# Patient Record
Sex: MALE | Race: WHITE | Employment: OTHER | URBAN - METROPOLITAN AREA
[De-identification: names, ages, dates, MRNs, and addresses within clinical notes are randomized per-mention and may not be internally consistent; named-entity substitution may affect disease eponyms.]

---

## 2017-02-09 ENCOUNTER — HOSPITAL ENCOUNTER (OUTPATIENT)
Dept: INTERVENTIONAL RADIOLOGY/VASCULAR | Age: 67
Discharge: HOME | End: 2017-02-09
Payer: COMMERCIAL

## 2017-02-09 VITALS
HEIGHT: 72 IN | HEART RATE: 62 BPM | SYSTOLIC BLOOD PRESSURE: 124 MMHG | RESPIRATION RATE: 18 BRPM | BODY MASS INDEX: 36.16 KG/M2 | DIASTOLIC BLOOD PRESSURE: 61 MMHG | TEMPERATURE: 97.5 F | WEIGHT: 267 LBS | OXYGEN SATURATION: 92 %

## 2017-02-09 DIAGNOSIS — N18.6 ESRD (END STAGE RENAL DISEASE) (HCC): ICD-10-CM

## 2017-02-09 LAB
INR BLD: 1
PARTIAL THROMBOPLASTIN TIME: 27.3 SEC (ref 23–31)
PLATELET # BLD: 135 K/UL (ref 150–450)
PROTHROMBIN TIME: 10.6 SEC (ref 9.7–12)

## 2017-02-09 PROCEDURE — 85049 AUTOMATED PLATELET COUNT: CPT

## 2017-02-09 PROCEDURE — 7100000010 HC PHASE II RECOVERY - FIRST 15 MIN

## 2017-02-09 PROCEDURE — 6360000004 HC RX CONTRAST MEDICATION: Performed by: SPECIALIST

## 2017-02-09 PROCEDURE — 76937 US GUIDE VASCULAR ACCESS: CPT | Performed by: RADIOLOGY

## 2017-02-09 PROCEDURE — 85730 THROMBOPLASTIN TIME PARTIAL: CPT

## 2017-02-09 PROCEDURE — 2580000003 HC RX 258: Performed by: RADIOLOGY

## 2017-02-09 PROCEDURE — 36415 COLL VENOUS BLD VENIPUNCTURE: CPT

## 2017-02-09 PROCEDURE — C1750 CATH, HEMODIALYSIS,LONG-TERM: HCPCS

## 2017-02-09 PROCEDURE — 36581 REPLACE TUNNELED CV CATH: CPT | Performed by: RADIOLOGY

## 2017-02-09 PROCEDURE — 6360000002 HC RX W HCPCS: Performed by: RADIOLOGY

## 2017-02-09 PROCEDURE — 2500000003 HC RX 250 WO HCPCS: Performed by: RADIOLOGY

## 2017-02-09 PROCEDURE — 7100000011 HC PHASE II RECOVERY - ADDTL 15 MIN

## 2017-02-09 PROCEDURE — 85610 PROTHROMBIN TIME: CPT

## 2017-02-09 PROCEDURE — 76000 FLUOROSCOPY <1 HR PHYS/QHP: CPT | Performed by: RADIOLOGY

## 2017-02-09 RX ORDER — FENTANYL CITRATE 50 UG/ML
INJECTION, SOLUTION INTRAMUSCULAR; INTRAVENOUS
Status: COMPLETED | OUTPATIENT
Start: 2017-02-09 | End: 2017-02-09

## 2017-02-09 RX ORDER — OXYCODONE AND ACETAMINOPHEN 10; 325 MG/1; MG/1
1 TABLET ORAL EVERY 6 HOURS PRN
Status: ON HOLD | COMMUNITY
End: 2018-06-22

## 2017-02-09 RX ORDER — LIDOCAINE HYDROCHLORIDE 20 MG/ML
INJECTION, SOLUTION INFILTRATION; PERINEURAL
Status: COMPLETED | OUTPATIENT
Start: 2017-02-09 | End: 2017-02-09

## 2017-02-09 RX ORDER — SODIUM CHLORIDE 0.9 % (FLUSH) 0.9 %
10 SYRINGE (ML) INJECTION PRN
Status: DISCONTINUED | OUTPATIENT
Start: 2017-02-09 | End: 2017-02-12 | Stop reason: HOSPADM

## 2017-02-09 RX ORDER — ACETAMINOPHEN 325 MG/1
650 TABLET ORAL EVERY 4 HOURS PRN
Status: DISCONTINUED | OUTPATIENT
Start: 2017-02-09 | End: 2017-02-12 | Stop reason: HOSPADM

## 2017-02-09 RX ORDER — ALPRAZOLAM 0.5 MG/1
0.5 TABLET ORAL
Status: ON HOLD | COMMUNITY
End: 2018-06-22 | Stop reason: HOSPADM

## 2017-02-09 RX ORDER — SODIUM CHLORIDE 9 MG/ML
INJECTION, SOLUTION INTRAVENOUS CONTINUOUS PRN
Status: COMPLETED | OUTPATIENT
Start: 2017-02-09 | End: 2017-02-09

## 2017-02-09 RX ADMIN — IOTHALAMATE MEGLUMINE 50 ML: 430 INJECTION INTRAVASCULAR at 11:22

## 2017-02-09 RX ADMIN — SODIUM CHLORIDE 50 ML/HR: 9 INJECTION, SOLUTION INTRAVENOUS at 10:49

## 2017-02-09 RX ADMIN — FENTANYL CITRATE 50 MCG: 50 INJECTION INTRAMUSCULAR; INTRAVENOUS at 10:52

## 2017-02-09 RX ADMIN — FENTANYL CITRATE 50 MCG: 50 INJECTION INTRAMUSCULAR; INTRAVENOUS at 10:56

## 2017-02-09 RX ADMIN — LIDOCAINE HYDROCHLORIDE 20 ML: 20 INJECTION, SOLUTION INFILTRATION; PERINEURAL at 10:52

## 2017-02-09 RX ADMIN — FENTANYL CITRATE 50 MCG: 50 INJECTION INTRAMUSCULAR; INTRAVENOUS at 10:49

## 2017-02-09 RX ADMIN — FENTANYL CITRATE 50 MCG: 50 INJECTION INTRAMUSCULAR; INTRAVENOUS at 11:03

## 2017-02-09 RX ADMIN — CEFAZOLIN 1 G: 1 INJECTION, POWDER, FOR SOLUTION INTRAMUSCULAR; INTRAVENOUS at 10:48

## 2017-02-09 ASSESSMENT — PAIN - FUNCTIONAL ASSESSMENT: PAIN_FUNCTIONAL_ASSESSMENT: 0-10

## 2017-02-09 ASSESSMENT — PAIN DESCRIPTION - DESCRIPTORS
DESCRIPTORS: DISCOMFORT;SHARP
DESCRIPTORS: DULL

## 2017-02-09 ASSESSMENT — PAIN DESCRIPTION - FREQUENCY: FREQUENCY: CONTINUOUS

## 2017-02-09 ASSESSMENT — PAIN DESCRIPTION - PAIN TYPE: TYPE: CHRONIC PAIN

## 2017-02-09 ASSESSMENT — PAIN SCALES - GENERAL: PAINLEVEL_OUTOF10: 6

## 2017-02-09 ASSESSMENT — PAIN DESCRIPTION - LOCATION: LOCATION: BACK

## 2017-05-25 ENCOUNTER — HOSPITAL ENCOUNTER (OUTPATIENT)
Dept: INTERVENTIONAL RADIOLOGY/VASCULAR | Age: 67
Discharge: HOME | End: 2017-05-25
Payer: COMMERCIAL

## 2017-06-06 ENCOUNTER — HOSPITAL ENCOUNTER (OUTPATIENT)
Dept: INTERVENTIONAL RADIOLOGY/VASCULAR | Age: 67
Discharge: HOME | End: 2017-06-06
Payer: COMMERCIAL

## 2017-06-06 VITALS — OXYGEN SATURATION: 96 % | HEART RATE: 62 BPM | SYSTOLIC BLOOD PRESSURE: 145 MMHG | DIASTOLIC BLOOD PRESSURE: 77 MMHG

## 2017-06-06 DIAGNOSIS — N18.6 ESRD (END STAGE RENAL DISEASE) (HCC): ICD-10-CM

## 2017-06-06 PROCEDURE — 2500000003 HC RX 250 WO HCPCS: Performed by: RADIOLOGY

## 2017-06-06 PROCEDURE — 36589 REMOVAL TUNNELED CV CATH: CPT | Performed by: RADIOLOGY

## 2017-06-06 RX ORDER — LIDOCAINE HYDROCHLORIDE 20 MG/ML
INJECTION, SOLUTION INFILTRATION; PERINEURAL
Status: COMPLETED | OUTPATIENT
Start: 2017-06-06 | End: 2017-06-06

## 2017-06-06 RX ORDER — ACETAMINOPHEN 325 MG/1
650 TABLET ORAL EVERY 4 HOURS PRN
Status: DISCONTINUED | OUTPATIENT
Start: 2017-06-06 | End: 2017-06-09 | Stop reason: HOSPADM

## 2017-06-06 RX ADMIN — LIDOCAINE HYDROCHLORIDE 3 ML: 20 INJECTION, SOLUTION INFILTRATION; PERINEURAL at 09:07

## 2017-06-06 RX ADMIN — LIDOCAINE HYDROCHLORIDE 7 ML: 20 INJECTION, SOLUTION INFILTRATION; PERINEURAL at 09:02

## 2017-10-19 ENCOUNTER — HOSPITAL ENCOUNTER (OUTPATIENT)
Age: 67
Setting detail: SPECIMEN
Discharge: HOME | End: 2017-10-19
Payer: COMMERCIAL

## 2017-10-19 LAB — POTASSIUM SERPL-SCNC: 4.3 MMOL/L (ref 3.7–5.3)

## 2017-10-19 PROCEDURE — P9603 ONE-WAY ALLOW PRORATED MILES: HCPCS

## 2017-10-19 PROCEDURE — 84132 ASSAY OF SERUM POTASSIUM: CPT

## 2017-10-19 PROCEDURE — 36415 COLL VENOUS BLD VENIPUNCTURE: CPT

## 2017-11-01 ENCOUNTER — HOSPITAL ENCOUNTER (OUTPATIENT)
Age: 67
Setting detail: SPECIMEN
Discharge: HOME | End: 2017-11-01
Payer: COMMERCIAL

## 2017-11-01 LAB
ANION GAP SERPL CALCULATED.3IONS-SCNC: 20 MMOL/L (ref 9–17)
BUN BLDV-MCNC: 61 MG/DL (ref 8–23)
BUN/CREAT BLD: ABNORMAL (ref 9–20)
CALCIUM SERPL-MCNC: 8.7 MG/DL (ref 8.6–10.4)
CHLORIDE BLD-SCNC: 103 MMOL/L (ref 98–107)
CO2: 24 MMOL/L (ref 20–31)
CREAT SERPL-MCNC: 6.08 MG/DL (ref 0.7–1.2)
GFR AFRICAN AMERICAN: 11 ML/MIN
GFR NON-AFRICAN AMERICAN: 9 ML/MIN
GFR SERPL CREATININE-BSD FRML MDRD: ABNORMAL ML/MIN/{1.73_M2}
GFR SERPL CREATININE-BSD FRML MDRD: ABNORMAL ML/MIN/{1.73_M2}
GLUCOSE BLD-MCNC: 98 MG/DL (ref 70–99)
HCT VFR BLD CALC: 35.5 % (ref 40.7–50.3)
HEMOGLOBIN: 10.3 G/DL (ref 13–17)
MAGNESIUM: 2.3 MG/DL (ref 1.6–2.6)
MCH RBC QN AUTO: 30.7 PG (ref 25.2–33.5)
MCHC RBC AUTO-ENTMCNC: 29 G/DL (ref 29.9–34.7)
MCV RBC AUTO: 106 FL (ref 82.6–102.9)
PDW BLD-RTO: 13.7 % (ref 11.8–14.4)
PLATELET # BLD: 94 K/UL (ref 138–453)
PMV BLD AUTO: 9.7 FL (ref 8.1–13.5)
POTASSIUM SERPL-SCNC: 4.5 MMOL/L (ref 3.7–5.3)
RBC # BLD: 3.35 M/UL (ref 4.21–5.77)
SODIUM BLD-SCNC: 147 MMOL/L (ref 135–144)
WBC # BLD: 5.6 K/UL (ref 3.5–11.3)

## 2017-11-01 PROCEDURE — 36415 COLL VENOUS BLD VENIPUNCTURE: CPT

## 2017-11-01 PROCEDURE — 85027 COMPLETE CBC AUTOMATED: CPT

## 2017-11-01 PROCEDURE — 80048 BASIC METABOLIC PNL TOTAL CA: CPT

## 2017-11-01 PROCEDURE — P9603 ONE-WAY ALLOW PRORATED MILES: HCPCS

## 2017-11-01 PROCEDURE — 83735 ASSAY OF MAGNESIUM: CPT

## 2017-11-15 ENCOUNTER — HOSPITAL ENCOUNTER (OUTPATIENT)
Age: 67
Setting detail: SPECIMEN
Discharge: HOME | End: 2017-11-15
Payer: COMMERCIAL

## 2017-11-15 LAB — MAGNESIUM: 2.1 MG/DL (ref 1.6–2.6)

## 2017-11-15 PROCEDURE — 36415 COLL VENOUS BLD VENIPUNCTURE: CPT

## 2017-11-15 PROCEDURE — P9603 ONE-WAY ALLOW PRORATED MILES: HCPCS

## 2017-11-15 PROCEDURE — 83735 ASSAY OF MAGNESIUM: CPT

## 2017-12-21 ENCOUNTER — HOSPITAL ENCOUNTER (OUTPATIENT)
Age: 67
Setting detail: SPECIMEN
Discharge: HOME | End: 2017-12-21
Payer: COMMERCIAL

## 2017-12-21 LAB
ANION GAP SERPL CALCULATED.3IONS-SCNC: 16 MMOL/L (ref 9–17)
BUN BLDV-MCNC: 36 MG/DL (ref 8–23)
BUN/CREAT BLD: ABNORMAL (ref 9–20)
CALCIUM SERPL-MCNC: 8.2 MG/DL (ref 8.6–10.4)
CHLORIDE BLD-SCNC: 104 MMOL/L (ref 98–107)
CO2: 28 MMOL/L (ref 20–31)
CREAT SERPL-MCNC: 4.7 MG/DL (ref 0.7–1.2)
ESTIMATED AVERAGE GLUCOSE: 94 MG/DL
GFR AFRICAN AMERICAN: 15 ML/MIN
GFR NON-AFRICAN AMERICAN: 12 ML/MIN
GFR SERPL CREATININE-BSD FRML MDRD: ABNORMAL ML/MIN/{1.73_M2}
GFR SERPL CREATININE-BSD FRML MDRD: ABNORMAL ML/MIN/{1.73_M2}
GLUCOSE BLD-MCNC: 114 MG/DL (ref 70–99)
HBA1C MFR BLD: 4.9 % (ref 4–6)
POTASSIUM SERPL-SCNC: 3.7 MMOL/L (ref 3.7–5.3)
SODIUM BLD-SCNC: 148 MMOL/L (ref 135–144)

## 2017-12-21 PROCEDURE — P9603 ONE-WAY ALLOW PRORATED MILES: HCPCS

## 2017-12-21 PROCEDURE — 80048 BASIC METABOLIC PNL TOTAL CA: CPT

## 2017-12-21 PROCEDURE — 36415 COLL VENOUS BLD VENIPUNCTURE: CPT

## 2017-12-21 PROCEDURE — 83036 HEMOGLOBIN GLYCOSYLATED A1C: CPT

## 2018-01-12 ENCOUNTER — HOSPITAL ENCOUNTER (OUTPATIENT)
Age: 68
Setting detail: SPECIMEN
Discharge: HOME | End: 2018-01-12
Payer: COMMERCIAL

## 2018-01-12 LAB
DIRECT EXAM: ABNORMAL
DIRECT EXAM: ABNORMAL
Lab: ABNORMAL
SPECIMEN DESCRIPTION: ABNORMAL
SPECIMEN DESCRIPTION: ABNORMAL
STATUS: ABNORMAL

## 2018-01-12 PROCEDURE — 87804 INFLUENZA ASSAY W/OPTIC: CPT

## 2018-01-12 PROCEDURE — 82565 ASSAY OF CREATININE: CPT

## 2018-01-12 PROCEDURE — 36415 COLL VENOUS BLD VENIPUNCTURE: CPT

## 2018-01-13 LAB
CREAT SERPL-MCNC: 3.9 MG/DL (ref 0.7–1.2)
GFR AFRICAN AMERICAN: 19 ML/MIN
GFR NON-AFRICAN AMERICAN: 15 ML/MIN
GFR SERPL CREATININE-BSD FRML MDRD: ABNORMAL ML/MIN/{1.73_M2}
GFR SERPL CREATININE-BSD FRML MDRD: ABNORMAL ML/MIN/{1.73_M2}

## 2018-02-26 ENCOUNTER — HOSPITAL ENCOUNTER (OUTPATIENT)
Age: 68
Setting detail: SPECIMEN
Discharge: HOME | End: 2018-02-26
Payer: COMMERCIAL

## 2018-02-26 PROCEDURE — 87205 SMEAR GRAM STAIN: CPT

## 2018-02-26 PROCEDURE — 87070 CULTURE OTHR SPECIMN AEROBIC: CPT

## 2018-02-28 LAB
CULTURE: ABNORMAL
CULTURE: ABNORMAL
DIRECT EXAM: ABNORMAL
Lab: ABNORMAL
SPECIMEN DESCRIPTION: ABNORMAL
STATUS: ABNORMAL

## 2018-06-11 ENCOUNTER — HOSPITAL ENCOUNTER (OUTPATIENT)
Dept: VASCULAR LAB | Age: 68
Discharge: HOME | End: 2018-06-11
Payer: COMMERCIAL

## 2018-06-11 PROCEDURE — 93923 UPR/LXTR ART STDY 3+ LVLS: CPT

## 2018-06-11 PROCEDURE — 93971 EXTREMITY STUDY: CPT

## 2018-06-18 ENCOUNTER — HOSPITAL ENCOUNTER (INPATIENT)
Age: 68
LOS: 2 days | Discharge: NURSING, REHAB OR LONG-TERM CARE | DRG: 602 | End: 2018-06-21
Attending: EMERGENCY MEDICINE | Admitting: INTERNAL MEDICINE
Payer: COMMERCIAL

## 2018-06-18 ENCOUNTER — APPOINTMENT (OUTPATIENT)
Dept: GENERAL RADIOLOGY | Age: 68
DRG: 602 | End: 2018-06-18
Payer: COMMERCIAL

## 2018-06-18 DIAGNOSIS — F41.9 ANXIETY: ICD-10-CM

## 2018-06-18 DIAGNOSIS — I50.9 ACUTE ON CHRONIC CONGESTIVE HEART FAILURE, UNSPECIFIED CONGESTIVE HEART FAILURE TYPE: ICD-10-CM

## 2018-06-18 DIAGNOSIS — L03.115 CELLULITIS OF RIGHT LOWER EXTREMITY: Primary | ICD-10-CM

## 2018-06-18 DIAGNOSIS — N18.6 END STAGE RENAL DISEASE (HCC): ICD-10-CM

## 2018-06-18 LAB
ABSOLUTE EOS #: 0 K/UL (ref 0–0.4)
ABSOLUTE IMMATURE GRANULOCYTE: ABNORMAL K/UL (ref 0–0.3)
ABSOLUTE LYMPH #: 0.8 K/UL (ref 1–4.8)
ABSOLUTE MONO #: 0.3 K/UL (ref 0.1–1.3)
ALBUMIN SERPL-MCNC: 3.6 G/DL (ref 3.5–5.2)
ALBUMIN/GLOBULIN RATIO: ABNORMAL (ref 1–2.5)
ALLEN TEST: ABNORMAL
ALP BLD-CCNC: 157 U/L (ref 40–129)
ALT SERPL-CCNC: 17 U/L (ref 5–41)
ANION GAP SERPL CALCULATED.3IONS-SCNC: 12 MMOL/L (ref 9–17)
AST SERPL-CCNC: 12 U/L
BASOPHILS # BLD: 1 % (ref 0–2)
BASOPHILS ABSOLUTE: 0 K/UL (ref 0–0.2)
BILIRUB SERPL-MCNC: 0.41 MG/DL (ref 0.3–1.2)
BUN BLDV-MCNC: 34 MG/DL (ref 8–23)
BUN/CREAT BLD: ABNORMAL (ref 9–20)
CALCIUM SERPL-MCNC: 8.3 MG/DL (ref 8.6–10.4)
CARBOXYHEMOGLOBIN: 2.7 % (ref 0–5)
CHLORIDE BLD-SCNC: 99 MMOL/L (ref 98–107)
CO2: 31 MMOL/L (ref 20–31)
CREAT SERPL-MCNC: 3.82 MG/DL (ref 0.7–1.2)
DIFFERENTIAL TYPE: ABNORMAL
EKG ATRIAL RATE: 78 BPM
EKG P AXIS: 11 DEGREES
EKG P-R INTERVAL: 132 MS
EKG Q-T INTERVAL: 460 MS
EKG QRS DURATION: 144 MS
EKG QTC CALCULATION (BAZETT): 524 MS
EKG R AXIS: -39 DEGREES
EKG T AXIS: 30 DEGREES
EKG VENTRICULAR RATE: 78 BPM
EOSINOPHILS RELATIVE PERCENT: 1 % (ref 0–4)
FIO2: ABNORMAL
GFR AFRICAN AMERICAN: 19 ML/MIN
GFR NON-AFRICAN AMERICAN: 16 ML/MIN
GFR SERPL CREATININE-BSD FRML MDRD: ABNORMAL ML/MIN/{1.73_M2}
GFR SERPL CREATININE-BSD FRML MDRD: ABNORMAL ML/MIN/{1.73_M2}
GLUCOSE BLD-MCNC: 140 MG/DL (ref 70–99)
HCO3 ARTERIAL: 32.1 MMOL/L (ref 22–26)
HCT VFR BLD CALC: 29.3 % (ref 41–53)
HEMOGLOBIN: 9.2 G/DL (ref 13.5–17.5)
IMMATURE GRANULOCYTES: ABNORMAL %
LACTIC ACID: 1.2 MMOL/L (ref 0.5–2.2)
LYMPHOCYTES # BLD: 11 % (ref 24–44)
MCH RBC QN AUTO: 28.9 PG (ref 26–34)
MCHC RBC AUTO-ENTMCNC: 31.3 G/DL (ref 31–37)
MCV RBC AUTO: 92.2 FL (ref 80–100)
METHEMOGLOBIN: 0.7 % (ref 0–1.9)
MODE: ABNORMAL
MONOCYTES # BLD: 4 % (ref 1–7)
NEGATIVE BASE EXCESS, ART: ABNORMAL MMOL/L (ref 0–2)
NOTIFICATION TIME: ABNORMAL
NOTIFICATION: ABNORMAL
NRBC AUTOMATED: ABNORMAL PER 100 WBC
O2 DEVICE/FLOW/%: ABNORMAL
O2 SAT, ARTERIAL: 86 % (ref 95–98)
OXYHEMOGLOBIN: ABNORMAL % (ref 95–98)
PATIENT TEMP: 37
PCO2 ARTERIAL: 48.2 MMHG (ref 35–45)
PCO2, ART, TEMP ADJ: ABNORMAL (ref 35–45)
PDW BLD-RTO: 16.1 % (ref 11.5–14.9)
PEEP/CPAP: ABNORMAL
PH ARTERIAL: 7.43 (ref 7.35–7.45)
PH, ART, TEMP ADJ: ABNORMAL (ref 7.35–7.45)
PLATELET # BLD: 121 K/UL (ref 150–450)
PLATELET ESTIMATE: ABNORMAL
PMV BLD AUTO: 6.9 FL (ref 6–12)
PO2 ARTERIAL: 59.1 MMHG (ref 80–100)
PO2, ART, TEMP ADJ: ABNORMAL MMHG (ref 80–100)
POSITIVE BASE EXCESS, ART: 7.8 MMOL/L (ref 0–2)
POTASSIUM SERPL-SCNC: 3.8 MMOL/L (ref 3.7–5.3)
PSV: ABNORMAL
PT. POSITION: ABNORMAL
RBC # BLD: 3.17 M/UL (ref 4.5–5.9)
RBC # BLD: ABNORMAL 10*6/UL
RESPIRATORY RATE: 16
SAMPLE SITE: ABNORMAL
SEG NEUTROPHILS: 83 % (ref 36–66)
SEGMENTED NEUTROPHILS ABSOLUTE COUNT: 6.3 K/UL (ref 1.3–9.1)
SET RATE: ABNORMAL
SODIUM BLD-SCNC: 142 MMOL/L (ref 135–144)
TEXT FOR RESPIRATORY: ABNORMAL
TOTAL HB: ABNORMAL G/DL (ref 12–16)
TOTAL PROTEIN: 6.1 G/DL (ref 6.4–8.3)
TOTAL RATE: ABNORMAL
TROPONIN INTERP: ABNORMAL
TROPONIN INTERP: ABNORMAL
TROPONIN T: 0.14 NG/ML
TROPONIN T: 0.14 NG/ML
VT: ABNORMAL
WBC # BLD: 7.6 K/UL (ref 3.5–11)
WBC # BLD: ABNORMAL 10*3/UL

## 2018-06-18 PROCEDURE — 84484 ASSAY OF TROPONIN QUANT: CPT

## 2018-06-18 PROCEDURE — 82805 BLOOD GASES W/O2 SATURATION: CPT

## 2018-06-18 PROCEDURE — 96374 THER/PROPH/DIAG INJ IV PUSH: CPT

## 2018-06-18 PROCEDURE — 6360000002 HC RX W HCPCS: Performed by: EMERGENCY MEDICINE

## 2018-06-18 PROCEDURE — 71045 X-RAY EXAM CHEST 1 VIEW: CPT

## 2018-06-18 PROCEDURE — 87040 BLOOD CULTURE FOR BACTERIA: CPT

## 2018-06-18 PROCEDURE — 36415 COLL VENOUS BLD VENIPUNCTURE: CPT

## 2018-06-18 PROCEDURE — 80053 COMPREHEN METABOLIC PANEL: CPT

## 2018-06-18 PROCEDURE — 93005 ELECTROCARDIOGRAM TRACING: CPT

## 2018-06-18 PROCEDURE — 83605 ASSAY OF LACTIC ACID: CPT

## 2018-06-18 PROCEDURE — 36600 WITHDRAWAL OF ARTERIAL BLOOD: CPT

## 2018-06-18 PROCEDURE — 85025 COMPLETE CBC W/AUTO DIFF WBC: CPT

## 2018-06-18 PROCEDURE — 96375 TX/PRO/DX INJ NEW DRUG ADDON: CPT

## 2018-06-18 PROCEDURE — 99285 EMERGENCY DEPT VISIT HI MDM: CPT

## 2018-06-18 RX ORDER — RAMELTEON 8 MG/1
8 TABLET ORAL NIGHTLY
COMMUNITY

## 2018-06-18 RX ORDER — ACETAMINOPHEN 325 MG/1
650 TABLET ORAL EVERY 6 HOURS PRN
COMMUNITY
End: 2018-08-19

## 2018-06-18 RX ORDER — MIDODRINE HYDROCHLORIDE 5 MG/1
5 TABLET ORAL
COMMUNITY

## 2018-06-18 RX ORDER — MORPHINE SULFATE 4 MG/ML
4 INJECTION, SOLUTION INTRAMUSCULAR; INTRAVENOUS ONCE
Status: COMPLETED | OUTPATIENT
Start: 2018-06-18 | End: 2018-06-18

## 2018-06-18 RX ORDER — DOXYCYCLINE HYCLATE 100 MG
100 TABLET ORAL 2 TIMES DAILY
Status: ON HOLD | COMMUNITY
End: 2018-06-22 | Stop reason: HOSPADM

## 2018-06-18 RX ORDER — FENTANYL CITRATE 50 UG/ML
50 INJECTION, SOLUTION INTRAMUSCULAR; INTRAVENOUS ONCE
Status: COMPLETED | OUTPATIENT
Start: 2018-06-18 | End: 2018-06-18

## 2018-06-18 RX ORDER — DIPHENHYDRAMINE HCL 25 MG
25 TABLET ORAL EVERY 8 HOURS PRN
Status: ON HOLD | COMMUNITY
End: 2020-01-01 | Stop reason: HOSPADM

## 2018-06-18 RX ADMIN — MORPHINE SULFATE 4 MG: 4 INJECTION INTRAVENOUS at 21:07

## 2018-06-18 RX ADMIN — FENTANYL CITRATE 50 MCG: 50 INJECTION INTRAMUSCULAR; INTRAVENOUS at 20:13

## 2018-06-18 ASSESSMENT — PAIN SCALES - GENERAL
PAINLEVEL_OUTOF10: 6
PAINLEVEL_OUTOF10: 5
PAINLEVEL_OUTOF10: 5

## 2018-06-18 ASSESSMENT — PAIN DESCRIPTION - FREQUENCY: FREQUENCY: CONTINUOUS

## 2018-06-18 ASSESSMENT — PAIN DESCRIPTION - ORIENTATION: ORIENTATION: MID

## 2018-06-18 ASSESSMENT — PAIN DESCRIPTION - DESCRIPTORS: DESCRIPTORS: STABBING;PRESSURE

## 2018-06-18 ASSESSMENT — PAIN DESCRIPTION - LOCATION: LOCATION: CHEST

## 2018-06-18 ASSESSMENT — PAIN DESCRIPTION - PAIN TYPE: TYPE: ACUTE PAIN

## 2018-06-19 LAB
ANION GAP SERPL CALCULATED.3IONS-SCNC: 9 MMOL/L (ref 9–17)
BUN BLDV-MCNC: 42 MG/DL (ref 8–23)
BUN/CREAT BLD: ABNORMAL (ref 9–20)
CALCIUM SERPL-MCNC: 8.8 MG/DL (ref 8.6–10.4)
CHLORIDE BLD-SCNC: 99 MMOL/L (ref 98–107)
CO2: 33 MMOL/L (ref 20–31)
CREAT SERPL-MCNC: 4.59 MG/DL (ref 0.7–1.2)
GFR AFRICAN AMERICAN: 16 ML/MIN
GFR NON-AFRICAN AMERICAN: 13 ML/MIN
GFR SERPL CREATININE-BSD FRML MDRD: ABNORMAL ML/MIN/{1.73_M2}
GFR SERPL CREATININE-BSD FRML MDRD: ABNORMAL ML/MIN/{1.73_M2}
GLUCOSE BLD-MCNC: 106 MG/DL (ref 75–110)
GLUCOSE BLD-MCNC: 149 MG/DL (ref 75–110)
GLUCOSE BLD-MCNC: 84 MG/DL (ref 75–110)
GLUCOSE BLD-MCNC: 96 MG/DL (ref 75–110)
GLUCOSE BLD-MCNC: 98 MG/DL (ref 70–99)
HCT VFR BLD CALC: 27.7 % (ref 41–53)
HEMOGLOBIN: 8.8 G/DL (ref 13.5–17.5)
MAGNESIUM: 2 MG/DL (ref 1.6–2.6)
MCH RBC QN AUTO: 29.4 PG (ref 26–34)
MCHC RBC AUTO-ENTMCNC: 31.7 G/DL (ref 31–37)
MCV RBC AUTO: 92.6 FL (ref 80–100)
NRBC AUTOMATED: ABNORMAL PER 100 WBC
PDW BLD-RTO: 16.4 % (ref 11.5–14.9)
PHOSPHORUS: 4.6 MG/DL (ref 2.5–4.5)
PLATELET # BLD: 116 K/UL (ref 150–450)
PMV BLD AUTO: 6.8 FL (ref 6–12)
POTASSIUM SERPL-SCNC: 4.5 MMOL/L (ref 3.7–5.3)
RBC # BLD: 2.99 M/UL (ref 4.5–5.9)
SODIUM BLD-SCNC: 141 MMOL/L (ref 135–144)
WBC # BLD: 6.2 K/UL (ref 3.5–11)

## 2018-06-19 PROCEDURE — 2580000003 HC RX 258: Performed by: INTERNAL MEDICINE

## 2018-06-19 PROCEDURE — 83735 ASSAY OF MAGNESIUM: CPT

## 2018-06-19 PROCEDURE — 94761 N-INVAS EAR/PLS OXIMETRY MLT: CPT

## 2018-06-19 PROCEDURE — 85027 COMPLETE CBC AUTOMATED: CPT

## 2018-06-19 PROCEDURE — 6370000000 HC RX 637 (ALT 250 FOR IP): Performed by: INTERNAL MEDICINE

## 2018-06-19 PROCEDURE — 6360000002 HC RX W HCPCS: Performed by: INTERNAL MEDICINE

## 2018-06-19 PROCEDURE — 99223 1ST HOSP IP/OBS HIGH 75: CPT | Performed by: INTERNAL MEDICINE

## 2018-06-19 PROCEDURE — 2580000003 HC RX 258: Performed by: EMERGENCY MEDICINE

## 2018-06-19 PROCEDURE — 94664 DEMO&/EVAL PT USE INHALER: CPT

## 2018-06-19 PROCEDURE — 80048 BASIC METABOLIC PNL TOTAL CA: CPT

## 2018-06-19 PROCEDURE — 84100 ASSAY OF PHOSPHORUS: CPT

## 2018-06-19 PROCEDURE — 6360000002 HC RX W HCPCS: Performed by: EMERGENCY MEDICINE

## 2018-06-19 PROCEDURE — 1200000000 HC SEMI PRIVATE

## 2018-06-19 PROCEDURE — 94640 AIRWAY INHALATION TREATMENT: CPT

## 2018-06-19 PROCEDURE — 36415 COLL VENOUS BLD VENIPUNCTURE: CPT

## 2018-06-19 PROCEDURE — 82947 ASSAY GLUCOSE BLOOD QUANT: CPT

## 2018-06-19 RX ORDER — CETIRIZINE HYDROCHLORIDE 10 MG/1
5 TABLET ORAL DAILY
Status: DISCONTINUED | OUTPATIENT
Start: 2018-06-19 | End: 2018-06-22 | Stop reason: HOSPADM

## 2018-06-19 RX ORDER — MORPHINE SULFATE 2 MG/ML
2 INJECTION, SOLUTION INTRAMUSCULAR; INTRAVENOUS
Status: DISCONTINUED | OUTPATIENT
Start: 2018-06-19 | End: 2018-06-22 | Stop reason: HOSPADM

## 2018-06-19 RX ORDER — MIDODRINE HYDROCHLORIDE 5 MG/1
5 TABLET ORAL
Status: DISCONTINUED | OUTPATIENT
Start: 2018-06-20 | End: 2018-06-22 | Stop reason: HOSPADM

## 2018-06-19 RX ORDER — NITROGLYCERIN 0.4 MG/1
0.4 TABLET SUBLINGUAL EVERY 5 MIN PRN
Status: DISCONTINUED | OUTPATIENT
Start: 2018-06-19 | End: 2018-06-22 | Stop reason: HOSPADM

## 2018-06-19 RX ORDER — GUAIFENESIN 400 MG/1
400 TABLET ORAL 4 TIMES DAILY PRN
Status: ON HOLD | COMMUNITY
End: 2018-06-22 | Stop reason: HOSPADM

## 2018-06-19 RX ORDER — POLYETHYLENE GLYCOL 3350 17 G/17G
17 POWDER, FOR SOLUTION ORAL DAILY
Status: DISCONTINUED | OUTPATIENT
Start: 2018-06-19 | End: 2018-06-19

## 2018-06-19 RX ORDER — SODIUM CHLORIDE 0.9 % (FLUSH) 0.9 %
10 SYRINGE (ML) INJECTION EVERY 12 HOURS SCHEDULED
Status: DISCONTINUED | OUTPATIENT
Start: 2018-06-19 | End: 2018-06-22 | Stop reason: HOSPADM

## 2018-06-19 RX ORDER — OXYCODONE HYDROCHLORIDE 15 MG/1
15 TABLET ORAL EVERY 4 HOURS PRN
Status: ON HOLD | COMMUNITY
End: 2018-06-22 | Stop reason: HOSPADM

## 2018-06-19 RX ORDER — INSULIN GLARGINE 100 [IU]/ML
40 INJECTION, SOLUTION SUBCUTANEOUS NIGHTLY
Status: DISCONTINUED | OUTPATIENT
Start: 2018-06-19 | End: 2018-06-22 | Stop reason: HOSPADM

## 2018-06-19 RX ORDER — DIPHENHYDRAMINE HCL 25 MG
25 TABLET ORAL EVERY 12 HOURS PRN
Status: DISCONTINUED | OUTPATIENT
Start: 2018-06-19 | End: 2018-06-22 | Stop reason: HOSPADM

## 2018-06-19 RX ORDER — ACETAMINOPHEN 325 MG/1
650 TABLET ORAL EVERY 6 HOURS PRN
Status: DISCONTINUED | OUTPATIENT
Start: 2018-06-19 | End: 2018-06-19

## 2018-06-19 RX ORDER — DEXTROSE MONOHYDRATE 25 G/50ML
12.5 INJECTION, SOLUTION INTRAVENOUS PRN
Status: DISCONTINUED | OUTPATIENT
Start: 2018-06-19 | End: 2018-06-22 | Stop reason: HOSPADM

## 2018-06-19 RX ORDER — MORPHINE SULFATE 2 MG/ML
4 INJECTION, SOLUTION INTRAMUSCULAR; INTRAVENOUS
Status: DISCONTINUED | OUTPATIENT
Start: 2018-06-19 | End: 2018-06-22 | Stop reason: HOSPADM

## 2018-06-19 RX ORDER — ISOSORBIDE MONONITRATE 60 MG/1
60 TABLET, EXTENDED RELEASE ORAL DAILY
Status: DISCONTINUED | OUTPATIENT
Start: 2018-06-19 | End: 2018-06-22 | Stop reason: HOSPADM

## 2018-06-19 RX ORDER — POLYETHYLENE GLYCOL 3350 17 G/17G
17 POWDER, FOR SOLUTION ORAL DAILY
Status: DISCONTINUED | OUTPATIENT
Start: 2018-06-19 | End: 2018-06-22 | Stop reason: HOSPADM

## 2018-06-19 RX ORDER — ALPRAZOLAM 0.5 MG/1
0.5 TABLET ORAL 2 TIMES DAILY PRN
Status: DISCONTINUED | OUTPATIENT
Start: 2018-06-19 | End: 2018-06-22 | Stop reason: HOSPADM

## 2018-06-19 RX ORDER — ONDANSETRON 4 MG/1
4 TABLET, ORALLY DISINTEGRATING ORAL EVERY 8 HOURS PRN
Status: DISCONTINUED | OUTPATIENT
Start: 2018-06-19 | End: 2018-06-22 | Stop reason: HOSPADM

## 2018-06-19 RX ORDER — TAMSULOSIN HYDROCHLORIDE 0.4 MG/1
0.4 CAPSULE ORAL DAILY
Status: DISCONTINUED | OUTPATIENT
Start: 2018-06-19 | End: 2018-06-22 | Stop reason: HOSPADM

## 2018-06-19 RX ORDER — PANTOPRAZOLE SODIUM 40 MG/1
40 TABLET, DELAYED RELEASE ORAL
Status: DISCONTINUED | OUTPATIENT
Start: 2018-06-20 | End: 2018-06-22 | Stop reason: HOSPADM

## 2018-06-19 RX ORDER — ALPRAZOLAM 0.5 MG/1
0.5 TABLET ORAL
Status: DISCONTINUED | OUTPATIENT
Start: 2018-06-20 | End: 2018-06-19

## 2018-06-19 RX ORDER — SENNA PLUS 8.6 MG/1
1 TABLET ORAL 2 TIMES DAILY
Status: DISCONTINUED | OUTPATIENT
Start: 2018-06-19 | End: 2018-06-22 | Stop reason: HOSPADM

## 2018-06-19 RX ORDER — SODIUM CHLORIDE 0.9 % (FLUSH) 0.9 %
10 SYRINGE (ML) INJECTION PRN
Status: DISCONTINUED | OUTPATIENT
Start: 2018-06-19 | End: 2018-06-22 | Stop reason: HOSPADM

## 2018-06-19 RX ORDER — FLUTICASONE PROPIONATE 50 MCG
1 SPRAY, SUSPENSION (ML) NASAL DAILY
Status: DISCONTINUED | OUTPATIENT
Start: 2018-06-19 | End: 2018-06-22 | Stop reason: HOSPADM

## 2018-06-19 RX ORDER — IPRATROPIUM BROMIDE AND ALBUTEROL SULFATE 2.5; .5 MG/3ML; MG/3ML
3 SOLUTION RESPIRATORY (INHALATION)
Status: DISCONTINUED | OUTPATIENT
Start: 2018-06-19 | End: 2018-06-22 | Stop reason: HOSPADM

## 2018-06-19 RX ORDER — CALCIUM CARBONATE 200(500)MG
1 TABLET,CHEWABLE ORAL EVERY 8 HOURS PRN
Status: DISCONTINUED | OUTPATIENT
Start: 2018-06-19 | End: 2018-06-22 | Stop reason: HOSPADM

## 2018-06-19 RX ORDER — HEPARIN SODIUM 5000 [USP'U]/ML
5000 INJECTION, SOLUTION INTRAVENOUS; SUBCUTANEOUS EVERY 8 HOURS SCHEDULED
Status: DISCONTINUED | OUTPATIENT
Start: 2018-06-19 | End: 2018-06-22 | Stop reason: HOSPADM

## 2018-06-19 RX ORDER — DIPHENHYDRAMINE HCL 25 MG
25 TABLET ORAL EVERY 6 HOURS PRN
Status: CANCELLED | OUTPATIENT
Start: 2018-06-19

## 2018-06-19 RX ORDER — ASPIRIN 81 MG/1
81 TABLET, CHEWABLE ORAL DAILY
Status: DISCONTINUED | OUTPATIENT
Start: 2018-06-19 | End: 2018-06-22 | Stop reason: HOSPADM

## 2018-06-19 RX ORDER — ACETAMINOPHEN 325 MG/1
650 TABLET ORAL EVERY 4 HOURS PRN
Status: DISCONTINUED | OUTPATIENT
Start: 2018-06-19 | End: 2018-06-22 | Stop reason: HOSPADM

## 2018-06-19 RX ORDER — CALCIUM CARBONATE 200(500)MG
1 TABLET,CHEWABLE ORAL EVERY 8 HOURS PRN
Status: ON HOLD | COMMUNITY
End: 2019-05-07 | Stop reason: ALTCHOICE

## 2018-06-19 RX ORDER — FINASTERIDE 5 MG/1
5 TABLET, FILM COATED ORAL DAILY
Status: DISCONTINUED | OUTPATIENT
Start: 2018-06-19 | End: 2018-06-22 | Stop reason: HOSPADM

## 2018-06-19 RX ORDER — SEVELAMER CARBONATE 800 MG/1
2400 TABLET, FILM COATED ORAL
Status: DISCONTINUED | OUTPATIENT
Start: 2018-06-19 | End: 2018-06-22 | Stop reason: HOSPADM

## 2018-06-19 RX ORDER — NICOTINE POLACRILEX 4 MG
15 LOZENGE BUCCAL PRN
Status: DISCONTINUED | OUTPATIENT
Start: 2018-06-19 | End: 2018-06-22 | Stop reason: HOSPADM

## 2018-06-19 RX ORDER — OXYCODONE HYDROCHLORIDE 5 MG/1
15 TABLET ORAL EVERY 4 HOURS PRN
Status: CANCELLED | OUTPATIENT
Start: 2018-06-19

## 2018-06-19 RX ORDER — DEXTROSE MONOHYDRATE 50 MG/ML
100 INJECTION, SOLUTION INTRAVENOUS PRN
Status: DISCONTINUED | OUTPATIENT
Start: 2018-06-19 | End: 2018-06-22 | Stop reason: HOSPADM

## 2018-06-19 RX ORDER — ESCITALOPRAM OXALATE 10 MG/1
10 TABLET ORAL EVERY MORNING
Status: DISCONTINUED | OUTPATIENT
Start: 2018-06-19 | End: 2018-06-22 | Stop reason: HOSPADM

## 2018-06-19 RX ADMIN — VANCOMYCIN HYDROCHLORIDE 1750 MG: 10 INJECTION, POWDER, LYOPHILIZED, FOR SOLUTION INTRAVENOUS at 00:13

## 2018-06-19 RX ADMIN — MORPHINE SULFATE 4 MG: 2 INJECTION, SOLUTION INTRAMUSCULAR; INTRAVENOUS at 08:32

## 2018-06-19 RX ADMIN — TAMSULOSIN HYDROCHLORIDE 0.4 MG: 0.4 CAPSULE ORAL at 10:15

## 2018-06-19 RX ADMIN — HEPARIN SODIUM 5000 UNITS: 5000 INJECTION, SOLUTION INTRAVENOUS; SUBCUTANEOUS at 21:14

## 2018-06-19 RX ADMIN — ASPIRIN 81 MG 81 MG: 81 TABLET ORAL at 10:15

## 2018-06-19 RX ADMIN — MORPHINE SULFATE 4 MG: 2 INJECTION, SOLUTION INTRAMUSCULAR; INTRAVENOUS at 06:28

## 2018-06-19 RX ADMIN — Medication 10 ML: at 21:14

## 2018-06-19 RX ADMIN — FLUTICASONE PROPIONATE 1 SPRAY: 50 SPRAY, METERED NASAL at 16:57

## 2018-06-19 RX ADMIN — SEVELAMER CARBONATE 2400 MG: 800 TABLET, FILM COATED ORAL at 11:48

## 2018-06-19 RX ADMIN — DIPHENHYDRAMINE HCL 25 MG: 25 TABLET ORAL at 08:34

## 2018-06-19 RX ADMIN — IPRATROPIUM BROMIDE AND ALBUTEROL SULFATE 3 ML: .5; 3 SOLUTION RESPIRATORY (INHALATION) at 19:56

## 2018-06-19 RX ADMIN — HEPARIN SODIUM 5000 UNITS: 5000 INJECTION, SOLUTION INTRAVENOUS; SUBCUTANEOUS at 16:57

## 2018-06-19 RX ADMIN — SEVELAMER CARBONATE 2400 MG: 800 TABLET, FILM COATED ORAL at 16:57

## 2018-06-19 RX ADMIN — DIPHENHYDRAMINE HCL 25 MG: 25 TABLET ORAL at 21:27

## 2018-06-19 RX ADMIN — MORPHINE SULFATE 4 MG: 2 INJECTION, SOLUTION INTRAMUSCULAR; INTRAVENOUS at 10:22

## 2018-06-19 RX ADMIN — ISOSORBIDE MONONITRATE 60 MG: 60 TABLET, EXTENDED RELEASE ORAL at 10:15

## 2018-06-19 RX ADMIN — ESCITALOPRAM OXALATE 10 MG: 20 TABLET ORAL at 10:15

## 2018-06-19 RX ADMIN — FINASTERIDE 5 MG: 5 TABLET, FILM COATED ORAL at 10:15

## 2018-06-19 RX ADMIN — INSULIN LISPRO 1 UNITS: 100 INJECTION, SOLUTION INTRAVENOUS; SUBCUTANEOUS at 11:47

## 2018-06-19 RX ADMIN — CETIRIZINE HYDROCHLORIDE 5 MG: 10 TABLET, FILM COATED ORAL at 10:15

## 2018-06-19 RX ADMIN — Medication 1 MG: at 21:13

## 2018-06-19 RX ADMIN — IPRATROPIUM BROMIDE AND ALBUTEROL SULFATE 3 ML: .5; 3 SOLUTION RESPIRATORY (INHALATION) at 11:15

## 2018-06-19 RX ADMIN — ALPRAZOLAM 0.5 MG: 0.5 TABLET ORAL at 10:15

## 2018-06-19 RX ADMIN — MORPHINE SULFATE 4 MG: 2 INJECTION, SOLUTION INTRAMUSCULAR; INTRAVENOUS at 03:44

## 2018-06-19 RX ADMIN — MORPHINE SULFATE 4 MG: 2 INJECTION, SOLUTION INTRAMUSCULAR; INTRAVENOUS at 21:24

## 2018-06-19 RX ADMIN — SENNOSIDES 8.6 MG: 8.6 TABLET, FILM COATED ORAL at 21:13

## 2018-06-19 RX ADMIN — Medication 10 ML: at 10:15

## 2018-06-19 RX ADMIN — SENNOSIDES 8.6 MG: 8.6 TABLET, FILM COATED ORAL at 10:15

## 2018-06-19 ASSESSMENT — PAIN SCALES - GENERAL
PAINLEVEL_OUTOF10: 7
PAINLEVEL_OUTOF10: 6
PAINLEVEL_OUTOF10: 7
PAINLEVEL_OUTOF10: 0
PAINLEVEL_OUTOF10: 5
PAINLEVEL_OUTOF10: 8
PAINLEVEL_OUTOF10: 5
PAINLEVEL_OUTOF10: 8
PAINLEVEL_OUTOF10: 7
PAINLEVEL_OUTOF10: 0

## 2018-06-19 ASSESSMENT — ENCOUNTER SYMPTOMS
COUGH: 1
SHORTNESS OF BREATH: 1
NAUSEA: 0
COLOR CHANGE: 1
ABDOMINAL PAIN: 0

## 2018-06-19 ASSESSMENT — PAIN DESCRIPTION - FREQUENCY: FREQUENCY: CONTINUOUS

## 2018-06-19 ASSESSMENT — PAIN DESCRIPTION - LOCATION
LOCATION: LEG;HIP;CHEST
LOCATION: LEG

## 2018-06-19 ASSESSMENT — PAIN DESCRIPTION - ORIENTATION
ORIENTATION: RIGHT
ORIENTATION: RIGHT

## 2018-06-19 ASSESSMENT — PAIN DESCRIPTION - PAIN TYPE
TYPE: ACUTE PAIN

## 2018-06-20 LAB
ANION GAP SERPL CALCULATED.3IONS-SCNC: 13 MMOL/L (ref 9–17)
BUN BLDV-MCNC: 52 MG/DL (ref 8–23)
BUN/CREAT BLD: ABNORMAL (ref 9–20)
CALCIUM SERPL-MCNC: 8.9 MG/DL (ref 8.6–10.4)
CHLORIDE BLD-SCNC: 99 MMOL/L (ref 98–107)
CO2: 31 MMOL/L (ref 20–31)
CREAT SERPL-MCNC: 5.81 MG/DL (ref 0.7–1.2)
GFR AFRICAN AMERICAN: 12 ML/MIN
GFR NON-AFRICAN AMERICAN: 10 ML/MIN
GFR SERPL CREATININE-BSD FRML MDRD: ABNORMAL ML/MIN/{1.73_M2}
GFR SERPL CREATININE-BSD FRML MDRD: ABNORMAL ML/MIN/{1.73_M2}
GLUCOSE BLD-MCNC: 103 MG/DL (ref 70–99)
GLUCOSE BLD-MCNC: 122 MG/DL (ref 75–110)
GLUCOSE BLD-MCNC: 136 MG/DL (ref 75–110)
GLUCOSE BLD-MCNC: 151 MG/DL (ref 75–110)
GLUCOSE BLD-MCNC: 88 MG/DL (ref 75–110)
HCT VFR BLD CALC: 29.4 % (ref 41–53)
HEMOGLOBIN: 9.3 G/DL (ref 13.5–17.5)
MAGNESIUM: 2.2 MG/DL (ref 1.6–2.6)
MCH RBC QN AUTO: 29.8 PG (ref 26–34)
MCHC RBC AUTO-ENTMCNC: 31.7 G/DL (ref 31–37)
MCV RBC AUTO: 94.1 FL (ref 80–100)
NRBC AUTOMATED: ABNORMAL PER 100 WBC
PDW BLD-RTO: 16.7 % (ref 11.5–14.9)
PHOSPHORUS: 5.8 MG/DL (ref 2.5–4.5)
PLATELET # BLD: 112 K/UL (ref 150–450)
PMV BLD AUTO: 6.9 FL (ref 6–12)
POTASSIUM SERPL-SCNC: 4.1 MMOL/L (ref 3.7–5.3)
RBC # BLD: 3.12 M/UL (ref 4.5–5.9)
SODIUM BLD-SCNC: 143 MMOL/L (ref 135–144)
WBC # BLD: 5.1 K/UL (ref 3.5–11)

## 2018-06-20 PROCEDURE — 6360000002 HC RX W HCPCS: Performed by: INTERNAL MEDICINE

## 2018-06-20 PROCEDURE — 80048 BASIC METABOLIC PNL TOTAL CA: CPT

## 2018-06-20 PROCEDURE — 90937 HEMODIALYSIS REPEATED EVAL: CPT

## 2018-06-20 PROCEDURE — 6370000000 HC RX 637 (ALT 250 FOR IP): Performed by: INTERNAL MEDICINE

## 2018-06-20 PROCEDURE — 85027 COMPLETE CBC AUTOMATED: CPT

## 2018-06-20 PROCEDURE — 94640 AIRWAY INHALATION TREATMENT: CPT

## 2018-06-20 PROCEDURE — 94761 N-INVAS EAR/PLS OXIMETRY MLT: CPT

## 2018-06-20 PROCEDURE — 2580000003 HC RX 258: Performed by: INTERNAL MEDICINE

## 2018-06-20 PROCEDURE — 82947 ASSAY GLUCOSE BLOOD QUANT: CPT

## 2018-06-20 PROCEDURE — 93970 EXTREMITY STUDY: CPT

## 2018-06-20 PROCEDURE — 36415 COLL VENOUS BLD VENIPUNCTURE: CPT

## 2018-06-20 PROCEDURE — 1200000000 HC SEMI PRIVATE

## 2018-06-20 PROCEDURE — 99222 1ST HOSP IP/OBS MODERATE 55: CPT | Performed by: INTERNAL MEDICINE

## 2018-06-20 PROCEDURE — 83735 ASSAY OF MAGNESIUM: CPT

## 2018-06-20 PROCEDURE — 84100 ASSAY OF PHOSPHORUS: CPT

## 2018-06-20 PROCEDURE — 99232 SBSQ HOSP IP/OBS MODERATE 35: CPT | Performed by: INTERNAL MEDICINE

## 2018-06-20 RX ORDER — OXYCODONE HYDROCHLORIDE AND ACETAMINOPHEN 5; 325 MG/1; MG/1
2 TABLET ORAL EVERY 6 HOURS PRN
Status: DISCONTINUED | OUTPATIENT
Start: 2018-06-20 | End: 2018-06-22 | Stop reason: HOSPADM

## 2018-06-20 RX ORDER — LIDOCAINE AND PRILOCAINE 25; 25 MG/G; MG/G
CREAM TOPICAL PRN
Status: DISCONTINUED | OUTPATIENT
Start: 2018-06-20 | End: 2018-06-22 | Stop reason: HOSPADM

## 2018-06-20 RX ADMIN — INSULIN GLARGINE 40 UNITS: 100 INJECTION, SOLUTION SUBCUTANEOUS at 21:05

## 2018-06-20 RX ADMIN — DARBEPOETIN ALFA 40 MCG: 40 INJECTION, SOLUTION INTRAVENOUS; SUBCUTANEOUS at 14:20

## 2018-06-20 RX ADMIN — IPRATROPIUM BROMIDE AND ALBUTEROL SULFATE 3 ML: .5; 3 SOLUTION RESPIRATORY (INHALATION) at 14:45

## 2018-06-20 RX ADMIN — IPRATROPIUM BROMIDE AND ALBUTEROL SULFATE 3 ML: .5; 3 SOLUTION RESPIRATORY (INHALATION) at 06:49

## 2018-06-20 RX ADMIN — MIDODRINE HYDROCHLORIDE 5 MG: 5 TABLET ORAL at 21:04

## 2018-06-20 RX ADMIN — HEPARIN SODIUM 5000 UNITS: 5000 INJECTION, SOLUTION INTRAVENOUS; SUBCUTANEOUS at 21:05

## 2018-06-20 RX ADMIN — Medication 1 MG: at 21:04

## 2018-06-20 RX ADMIN — OXYCODONE HYDROCHLORIDE AND ACETAMINOPHEN 2 TABLET: 5; 325 TABLET ORAL at 21:04

## 2018-06-20 RX ADMIN — IPRATROPIUM BROMIDE AND ALBUTEROL SULFATE 3 ML: .5; 3 SOLUTION RESPIRATORY (INHALATION) at 19:04

## 2018-06-20 RX ADMIN — MORPHINE SULFATE 4 MG: 2 INJECTION, SOLUTION INTRAMUSCULAR; INTRAVENOUS at 16:38

## 2018-06-20 RX ADMIN — Medication 10 ML: at 11:14

## 2018-06-20 RX ADMIN — HEPARIN SODIUM 5000 UNITS: 5000 INJECTION, SOLUTION INTRAVENOUS; SUBCUTANEOUS at 14:23

## 2018-06-20 RX ADMIN — Medication 10 ML: at 22:51

## 2018-06-20 RX ADMIN — PANTOPRAZOLE SODIUM 40 MG: 40 TABLET, DELAYED RELEASE ORAL at 05:55

## 2018-06-20 RX ADMIN — MORPHINE SULFATE 4 MG: 2 INJECTION, SOLUTION INTRAMUSCULAR; INTRAVENOUS at 11:11

## 2018-06-20 RX ADMIN — MORPHINE SULFATE 4 MG: 2 INJECTION, SOLUTION INTRAMUSCULAR; INTRAVENOUS at 02:48

## 2018-06-20 RX ADMIN — MORPHINE SULFATE 4 MG: 2 INJECTION, SOLUTION INTRAMUSCULAR; INTRAVENOUS at 00:36

## 2018-06-20 RX ADMIN — SENNOSIDES 8.6 MG: 8.6 TABLET, FILM COATED ORAL at 21:05

## 2018-06-20 RX ADMIN — SEVELAMER CARBONATE 2400 MG: 800 TABLET, FILM COATED ORAL at 13:09

## 2018-06-20 RX ADMIN — ASPIRIN 81 MG 81 MG: 81 TABLET ORAL at 11:11

## 2018-06-20 RX ADMIN — HEPARIN SODIUM 5000 UNITS: 5000 INJECTION, SOLUTION INTRAVENOUS; SUBCUTANEOUS at 05:55

## 2018-06-20 RX ADMIN — OXYCODONE HYDROCHLORIDE AND ACETAMINOPHEN 2 TABLET: 5; 325 TABLET ORAL at 09:02

## 2018-06-20 RX ADMIN — SEVELAMER CARBONATE 2400 MG: 800 TABLET, FILM COATED ORAL at 18:16

## 2018-06-20 ASSESSMENT — PAIN DESCRIPTION - PAIN TYPE
TYPE: ACUTE PAIN

## 2018-06-20 ASSESSMENT — PAIN DESCRIPTION - ORIENTATION
ORIENTATION: RIGHT

## 2018-06-20 ASSESSMENT — PAIN SCALES - GENERAL
PAINLEVEL_OUTOF10: 8
PAINLEVEL_OUTOF10: 8
PAINLEVEL_OUTOF10: 7
PAINLEVEL_OUTOF10: 7
PAINLEVEL_OUTOF10: 3
PAINLEVEL_OUTOF10: 5
PAINLEVEL_OUTOF10: 6
PAINLEVEL_OUTOF10: 8
PAINLEVEL_OUTOF10: 2
PAINLEVEL_OUTOF10: 5

## 2018-06-20 ASSESSMENT — PAIN DESCRIPTION - LOCATION
LOCATION: LEG

## 2018-06-20 ASSESSMENT — PAIN DESCRIPTION - DESCRIPTORS: DESCRIPTORS: ACHING;THROBBING

## 2018-06-21 LAB
ANION GAP SERPL CALCULATED.3IONS-SCNC: 11 MMOL/L (ref 9–17)
BUN BLDV-MCNC: 37 MG/DL (ref 8–23)
BUN/CREAT BLD: ABNORMAL (ref 9–20)
CALCIUM SERPL-MCNC: 9 MG/DL (ref 8.6–10.4)
CHLORIDE BLD-SCNC: 100 MMOL/L (ref 98–107)
CO2: 32 MMOL/L (ref 20–31)
CREAT SERPL-MCNC: 4.37 MG/DL (ref 0.7–1.2)
GFR AFRICAN AMERICAN: 16 ML/MIN
GFR NON-AFRICAN AMERICAN: 14 ML/MIN
GFR SERPL CREATININE-BSD FRML MDRD: ABNORMAL ML/MIN/{1.73_M2}
GFR SERPL CREATININE-BSD FRML MDRD: ABNORMAL ML/MIN/{1.73_M2}
GLUCOSE BLD-MCNC: 105 MG/DL (ref 75–110)
GLUCOSE BLD-MCNC: 143 MG/DL (ref 75–110)
GLUCOSE BLD-MCNC: 176 MG/DL (ref 75–110)
GLUCOSE BLD-MCNC: 72 MG/DL (ref 70–99)
GLUCOSE BLD-MCNC: 75 MG/DL (ref 75–110)
MAGNESIUM: 2.3 MG/DL (ref 1.6–2.6)
PHOSPHORUS: 4.6 MG/DL (ref 2.5–4.5)
POTASSIUM SERPL-SCNC: 4.2 MMOL/L (ref 3.7–5.3)
SODIUM BLD-SCNC: 143 MMOL/L (ref 135–144)

## 2018-06-21 PROCEDURE — 6370000000 HC RX 637 (ALT 250 FOR IP): Performed by: INTERNAL MEDICINE

## 2018-06-21 PROCEDURE — 36415 COLL VENOUS BLD VENIPUNCTURE: CPT

## 2018-06-21 PROCEDURE — 94761 N-INVAS EAR/PLS OXIMETRY MLT: CPT

## 2018-06-21 PROCEDURE — 80048 BASIC METABOLIC PNL TOTAL CA: CPT

## 2018-06-21 PROCEDURE — 84100 ASSAY OF PHOSPHORUS: CPT

## 2018-06-21 PROCEDURE — 82947 ASSAY GLUCOSE BLOOD QUANT: CPT

## 2018-06-21 PROCEDURE — 6360000002 HC RX W HCPCS: Performed by: INTERNAL MEDICINE

## 2018-06-21 PROCEDURE — 2580000003 HC RX 258: Performed by: INTERNAL MEDICINE

## 2018-06-21 PROCEDURE — 1200000000 HC SEMI PRIVATE

## 2018-06-21 PROCEDURE — 94640 AIRWAY INHALATION TREATMENT: CPT

## 2018-06-21 PROCEDURE — 83735 ASSAY OF MAGNESIUM: CPT

## 2018-06-21 PROCEDURE — 99233 SBSQ HOSP IP/OBS HIGH 50: CPT | Performed by: INTERNAL MEDICINE

## 2018-06-21 RX ORDER — BISACODYL 10 MG
10 SUPPOSITORY, RECTAL RECTAL DAILY PRN
Status: DISCONTINUED | OUTPATIENT
Start: 2018-06-21 | End: 2018-06-22 | Stop reason: HOSPADM

## 2018-06-21 RX ORDER — DOCUSATE SODIUM 100 MG/1
100 CAPSULE, LIQUID FILLED ORAL DAILY
Status: DISCONTINUED | OUTPATIENT
Start: 2018-06-21 | End: 2018-06-21

## 2018-06-21 RX ADMIN — FLUTICASONE PROPIONATE 1 SPRAY: 50 SPRAY, METERED NASAL at 08:46

## 2018-06-21 RX ADMIN — CETIRIZINE HYDROCHLORIDE 5 MG: 10 TABLET, FILM COATED ORAL at 08:43

## 2018-06-21 RX ADMIN — TAMSULOSIN HYDROCHLORIDE 0.4 MG: 0.4 CAPSULE ORAL at 08:43

## 2018-06-21 RX ADMIN — Medication 10 ML: at 22:08

## 2018-06-21 RX ADMIN — IPRATROPIUM BROMIDE AND ALBUTEROL SULFATE 3 ML: .5; 3 SOLUTION RESPIRATORY (INHALATION) at 11:32

## 2018-06-21 RX ADMIN — FINASTERIDE 5 MG: 5 TABLET, FILM COATED ORAL at 08:43

## 2018-06-21 RX ADMIN — HEPARIN SODIUM 5000 UNITS: 5000 INJECTION, SOLUTION INTRAVENOUS; SUBCUTANEOUS at 05:52

## 2018-06-21 RX ADMIN — SEVELAMER CARBONATE 2400 MG: 800 TABLET, FILM COATED ORAL at 08:43

## 2018-06-21 RX ADMIN — ESCITALOPRAM OXALATE 10 MG: 20 TABLET ORAL at 08:43

## 2018-06-21 RX ADMIN — ASPIRIN 81 MG 81 MG: 81 TABLET ORAL at 08:43

## 2018-06-21 RX ADMIN — Medication 1 MG: at 21:54

## 2018-06-21 RX ADMIN — Medication 10 ML: at 08:47

## 2018-06-21 RX ADMIN — SENNOSIDES 8.6 MG: 8.6 TABLET, FILM COATED ORAL at 21:54

## 2018-06-21 RX ADMIN — HEPARIN SODIUM 5000 UNITS: 5000 INJECTION, SOLUTION INTRAVENOUS; SUBCUTANEOUS at 15:02

## 2018-06-21 RX ADMIN — OXYCODONE HYDROCHLORIDE AND ACETAMINOPHEN 2 TABLET: 5; 325 TABLET ORAL at 01:05

## 2018-06-21 RX ADMIN — HEPARIN SODIUM 5000 UNITS: 5000 INJECTION, SOLUTION INTRAVENOUS; SUBCUTANEOUS at 21:55

## 2018-06-21 RX ADMIN — PANTOPRAZOLE SODIUM 40 MG: 40 TABLET, DELAYED RELEASE ORAL at 05:53

## 2018-06-21 RX ADMIN — IPRATROPIUM BROMIDE AND ALBUTEROL SULFATE 3 ML: .5; 3 SOLUTION RESPIRATORY (INHALATION) at 18:18

## 2018-06-21 RX ADMIN — SEVELAMER CARBONATE 2400 MG: 800 TABLET, FILM COATED ORAL at 17:04

## 2018-06-21 RX ADMIN — ISOSORBIDE MONONITRATE 60 MG: 60 TABLET, EXTENDED RELEASE ORAL at 08:43

## 2018-06-21 RX ADMIN — SEVELAMER CARBONATE 2400 MG: 800 TABLET, FILM COATED ORAL at 12:06

## 2018-06-21 RX ADMIN — SENNOSIDES 8.6 MG: 8.6 TABLET, FILM COATED ORAL at 08:43

## 2018-06-21 RX ADMIN — OXYCODONE HYDROCHLORIDE AND ACETAMINOPHEN 2 TABLET: 5; 325 TABLET ORAL at 18:03

## 2018-06-21 RX ADMIN — IPRATROPIUM BROMIDE AND ALBUTEROL SULFATE 3 ML: .5; 3 SOLUTION RESPIRATORY (INHALATION) at 07:07

## 2018-06-21 ASSESSMENT — PAIN DESCRIPTION - ORIENTATION: ORIENTATION: RIGHT

## 2018-06-21 ASSESSMENT — PAIN SCALES - GENERAL
PAINLEVEL_OUTOF10: 8
PAINLEVEL_OUTOF10: 10
PAINLEVEL_OUTOF10: 5

## 2018-06-21 ASSESSMENT — PAIN DESCRIPTION - DESCRIPTORS: DESCRIPTORS: ACHING

## 2018-06-21 ASSESSMENT — PAIN DESCRIPTION - LOCATION: LOCATION: LEG

## 2018-06-21 ASSESSMENT — PAIN DESCRIPTION - PAIN TYPE: TYPE: ACUTE PAIN

## 2018-06-22 VITALS
RESPIRATION RATE: 16 BRPM | OXYGEN SATURATION: 100 % | HEIGHT: 72 IN | TEMPERATURE: 98.1 F | DIASTOLIC BLOOD PRESSURE: 72 MMHG | WEIGHT: 295.42 LBS | SYSTOLIC BLOOD PRESSURE: 121 MMHG | HEART RATE: 67 BPM | BODY MASS INDEX: 40.01 KG/M2

## 2018-06-22 LAB
ANION GAP SERPL CALCULATED.3IONS-SCNC: 13 MMOL/L (ref 9–17)
BUN BLDV-MCNC: 45 MG/DL (ref 8–23)
BUN/CREAT BLD: ABNORMAL (ref 9–20)
CALCIUM SERPL-MCNC: 9.2 MG/DL (ref 8.6–10.4)
CHLORIDE BLD-SCNC: 102 MMOL/L (ref 98–107)
CO2: 27 MMOL/L (ref 20–31)
CREAT SERPL-MCNC: 5.3 MG/DL (ref 0.7–1.2)
GFR AFRICAN AMERICAN: 13 ML/MIN
GFR NON-AFRICAN AMERICAN: 11 ML/MIN
GFR SERPL CREATININE-BSD FRML MDRD: ABNORMAL ML/MIN/{1.73_M2}
GFR SERPL CREATININE-BSD FRML MDRD: ABNORMAL ML/MIN/{1.73_M2}
GLUCOSE BLD-MCNC: 100 MG/DL (ref 75–110)
GLUCOSE BLD-MCNC: 126 MG/DL (ref 70–99)
GLUCOSE BLD-MCNC: 135 MG/DL (ref 75–110)
MAGNESIUM: 2.4 MG/DL (ref 1.6–2.6)
PHOSPHORUS: 4.5 MG/DL (ref 2.5–4.5)
POTASSIUM SERPL-SCNC: 4.2 MMOL/L (ref 3.7–5.3)
SODIUM BLD-SCNC: 142 MMOL/L (ref 135–144)

## 2018-06-22 PROCEDURE — 84100 ASSAY OF PHOSPHORUS: CPT

## 2018-06-22 PROCEDURE — 99232 SBSQ HOSP IP/OBS MODERATE 35: CPT | Performed by: INTERNAL MEDICINE

## 2018-06-22 PROCEDURE — 2580000003 HC RX 258: Performed by: INTERNAL MEDICINE

## 2018-06-22 PROCEDURE — 6360000002 HC RX W HCPCS: Performed by: INTERNAL MEDICINE

## 2018-06-22 PROCEDURE — 94640 AIRWAY INHALATION TREATMENT: CPT

## 2018-06-22 PROCEDURE — 6370000000 HC RX 637 (ALT 250 FOR IP): Performed by: INTERNAL MEDICINE

## 2018-06-22 PROCEDURE — 83735 ASSAY OF MAGNESIUM: CPT

## 2018-06-22 PROCEDURE — 36415 COLL VENOUS BLD VENIPUNCTURE: CPT

## 2018-06-22 PROCEDURE — 80048 BASIC METABOLIC PNL TOTAL CA: CPT

## 2018-06-22 PROCEDURE — 82947 ASSAY GLUCOSE BLOOD QUANT: CPT

## 2018-06-22 PROCEDURE — 99239 HOSP IP/OBS DSCHRG MGMT >30: CPT | Performed by: INTERNAL MEDICINE

## 2018-06-22 PROCEDURE — 94761 N-INVAS EAR/PLS OXIMETRY MLT: CPT

## 2018-06-22 RX ORDER — OXYCODONE AND ACETAMINOPHEN 10; 325 MG/1; MG/1
1 TABLET ORAL EVERY 6 HOURS PRN
Qty: 8 TABLET | Refills: 0 | Status: SHIPPED | OUTPATIENT
Start: 2018-06-22 | End: 2018-06-24

## 2018-06-22 RX ORDER — ALPRAZOLAM 0.5 MG/1
0.5 TABLET ORAL 2 TIMES DAILY PRN
Qty: 6 TABLET | Refills: 0 | Status: SHIPPED | OUTPATIENT
Start: 2018-06-22 | End: 2018-06-25

## 2018-06-22 RX ADMIN — LIDOCAINE AND PRILOCAINE: 25; 25 CREAM TOPICAL at 07:06

## 2018-06-22 RX ADMIN — FLUTICASONE PROPIONATE 1 SPRAY: 50 SPRAY, METERED NASAL at 07:28

## 2018-06-22 RX ADMIN — IPRATROPIUM BROMIDE AND ALBUTEROL SULFATE 3 ML: .5; 3 SOLUTION RESPIRATORY (INHALATION) at 11:27

## 2018-06-22 RX ADMIN — TAMSULOSIN HYDROCHLORIDE 0.4 MG: 0.4 CAPSULE ORAL at 07:21

## 2018-06-22 RX ADMIN — PANTOPRAZOLE SODIUM 40 MG: 40 TABLET, DELAYED RELEASE ORAL at 07:21

## 2018-06-22 RX ADMIN — OXYCODONE HYDROCHLORIDE AND ACETAMINOPHEN 2 TABLET: 5; 325 TABLET ORAL at 09:15

## 2018-06-22 RX ADMIN — IPRATROPIUM BROMIDE AND ALBUTEROL SULFATE 3 ML: .5; 3 SOLUTION RESPIRATORY (INHALATION) at 09:03

## 2018-06-22 RX ADMIN — ESCITALOPRAM OXALATE 10 MG: 20 TABLET ORAL at 07:22

## 2018-06-22 RX ADMIN — Medication 10 ML: at 07:28

## 2018-06-22 RX ADMIN — SEVELAMER CARBONATE 2400 MG: 800 TABLET, FILM COATED ORAL at 07:21

## 2018-06-22 RX ADMIN — VANCOMYCIN HYDROCHLORIDE 1000 MG: 1 INJECTION, POWDER, LYOPHILIZED, FOR SOLUTION INTRAVENOUS at 10:47

## 2018-06-22 RX ADMIN — ASPIRIN 81 MG 81 MG: 81 TABLET ORAL at 07:21

## 2018-06-22 RX ADMIN — OXYCODONE HYDROCHLORIDE AND ACETAMINOPHEN 2 TABLET: 5; 325 TABLET ORAL at 02:33

## 2018-06-22 RX ADMIN — CETIRIZINE HYDROCHLORIDE 5 MG: 10 TABLET, FILM COATED ORAL at 07:21

## 2018-06-22 RX ADMIN — ISOSORBIDE MONONITRATE 60 MG: 60 TABLET, EXTENDED RELEASE ORAL at 07:21

## 2018-06-22 RX ADMIN — SENNOSIDES 8.6 MG: 8.6 TABLET, FILM COATED ORAL at 07:21

## 2018-06-22 RX ADMIN — HEPARIN SODIUM 5000 UNITS: 5000 INJECTION, SOLUTION INTRAVENOUS; SUBCUTANEOUS at 14:36

## 2018-06-22 RX ADMIN — HEPARIN SODIUM 5000 UNITS: 5000 INJECTION, SOLUTION INTRAVENOUS; SUBCUTANEOUS at 06:06

## 2018-06-22 RX ADMIN — FINASTERIDE 5 MG: 5 TABLET, FILM COATED ORAL at 07:21

## 2018-06-22 ASSESSMENT — PAIN DESCRIPTION - LOCATION: LOCATION: NECK;BACK

## 2018-06-22 ASSESSMENT — PAIN SCALES - GENERAL
PAINLEVEL_OUTOF10: 4
PAINLEVEL_OUTOF10: 8
PAINLEVEL_OUTOF10: 4
PAINLEVEL_OUTOF10: 7

## 2018-06-22 ASSESSMENT — PAIN DESCRIPTION - ORIENTATION: ORIENTATION: MID

## 2018-06-22 ASSESSMENT — PAIN DESCRIPTION - PAIN TYPE: TYPE: CHRONIC PAIN

## 2018-06-25 LAB
CULTURE: NORMAL
CULTURE: NORMAL
Lab: NORMAL
Lab: NORMAL
SPECIMEN DESCRIPTION: NORMAL
SPECIMEN DESCRIPTION: NORMAL
STATUS: NORMAL
STATUS: NORMAL

## 2018-08-19 ENCOUNTER — HOSPITAL ENCOUNTER (EMERGENCY)
Age: 68
Discharge: HOME | End: 2018-08-19
Attending: EMERGENCY MEDICINE
Payer: COMMERCIAL

## 2018-08-19 ENCOUNTER — APPOINTMENT (OUTPATIENT)
Dept: CT IMAGING | Age: 68
End: 2018-08-19
Payer: COMMERCIAL

## 2018-08-19 VITALS
BODY MASS INDEX: 39.96 KG/M2 | DIASTOLIC BLOOD PRESSURE: 72 MMHG | RESPIRATION RATE: 18 BRPM | OXYGEN SATURATION: 96 % | HEIGHT: 72 IN | WEIGHT: 295 LBS | HEART RATE: 59 BPM | SYSTOLIC BLOOD PRESSURE: 165 MMHG | TEMPERATURE: 97.5 F

## 2018-08-19 DIAGNOSIS — M54.50 ACUTE RIGHT-SIDED LOW BACK PAIN WITHOUT SCIATICA: Primary | ICD-10-CM

## 2018-08-19 LAB
-: ABNORMAL
ABSOLUTE EOS #: 0.1 K/UL (ref 0–0.4)
ABSOLUTE IMMATURE GRANULOCYTE: ABNORMAL K/UL (ref 0–0.3)
ABSOLUTE LYMPH #: 0.6 K/UL (ref 1–4.8)
ABSOLUTE MONO #: 0.2 K/UL (ref 0.1–1.3)
ALBUMIN SERPL-MCNC: 4.1 G/DL (ref 3.5–5.2)
ALBUMIN/GLOBULIN RATIO: ABNORMAL (ref 1–2.5)
ALP BLD-CCNC: 156 U/L (ref 40–129)
ALT SERPL-CCNC: 18 U/L (ref 5–41)
AMORPHOUS: ABNORMAL
ANION GAP SERPL CALCULATED.3IONS-SCNC: 12 MMOL/L (ref 9–17)
AST SERPL-CCNC: 11 U/L
BACTERIA: ABNORMAL
BASOPHILS # BLD: 1 % (ref 0–2)
BASOPHILS ABSOLUTE: 0 K/UL (ref 0–0.2)
BILIRUB SERPL-MCNC: 0.43 MG/DL (ref 0.3–1.2)
BILIRUBIN DIRECT: 0.24 MG/DL
BILIRUBIN URINE: NEGATIVE
BILIRUBIN, INDIRECT: 0.19 MG/DL (ref 0–1)
BUN BLDV-MCNC: 50 MG/DL (ref 8–23)
BUN/CREAT BLD: ABNORMAL (ref 9–20)
CALCIUM SERPL-MCNC: 8.8 MG/DL (ref 8.6–10.4)
CASTS UA: ABNORMAL /LPF
CHLORIDE BLD-SCNC: 107 MMOL/L (ref 98–107)
CO2: 28 MMOL/L (ref 20–31)
COLOR: YELLOW
COMMENT UA: ABNORMAL
CREAT SERPL-MCNC: 5.76 MG/DL (ref 0.7–1.2)
CRYSTALS, UA: ABNORMAL /HPF
DIFFERENTIAL TYPE: ABNORMAL
EOSINOPHILS RELATIVE PERCENT: 1 % (ref 0–4)
EPITHELIAL CELLS UA: ABNORMAL /HPF
GFR AFRICAN AMERICAN: 12 ML/MIN
GFR NON-AFRICAN AMERICAN: 10 ML/MIN
GFR SERPL CREATININE-BSD FRML MDRD: ABNORMAL ML/MIN/{1.73_M2}
GFR SERPL CREATININE-BSD FRML MDRD: ABNORMAL ML/MIN/{1.73_M2}
GLOBULIN: ABNORMAL G/DL (ref 1.5–3.8)
GLUCOSE BLD-MCNC: 131 MG/DL (ref 70–99)
GLUCOSE URINE: NEGATIVE
HCT VFR BLD CALC: 32.6 % (ref 41–53)
HEMOGLOBIN: 10.3 G/DL (ref 13.5–17.5)
IMMATURE GRANULOCYTES: ABNORMAL %
KETONES, URINE: NEGATIVE
LEUKOCYTE ESTERASE, URINE: NEGATIVE
LIPASE: 15 U/L (ref 13–60)
LYMPHOCYTES # BLD: 13 % (ref 24–44)
MCH RBC QN AUTO: 29.6 PG (ref 26–34)
MCHC RBC AUTO-ENTMCNC: 31.6 G/DL (ref 31–37)
MCV RBC AUTO: 93.6 FL (ref 80–100)
MONOCYTES # BLD: 4 % (ref 1–7)
MUCUS: ABNORMAL
NITRITE, URINE: NEGATIVE
NRBC AUTOMATED: ABNORMAL PER 100 WBC
OTHER OBSERVATIONS UA: ABNORMAL
PDW BLD-RTO: 16 % (ref 11.5–14.9)
PH UA: 8 (ref 5–8)
PLATELET # BLD: 114 K/UL (ref 150–450)
PLATELET ESTIMATE: ABNORMAL
PMV BLD AUTO: 6.9 FL (ref 6–12)
POTASSIUM SERPL-SCNC: 4.9 MMOL/L (ref 3.7–5.3)
PROTEIN UA: ABNORMAL
RBC # BLD: 3.49 M/UL (ref 4.5–5.9)
RBC # BLD: ABNORMAL 10*6/UL
RBC UA: ABNORMAL /HPF
RENAL EPITHELIAL, UA: ABNORMAL /HPF
SEG NEUTROPHILS: 81 % (ref 36–66)
SEGMENTED NEUTROPHILS ABSOLUTE COUNT: 4.2 K/UL (ref 1.3–9.1)
SODIUM BLD-SCNC: 147 MMOL/L (ref 135–144)
SPECIFIC GRAVITY UA: 1.01 (ref 1–1.03)
TOTAL PROTEIN: 6.3 G/DL (ref 6.4–8.3)
TRICHOMONAS: ABNORMAL
TURBIDITY: CLEAR
URINE HGB: NEGATIVE
UROBILINOGEN, URINE: NORMAL
WBC # BLD: 5.2 K/UL (ref 3.5–11)
WBC # BLD: ABNORMAL 10*3/UL
WBC UA: ABNORMAL /HPF
YEAST: ABNORMAL

## 2018-08-19 PROCEDURE — 36415 COLL VENOUS BLD VENIPUNCTURE: CPT

## 2018-08-19 PROCEDURE — 99284 EMERGENCY DEPT VISIT MOD MDM: CPT

## 2018-08-19 PROCEDURE — 85025 COMPLETE CBC W/AUTO DIFF WBC: CPT

## 2018-08-19 PROCEDURE — 74176 CT ABD & PELVIS W/O CONTRAST: CPT

## 2018-08-19 PROCEDURE — 81001 URINALYSIS AUTO W/SCOPE: CPT

## 2018-08-19 PROCEDURE — 83690 ASSAY OF LIPASE: CPT

## 2018-08-19 PROCEDURE — 80076 HEPATIC FUNCTION PANEL: CPT

## 2018-08-19 PROCEDURE — 80048 BASIC METABOLIC PNL TOTAL CA: CPT

## 2018-08-19 RX ORDER — FLUCONAZOLE 100 MG/1
100 TABLET ORAL NIGHTLY
COMMUNITY
End: 2019-01-11

## 2018-08-19 RX ORDER — ACETAMINOPHEN 325 MG/1
650 TABLET ORAL EVERY 6 HOURS PRN
Qty: 60 TABLET | Refills: 0 | Status: SHIPPED | OUTPATIENT
Start: 2018-08-19 | End: 2019-01-01 | Stop reason: ALTCHOICE

## 2018-08-19 RX ORDER — CEPHALEXIN 500 MG/1
500 CAPSULE ORAL 2 TIMES DAILY
COMMUNITY
End: 2019-01-11

## 2018-08-19 RX ORDER — ALPRAZOLAM 0.5 MG/1
0.5 TABLET ORAL
Status: ON HOLD | COMMUNITY
End: 2019-01-14

## 2018-08-19 ASSESSMENT — ENCOUNTER SYMPTOMS
BACK PAIN: 0
EYE REDNESS: 0
SHORTNESS OF BREATH: 0
NAUSEA: 0
VOMITING: 0
ABDOMINAL PAIN: 1
DIARRHEA: 0
EYE ITCHING: 0
RHINORRHEA: 0
CONSTIPATION: 0
COUGH: 0

## 2018-08-19 ASSESSMENT — PAIN SCALES - GENERAL: PAINLEVEL_OUTOF10: 8

## 2018-08-20 NOTE — ED NOTES
Pt sitting doing cross word puzzle at this time. No distress noted.      Mario Vasquez RN  08/19/18 8557

## 2018-08-20 NOTE — ED PROVIDER NOTES
[address]  eMERGENCY dEPARTMENT eNCOUnter   Attending Attestation     Pt Name: Don Méndez  MRN: [id number]  [place] 1950  Date of evaluation: 8/19/18       Don Méndez is a 70 y.o. male who presents with Flank Pain  2 days of progressive RLQ abdominal pain, worse with movement, radiates to lower back. No fevers, no GI sx. Tolerating PO intake. No urinary sx. He is on HD, due tomorrow. S/p R nephrectomy and nate. MDM:   Exam: Chronically ill but non-toxic appearing. No tachycardia or fever. Abdomen with tenderness in the RLQ but no peritoneal signs. Labs unremarkable. No leukocytosis. CT scan administered without contrast given ESRD. CT non-contrast did not identify appendix but no secondary signs of appendicitis. After 2 days of constant pain, doubt appendicitis with no secondary signs and no leukocytosis. DC home with return precautions of worsening pain, fever, inability to tolerate PO intake. Vitals:   Vitals:    08/19/18 2017 08/19/18 2322   BP: (!) 182/85 (!) 165/72   Pulse: 60 59   Resp: 18    Temp: 97.5 °F (36.4 °C)    TempSrc: Oral    SpO2: 98% 96%   Weight: 295 lb (133.8 kg)    Height: 6' (1.829 m)          I personally evaluated and examined the patient in conjunction with the resident and agree with the assessment, treatment plan, and disposition of the patient as recorded by the resident. I performed a history and physical examination of the patient and discussed management with the resident. I reviewed the residents note and agree with the documented findings and plan of care. Any areas of disagreement are noted on the chart. I was personally present for the key portions of any procedures. I have documented in the chart those procedures where I was not present during the key portions. I have personally reviewed all images and agree with the resident's interpretation. I have reviewed the emergency nurses triage note.  I agree with the chief complaint, past medical history, past surgical history, allergies, medications, social and family history as documented unless otherwise noted.     Hollie Foreman MD  Attending Emergency Physician            Sumit Bhat MD  08/20/18 2485

## 2018-08-20 NOTE — ED NOTES
Report called to Fatou [address] at [facility].      Solange Patient, RN  08/19/18 4459

## 2018-08-20 NOTE — ED PROVIDER NOTES
[address]  Emergency Department Encounter  Emergency Medicine Resident     Pt Name: Maurice Mcguire  MRN: [id number]  [place] 1950  Date of evaluation: 8/19/18  PCP:  Deysi Brown, [address] [place] [address]       Chief Complaint   Patient presents with    Flank Pain       HISTORY OF PRESENT ILLNESS  (Location/Symptom, Timing/Onset, Context/Setting, Quality, Duration, Modifying Factors, Severity.)      Maurice Mcguire is a 70 y.o. male who presents with Several-day history of right lower quadrant abdominal pain, radiates from his back. He says it is sharp in nature, worsens with specific movements but he is unable to reproduce it. He says it is felt deeper than the skin. He has never had this problem before, denies nausea, vomiting, fever, chills, change in appetite, dysuria, hematuria. Patient is an end-stage renal patient, gets dialysis to her left AV fistula, Monday Wednesday Friday dialysis, no recent missed appointments. Patient does not have any chest pain or shortness of breath. No change in bowel habits, no blood per rectum or melena. Patient currently resides at [facility]. History of cholecystectomy as well as right-sided nephrectomy, live donor surgery. PAST MEDICAL / SURGICAL / SOCIAL / FAMILY HISTORY      has a past medical history of Acute combined systolic and diastolic congestive heart failure ([address] Anemia; BPH (benign prostatic hyperplasia); CAD (coronary artery disease); Cancer ([address] CKD (chronic kidney disease) stage 3, GFR 30-59 ml/min; Constipation; COPD (chronic obstructive pulmonary disease) ([address] Dementia; Depression; GERD (gastroesophageal reflux disease); Hemodialysis patient ([address] HTN (hypertension); Hx of blood clots; Hyperparathyroidism ([address] Hypothyroidism; IDDM (insulin dependent diabetes mellitus) ([address] Insomnia; MDRO (multiple drug resistant organisms) resistance; Neurofibromatosis ([address] Osteoarthritis; Paraplegia ([address]  Peptic ulcer; nightly   Yes Historical Provider, MD   diphenhydrAMINE (BENADRYL) 25 MG tablet Take 25 mg by mouth every 6 hours as needed for Itching   Yes Historical Provider, MD   loratadine (CLARITIN) 10 MG tablet Take 0.5 tablets by mouth daily as needed (for allergies)  Patient taking differently: Take 5 mg by mouth daily  11/30/16  Yes Komal Murrell MD   ipratropium-albuterol (DUONEB) 0.5-2.5 (3) MG/3ML SOLN nebulizer solution Inhale 3 mLs into the lungs every 4 hours (while awake) 11/30/16  Yes KELECHI Douglass - CNP   aspirin 81 MG chewable tablet Take 81 mg by mouth daily   Yes Historical Provider, MD   polyethylene glycol (GLYCOLAX) powder Take 17 g by mouth daily In 4 oz water/juice   Yes Historical Provider, MD   sevelamer (RENVELA) 800 MG tablet Take 3 tablets by mouth 3 times daily (with meals)    Yes Historical Provider, MD   senna (SENOKOT) 8.6 MG tablet Take 1 tablet by mouth 2 times daily   Yes Historical Provider, MD   furosemide (LASIX) 80 MG tablet Take 80 mg by mouth 2 times daily   Yes Historical Provider, MD   escitalopram (LEXAPRO) 10 MG tablet Take 10 mg by mouth every morning    Yes Historical Provider, MD   insulin lispro (HUMALOG) 100 UNIT/ML injection vial Inject into the skin nightly Per sliding scale  150-200: 3 units  201-250: 4 units  251-300: 5 units  301-350: 6 units  351-400: 7 units  BS <70 or >400 Call physician   Yes Historical Provider, MD   isosorbide mononitrate (IMDUR) 60 MG CR tablet Take 60 mg by mouth daily   Yes Historical Provider, MD   nitroGLYCERIN (NITROSTAT) 0.4 MG SL tablet Place 0.4 mg under the tongue every 5 minutes as needed for Chest pain. Yes Historical Provider, MD   fluticasone (FLONASE) 50 MCG/ACT nasal spray 1 spray by Nasal route daily    Yes Historical Provider, MD   finasteride (PROSCAR) 5 MG tablet Take 5 mg by mouth daily.    Yes Historical Provider, MD   omeprazole (PRILOSEC) 20 MG capsule Take 20 mg by mouth every morning    Yes Historical Provider, MD insulin glargine (LANTUS) 100 UNIT/ML injection Inject 40 Units into the skin nightly    Yes Historical Provider, MD   metoprolol (LOPRESSOR) 50 MG tablet Take 25 mg by mouth 2 times daily Hold for SBP less than 110 oe HR less than60   Yes Historical Provider, MD   tamsulosin (FLOMAX) 0.4 MG capsule Take 0.4 mg by mouth daily. Yes Historical Provider, MD   calcium carbonate (TUMS) 500 MG chewable tablet Take 1 tablet by mouth every 8 hours as needed for Heartburn    Historical Provider, MD   Pollen Extracts (PROSTAT PO) Take by mouth    Historical Provider, MD   insulin lispro (HUMALOG) 100 UNIT/ML injection vial Inject 0-6 Units into the skin 3 times daily (with meals) 9/18/16   Zach Hubbard MD   melatonin 5 MG TABS tablet Take 5 mg by mouth nightly    Historical Provider, MD   amLODIPine (NORVASC) 10 MG tablet Take 10 mg by mouth daily. Historical Provider, MD   hydrALAZINE (APRESOLINE) 100 MG tablet Take 50 mg by mouth 3 times daily     Historical Provider, MD       REVIEW OF SYSTEMS    (2-9 systems for level 4, 10 or more for level 5)      Review of Systems   Constitutional: Negative for chills and fever. HENT: Negative for congestion and rhinorrhea. Eyes: Negative for redness and itching. Respiratory: Negative for cough and shortness of breath. Cardiovascular: Negative for chest pain and leg swelling. Gastrointestinal: Positive for abdominal pain. Negative for constipation, diarrhea, nausea and vomiting. Genitourinary: Negative for dysuria and frequency. Musculoskeletal: Negative for back pain and neck pain. Skin: Negative for pallor and rash. Neurological: Negative for weakness, light-headedness, numbness and headaches.        PHYSICAL EXAM   (up to 7 for level 4, 8 or more for level 5)      INITIAL VITALS:   BP (!) 165/72   Pulse 59   Temp 97.5 °F (36.4 °C) (Oral)   Resp 18   Ht 6' (1.829 m)   Wt 295 lb (133.8 kg)   SpO2 96%   BMI 40.01 kg/m²     Physical Exam Automated NOT REPORTED per 100 WBC    Differential Type NOT REPORTED     Seg Neutrophils 81 (H) 36 - 66 %    Lymphocytes 13 (L) 24 - 44 %    Monocytes 4 1 - 7 %    Eosinophils % 1 0 - 4 %    Basophils 1 0 - 2 %    Immature Granulocytes NOT REPORTED 0 %    Segs Absolute 4.20 1.3 - 9.1 k/uL    Absolute Lymph # 0.60 (L) 1.0 - 4.8 k/uL    Absolute Mono # 0.20 0.1 - 1.3 k/uL    Absolute Eos # 0.10 0.0 - 0.4 k/uL    Basophils # 0.00 0.0 - 0.2 k/uL    Absolute Immature Granulocyte NOT REPORTED 0.00 - 0.30 k/uL    WBC Morphology NOT REPORTED     RBC Morphology NOT REPORTED     Platelet Estimate NOT REPORTED    Basic Metabolic Panel   Result Value Ref Range    Glucose 131 (H) 70 - 99 mg/dL    BUN 50 (H) 8 - 23 mg/dL    CREATININE 5.76 (HH) 0.70 - 1.20 mg/dL    Bun/Cre Ratio NOT REPORTED 9 - 20    Calcium 8.8 8.6 - 10.4 mg/dL    Sodium 147 (H) 135 - 144 mmol/L    Potassium 4.9 3.7 - 5.3 mmol/L    Chloride 107 98 - 107 mmol/L    CO2 28 20 - 31 mmol/L    Anion Gap 12 9 - 17 mmol/L    GFR Non-African American 10 (L) >60 mL/min    GFR  12 (L) >60 mL/min    GFR Comment          GFR Staging NOT REPORTED    Hepatic Function Panel   Result Value Ref Range    Alb 4.1 3.5 - 5.2 g/dL    Alkaline Phosphatase 156 (H) 40 - 129 U/L    ALT 18 5 - 41 U/L    AST 11 <40 U/L    Total Bilirubin 0.43 0.3 - 1.2 mg/dL    Bilirubin, Direct 0.24 <0.31 mg/dL    Bilirubin, Indirect 0.19 0.00 - 1.00 mg/dL    Total Protein 6.3 (L) 6.4 - 8.3 g/dL    Globulin NOT REPORTED 1.5 - 3.8 g/dL    Albumin/Globulin Ratio NOT REPORTED 1.0 - 2.5   Lipase   Result Value Ref Range    Lipase 15 13 - 60 U/L   Urinalysis with Microscopic   Result Value Ref Range    Color, UA YELLOW YEL    Turbidity UA CLEAR CLEAR    Glucose, Ur NEGATIVE NEG    Bilirubin Urine NEGATIVE NEG    Ketones, Urine NEGATIVE NEG    Specific Weaver, UA 1.011 1.000 - 1.030    Urine Hgb NEGATIVE NEG    pH, UA 8.0 5.0 - 8.0    Protein, UA 2+ (A) NEG    Urobilinogen, Urine Normal NORM Nitrite, Urine NEGATIVE NEG    Leukocyte Esterase, Urine NEGATIVE NEG    Urinalysis Comments NOT REPORTED     -          WBC, UA 0 TO 2 /HPF    RBC, UA 0 TO 2 /HPF    Casts UA NOT REPORTED /LPF    Crystals UA NOT REPORTED NONE /HPF    Epithelial Cells UA 2 TO 5 /HPF    Renal Epithelial, Urine NOT REPORTED 0 /HPF    Bacteria, UA FEW (A) NONE    Mucus, UA NOT REPORTED NONE    Trichomonas, UA NOT REPORTED NONE    Amorphous, UA NOT REPORTED NONE    Other Observations UA NOT REPORTED NREQ    Yeast, UA NOT REPORTED NONE       IMPRESSION: Agusto Nava is a 70 y.o. presenting with right lower quadrant abdominal pain that radiates from his lower back. Patient has a nonsurgical abdomen at this time, afebrile. Patient does not have any decreased appetite, no Mc [place]'s point pain. Vital signs are within normal limits and he is afebrile. Patient is likely suffering from a skilled skeletal back pain however will obtain CT scan to evaluate for appendicitis. Patient BMI is large, indicates morbid obesity. Patient likely has enough intra-abdominal fat to enlighten any inflammatory changes near the appendix. CBC, BMP, LFTs, lipase and urinalysis. No suspicion for a right-sided kidney stone as the patient has a prior nephrectomy. RADIOLOGY:  CT ABDOMEN PELVIS WO CONTRAST   Preliminary Result   No specific etiology for patient's right lower quadrant pain is identified. Appendix is not identified, however, there is no secondary CT evidence of   acute appendicitis. Interval increase in size of hyperdense mass arising from the left kidney   currently measuring 6.1 x 5.5 cm. This previously measures 4.4 x 4.6 cm. This is most concerning for renal cell carcinoma, until proven otherwise. Proteinaceous cyst could have similar appearance. Additional calcified left   renal lesion, and additional hyperdense, hypodense, and isodense left renal   lesions.   Follow-up renal ultrasound is recommended to exclude solid

## 2018-09-17 ENCOUNTER — HOSPITAL ENCOUNTER (OUTPATIENT)
Age: 68
Setting detail: SPECIMEN
Discharge: HOME | End: 2018-09-17
Payer: COMMERCIAL

## 2018-09-17 LAB
ESTIMATED AVERAGE GLUCOSE: 91 MG/DL
HBA1C MFR BLD: 4.8 % (ref 4–6)

## 2018-09-17 PROCEDURE — P9603 ONE-WAY ALLOW PRORATED MILES: HCPCS

## 2018-09-17 PROCEDURE — 83036 HEMOGLOBIN GLYCOSYLATED A1C: CPT

## 2018-09-17 PROCEDURE — 36415 COLL VENOUS BLD VENIPUNCTURE: CPT

## 2018-11-23 ENCOUNTER — HOSPITAL ENCOUNTER (EMERGENCY)
Age: 68
Discharge: HOME | End: 2018-11-23
Attending: EMERGENCY MEDICINE
Payer: COMMERCIAL

## 2018-11-23 VITALS
HEIGHT: 72 IN | WEIGHT: 280 LBS | OXYGEN SATURATION: 95 % | SYSTOLIC BLOOD PRESSURE: 117 MMHG | RESPIRATION RATE: 16 BRPM | TEMPERATURE: 98.1 F | BODY MASS INDEX: 37.93 KG/M2 | DIASTOLIC BLOOD PRESSURE: 59 MMHG | HEART RATE: 64 BPM

## 2018-11-23 DIAGNOSIS — R55 NEAR SYNCOPE: Primary | ICD-10-CM

## 2018-11-23 LAB
ABSOLUTE EOS #: 0 K/UL (ref 0–0.4)
ABSOLUTE IMMATURE GRANULOCYTE: ABNORMAL K/UL (ref 0–0.3)
ABSOLUTE LYMPH #: 0.7 K/UL (ref 1–4.8)
ABSOLUTE MONO #: 0.4 K/UL (ref 0.1–1.3)
ALBUMIN SERPL-MCNC: 3.6 G/DL (ref 3.5–5.2)
ALBUMIN/GLOBULIN RATIO: ABNORMAL (ref 1–2.5)
ALP BLD-CCNC: 147 U/L (ref 40–129)
ALT SERPL-CCNC: 14 U/L (ref 5–41)
ANION GAP SERPL CALCULATED.3IONS-SCNC: 11 MMOL/L (ref 9–17)
AST SERPL-CCNC: 10 U/L
BASOPHILS # BLD: 1 % (ref 0–2)
BASOPHILS ABSOLUTE: 0.1 K/UL (ref 0–0.2)
BILIRUB SERPL-MCNC: 0.63 MG/DL (ref 0.3–1.2)
BUN BLDV-MCNC: 53 MG/DL (ref 8–23)
BUN/CREAT BLD: ABNORMAL (ref 9–20)
CALCIUM SERPL-MCNC: 9 MG/DL (ref 8.6–10.4)
CHLORIDE BLD-SCNC: 99 MMOL/L (ref 98–107)
CO2: 34 MMOL/L (ref 20–31)
CREAT SERPL-MCNC: 5.17 MG/DL (ref 0.7–1.2)
DIFFERENTIAL TYPE: ABNORMAL
EKG ATRIAL RATE: 62 BPM
EKG P AXIS: 33 DEGREES
EKG P-R INTERVAL: 142 MS
EKG Q-T INTERVAL: 476 MS
EKG QRS DURATION: 146 MS
EKG QTC CALCULATION (BAZETT): 483 MS
EKG R AXIS: 6 DEGREES
EKG T AXIS: 41 DEGREES
EKG VENTRICULAR RATE: 62 BPM
EOSINOPHILS RELATIVE PERCENT: 1 % (ref 0–4)
GFR AFRICAN AMERICAN: 13 ML/MIN
GFR NON-AFRICAN AMERICAN: 11 ML/MIN
GFR SERPL CREATININE-BSD FRML MDRD: ABNORMAL ML/MIN/{1.73_M2}
GFR SERPL CREATININE-BSD FRML MDRD: ABNORMAL ML/MIN/{1.73_M2}
GLUCOSE BLD-MCNC: 93 MG/DL (ref 70–99)
HCT VFR BLD CALC: 30.7 % (ref 41–53)
HEMOGLOBIN: 9.5 G/DL (ref 13.5–17.5)
IMMATURE GRANULOCYTES: ABNORMAL %
LYMPHOCYTES # BLD: 9 % (ref 24–44)
MCH RBC QN AUTO: 28.7 PG (ref 26–34)
MCHC RBC AUTO-ENTMCNC: 30.9 G/DL (ref 31–37)
MCV RBC AUTO: 92.8 FL (ref 80–100)
MONOCYTES # BLD: 5 % (ref 1–7)
NRBC AUTOMATED: ABNORMAL PER 100 WBC
PDW BLD-RTO: 15.7 % (ref 11.5–14.9)
PLATELET # BLD: 126 K/UL (ref 150–450)
PLATELET ESTIMATE: ABNORMAL
PMV BLD AUTO: 6.8 FL (ref 6–12)
POTASSIUM SERPL-SCNC: 4 MMOL/L (ref 3.7–5.3)
RBC # BLD: 3.31 M/UL (ref 4.5–5.9)
RBC # BLD: ABNORMAL 10*6/UL
SEG NEUTROPHILS: 84 % (ref 36–66)
SEGMENTED NEUTROPHILS ABSOLUTE COUNT: 6.3 K/UL (ref 1.3–9.1)
SODIUM BLD-SCNC: 144 MMOL/L (ref 135–144)
TOTAL PROTEIN: 6.3 G/DL (ref 6.4–8.3)
WBC # BLD: 7.5 K/UL (ref 3.5–11)
WBC # BLD: ABNORMAL 10*3/UL

## 2018-11-23 PROCEDURE — 80053 COMPREHEN METABOLIC PANEL: CPT

## 2018-11-23 PROCEDURE — 99284 EMERGENCY DEPT VISIT MOD MDM: CPT

## 2018-11-23 PROCEDURE — 36415 COLL VENOUS BLD VENIPUNCTURE: CPT

## 2018-11-23 PROCEDURE — 93005 ELECTROCARDIOGRAM TRACING: CPT

## 2018-11-23 PROCEDURE — 85025 COMPLETE CBC W/AUTO DIFF WBC: CPT

## 2018-11-23 ASSESSMENT — PAIN DESCRIPTION - PAIN TYPE: TYPE: CHRONIC PAIN

## 2018-11-23 ASSESSMENT — PAIN SCALES - GENERAL: PAINLEVEL_OUTOF10: 8

## 2018-11-23 ASSESSMENT — PAIN DESCRIPTION - LOCATION: LOCATION: BACK

## 2018-11-23 NOTE — ED PROVIDER NOTES
disorder; Syncope; Type II or unspecified type diabetes mellitus without mention of complication, not stated as uncontrolled; and Venous thrombosis. Past Surgical History:  has a past surgical history that includes total nephrectomy (Right); Cholecystectomy; Skin graft; Colonoscopy (08/22/2011); Skin cancer excision (Left); TURP; Dialysis fistula creation (Left, 06/13/2016); and Tunneled venous catheter placement (Right, 09/03/2016). Social History:  reports that he has been smoking Cigarettes. He has been smoking about 1.00 pack per day. He has never used smokeless tobacco. He reports that he does not drink alcohol or use drugs. Family History: Family history is unknown by patient. The patients home medications have been reviewed. Allergies: Toradol [ketorolac tromethamine]; Cymbalta [duloxetine hcl];  Ketorolac tromethamine; and Tromethamine    -------------------------------------------------- RESULTS -------------------------------------------------  All laboratory and radiology results have been personally reviewed by myself   LABS:  Results for orders placed or performed during the hospital encounter of 11/23/18   CBC Auto Differential   Result Value Ref Range    WBC 7.5 3.5 - 11.0 k/uL    RBC 3.31 (L) 4.5 - 5.9 m/uL    Hemoglobin 9.5 (L) 13.5 - 17.5 g/dL    Hematocrit 30.7 (L) 41 - 53 %    MCV 92.8 80 - 100 fL    MCH 28.7 26 - 34 pg    MCHC 30.9 (L) 31 - 37 g/dL    RDW 15.7 (H) 11.5 - 14.9 %    Platelets 815 (L) 669 - 450 k/uL    MPV 6.8 6.0 - 12.0 fL    NRBC Automated NOT REPORTED per 100 WBC    Differential Type NOT REPORTED     Seg Neutrophils 84 (H) 36 - 66 %    Lymphocytes 9 (L) 24 - 44 %    Monocytes 5 1 - 7 %    Eosinophils % 1 0 - 4 %    Basophils 1 0 - 2 %    Immature Granulocytes NOT REPORTED 0 %    Segs Absolute 6.30 1.3 - 9.1 k/uL    Absolute Lymph # 0.70 (L) 1.0 - 4.8 k/uL    Absolute Mono # 0.40 0.1 - 1.3 k/uL    Absolute Eos # 0.00 0.0 - 0.4 k/uL    Basophils # 0.10 0.0 - 0.2 EXAM--------------------------------------      Constitutional/General: Alert and oriented x3, well appearing, non toxic in NAD  Head: NC/AT  Eyes: PERRL, EOMI  Mouth: Oropharynx clear, handling secretions, no trismus  Neck: Supple, full ROM,   Pulmonary: Lungs clear to auscultation bilaterally, no wheezes, rales, or rhonchi. Not in respiratory distress  Cardiovascular:  Regular rate and rhythm, no murmurs, gallops, or rubs. 2+ distal pulses  Abdomen: Soft, non tender, non distended,   Extremities: Moves all extremities x 4. Warm and well perfused  Skin: warm and dry without rash. Multiple lesions of neurofibromatosis noted. Neurologic: GCS 15,  Psych: Normal Affect      ------------------------------ ED COURSE/MEDICAL DECISION MAKING----------------------  Medications - No data to display      Medical Decision Making: This is a patient status post dialysis with a fell asleep at a presyncopal episode. Given the nature of dialysis and the fact this patient has had hypotensive episodes in the past with dialysis I do not feel this represents a primary cardiovascular event despite the patient's age. I discussed with the patient understands and agrees and does not wish to stay in the hospital.  He will be discharged back to his ECF. Counseling: The emergency provider has spoken with the patient and discussed todays results, in addition to providing specific details for the plan of care and counseling regarding the diagnosis and prognosis. Questions are answered at this time and they are agreeable with the plan.      --------------------------------- IMPRESSION AND DISPOSITION ---------------------------------    IMPRESSION  1.  Near syncope        DISPOSITION  Disposition: Discharge to nursing home  Patient condition is stable              Cj Diggs MD  11/23/18 7627

## 2018-12-17 ENCOUNTER — HOSPITAL ENCOUNTER (OUTPATIENT)
Age: 68
Setting detail: SPECIMEN
Discharge: HOME | End: 2018-12-17
Payer: COMMERCIAL

## 2018-12-17 LAB
-: NORMAL
ANION GAP SERPL CALCULATED.3IONS-SCNC: 17 MMOL/L (ref 9–17)
BUN BLDV-MCNC: 65 MG/DL (ref 8–23)
BUN/CREAT BLD: 10 (ref 9–20)
CALCIUM SERPL-MCNC: 9.3 MG/DL (ref 8.6–10.4)
CHLORIDE BLD-SCNC: 100 MMOL/L (ref 98–107)
CO2: 28 MMOL/L (ref 20–31)
CREAT SERPL-MCNC: 6.84 MG/DL (ref 0.7–1.2)
GFR AFRICAN AMERICAN: 10 ML/MIN
GFR NON-AFRICAN AMERICAN: 8 ML/MIN
GFR SERPL CREATININE-BSD FRML MDRD: ABNORMAL ML/MIN/{1.73_M2}
GFR SERPL CREATININE-BSD FRML MDRD: ABNORMAL ML/MIN/{1.73_M2}
GLUCOSE BLD-MCNC: 104 MG/DL (ref 70–99)
HCT VFR BLD CALC: 28.3 % (ref 41–53)
HEMOGLOBIN: 9.4 G/DL (ref 13.5–17.5)
MCH RBC QN AUTO: 30.9 PG (ref 26–34)
MCHC RBC AUTO-ENTMCNC: 33.1 G/DL (ref 31–37)
MCV RBC AUTO: 93.2 FL (ref 80–100)
NRBC AUTOMATED: ABNORMAL PER 100 WBC
PDW BLD-RTO: 15.6 % (ref 11.5–14.5)
PLATELET # BLD: 128 K/UL (ref 130–400)
PMV BLD AUTO: 7.1 FL (ref 6–12)
POTASSIUM SERPL-SCNC: 4 MMOL/L (ref 3.7–5.3)
RBC # BLD: 3.03 M/UL (ref 4.5–5.9)
REASON FOR REJECTION: NORMAL
SODIUM BLD-SCNC: 145 MMOL/L (ref 135–144)
WBC # BLD: 5.9 K/UL (ref 3.5–11)
ZZ NTE CLEAN UP: ORDERED TEST: NORMAL
ZZ NTE WITH NAME CLEAN UP: SPECIMEN SOURCE: NORMAL

## 2018-12-17 PROCEDURE — P9603 ONE-WAY ALLOW PRORATED MILES: HCPCS

## 2018-12-17 PROCEDURE — 80048 BASIC METABOLIC PNL TOTAL CA: CPT

## 2018-12-17 PROCEDURE — 85027 COMPLETE CBC AUTOMATED: CPT

## 2018-12-17 PROCEDURE — 36415 COLL VENOUS BLD VENIPUNCTURE: CPT

## 2019-01-01 ENCOUNTER — ANESTHESIA (OUTPATIENT)
Dept: OPERATING ROOM | Age: 69
End: 2019-01-01
Payer: COMMERCIAL

## 2019-01-01 ENCOUNTER — ANESTHESIA EVENT (OUTPATIENT)
Dept: OPERATING ROOM | Age: 69
End: 2019-01-01
Payer: COMMERCIAL

## 2019-01-01 ENCOUNTER — HOSPITAL ENCOUNTER (OUTPATIENT)
Age: 69
Setting detail: SPECIMEN
Discharge: HOME | End: 2019-06-18
Payer: COMMERCIAL

## 2019-01-01 ENCOUNTER — HOSPITAL ENCOUNTER (OUTPATIENT)
Age: 69
Setting detail: OUTPATIENT SURGERY
Discharge: NURSING, REHAB OR LONG-TERM CARE | End: 2019-05-28
Attending: OPHTHALMOLOGY | Admitting: OPHTHALMOLOGY
Payer: COMMERCIAL

## 2019-01-01 ENCOUNTER — HOSPITAL ENCOUNTER (OUTPATIENT)
Dept: INTERVENTIONAL RADIOLOGY/VASCULAR | Age: 69
Discharge: HOME | End: 2019-06-26
Payer: COMMERCIAL

## 2019-01-01 VITALS
TEMPERATURE: 98.7 F | WEIGHT: 26 LBS | RESPIRATION RATE: 18 BRPM | SYSTOLIC BLOOD PRESSURE: 125 MMHG | HEART RATE: 62 BPM | HEIGHT: 72 IN | DIASTOLIC BLOOD PRESSURE: 78 MMHG | OXYGEN SATURATION: 100 % | BODY MASS INDEX: 3.52 KG/M2

## 2019-01-01 VITALS
DIASTOLIC BLOOD PRESSURE: 68 MMHG | RESPIRATION RATE: 16 BRPM | WEIGHT: 270 LBS | BODY MASS INDEX: 36.57 KG/M2 | OXYGEN SATURATION: 99 % | TEMPERATURE: 97.1 F | HEIGHT: 72 IN | HEART RATE: 78 BPM | SYSTOLIC BLOOD PRESSURE: 152 MMHG

## 2019-01-01 VITALS — OXYGEN SATURATION: 100 % | SYSTOLIC BLOOD PRESSURE: 166 MMHG | DIASTOLIC BLOOD PRESSURE: 74 MMHG

## 2019-01-01 DIAGNOSIS — N18.6 ESRD (END STAGE RENAL DISEASE) (HCC): ICD-10-CM

## 2019-01-01 LAB
CHOLESTEROL/HDL RATIO: 2.9
CHOLESTEROL: 108 MG/DL
GLUCOSE BLD-MCNC: 76 MG/DL (ref 75–110)
GLUCOSE BLD-MCNC: 94 MG/DL (ref 75–110)
HDLC SERPL-MCNC: 37 MG/DL
INR BLD: 1
LDL CHOLESTEROL: 57 MG/DL (ref 0–130)
PARTIAL THROMBOPLASTIN TIME: 32.8 SEC (ref 24–36)
PLATELET # BLD: 117 K/UL (ref 150–450)
POTASSIUM SERPL-SCNC: 5 MMOL/L (ref 3.7–5.3)
POTASSIUM SERPL-SCNC: 5 MMOL/L (ref 3.7–5.3)
PROTHROMBIN TIME: 13.4 SEC (ref 11.8–14.6)
TRIGL SERPL-MCNC: 71 MG/DL
VLDLC SERPL CALC-MCNC: ABNORMAL MG/DL (ref 1–30)

## 2019-01-01 PROCEDURE — 2580000003 HC RX 258: Performed by: ANESTHESIOLOGY

## 2019-01-01 PROCEDURE — 6370000000 HC RX 637 (ALT 250 FOR IP): Performed by: OPHTHALMOLOGY

## 2019-01-01 PROCEDURE — P9603 ONE-WAY ALLOW PRORATED MILES: HCPCS

## 2019-01-01 PROCEDURE — 82947 ASSAY GLUCOSE BLOOD QUANT: CPT

## 2019-01-01 PROCEDURE — 2500000003 HC RX 250 WO HCPCS: Performed by: OPHTHALMOLOGY

## 2019-01-01 PROCEDURE — 6360000004 HC RX CONTRAST MEDICATION: Performed by: RADIOLOGY

## 2019-01-01 PROCEDURE — 85049 AUTOMATED PLATELET COUNT: CPT

## 2019-01-01 PROCEDURE — 6360000002 HC RX W HCPCS: Performed by: NURSE ANESTHETIST, CERTIFIED REGISTERED

## 2019-01-01 PROCEDURE — 80061 LIPID PANEL: CPT

## 2019-01-01 PROCEDURE — 3600000012 HC SURGERY LEVEL 2 ADDTL 15MIN: Performed by: OPHTHALMOLOGY

## 2019-01-01 PROCEDURE — 84132 ASSAY OF SERUM POTASSIUM: CPT

## 2019-01-01 PROCEDURE — 36901 INTRO CATH DIALYSIS CIRCUIT: CPT | Performed by: RADIOLOGY

## 2019-01-01 PROCEDURE — 7100000010 HC PHASE II RECOVERY - FIRST 15 MIN

## 2019-01-01 PROCEDURE — 7100000001 HC PACU RECOVERY - ADDTL 15 MIN: Performed by: OPHTHALMOLOGY

## 2019-01-01 PROCEDURE — 3700000000 HC ANESTHESIA ATTENDED CARE: Performed by: OPHTHALMOLOGY

## 2019-01-01 PROCEDURE — 36415 COLL VENOUS BLD VENIPUNCTURE: CPT

## 2019-01-01 PROCEDURE — 2709999900 HC NON-CHARGEABLE SUPPLY: Performed by: OPHTHALMOLOGY

## 2019-01-01 PROCEDURE — 2709999900 IR FISTULAGRAM

## 2019-01-01 PROCEDURE — 7100000030 HC ASPR PHASE II RECOVERY - FIRST 15 MIN

## 2019-01-01 PROCEDURE — 6360000002 HC RX W HCPCS: Performed by: OPHTHALMOLOGY

## 2019-01-01 PROCEDURE — 85610 PROTHROMBIN TIME: CPT

## 2019-01-01 PROCEDURE — 7100000000 HC PACU RECOVERY - FIRST 15 MIN: Performed by: OPHTHALMOLOGY

## 2019-01-01 PROCEDURE — 3600000002 HC SURGERY LEVEL 2 BASE: Performed by: OPHTHALMOLOGY

## 2019-01-01 PROCEDURE — 3700000001 HC ADD 15 MINUTES (ANESTHESIA): Performed by: OPHTHALMOLOGY

## 2019-01-01 PROCEDURE — 6360000002 HC RX W HCPCS: Performed by: RADIOLOGY

## 2019-01-01 PROCEDURE — 7100000030 HC ASPR PHASE II RECOVERY - FIRST 15 MIN: Performed by: OPHTHALMOLOGY

## 2019-01-01 PROCEDURE — 85730 THROMBOPLASTIN TIME PARTIAL: CPT

## 2019-01-01 PROCEDURE — 7100000031 HC ASPR PHASE II RECOVERY - ADDTL 15 MIN

## 2019-01-01 PROCEDURE — 2580000003 HC RX 258: Performed by: RADIOLOGY

## 2019-01-01 PROCEDURE — 7100000031 HC ASPR PHASE II RECOVERY - ADDTL 15 MIN: Performed by: OPHTHALMOLOGY

## 2019-01-01 PROCEDURE — 7100000011 HC PHASE II RECOVERY - ADDTL 15 MIN

## 2019-01-01 PROCEDURE — V2632 POST CHMBR INTRAOCULAR LENS: HCPCS | Performed by: OPHTHALMOLOGY

## 2019-01-01 DEVICE — ACRYSOF(R) IQ ASPHERIC NATURAL IOL, SINGLE-PIECE ACRYLIC FOLDABLE PCL, UV WITH BLUE LIGHTFILTER, 13.0MM LENGTH, 6.0MM ANTERIORASYMMETRIC BICONVEX OPTIC, PLANAR HAPTICS.
Type: IMPLANTABLE DEVICE | Site: EYE | Status: FUNCTIONAL
Brand: ACRYSOF®

## 2019-01-01 RX ORDER — SODIUM CHLORIDE 0.9 % (FLUSH) 0.9 %
10 SYRINGE (ML) INJECTION 2 TIMES DAILY
Status: DISCONTINUED | OUTPATIENT
Start: 2019-01-01 | End: 2019-01-01 | Stop reason: HOSPADM

## 2019-01-01 RX ORDER — SODIUM CHLORIDE 9 MG/ML
INJECTION, SOLUTION INTRAVENOUS CONTINUOUS
Status: DISCONTINUED | OUTPATIENT
Start: 2019-01-01 | End: 2019-01-01 | Stop reason: HOSPADM

## 2019-01-01 RX ORDER — BALANCED SALT SOLUTION ENRICHED WITH BICARBONATE, DEXTROSE, AND GLUTATHIONE
KIT INTRAOCULAR PRN
Status: DISCONTINUED | OUTPATIENT
Start: 2019-01-01 | End: 2019-01-01 | Stop reason: ALTCHOICE

## 2019-01-01 RX ORDER — FENTANYL CITRATE 50 UG/ML
INJECTION, SOLUTION INTRAMUSCULAR; INTRAVENOUS
Status: COMPLETED | OUTPATIENT
Start: 2019-01-01 | End: 2019-01-01

## 2019-01-01 RX ORDER — ACETAMINOPHEN 325 MG/1
650 TABLET ORAL EVERY 4 HOURS PRN
Status: DISCONTINUED | OUTPATIENT
Start: 2019-01-01 | End: 2019-01-01 | Stop reason: HOSPADM

## 2019-01-01 RX ORDER — BUPIVACAINE HYDROCHLORIDE 5 MG/ML
1 INJECTION, SOLUTION EPIDURAL; INTRACAUDAL SEE ADMIN INSTRUCTIONS
Status: DISCONTINUED | OUTPATIENT
Start: 2019-01-01 | End: 2019-01-01 | Stop reason: HOSPADM

## 2019-01-01 RX ORDER — DIFLUPREDNATE 0.5 MG/ML
1 EMULSION OPHTHALMIC 2 TIMES DAILY
COMMUNITY
End: 2020-01-01

## 2019-01-01 RX ORDER — CYCLOPENTOLATE HYDROCHLORIDE 10 MG/ML
1 SOLUTION/ DROPS OPHTHALMIC EVERY 5 MIN PRN
Status: COMPLETED | OUTPATIENT
Start: 2019-01-01 | End: 2019-01-01

## 2019-01-01 RX ORDER — MIDAZOLAM HYDROCHLORIDE 1 MG/ML
INJECTION INTRAMUSCULAR; INTRAVENOUS PRN
Status: DISCONTINUED | OUTPATIENT
Start: 2019-01-01 | End: 2019-01-01 | Stop reason: SDUPTHER

## 2019-01-01 RX ORDER — NEOMYCIN SULFATE, POLYMYXIN B SULFATE, AND DEXAMETHASONE 3.5; 10000; 1 MG/G; [USP'U]/G; MG/G
OINTMENT OPHTHALMIC PRN
Status: DISCONTINUED | OUTPATIENT
Start: 2019-01-01 | End: 2019-01-01 | Stop reason: ALTCHOICE

## 2019-01-01 RX ORDER — KETOROLAC TROMETHAMINE 5 MG/ML
1 SOLUTION OPHTHALMIC EVERY 5 MIN PRN
Status: COMPLETED | OUTPATIENT
Start: 2019-01-01 | End: 2019-01-01

## 2019-01-01 RX ORDER — TOBRAMYCIN 3 MG/ML
1 SOLUTION/ DROPS OPHTHALMIC EVERY 5 MIN PRN
Status: COMPLETED | OUTPATIENT
Start: 2019-01-01 | End: 2019-01-01

## 2019-01-01 RX ORDER — SODIUM CHLORIDE, SODIUM LACTATE, POTASSIUM CHLORIDE, CALCIUM CHLORIDE 600; 310; 30; 20 MG/100ML; MG/100ML; MG/100ML; MG/100ML
INJECTION, SOLUTION INTRAVENOUS CONTINUOUS
Status: DISCONTINUED | OUTPATIENT
Start: 2019-01-01 | End: 2019-01-01

## 2019-01-01 RX ORDER — TOBRAMYCIN 3 MG/ML
SOLUTION/ DROPS OPHTHALMIC PRN
Status: DISCONTINUED | OUTPATIENT
Start: 2019-01-01 | End: 2019-01-01 | Stop reason: ALTCHOICE

## 2019-01-01 RX ORDER — SODIUM CHLORIDE 9 MG/ML
INJECTION, SOLUTION INTRAVENOUS CONTINUOUS
Status: CANCELLED | OUTPATIENT
Start: 2019-01-01

## 2019-01-01 RX ORDER — LIDOCAINE HYDROCHLORIDE 10 MG/ML
INJECTION, SOLUTION INFILTRATION; PERINEURAL PRN
Status: DISCONTINUED | OUTPATIENT
Start: 2019-01-01 | End: 2019-01-01 | Stop reason: ALTCHOICE

## 2019-01-01 RX ORDER — FENTANYL 25 UG/H
1 PATCH TRANSDERMAL
Status: ON HOLD | COMMUNITY
End: 2020-01-01 | Stop reason: HOSPADM

## 2019-01-01 RX ORDER — PHENYLEPHRINE HCL 2.5 %
1 DROPS OPHTHALMIC (EYE) EVERY 5 MIN PRN
Status: COMPLETED | OUTPATIENT
Start: 2019-01-01 | End: 2019-01-01

## 2019-01-01 RX ORDER — TIMOLOL MALEATE 5 MG/ML
SOLUTION/ DROPS OPHTHALMIC PRN
Status: DISCONTINUED | OUTPATIENT
Start: 2019-01-01 | End: 2019-01-01 | Stop reason: ALTCHOICE

## 2019-01-01 RX ORDER — BUPIVACAINE HYDROCHLORIDE 5 MG/ML
INJECTION, SOLUTION EPIDURAL; INTRACAUDAL PRN
Status: DISCONTINUED | OUTPATIENT
Start: 2019-01-01 | End: 2019-01-01 | Stop reason: ALTCHOICE

## 2019-01-01 RX ORDER — LORAZEPAM 1 MG/1
1 TABLET ORAL ONCE
Status: DISCONTINUED | OUTPATIENT
Start: 2019-01-01 | End: 2019-01-01

## 2019-01-01 RX ADMIN — TOBRAMYCIN 1 DROP: 3 SOLUTION OPHTHALMIC at 08:28

## 2019-01-01 RX ADMIN — TOBRAMYCIN 1 DROP: 3 SOLUTION OPHTHALMIC at 08:40

## 2019-01-01 RX ADMIN — CYCLOPENTOLATE HYDROCHLORIDE 1 DROP: 10 SOLUTION/ DROPS OPHTHALMIC at 08:40

## 2019-01-01 RX ADMIN — PHENYLEPHRINE HYDROCHLORIDE 1 DROP: 25 SOLUTION/ DROPS OPHTHALMIC at 08:40

## 2019-01-01 RX ADMIN — PHENYLEPHRINE HYDROCHLORIDE 1 DROP: 25 SOLUTION/ DROPS OPHTHALMIC at 08:28

## 2019-01-01 RX ADMIN — KETOROLAC TROMETHAMINE 1 DROP: 5 SOLUTION/ DROPS OPHTHALMIC at 08:28

## 2019-01-01 RX ADMIN — BUPIVACAINE HYDROCHLORIDE 5 MG: 5 INJECTION, SOLUTION EPIDURAL; INTRACAUDAL; PERINEURAL at 08:28

## 2019-01-01 RX ADMIN — CYCLOPENTOLATE HYDROCHLORIDE 1 DROP: 10 SOLUTION/ DROPS OPHTHALMIC at 08:28

## 2019-01-01 RX ADMIN — MIDAZOLAM 0.5 MG: 1 INJECTION INTRAMUSCULAR; INTRAVENOUS at 09:07

## 2019-01-01 RX ADMIN — IOVERSOL 100 ML: 678 INJECTION INTRA-ARTERIAL; INTRAVENOUS at 11:40

## 2019-01-01 RX ADMIN — FENTANYL CITRATE 25 MCG: 50 INJECTION, SOLUTION INTRAMUSCULAR; INTRAVENOUS at 11:19

## 2019-01-01 RX ADMIN — MIDAZOLAM 0.5 MG: 1 INJECTION INTRAMUSCULAR; INTRAVENOUS at 09:04

## 2019-01-01 RX ADMIN — SODIUM CHLORIDE: 9 INJECTION, SOLUTION INTRAVENOUS at 08:20

## 2019-01-01 RX ADMIN — KETOROLAC TROMETHAMINE 1 DROP: 5 SOLUTION/ DROPS OPHTHALMIC at 08:40

## 2019-01-01 RX ADMIN — Medication 10 ML: at 10:30

## 2019-01-01 RX ADMIN — BUPIVACAINE HYDROCHLORIDE 5 MG: 5 INJECTION, SOLUTION EPIDURAL; INTRACAUDAL; PERINEURAL at 08:40

## 2019-01-01 ASSESSMENT — PULMONARY FUNCTION TESTS
PIF_VALUE: 1

## 2019-01-01 ASSESSMENT — PAIN SCALES - GENERAL
PAINLEVEL_OUTOF10: 0
PAINLEVEL_OUTOF10: 5

## 2019-01-01 ASSESSMENT — PAIN - FUNCTIONAL ASSESSMENT
PAIN_FUNCTIONAL_ASSESSMENT: 0-10
PAIN_FUNCTIONAL_ASSESSMENT: 0-10

## 2019-01-01 ASSESSMENT — PAIN DESCRIPTION - DESCRIPTORS: DESCRIPTORS: ACHING;DISCOMFORT

## 2019-01-01 ASSESSMENT — LIFESTYLE VARIABLES: SMOKING_STATUS: 1

## 2019-01-01 ASSESSMENT — PAIN DESCRIPTION - PAIN TYPE: TYPE: CHRONIC PAIN

## 2019-01-01 ASSESSMENT — PAIN DESCRIPTION - FREQUENCY: FREQUENCY: CONTINUOUS

## 2019-01-08 ENCOUNTER — APPOINTMENT (OUTPATIENT)
Dept: CT IMAGING | Age: 69
DRG: 640 | End: 2019-01-08
Payer: COMMERCIAL

## 2019-01-08 ENCOUNTER — APPOINTMENT (OUTPATIENT)
Dept: GENERAL RADIOLOGY | Age: 69
DRG: 640 | End: 2019-01-08
Payer: COMMERCIAL

## 2019-01-08 ENCOUNTER — HOSPITAL ENCOUNTER (EMERGENCY)
Age: 69
Discharge: LEFT BEFORE DISCHARGE | DRG: 640 | End: 2019-01-08
Attending: EMERGENCY MEDICINE
Payer: COMMERCIAL

## 2019-01-08 VITALS
BODY MASS INDEX: 37.25 KG/M2 | HEART RATE: 70 BPM | DIASTOLIC BLOOD PRESSURE: 74 MMHG | SYSTOLIC BLOOD PRESSURE: 148 MMHG | RESPIRATION RATE: 18 BRPM | OXYGEN SATURATION: 92 % | HEIGHT: 72 IN | TEMPERATURE: 98.7 F | WEIGHT: 275 LBS

## 2019-01-08 DIAGNOSIS — S20.212A CONTUSION OF RIB ON LEFT SIDE, INITIAL ENCOUNTER: Primary | ICD-10-CM

## 2019-01-08 LAB
ABSOLUTE EOS #: 0.1 K/UL (ref 0–0.4)
ABSOLUTE IMMATURE GRANULOCYTE: ABNORMAL K/UL (ref 0–0.3)
ABSOLUTE LYMPH #: 0.6 K/UL (ref 1–4.8)
ABSOLUTE MONO #: 0.3 K/UL (ref 0.1–1.3)
ALBUMIN SERPL-MCNC: 4.1 G/DL (ref 3.5–5.2)
ALBUMIN/GLOBULIN RATIO: ABNORMAL (ref 1–2.5)
ALP BLD-CCNC: 227 U/L (ref 40–129)
ALT SERPL-CCNC: 29 U/L (ref 5–41)
ANION GAP SERPL CALCULATED.3IONS-SCNC: 12 MMOL/L (ref 9–17)
AST SERPL-CCNC: 20 U/L
BASOPHILS # BLD: 1 % (ref 0–2)
BASOPHILS ABSOLUTE: 0 K/UL (ref 0–0.2)
BILIRUB SERPL-MCNC: 0.57 MG/DL (ref 0.3–1.2)
BUN BLDV-MCNC: 51 MG/DL (ref 8–23)
BUN/CREAT BLD: ABNORMAL (ref 9–20)
CALCIUM SERPL-MCNC: 9.4 MG/DL (ref 8.6–10.4)
CHLORIDE BLD-SCNC: 103 MMOL/L (ref 98–107)
CO2: 27 MMOL/L (ref 20–31)
CREAT SERPL-MCNC: 5.38 MG/DL (ref 0.7–1.2)
DIFFERENTIAL TYPE: ABNORMAL
EOSINOPHILS RELATIVE PERCENT: 1 % (ref 0–4)
GFR AFRICAN AMERICAN: 13 ML/MIN
GFR NON-AFRICAN AMERICAN: 11 ML/MIN
GFR SERPL CREATININE-BSD FRML MDRD: ABNORMAL ML/MIN/{1.73_M2}
GFR SERPL CREATININE-BSD FRML MDRD: ABNORMAL ML/MIN/{1.73_M2}
GLUCOSE BLD-MCNC: 120 MG/DL (ref 70–99)
HCT VFR BLD CALC: 33.2 % (ref 41–53)
HEMOGLOBIN: 10.7 G/DL (ref 13.5–17.5)
IMMATURE GRANULOCYTES: ABNORMAL %
LYMPHOCYTES # BLD: 10 % (ref 24–44)
MCH RBC QN AUTO: 30.6 PG (ref 26–34)
MCHC RBC AUTO-ENTMCNC: 32.4 G/DL (ref 31–37)
MCV RBC AUTO: 94.6 FL (ref 80–100)
MONOCYTES # BLD: 5 % (ref 1–7)
NRBC AUTOMATED: ABNORMAL PER 100 WBC
PDW BLD-RTO: 15.6 % (ref 11.5–14.9)
PLATELET # BLD: 128 K/UL (ref 150–450)
PLATELET ESTIMATE: ABNORMAL
PMV BLD AUTO: 6.5 FL (ref 6–12)
POTASSIUM SERPL-SCNC: 4.8 MMOL/L (ref 3.7–5.3)
RBC # BLD: 3.51 M/UL (ref 4.5–5.9)
RBC # BLD: ABNORMAL 10*6/UL
SEG NEUTROPHILS: 83 % (ref 36–66)
SEGMENTED NEUTROPHILS ABSOLUTE COUNT: 4.8 K/UL (ref 1.3–9.1)
SODIUM BLD-SCNC: 142 MMOL/L (ref 135–144)
TOTAL PROTEIN: 7 G/DL (ref 6.4–8.3)
TROPONIN INTERP: ABNORMAL
TROPONIN T: ABNORMAL NG/ML
TROPONIN, HIGH SENSITIVITY: 108 NG/L (ref 0–22)
WBC # BLD: 5.8 K/UL (ref 3.5–11)
WBC # BLD: ABNORMAL 10*3/UL

## 2019-01-08 PROCEDURE — 71045 X-RAY EXAM CHEST 1 VIEW: CPT

## 2019-01-08 PROCEDURE — 80053 COMPREHEN METABOLIC PANEL: CPT

## 2019-01-08 PROCEDURE — 6360000002 HC RX W HCPCS: Performed by: EMERGENCY MEDICINE

## 2019-01-08 PROCEDURE — 96374 THER/PROPH/DIAG INJ IV PUSH: CPT

## 2019-01-08 PROCEDURE — 84484 ASSAY OF TROPONIN QUANT: CPT

## 2019-01-08 PROCEDURE — 85025 COMPLETE CBC W/AUTO DIFF WBC: CPT

## 2019-01-08 PROCEDURE — 93005 ELECTROCARDIOGRAM TRACING: CPT

## 2019-01-08 PROCEDURE — 36415 COLL VENOUS BLD VENIPUNCTURE: CPT

## 2019-01-08 PROCEDURE — 99284 EMERGENCY DEPT VISIT MOD MDM: CPT

## 2019-01-08 PROCEDURE — 6370000000 HC RX 637 (ALT 250 FOR IP): Performed by: EMERGENCY MEDICINE

## 2019-01-08 RX ORDER — MORPHINE SULFATE 2 MG/ML
2 INJECTION, SOLUTION INTRAMUSCULAR; INTRAVENOUS ONCE
Status: COMPLETED | OUTPATIENT
Start: 2019-01-08 | End: 2019-01-08

## 2019-01-08 RX ORDER — OXYCODONE HYDROCHLORIDE AND ACETAMINOPHEN 5; 325 MG/1; MG/1
2 TABLET ORAL ONCE
Status: COMPLETED | OUTPATIENT
Start: 2019-01-08 | End: 2019-01-08

## 2019-01-08 RX ADMIN — OXYCODONE HYDROCHLORIDE AND ACETAMINOPHEN 2 TABLET: 5; 325 TABLET ORAL at 11:05

## 2019-01-08 RX ADMIN — MORPHINE SULFATE 2 MG: 2 INJECTION, SOLUTION INTRAMUSCULAR; INTRAVENOUS at 09:59

## 2019-01-08 ASSESSMENT — PAIN DESCRIPTION - LOCATION: LOCATION: RIB CAGE

## 2019-01-08 ASSESSMENT — ENCOUNTER SYMPTOMS
SHORTNESS OF BREATH: 1
VOMITING: 0
COUGH: 1
ABDOMINAL PAIN: 0
NAUSEA: 0

## 2019-01-08 ASSESSMENT — PAIN DESCRIPTION - ORIENTATION: ORIENTATION: LEFT

## 2019-01-08 ASSESSMENT — PAIN SCALES - GENERAL
PAINLEVEL_OUTOF10: 10

## 2019-01-08 ASSESSMENT — PAIN DESCRIPTION - DESCRIPTORS: DESCRIPTORS: SHARP

## 2019-01-08 ASSESSMENT — PAIN DESCRIPTION - PAIN TYPE: TYPE: ACUTE PAIN

## 2019-01-11 ENCOUNTER — APPOINTMENT (OUTPATIENT)
Dept: CT IMAGING | Age: 69
DRG: 640 | End: 2019-01-11
Payer: COMMERCIAL

## 2019-01-11 ENCOUNTER — APPOINTMENT (OUTPATIENT)
Dept: GENERAL RADIOLOGY | Age: 69
DRG: 640 | End: 2019-01-11
Payer: COMMERCIAL

## 2019-01-11 ENCOUNTER — HOSPITAL ENCOUNTER (INPATIENT)
Age: 69
LOS: 3 days | Discharge: HOSPICE | DRG: 640 | End: 2019-01-14
Attending: EMERGENCY MEDICINE | Admitting: INTERNAL MEDICINE
Payer: COMMERCIAL

## 2019-01-11 DIAGNOSIS — R07.81 RIB PAIN ON LEFT SIDE: Chronic | ICD-10-CM

## 2019-01-11 DIAGNOSIS — J96.22 ACUTE ON CHRONIC RESPIRATORY FAILURE WITH HYPERCAPNIA (HCC): Primary | ICD-10-CM

## 2019-01-11 DIAGNOSIS — R42 DIZZINESS: ICD-10-CM

## 2019-01-11 PROBLEM — E87.70 FLUID OVERLOAD: Chronic | Status: ACTIVE | Noted: 2019-01-11

## 2019-01-11 PROBLEM — E87.70 FLUID OVERLOAD: Status: ACTIVE | Noted: 2019-01-11

## 2019-01-11 LAB
ABSOLUTE BANDS #: 0.32 K/UL (ref 0–1)
ABSOLUTE EOS #: 0 K/UL (ref 0–0.4)
ABSOLUTE IMMATURE GRANULOCYTE: ABNORMAL K/UL (ref 0–0.3)
ABSOLUTE LYMPH #: 0.32 K/UL (ref 1–4.8)
ABSOLUTE MONO #: 0.42 K/UL (ref 0.1–1.3)
ALBUMIN SERPL-MCNC: 3.8 G/DL (ref 3.5–5.2)
ALBUMIN/GLOBULIN RATIO: ABNORMAL (ref 1–2.5)
ALLEN TEST: ABNORMAL
ALP BLD-CCNC: 215 U/L (ref 40–129)
ALT SERPL-CCNC: 29 U/L (ref 5–41)
ANION GAP SERPL CALCULATED.3IONS-SCNC: 13 MMOL/L (ref 9–17)
AST SERPL-CCNC: 29 U/L
BANDS: 3 % (ref 0–10)
BASOPHILS # BLD: 0 % (ref 0–2)
BASOPHILS ABSOLUTE: 0 K/UL (ref 0–0.2)
BILIRUB SERPL-MCNC: 0.69 MG/DL (ref 0.3–1.2)
BNP INTERPRETATION: ABNORMAL
BUN BLDV-MCNC: 87 MG/DL (ref 8–23)
BUN/CREAT BLD: ABNORMAL (ref 9–20)
CALCIUM SERPL-MCNC: 9.1 MG/DL (ref 8.6–10.4)
CARBOXYHEMOGLOBIN: 1.1 % (ref 0–5)
CHLORIDE BLD-SCNC: 107 MMOL/L (ref 98–107)
CHP ED QC CHECK: YES
CO2: 27 MMOL/L (ref 20–31)
CREAT SERPL-MCNC: 7.06 MG/DL (ref 0.7–1.2)
DIFFERENTIAL TYPE: ABNORMAL
EKG ATRIAL RATE: 52 BPM
EKG P AXIS: -7 DEGREES
EKG P-R INTERVAL: 144 MS
EKG Q-T INTERVAL: 490 MS
EKG QRS DURATION: 142 MS
EKG QTC CALCULATION (BAZETT): 455 MS
EKG R AXIS: -32 DEGREES
EKG T AXIS: 27 DEGREES
EKG VENTRICULAR RATE: 52 BPM
EOSINOPHILS RELATIVE PERCENT: 0 % (ref 0–4)
FERRITIN: 754 UG/L (ref 30–400)
FIO2: ABNORMAL
GFR AFRICAN AMERICAN: 9 ML/MIN
GFR NON-AFRICAN AMERICAN: 8 ML/MIN
GFR SERPL CREATININE-BSD FRML MDRD: ABNORMAL ML/MIN/{1.73_M2}
GFR SERPL CREATININE-BSD FRML MDRD: ABNORMAL ML/MIN/{1.73_M2}
GLUCOSE BLD-MCNC: 111 MG/DL
GLUCOSE BLD-MCNC: 111 MG/DL (ref 75–110)
GLUCOSE BLD-MCNC: 52 MG/DL (ref 75–110)
GLUCOSE BLD-MCNC: 62 MG/DL (ref 75–110)
GLUCOSE BLD-MCNC: 73 MG/DL (ref 75–110)
GLUCOSE BLD-MCNC: 98 MG/DL (ref 70–99)
HCO3 ARTERIAL: 25.6 MMOL/L (ref 22–26)
HCT VFR BLD CALC: 32.4 % (ref 41–53)
HEMOGLOBIN: 10.1 G/DL (ref 13.5–17.5)
IMMATURE GRANULOCYTES: ABNORMAL %
IRON SATURATION: 23 % (ref 20–55)
IRON: 37 UG/DL (ref 59–158)
LYMPHOCYTES # BLD: 3 % (ref 24–44)
MCH RBC QN AUTO: 30.3 PG (ref 26–34)
MCHC RBC AUTO-ENTMCNC: 31.3 G/DL (ref 31–37)
MCV RBC AUTO: 96.8 FL (ref 80–100)
METHEMOGLOBIN: 0.7 % (ref 0–1.9)
MODE: ABNORMAL
MONOCYTES # BLD: 4 % (ref 1–7)
MORPHOLOGY: ABNORMAL
NEGATIVE BASE EXCESS, ART: 2.1 MMOL/L (ref 0–2)
NOTIFICATION TIME: ABNORMAL
NOTIFICATION: ABNORMAL
NRBC AUTOMATED: ABNORMAL PER 100 WBC
O2 DEVICE/FLOW/%: ABNORMAL
O2 SAT, ARTERIAL: 90.8 % (ref 95–98)
OXYHEMOGLOBIN: ABNORMAL % (ref 95–98)
PATIENT TEMP: 37
PCO2 ARTERIAL: 61.5 MMHG (ref 35–45)
PCO2, ART, TEMP ADJ: ABNORMAL (ref 35–45)
PDW BLD-RTO: 15.7 % (ref 11.5–14.9)
PEEP/CPAP: ABNORMAL
PH ARTERIAL: 7.23 (ref 7.35–7.45)
PH, ART, TEMP ADJ: ABNORMAL (ref 7.35–7.45)
PLATELET # BLD: 142 K/UL (ref 150–450)
PLATELET ESTIMATE: ABNORMAL
PMV BLD AUTO: 7 FL (ref 6–12)
PO2 ARTERIAL: 78.9 MMHG (ref 80–100)
PO2, ART, TEMP ADJ: ABNORMAL MMHG (ref 80–100)
POSITIVE BASE EXCESS, ART: ABNORMAL MMOL/L (ref 0–2)
POTASSIUM SERPL-SCNC: 5 MMOL/L (ref 3.7–5.3)
PRO-BNP: 674 PG/ML
PSV: ABNORMAL
PT. POSITION: ABNORMAL
RBC # BLD: 3.35 M/UL (ref 4.5–5.9)
RBC # BLD: ABNORMAL 10*6/UL
RESPIRATORY RATE: 18
SAMPLE SITE: ABNORMAL
SEG NEUTROPHILS: 90 % (ref 36–66)
SEGMENTED NEUTROPHILS ABSOLUTE COUNT: 9.44 K/UL (ref 1.3–9.1)
SET RATE: ABNORMAL
SODIUM BLD-SCNC: 147 MMOL/L (ref 135–144)
TEXT FOR RESPIRATORY: ABNORMAL
TOTAL HB: ABNORMAL G/DL (ref 12–16)
TOTAL IRON BINDING CAPACITY: 164 UG/DL (ref 250–450)
TOTAL PROTEIN: 6.5 G/DL (ref 6.4–8.3)
TOTAL RATE: ABNORMAL
TROPONIN INTERP: ABNORMAL
TROPONIN INTERP: ABNORMAL
TROPONIN T: ABNORMAL NG/ML
TROPONIN T: ABNORMAL NG/ML
TROPONIN, HIGH SENSITIVITY: 113 NG/L (ref 0–22)
TROPONIN, HIGH SENSITIVITY: 99 NG/L (ref 0–22)
UNSATURATED IRON BINDING CAPACITY: 127 UG/DL (ref 112–347)
VT: ABNORMAL
WBC # BLD: 10.5 K/UL (ref 3.5–11)
WBC # BLD: ABNORMAL 10*3/UL

## 2019-01-11 PROCEDURE — 94762 N-INVAS EAR/PLS OXIMTRY CONT: CPT

## 2019-01-11 PROCEDURE — 2500000003 HC RX 250 WO HCPCS: Performed by: INTERNAL MEDICINE

## 2019-01-11 PROCEDURE — 6370000000 HC RX 637 (ALT 250 FOR IP): Performed by: INTERNAL MEDICINE

## 2019-01-11 PROCEDURE — 83540 ASSAY OF IRON: CPT

## 2019-01-11 PROCEDURE — 2060000000 HC ICU INTERMEDIATE R&B

## 2019-01-11 PROCEDURE — P9046 ALBUMIN (HUMAN), 25%, 20 ML: HCPCS | Performed by: INTERNAL MEDICINE

## 2019-01-11 PROCEDURE — 36415 COLL VENOUS BLD VENIPUNCTURE: CPT

## 2019-01-11 PROCEDURE — 2580000003 HC RX 258: Performed by: STUDENT IN AN ORGANIZED HEALTH CARE EDUCATION/TRAINING PROGRAM

## 2019-01-11 PROCEDURE — 83880 ASSAY OF NATRIURETIC PEPTIDE: CPT

## 2019-01-11 PROCEDURE — 94640 AIRWAY INHALATION TREATMENT: CPT

## 2019-01-11 PROCEDURE — 5A1D70Z PERFORMANCE OF URINARY FILTRATION, INTERMITTENT, LESS THAN 6 HOURS PER DAY: ICD-10-PCS | Performed by: INTERNAL MEDICINE

## 2019-01-11 PROCEDURE — 6360000002 HC RX W HCPCS: Performed by: INTERNAL MEDICINE

## 2019-01-11 PROCEDURE — 6370000000 HC RX 637 (ALT 250 FOR IP): Performed by: STUDENT IN AN ORGANIZED HEALTH CARE EDUCATION/TRAINING PROGRAM

## 2019-01-11 PROCEDURE — 85025 COMPLETE CBC W/AUTO DIFF WBC: CPT

## 2019-01-11 PROCEDURE — 82805 BLOOD GASES W/O2 SATURATION: CPT

## 2019-01-11 PROCEDURE — 6360000002 HC RX W HCPCS: Performed by: STUDENT IN AN ORGANIZED HEALTH CARE EDUCATION/TRAINING PROGRAM

## 2019-01-11 PROCEDURE — 6370000000 HC RX 637 (ALT 250 FOR IP): Performed by: EMERGENCY MEDICINE

## 2019-01-11 PROCEDURE — 2580000003 HC RX 258: Performed by: INTERNAL MEDICINE

## 2019-01-11 PROCEDURE — 90937 HEMODIALYSIS REPEATED EVAL: CPT

## 2019-01-11 PROCEDURE — 2580000003 HC RX 258: Performed by: EMERGENCY MEDICINE

## 2019-01-11 PROCEDURE — 93005 ELECTROCARDIOGRAM TRACING: CPT

## 2019-01-11 PROCEDURE — 82947 ASSAY GLUCOSE BLOOD QUANT: CPT

## 2019-01-11 PROCEDURE — 99285 EMERGENCY DEPT VISIT HI MDM: CPT

## 2019-01-11 PROCEDURE — 94660 CPAP INITIATION&MGMT: CPT

## 2019-01-11 PROCEDURE — 71045 X-RAY EXAM CHEST 1 VIEW: CPT

## 2019-01-11 PROCEDURE — 90935 HEMODIALYSIS ONE EVALUATION: CPT

## 2019-01-11 PROCEDURE — 82728 ASSAY OF FERRITIN: CPT

## 2019-01-11 PROCEDURE — 99233 SBSQ HOSP IP/OBS HIGH 50: CPT | Performed by: INTERNAL MEDICINE

## 2019-01-11 PROCEDURE — 80053 COMPREHEN METABOLIC PANEL: CPT

## 2019-01-11 PROCEDURE — 2500000003 HC RX 250 WO HCPCS: Performed by: STUDENT IN AN ORGANIZED HEALTH CARE EDUCATION/TRAINING PROGRAM

## 2019-01-11 PROCEDURE — 84484 ASSAY OF TROPONIN QUANT: CPT

## 2019-01-11 PROCEDURE — 2700000000 HC OXYGEN THERAPY PER DAY

## 2019-01-11 PROCEDURE — 83550 IRON BINDING TEST: CPT

## 2019-01-11 RX ORDER — DIPHENHYDRAMINE HCL 25 MG
25 TABLET ORAL EVERY 8 HOURS PRN
Status: DISCONTINUED | OUTPATIENT
Start: 2019-01-11 | End: 2019-01-14 | Stop reason: HOSPADM

## 2019-01-11 RX ORDER — NICOTINE POLACRILEX 4 MG
15 LOZENGE BUCCAL PRN
Status: DISCONTINUED | OUTPATIENT
Start: 2019-01-11 | End: 2019-01-14 | Stop reason: HOSPADM

## 2019-01-11 RX ORDER — MIDODRINE HYDROCHLORIDE 5 MG/1
5 TABLET ORAL
Status: DISCONTINUED | OUTPATIENT
Start: 2019-01-11 | End: 2019-01-14 | Stop reason: HOSPADM

## 2019-01-11 RX ORDER — SODIUM CHLORIDE 0.9 % (FLUSH) 0.9 %
10 SYRINGE (ML) INJECTION PRN
Status: DISCONTINUED | OUTPATIENT
Start: 2019-01-11 | End: 2019-01-14 | Stop reason: HOSPADM

## 2019-01-11 RX ORDER — ONDANSETRON 4 MG/1
4 TABLET, FILM COATED ORAL EVERY 6 HOURS PRN
Status: DISCONTINUED | OUTPATIENT
Start: 2019-01-11 | End: 2019-01-14 | Stop reason: HOSPADM

## 2019-01-11 RX ORDER — FINASTERIDE 5 MG/1
5 TABLET, FILM COATED ORAL DAILY
Status: DISCONTINUED | OUTPATIENT
Start: 2019-01-11 | End: 2019-01-14 | Stop reason: HOSPADM

## 2019-01-11 RX ORDER — LORATADINE 10 MG/1
10 TABLET ORAL DAILY
Status: ON HOLD | COMMUNITY
End: 2019-05-07 | Stop reason: ALTCHOICE

## 2019-01-11 RX ORDER — SODIUM CHLORIDE 0.9 % (FLUSH) 0.9 %
10 SYRINGE (ML) INJECTION EVERY 12 HOURS SCHEDULED
Status: DISCONTINUED | OUTPATIENT
Start: 2019-01-11 | End: 2019-01-14 | Stop reason: HOSPADM

## 2019-01-11 RX ORDER — NICOTINE 21 MG/24HR
1 PATCH, TRANSDERMAL 24 HOURS TRANSDERMAL DAILY
Status: DISCONTINUED | OUTPATIENT
Start: 2019-01-11 | End: 2019-01-14 | Stop reason: HOSPADM

## 2019-01-11 RX ORDER — DEXTROSE MONOHYDRATE 25 G/50ML
12.5 INJECTION, SOLUTION INTRAVENOUS ONCE
Status: COMPLETED | OUTPATIENT
Start: 2019-01-11 | End: 2019-01-11

## 2019-01-11 RX ORDER — IPRATROPIUM BROMIDE AND ALBUTEROL SULFATE 2.5; .5 MG/3ML; MG/3ML
3 SOLUTION RESPIRATORY (INHALATION)
Status: DISCONTINUED | OUTPATIENT
Start: 2019-01-11 | End: 2019-01-14 | Stop reason: HOSPADM

## 2019-01-11 RX ORDER — ALPRAZOLAM 0.5 MG/1
0.5 TABLET ORAL
Status: DISCONTINUED | OUTPATIENT
Start: 2019-01-11 | End: 2019-01-13

## 2019-01-11 RX ORDER — SEVELAMER CARBONATE 800 MG/1
2400 TABLET, FILM COATED ORAL
Status: DISCONTINUED | OUTPATIENT
Start: 2019-01-11 | End: 2019-01-14 | Stop reason: HOSPADM

## 2019-01-11 RX ORDER — MENTHOL AND METHYL SALICYLATE 7.6; 29 G/100G; G/100G
OINTMENT TOPICAL DAILY PRN
Status: ON HOLD | COMMUNITY
End: 2019-05-07 | Stop reason: ALTCHOICE

## 2019-01-11 RX ORDER — ONDANSETRON 2 MG/ML
4 INJECTION INTRAMUSCULAR; INTRAVENOUS EVERY 6 HOURS PRN
Status: DISCONTINUED | OUTPATIENT
Start: 2019-01-11 | End: 2019-01-14 | Stop reason: HOSPADM

## 2019-01-11 RX ORDER — OXYCODONE HYDROCHLORIDE 5 MG/1
15 TABLET ORAL EVERY 4 HOURS PRN
Status: DISCONTINUED | OUTPATIENT
Start: 2019-01-11 | End: 2019-01-14 | Stop reason: HOSPADM

## 2019-01-11 RX ORDER — ALBUMIN (HUMAN) 12.5 G/50ML
25 SOLUTION INTRAVENOUS ONCE
Status: COMPLETED | OUTPATIENT
Start: 2019-01-11 | End: 2019-01-11

## 2019-01-11 RX ORDER — DEXTROSE MONOHYDRATE 25 G/50ML
12.5 INJECTION, SOLUTION INTRAVENOUS PRN
Status: DISCONTINUED | OUTPATIENT
Start: 2019-01-11 | End: 2019-01-14 | Stop reason: HOSPADM

## 2019-01-11 RX ORDER — METHYLPREDNISOLONE SODIUM SUCCINATE 125 MG/2ML
60 INJECTION, POWDER, LYOPHILIZED, FOR SOLUTION INTRAMUSCULAR; INTRAVENOUS EVERY 6 HOURS
Status: DISCONTINUED | OUTPATIENT
Start: 2019-01-11 | End: 2019-01-12

## 2019-01-11 RX ORDER — NITROGLYCERIN 0.4 MG/1
0.4 TABLET SUBLINGUAL EVERY 5 MIN PRN
Status: DISCONTINUED | OUTPATIENT
Start: 2019-01-11 | End: 2019-01-14 | Stop reason: HOSPADM

## 2019-01-11 RX ORDER — IPRATROPIUM BROMIDE AND ALBUTEROL SULFATE 2.5; .5 MG/3ML; MG/3ML
1 SOLUTION RESPIRATORY (INHALATION) PRN
Status: DISCONTINUED | OUTPATIENT
Start: 2019-01-11 | End: 2019-01-14 | Stop reason: HOSPADM

## 2019-01-11 RX ORDER — FUROSEMIDE 10 MG/ML
40 INJECTION INTRAMUSCULAR; INTRAVENOUS DAILY
Status: DISCONTINUED | OUTPATIENT
Start: 2019-01-11 | End: 2019-01-14 | Stop reason: HOSPADM

## 2019-01-11 RX ORDER — MIDODRINE HYDROCHLORIDE 5 MG/1
5 TABLET ORAL 2 TIMES DAILY PRN
Status: DISCONTINUED | OUTPATIENT
Start: 2019-01-11 | End: 2019-01-14 | Stop reason: HOSPADM

## 2019-01-11 RX ORDER — LACTULOSE 10 G/15ML
10 SOLUTION ORAL DAILY
COMMUNITY

## 2019-01-11 RX ORDER — ISOSORBIDE MONONITRATE 60 MG/1
60 TABLET, EXTENDED RELEASE ORAL DAILY
Status: DISCONTINUED | OUTPATIENT
Start: 2019-01-11 | End: 2019-01-14 | Stop reason: HOSPADM

## 2019-01-11 RX ORDER — TIZANIDINE 4 MG/1
4 TABLET ORAL EVERY 6 HOURS PRN
Status: DISCONTINUED | OUTPATIENT
Start: 2019-01-11 | End: 2019-01-14 | Stop reason: HOSPADM

## 2019-01-11 RX ORDER — TIZANIDINE 4 MG/1
4 TABLET ORAL EVERY 6 HOURS PRN
COMMUNITY
End: 2019-01-18

## 2019-01-11 RX ORDER — ONDANSETRON 4 MG/1
4 TABLET, FILM COATED ORAL EVERY 6 HOURS PRN
COMMUNITY

## 2019-01-11 RX ORDER — HEPARIN SODIUM 5000 [USP'U]/ML
5000 INJECTION, SOLUTION INTRAVENOUS; SUBCUTANEOUS EVERY 8 HOURS SCHEDULED
Status: DISCONTINUED | OUTPATIENT
Start: 2019-01-11 | End: 2019-01-14 | Stop reason: HOSPADM

## 2019-01-11 RX ORDER — CALCIUM CARBONATE 200(500)MG
1 TABLET,CHEWABLE ORAL EVERY 8 HOURS PRN
Status: DISCONTINUED | OUTPATIENT
Start: 2019-01-11 | End: 2019-01-14 | Stop reason: HOSPADM

## 2019-01-11 RX ORDER — OXYCODONE HYDROCHLORIDE 15 MG/1
15 TABLET ORAL EVERY 4 HOURS PRN
Status: ON HOLD | COMMUNITY
End: 2019-01-14

## 2019-01-11 RX ORDER — INSULIN GLARGINE 100 [IU]/ML
40 INJECTION, SOLUTION SUBCUTANEOUS NIGHTLY
Status: DISCONTINUED | OUTPATIENT
Start: 2019-01-11 | End: 2019-01-14 | Stop reason: HOSPADM

## 2019-01-11 RX ORDER — CETIRIZINE HYDROCHLORIDE 10 MG/1
5 TABLET ORAL DAILY
Status: DISCONTINUED | OUTPATIENT
Start: 2019-01-11 | End: 2019-01-14 | Stop reason: HOSPADM

## 2019-01-11 RX ORDER — TAMSULOSIN HYDROCHLORIDE 0.4 MG/1
0.4 CAPSULE ORAL DAILY
Status: DISCONTINUED | OUTPATIENT
Start: 2019-01-11 | End: 2019-01-14 | Stop reason: HOSPADM

## 2019-01-11 RX ORDER — SENNA PLUS 8.6 MG/1
1 TABLET ORAL 2 TIMES DAILY
Status: DISCONTINUED | OUTPATIENT
Start: 2019-01-11 | End: 2019-01-14 | Stop reason: HOSPADM

## 2019-01-11 RX ORDER — ESCITALOPRAM OXALATE 10 MG/1
10 TABLET ORAL EVERY MORNING
Status: DISCONTINUED | OUTPATIENT
Start: 2019-01-12 | End: 2019-01-14 | Stop reason: HOSPADM

## 2019-01-11 RX ORDER — POLYETHYLENE GLYCOL 3350 17 G/17G
17 POWDER, FOR SOLUTION ORAL NIGHTLY
Status: DISCONTINUED | OUTPATIENT
Start: 2019-01-11 | End: 2019-01-14 | Stop reason: HOSPADM

## 2019-01-11 RX ORDER — FLUTICASONE PROPIONATE 50 MCG
1 SPRAY, SUSPENSION (ML) NASAL DAILY
Status: DISCONTINUED | OUTPATIENT
Start: 2019-01-11 | End: 2019-01-14 | Stop reason: HOSPADM

## 2019-01-11 RX ORDER — ASPIRIN 81 MG/1
81 TABLET, CHEWABLE ORAL DAILY
Status: DISCONTINUED | OUTPATIENT
Start: 2019-01-11 | End: 2019-01-14 | Stop reason: HOSPADM

## 2019-01-11 RX ORDER — DEXTROSE MONOHYDRATE 50 MG/ML
100 INJECTION, SOLUTION INTRAVENOUS PRN
Status: DISCONTINUED | OUTPATIENT
Start: 2019-01-11 | End: 2019-01-14 | Stop reason: HOSPADM

## 2019-01-11 RX ORDER — FENTANYL CITRATE 50 UG/ML
25 INJECTION, SOLUTION INTRAMUSCULAR; INTRAVENOUS EVERY 4 HOURS PRN
Status: DISCONTINUED | OUTPATIENT
Start: 2019-01-11 | End: 2019-01-14 | Stop reason: HOSPADM

## 2019-01-11 RX ORDER — ACETAMINOPHEN 325 MG/1
650 TABLET ORAL EVERY 6 HOURS PRN
Status: DISCONTINUED | OUTPATIENT
Start: 2019-01-11 | End: 2019-01-14 | Stop reason: HOSPADM

## 2019-01-11 RX ORDER — LACTULOSE 10 G/15ML
10 SOLUTION ORAL DAILY
Status: DISCONTINUED | OUTPATIENT
Start: 2019-01-11 | End: 2019-01-12

## 2019-01-11 RX ORDER — LIDOCAINE AND PRILOCAINE 25; 25 MG/G; MG/G
CREAM TOPICAL
COMMUNITY

## 2019-01-11 RX ADMIN — IPRATROPIUM BROMIDE AND ALBUTEROL SULFATE 1 AMPULE: .5; 3 SOLUTION RESPIRATORY (INHALATION) at 13:37

## 2019-01-11 RX ADMIN — TAMSULOSIN HYDROCHLORIDE 0.4 MG: 0.4 CAPSULE ORAL at 21:10

## 2019-01-11 RX ADMIN — SENNOSIDES 8.6 MG: 8.6 TABLET, FILM COATED ORAL at 21:22

## 2019-01-11 RX ADMIN — DEXTROSE MONOHYDRATE 12.5 G: 25 INJECTION, SOLUTION INTRAVENOUS at 19:36

## 2019-01-11 RX ADMIN — ALPRAZOLAM 0.5 MG: 0.5 TABLET ORAL at 20:47

## 2019-01-11 RX ADMIN — ISOSORBIDE MONONITRATE 60 MG: 60 TABLET, EXTENDED RELEASE ORAL at 21:11

## 2019-01-11 RX ADMIN — ALBUMIN (HUMAN) 25 G: 0.25 INJECTION, SOLUTION INTRAVENOUS at 22:45

## 2019-01-11 RX ADMIN — MIDODRINE HYDROCHLORIDE 5 MG: 5 TABLET ORAL at 22:40

## 2019-01-11 RX ADMIN — ASPIRIN 81 MG CHEWABLE TABLET 81 MG: 81 TABLET CHEWABLE at 21:21

## 2019-01-11 RX ADMIN — FLUTICASONE PROPIONATE 1 SPRAY: 50 SPRAY, METERED NASAL at 21:12

## 2019-01-11 RX ADMIN — MICONAZOLE NITRATE: 20.6 POWDER TOPICAL at 21:12

## 2019-01-11 RX ADMIN — METOPROLOL TARTRATE 25 MG: 25 TABLET ORAL at 21:10

## 2019-01-11 RX ADMIN — DEXTROSE MONOHYDRATE 12.5 G: 25 INJECTION, SOLUTION INTRAVENOUS at 17:54

## 2019-01-11 RX ADMIN — IPRATROPIUM BROMIDE AND ALBUTEROL SULFATE 3 ML: .5; 3 SOLUTION RESPIRATORY (INHALATION) at 20:32

## 2019-01-11 RX ADMIN — HEPARIN SODIUM 5000 UNITS: 5000 INJECTION INTRAVENOUS; SUBCUTANEOUS at 21:12

## 2019-01-11 RX ADMIN — FINASTERIDE 5 MG: 5 TABLET, FILM COATED ORAL at 21:10

## 2019-01-11 RX ADMIN — CETIRIZINE HYDROCHLORIDE 5 MG: 10 TABLET, FILM COATED ORAL at 21:10

## 2019-01-11 RX ADMIN — FUROSEMIDE 40 MG: 10 INJECTION, SOLUTION INTRAMUSCULAR; INTRAVENOUS at 21:12

## 2019-01-11 RX ADMIN — MIDODRINE HYDROCHLORIDE 5 MG: 5 TABLET ORAL at 22:29

## 2019-01-11 RX ADMIN — DEXMEDETOMIDINE HYDROCHLORIDE 0.2 MCG/KG/HR: 100 INJECTION, SOLUTION INTRAVENOUS at 21:26

## 2019-01-11 RX ADMIN — Medication 10 ML: at 21:22

## 2019-01-11 RX ADMIN — METHYLPREDNISOLONE SODIUM SUCCINATE 60 MG: 125 INJECTION, POWDER, FOR SOLUTION INTRAMUSCULAR; INTRAVENOUS at 21:12

## 2019-01-11 RX ADMIN — SEVELAMER CARBONATE 2400 MG: 800 TABLET, FILM COATED ORAL at 21:09

## 2019-01-11 ASSESSMENT — ENCOUNTER SYMPTOMS
NAUSEA: 0
SORE THROAT: 0
SHORTNESS OF BREATH: 1
ABDOMINAL PAIN: 0
CONSTIPATION: 0
CHEST TIGHTNESS: 0
VOMITING: 0
COUGH: 0
DIARRHEA: 0

## 2019-01-12 ENCOUNTER — APPOINTMENT (OUTPATIENT)
Dept: GENERAL RADIOLOGY | Age: 69
DRG: 640 | End: 2019-01-12
Payer: COMMERCIAL

## 2019-01-12 LAB
-: ABNORMAL
-: NORMAL
ALLEN TEST: NORMAL
AMORPHOUS: ABNORMAL
ANION GAP SERPL CALCULATED.3IONS-SCNC: 16 MMOL/L (ref 9–17)
ANION GAP SERPL CALCULATED.3IONS-SCNC: 18 MMOL/L (ref 9–17)
BACTERIA: ABNORMAL
BILIRUBIN URINE: NEGATIVE
BUN BLDV-MCNC: 42 MG/DL (ref 8–23)
BUN BLDV-MCNC: 59 MG/DL (ref 8–23)
BUN/CREAT BLD: ABNORMAL (ref 9–20)
BUN/CREAT BLD: ABNORMAL (ref 9–20)
CALCIUM SERPL-MCNC: 8.6 MG/DL (ref 8.6–10.4)
CALCIUM SERPL-MCNC: 8.8 MG/DL (ref 8.6–10.4)
CARBOXYHEMOGLOBIN: 0.7 %
CASTS UA: ABNORMAL /LPF
CHLORIDE BLD-SCNC: 100 MMOL/L (ref 98–107)
CHLORIDE BLD-SCNC: 96 MMOL/L (ref 98–107)
CO2: 24 MMOL/L (ref 20–31)
CO2: 26 MMOL/L (ref 20–31)
COLOR: YELLOW
COMMENT UA: ABNORMAL
CREAT SERPL-MCNC: 3.9 MG/DL (ref 0.7–1.2)
CREAT SERPL-MCNC: 5.21 MG/DL (ref 0.7–1.2)
CRYSTALS, UA: ABNORMAL /HPF
EPITHELIAL CELLS UA: ABNORMAL /HPF
FIO2: 40
GFR AFRICAN AMERICAN: 13 ML/MIN
GFR AFRICAN AMERICAN: 19 ML/MIN
GFR NON-AFRICAN AMERICAN: 11 ML/MIN
GFR NON-AFRICAN AMERICAN: 15 ML/MIN
GFR SERPL CREATININE-BSD FRML MDRD: ABNORMAL ML/MIN/{1.73_M2}
GLUCOSE BLD-MCNC: 125 MG/DL (ref 70–99)
GLUCOSE BLD-MCNC: 194 MG/DL (ref 75–110)
GLUCOSE BLD-MCNC: 219 MG/DL (ref 75–110)
GLUCOSE BLD-MCNC: 276 MG/DL (ref 75–110)
GLUCOSE BLD-MCNC: 294 MG/DL (ref 70–99)
GLUCOSE BLD-MCNC: 95 MG/DL (ref 75–110)
GLUCOSE URINE: NEGATIVE
HBV SURFACE AB TITR SER: <3.5 MIU/ML
HCO3 ARTERIAL: 25.5 MMOL/L
HCT VFR BLD CALC: 30.4 % (ref 41–53)
HEMOGLOBIN: 9.7 G/DL (ref 13.5–17.5)
HEPATITIS B CORE IGM ANTIBODY: NONREACTIVE
HEPATITIS B SURFACE ANTIGEN: NONREACTIVE
KETONES, URINE: NEGATIVE
LEUKOCYTE ESTERASE, URINE: NEGATIVE
MAGNESIUM: 2.2 MG/DL (ref 1.6–2.6)
MCH RBC QN AUTO: 30.1 PG (ref 26–34)
MCHC RBC AUTO-ENTMCNC: 31.9 G/DL (ref 31–37)
MCV RBC AUTO: 94.5 FL (ref 80–100)
METHEMOGLOBIN: 0.9 %
MODE: NORMAL
MUCUS: ABNORMAL
NEGATIVE BASE EXCESS, ART: 0.6 MMOL/L (ref 0–2)
NITRITE, URINE: NEGATIVE
NOTIFICATION TIME: NORMAL
NOTIFICATION: NORMAL
NRBC AUTOMATED: ABNORMAL PER 100 WBC
O2 DEVICE/FLOW/%: NORMAL
O2 SAT, ARTERIAL: 87 %
OTHER OBSERVATIONS UA: ABNORMAL
OXYHEMOGLOBIN: NORMAL % (ref 95–98)
PATIENT TEMP: 37
PCO2 ARTERIAL: 49.2 MMHG
PCO2, ART, TEMP ADJ: NORMAL
PDW BLD-RTO: 15.6 % (ref 11.5–14.9)
PEEP/CPAP: NORMAL
PH ARTERIAL: 7.32
PH UA: 5 (ref 5–8)
PH, ART, TEMP ADJ: NORMAL
PLATELET # BLD: 124 K/UL (ref 150–450)
PMV BLD AUTO: 7.1 FL (ref 6–12)
PO2 ARTERIAL: 60.6 MMHG
PO2, ART, TEMP ADJ: NORMAL MMHG
POSITIVE BASE EXCESS, ART: NORMAL MMOL/L (ref 0–2)
POTASSIUM SERPL-SCNC: 4.1 MMOL/L (ref 3.7–5.3)
POTASSIUM SERPL-SCNC: 5 MMOL/L (ref 3.7–5.3)
PROTEIN UA: ABNORMAL
PSV: NORMAL
PT. POSITION: NORMAL
RBC # BLD: 3.21 M/UL (ref 4.5–5.9)
RBC UA: ABNORMAL /HPF
REASON FOR REJECTION: NORMAL
RENAL EPITHELIAL, UA: ABNORMAL /HPF
RESPIRATORY RATE: 18
SAMPLE SITE: NORMAL
SET RATE: NORMAL
SODIUM BLD-SCNC: 138 MMOL/L (ref 135–144)
SODIUM BLD-SCNC: 142 MMOL/L (ref 135–144)
SPECIFIC GRAVITY UA: 1.01 (ref 1–1.03)
TEXT FOR RESPIRATORY: NORMAL
TOTAL HB: NORMAL G/DL (ref 12–16)
TOTAL RATE: NORMAL
TRICHOMONAS: ABNORMAL
TROPONIN INTERP: ABNORMAL
TROPONIN T: ABNORMAL NG/ML
TROPONIN, HIGH SENSITIVITY: 108 NG/L (ref 0–22)
TSH SERPL DL<=0.05 MIU/L-ACNC: 0.86 MIU/L (ref 0.3–5)
TURBIDITY: ABNORMAL
URINE HGB: NEGATIVE
UROBILINOGEN, URINE: NORMAL
VT: NORMAL
WBC # BLD: 7.7 K/UL (ref 3.5–11)
WBC UA: ABNORMAL /HPF
YEAST: ABNORMAL
ZZ NTE CLEAN UP: ORDERED TEST: NORMAL
ZZ NTE WITH NAME CLEAN UP: SPECIMEN SOURCE: NORMAL

## 2019-01-12 PROCEDURE — 36415 COLL VENOUS BLD VENIPUNCTURE: CPT

## 2019-01-12 PROCEDURE — 2060000000 HC ICU INTERMEDIATE R&B

## 2019-01-12 PROCEDURE — 83036 HEMOGLOBIN GLYCOSYLATED A1C: CPT

## 2019-01-12 PROCEDURE — 86317 IMMUNOASSAY INFECTIOUS AGENT: CPT

## 2019-01-12 PROCEDURE — 6370000000 HC RX 637 (ALT 250 FOR IP): Performed by: STUDENT IN AN ORGANIZED HEALTH CARE EDUCATION/TRAINING PROGRAM

## 2019-01-12 PROCEDURE — 82805 BLOOD GASES W/O2 SATURATION: CPT

## 2019-01-12 PROCEDURE — 94762 N-INVAS EAR/PLS OXIMTRY CONT: CPT

## 2019-01-12 PROCEDURE — 85027 COMPLETE CBC AUTOMATED: CPT

## 2019-01-12 PROCEDURE — 94660 CPAP INITIATION&MGMT: CPT

## 2019-01-12 PROCEDURE — 82947 ASSAY GLUCOSE BLOOD QUANT: CPT

## 2019-01-12 PROCEDURE — 2500000003 HC RX 250 WO HCPCS: Performed by: INTERNAL MEDICINE

## 2019-01-12 PROCEDURE — 6360000002 HC RX W HCPCS: Performed by: STUDENT IN AN ORGANIZED HEALTH CARE EDUCATION/TRAINING PROGRAM

## 2019-01-12 PROCEDURE — 6360000002 HC RX W HCPCS: Performed by: INTERNAL MEDICINE

## 2019-01-12 PROCEDURE — 94640 AIRWAY INHALATION TREATMENT: CPT

## 2019-01-12 PROCEDURE — 80048 BASIC METABOLIC PNL TOTAL CA: CPT

## 2019-01-12 PROCEDURE — 2700000000 HC OXYGEN THERAPY PER DAY

## 2019-01-12 PROCEDURE — 86705 HEP B CORE ANTIBODY IGM: CPT

## 2019-01-12 PROCEDURE — 2580000003 HC RX 258: Performed by: INTERNAL MEDICINE

## 2019-01-12 PROCEDURE — 84484 ASSAY OF TROPONIN QUANT: CPT

## 2019-01-12 PROCEDURE — 87340 HEPATITIS B SURFACE AG IA: CPT

## 2019-01-12 PROCEDURE — 51702 INSERT TEMP BLADDER CATH: CPT

## 2019-01-12 PROCEDURE — 74018 RADEX ABDOMEN 1 VIEW: CPT

## 2019-01-12 PROCEDURE — 84443 ASSAY THYROID STIM HORMONE: CPT

## 2019-01-12 PROCEDURE — 83735 ASSAY OF MAGNESIUM: CPT

## 2019-01-12 PROCEDURE — P9046 ALBUMIN (HUMAN), 25%, 20 ML: HCPCS | Performed by: INTERNAL MEDICINE

## 2019-01-12 PROCEDURE — 81001 URINALYSIS AUTO W/SCOPE: CPT

## 2019-01-12 PROCEDURE — 90937 HEMODIALYSIS REPEATED EVAL: CPT

## 2019-01-12 PROCEDURE — 99233 SBSQ HOSP IP/OBS HIGH 50: CPT | Performed by: INTERNAL MEDICINE

## 2019-01-12 PROCEDURE — 90935 HEMODIALYSIS ONE EVALUATION: CPT

## 2019-01-12 RX ORDER — DOCUSATE SODIUM 100 MG/1
100 CAPSULE, LIQUID FILLED ORAL DAILY
Status: DISCONTINUED | OUTPATIENT
Start: 2019-01-12 | End: 2019-01-14 | Stop reason: HOSPADM

## 2019-01-12 RX ORDER — ALBUMIN (HUMAN) 12.5 G/50ML
25 SOLUTION INTRAVENOUS ONCE
Status: COMPLETED | OUTPATIENT
Start: 2019-01-12 | End: 2019-01-12

## 2019-01-12 RX ORDER — METHYLPREDNISOLONE SODIUM SUCCINATE 40 MG/ML
40 INJECTION, POWDER, LYOPHILIZED, FOR SOLUTION INTRAMUSCULAR; INTRAVENOUS EVERY 6 HOURS
Status: DISCONTINUED | OUTPATIENT
Start: 2019-01-12 | End: 2019-01-14 | Stop reason: HOSPADM

## 2019-01-12 RX ADMIN — METHYLPREDNISOLONE SODIUM SUCCINATE 40 MG: 40 INJECTION, POWDER, FOR SOLUTION INTRAMUSCULAR; INTRAVENOUS at 21:22

## 2019-01-12 RX ADMIN — ALBUMIN (HUMAN) 25 G: 0.25 INJECTION, SOLUTION INTRAVENOUS at 16:35

## 2019-01-12 RX ADMIN — ESCITALOPRAM OXALATE 10 MG: 10 TABLET ORAL at 08:45

## 2019-01-12 RX ADMIN — OXYCODONE HYDROCHLORIDE 15 MG: 5 TABLET ORAL at 23:54

## 2019-01-12 RX ADMIN — DEXMEDETOMIDINE HYDROCHLORIDE 0.4 MCG/KG/HR: 100 INJECTION, SOLUTION INTRAVENOUS at 18:48

## 2019-01-12 RX ADMIN — OXYCODONE HYDROCHLORIDE 15 MG: 5 TABLET ORAL at 03:41

## 2019-01-12 RX ADMIN — HEPARIN SODIUM 5000 UNITS: 5000 INJECTION INTRAVENOUS; SUBCUTANEOUS at 17:54

## 2019-01-12 RX ADMIN — METHYLPREDNISOLONE SODIUM SUCCINATE 40 MG: 40 INJECTION, POWDER, FOR SOLUTION INTRAMUSCULAR; INTRAVENOUS at 18:50

## 2019-01-12 RX ADMIN — METHYLPREDNISOLONE SODIUM SUCCINATE 60 MG: 125 INJECTION, POWDER, FOR SOLUTION INTRAMUSCULAR; INTRAVENOUS at 08:45

## 2019-01-12 RX ADMIN — FENTANYL CITRATE 25 MCG: 50 INJECTION, SOLUTION INTRAMUSCULAR; INTRAVENOUS at 02:10

## 2019-01-12 RX ADMIN — METHYLPREDNISOLONE SODIUM SUCCINATE 60 MG: 125 INJECTION, POWDER, FOR SOLUTION INTRAMUSCULAR; INTRAVENOUS at 03:54

## 2019-01-12 RX ADMIN — FINASTERIDE 5 MG: 5 TABLET, FILM COATED ORAL at 08:44

## 2019-01-12 RX ADMIN — IPRATROPIUM BROMIDE AND ALBUTEROL SULFATE 3 ML: .5; 3 SOLUTION RESPIRATORY (INHALATION) at 20:11

## 2019-01-12 RX ADMIN — ISOSORBIDE MONONITRATE 60 MG: 60 TABLET, EXTENDED RELEASE ORAL at 08:45

## 2019-01-12 RX ADMIN — HEPARIN SODIUM 5000 UNITS: 5000 INJECTION INTRAVENOUS; SUBCUTANEOUS at 21:23

## 2019-01-12 RX ADMIN — MICONAZOLE NITRATE: 20.6 POWDER TOPICAL at 21:28

## 2019-01-12 RX ADMIN — DEXMEDETOMIDINE HYDROCHLORIDE 0.6 MCG/KG/HR: 100 INJECTION, SOLUTION INTRAVENOUS at 23:28

## 2019-01-12 RX ADMIN — INSULIN GLARGINE 40 UNITS: 100 INJECTION, SOLUTION SUBCUTANEOUS at 21:22

## 2019-01-12 RX ADMIN — DEXMEDETOMIDINE HYDROCHLORIDE 0.3 MCG/KG/HR: 100 INJECTION, SOLUTION INTRAVENOUS at 13:10

## 2019-01-12 RX ADMIN — SEVELAMER CARBONATE 2400 MG: 800 TABLET, FILM COATED ORAL at 08:45

## 2019-01-12 RX ADMIN — CETIRIZINE HYDROCHLORIDE 5 MG: 10 TABLET, FILM COATED ORAL at 12:40

## 2019-01-12 RX ADMIN — TIZANIDINE 4 MG: 4 TABLET ORAL at 03:41

## 2019-01-12 RX ADMIN — INSULIN LISPRO 1 UNITS: 100 INJECTION, SOLUTION INTRAVENOUS; SUBCUTANEOUS at 18:46

## 2019-01-12 RX ADMIN — DOCUSATE SODIUM 100 MG: 100 CAPSULE, LIQUID FILLED ORAL at 17:53

## 2019-01-12 RX ADMIN — OXYCODONE HYDROCHLORIDE 15 MG: 5 TABLET ORAL at 08:15

## 2019-01-12 RX ADMIN — SEVELAMER CARBONATE 2400 MG: 800 TABLET, FILM COATED ORAL at 18:47

## 2019-01-12 RX ADMIN — FLUTICASONE PROPIONATE 1 SPRAY: 50 SPRAY, METERED NASAL at 10:17

## 2019-01-12 RX ADMIN — TAMSULOSIN HYDROCHLORIDE 0.4 MG: 0.4 CAPSULE ORAL at 10:16

## 2019-01-12 RX ADMIN — INSULIN LISPRO 2 UNITS: 100 INJECTION, SOLUTION INTRAVENOUS; SUBCUTANEOUS at 12:40

## 2019-01-12 RX ADMIN — ASPIRIN 81 MG CHEWABLE TABLET 81 MG: 81 TABLET CHEWABLE at 08:45

## 2019-01-12 RX ADMIN — MICONAZOLE NITRATE: 20.6 POWDER TOPICAL at 10:16

## 2019-01-12 RX ADMIN — INSULIN LISPRO 2 UNITS: 100 INJECTION, SOLUTION INTRAVENOUS; SUBCUTANEOUS at 21:22

## 2019-01-12 RX ADMIN — OXYCODONE HYDROCHLORIDE 15 MG: 5 TABLET ORAL at 17:53

## 2019-01-12 RX ADMIN — OXYCODONE HYDROCHLORIDE 15 MG: 5 TABLET ORAL at 12:47

## 2019-01-12 RX ADMIN — FUROSEMIDE 40 MG: 10 INJECTION, SOLUTION INTRAMUSCULAR; INTRAVENOUS at 08:46

## 2019-01-12 RX ADMIN — POLYETHYLENE GLYCOL 3350 17 G: 17 POWDER, FOR SOLUTION ORAL at 22:04

## 2019-01-12 RX ADMIN — FENTANYL CITRATE 25 MCG: 50 INJECTION, SOLUTION INTRAMUSCULAR; INTRAVENOUS at 21:23

## 2019-01-12 ASSESSMENT — PAIN SCALES - GENERAL
PAINLEVEL_OUTOF10: 0
PAINLEVEL_OUTOF10: 6
PAINLEVEL_OUTOF10: 10
PAINLEVEL_OUTOF10: 3
PAINLEVEL_OUTOF10: 5
PAINLEVEL_OUTOF10: 0
PAINLEVEL_OUTOF10: 9
PAINLEVEL_OUTOF10: 0
PAINLEVEL_OUTOF10: 9
PAINLEVEL_OUTOF10: 10
PAINLEVEL_OUTOF10: 4
PAINLEVEL_OUTOF10: 0
PAINLEVEL_OUTOF10: 8

## 2019-01-12 ASSESSMENT — ENCOUNTER SYMPTOMS
SHORTNESS OF BREATH: 0
NAUSEA: 0
CONSTIPATION: 0
COUGH: 0
ABDOMINAL PAIN: 0
DIARRHEA: 0
VOMITING: 0

## 2019-01-13 ENCOUNTER — APPOINTMENT (OUTPATIENT)
Dept: GENERAL RADIOLOGY | Age: 69
DRG: 640 | End: 2019-01-13
Payer: COMMERCIAL

## 2019-01-13 LAB
ALLEN TEST: ABNORMAL
ANION GAP SERPL CALCULATED.3IONS-SCNC: 15 MMOL/L (ref 9–17)
BUN BLDV-MCNC: 57 MG/DL (ref 8–23)
BUN/CREAT BLD: ABNORMAL (ref 9–20)
CALCIUM SERPL-MCNC: 9 MG/DL (ref 8.6–10.4)
CARBOXYHEMOGLOBIN: 0.3 %
CHLORIDE BLD-SCNC: 96 MMOL/L (ref 98–107)
CO2: 28 MMOL/L (ref 20–31)
CREAT SERPL-MCNC: 4.83 MG/DL (ref 0.7–1.2)
ESTIMATED AVERAGE GLUCOSE: 88 MG/DL
FIO2: ABNORMAL
GFR AFRICAN AMERICAN: 15 ML/MIN
GFR NON-AFRICAN AMERICAN: 12 ML/MIN
GFR SERPL CREATININE-BSD FRML MDRD: ABNORMAL ML/MIN/{1.73_M2}
GFR SERPL CREATININE-BSD FRML MDRD: ABNORMAL ML/MIN/{1.73_M2}
GLUCOSE BLD-MCNC: 122 MG/DL (ref 75–110)
GLUCOSE BLD-MCNC: 214 MG/DL (ref 70–99)
GLUCOSE BLD-MCNC: 248 MG/DL (ref 75–110)
HBA1C MFR BLD: 4.7 % (ref 4–6)
HCO3 ARTERIAL: 29.5 MMOL/L
HCT VFR BLD CALC: 33.3 % (ref 41–53)
HEMOGLOBIN: 10.7 G/DL (ref 13.5–17.5)
MCH RBC QN AUTO: 29.9 PG (ref 26–34)
MCHC RBC AUTO-ENTMCNC: 32.1 G/DL (ref 31–37)
MCV RBC AUTO: 93.3 FL (ref 80–100)
METHEMOGLOBIN: 0.6 %
MODE: ABNORMAL
NEGATIVE BASE EXCESS, ART: ABNORMAL MMOL/L (ref 0–2)
NOTIFICATION TIME: ABNORMAL
NOTIFICATION: ABNORMAL
NRBC AUTOMATED: ABNORMAL PER 100 WBC
O2 DEVICE/FLOW/%: ABNORMAL
O2 SAT, ARTERIAL: 89.7 %
OXYHEMOGLOBIN: ABNORMAL % (ref 95–98)
PATIENT TEMP: 37
PCO2 ARTERIAL: 46.8 MMHG
PCO2, ART, TEMP ADJ: ABNORMAL
PDW BLD-RTO: 14.9 % (ref 11.5–14.9)
PEEP/CPAP: ABNORMAL
PH ARTERIAL: 7.41
PH, ART, TEMP ADJ: ABNORMAL
PHOSPHORUS: 4.8 MG/DL (ref 2.5–4.5)
PLATELET # BLD: 138 K/UL (ref 150–450)
PMV BLD AUTO: 7.9 FL (ref 6–12)
PO2 ARTERIAL: 63.8 MMHG
PO2, ART, TEMP ADJ: ABNORMAL MMHG
POSITIVE BASE EXCESS, ART: 4.9 MMOL/L (ref 0–2)
POTASSIUM SERPL-SCNC: 4.8 MMOL/L (ref 3.7–5.3)
PSV: ABNORMAL
PT. POSITION: ABNORMAL
RBC # BLD: 3.56 M/UL (ref 4.5–5.9)
RESPIRATORY RATE: 19
SAMPLE SITE: ABNORMAL
SET RATE: ABNORMAL
SODIUM BLD-SCNC: 139 MMOL/L (ref 135–144)
TEXT FOR RESPIRATORY: ABNORMAL
TOTAL HB: ABNORMAL G/DL (ref 12–16)
TOTAL RATE: ABNORMAL
VT: ABNORMAL
WBC # BLD: 10.5 K/UL (ref 3.5–11)

## 2019-01-13 PROCEDURE — 6370000000 HC RX 637 (ALT 250 FOR IP): Performed by: STUDENT IN AN ORGANIZED HEALTH CARE EDUCATION/TRAINING PROGRAM

## 2019-01-13 PROCEDURE — 2580000003 HC RX 258: Performed by: INTERNAL MEDICINE

## 2019-01-13 PROCEDURE — 2500000003 HC RX 250 WO HCPCS: Performed by: INTERNAL MEDICINE

## 2019-01-13 PROCEDURE — 84100 ASSAY OF PHOSPHORUS: CPT

## 2019-01-13 PROCEDURE — 85027 COMPLETE CBC AUTOMATED: CPT

## 2019-01-13 PROCEDURE — 6360000002 HC RX W HCPCS: Performed by: HOSPITALIST

## 2019-01-13 PROCEDURE — 6370000000 HC RX 637 (ALT 250 FOR IP): Performed by: HOSPITALIST

## 2019-01-13 PROCEDURE — 82947 ASSAY GLUCOSE BLOOD QUANT: CPT

## 2019-01-13 PROCEDURE — 1200000000 HC SEMI PRIVATE

## 2019-01-13 PROCEDURE — 2700000000 HC OXYGEN THERAPY PER DAY

## 2019-01-13 PROCEDURE — 6360000002 HC RX W HCPCS: Performed by: STUDENT IN AN ORGANIZED HEALTH CARE EDUCATION/TRAINING PROGRAM

## 2019-01-13 PROCEDURE — 82805 BLOOD GASES W/O2 SATURATION: CPT

## 2019-01-13 PROCEDURE — 36415 COLL VENOUS BLD VENIPUNCTURE: CPT

## 2019-01-13 PROCEDURE — 6360000002 HC RX W HCPCS: Performed by: INTERNAL MEDICINE

## 2019-01-13 PROCEDURE — 94762 N-INVAS EAR/PLS OXIMTRY CONT: CPT

## 2019-01-13 PROCEDURE — 6370000000 HC RX 637 (ALT 250 FOR IP): Performed by: INTERNAL MEDICINE

## 2019-01-13 PROCEDURE — 94660 CPAP INITIATION&MGMT: CPT

## 2019-01-13 PROCEDURE — 99233 SBSQ HOSP IP/OBS HIGH 50: CPT | Performed by: INTERNAL MEDICINE

## 2019-01-13 PROCEDURE — 80048 BASIC METABOLIC PNL TOTAL CA: CPT

## 2019-01-13 PROCEDURE — 71045 X-RAY EXAM CHEST 1 VIEW: CPT

## 2019-01-13 PROCEDURE — 36600 WITHDRAWAL OF ARTERIAL BLOOD: CPT

## 2019-01-13 PROCEDURE — 94640 AIRWAY INHALATION TREATMENT: CPT

## 2019-01-13 RX ORDER — HYDRALAZINE HYDROCHLORIDE 25 MG/1
25 TABLET, FILM COATED ORAL EVERY 8 HOURS SCHEDULED
Status: DISCONTINUED | OUTPATIENT
Start: 2019-01-13 | End: 2019-01-13

## 2019-01-13 RX ORDER — HYDRALAZINE HYDROCHLORIDE 20 MG/ML
5 INJECTION INTRAMUSCULAR; INTRAVENOUS ONCE
Status: COMPLETED | OUTPATIENT
Start: 2019-01-13 | End: 2019-01-13

## 2019-01-13 RX ORDER — HYDRALAZINE HYDROCHLORIDE 50 MG/1
50 TABLET, FILM COATED ORAL EVERY 8 HOURS SCHEDULED
Status: DISCONTINUED | OUTPATIENT
Start: 2019-01-13 | End: 2019-01-14 | Stop reason: HOSPADM

## 2019-01-13 RX ORDER — ALPRAZOLAM 0.5 MG/1
0.5 TABLET ORAL 3 TIMES DAILY PRN
Status: DISCONTINUED | OUTPATIENT
Start: 2019-01-13 | End: 2019-01-14 | Stop reason: HOSPADM

## 2019-01-13 RX ADMIN — IPRATROPIUM BROMIDE AND ALBUTEROL SULFATE 3 ML: .5; 3 SOLUTION RESPIRATORY (INHALATION) at 12:05

## 2019-01-13 RX ADMIN — CETIRIZINE HYDROCHLORIDE 5 MG: 10 TABLET, FILM COATED ORAL at 10:10

## 2019-01-13 RX ADMIN — FLUTICASONE PROPIONATE 1 SPRAY: 50 SPRAY, METERED NASAL at 10:11

## 2019-01-13 RX ADMIN — OXYCODONE HYDROCHLORIDE 15 MG: 5 TABLET ORAL at 18:37

## 2019-01-13 RX ADMIN — INSULIN LISPRO 2 UNITS: 100 INJECTION, SOLUTION INTRAVENOUS; SUBCUTANEOUS at 10:37

## 2019-01-13 RX ADMIN — HEPARIN SODIUM 5000 UNITS: 5000 INJECTION INTRAVENOUS; SUBCUTANEOUS at 05:18

## 2019-01-13 RX ADMIN — INSULIN LISPRO 2 UNITS: 100 INJECTION, SOLUTION INTRAVENOUS; SUBCUTANEOUS at 14:44

## 2019-01-13 RX ADMIN — HYDRALAZINE HYDROCHLORIDE 25 MG: 25 TABLET, FILM COATED ORAL at 10:12

## 2019-01-13 RX ADMIN — SEVELAMER CARBONATE 2400 MG: 800 TABLET, FILM COATED ORAL at 14:33

## 2019-01-13 RX ADMIN — DEXMEDETOMIDINE HYDROCHLORIDE 0.8 MCG/KG/HR: 100 INJECTION, SOLUTION INTRAVENOUS at 03:38

## 2019-01-13 RX ADMIN — HEPARIN SODIUM 5000 UNITS: 5000 INJECTION INTRAVENOUS; SUBCUTANEOUS at 14:43

## 2019-01-13 RX ADMIN — ALPRAZOLAM 0.5 MG: 0.5 TABLET ORAL at 14:33

## 2019-01-13 RX ADMIN — HYDRALAZINE HYDROCHLORIDE 5 MG: 20 INJECTION INTRAMUSCULAR; INTRAVENOUS at 17:29

## 2019-01-13 RX ADMIN — ALPRAZOLAM 0.5 MG: 0.5 TABLET ORAL at 21:15

## 2019-01-13 RX ADMIN — DOCUSATE SODIUM 100 MG: 100 CAPSULE, LIQUID FILLED ORAL at 10:10

## 2019-01-13 RX ADMIN — ASPIRIN 81 MG CHEWABLE TABLET 81 MG: 81 TABLET CHEWABLE at 10:10

## 2019-01-13 RX ADMIN — METHYLPREDNISOLONE SODIUM SUCCINATE 40 MG: 40 INJECTION, POWDER, FOR SOLUTION INTRAMUSCULAR; INTRAVENOUS at 02:59

## 2019-01-13 RX ADMIN — SEVELAMER CARBONATE 2400 MG: 800 TABLET, FILM COATED ORAL at 17:38

## 2019-01-13 RX ADMIN — FENTANYL CITRATE 25 MCG: 50 INJECTION, SOLUTION INTRAMUSCULAR; INTRAVENOUS at 20:05

## 2019-01-13 RX ADMIN — ISOSORBIDE MONONITRATE 60 MG: 60 TABLET, EXTENDED RELEASE ORAL at 10:09

## 2019-01-13 RX ADMIN — TAMSULOSIN HYDROCHLORIDE 0.4 MG: 0.4 CAPSULE ORAL at 10:20

## 2019-01-13 RX ADMIN — HYDRALAZINE HYDROCHLORIDE 25 MG: 25 TABLET, FILM COATED ORAL at 14:45

## 2019-01-13 RX ADMIN — FINASTERIDE 5 MG: 5 TABLET, FILM COATED ORAL at 10:09

## 2019-01-13 RX ADMIN — DEXMEDETOMIDINE HYDROCHLORIDE 0.8 MCG/KG/HR: 100 INJECTION, SOLUTION INTRAVENOUS at 07:59

## 2019-01-13 RX ADMIN — OXYCODONE HYDROCHLORIDE 15 MG: 5 TABLET ORAL at 05:17

## 2019-01-13 RX ADMIN — OXYCODONE HYDROCHLORIDE 15 MG: 5 TABLET ORAL at 22:36

## 2019-01-13 RX ADMIN — IPRATROPIUM BROMIDE AND ALBUTEROL SULFATE 3 ML: .5; 3 SOLUTION RESPIRATORY (INHALATION) at 21:06

## 2019-01-13 RX ADMIN — IPRATROPIUM BROMIDE AND ALBUTEROL SULFATE 3 ML: .5; 3 SOLUTION RESPIRATORY (INHALATION) at 08:23

## 2019-01-13 RX ADMIN — OXYCODONE HYDROCHLORIDE 15 MG: 5 TABLET ORAL at 14:33

## 2019-01-13 RX ADMIN — METHYLPREDNISOLONE SODIUM SUCCINATE 40 MG: 40 INJECTION, POWDER, FOR SOLUTION INTRAMUSCULAR; INTRAVENOUS at 10:10

## 2019-01-13 RX ADMIN — ESCITALOPRAM OXALATE 10 MG: 10 TABLET ORAL at 10:10

## 2019-01-13 RX ADMIN — SENNOSIDES 8.6 MG: 8.6 TABLET, FILM COATED ORAL at 10:16

## 2019-01-13 RX ADMIN — IPRATROPIUM BROMIDE AND ALBUTEROL SULFATE 3 ML: .5; 3 SOLUTION RESPIRATORY (INHALATION) at 15:36

## 2019-01-13 RX ADMIN — FUROSEMIDE 40 MG: 10 INJECTION, SOLUTION INTRAMUSCULAR; INTRAVENOUS at 10:37

## 2019-01-13 RX ADMIN — SEVELAMER CARBONATE 2400 MG: 800 TABLET, FILM COATED ORAL at 10:15

## 2019-01-13 RX ADMIN — METHYLPREDNISOLONE SODIUM SUCCINATE 40 MG: 40 INJECTION, POWDER, FOR SOLUTION INTRAMUSCULAR; INTRAVENOUS at 14:44

## 2019-01-13 RX ADMIN — MICONAZOLE NITRATE: 20.6 POWDER TOPICAL at 10:11

## 2019-01-13 RX ADMIN — OXYCODONE HYDROCHLORIDE 15 MG: 5 TABLET ORAL at 10:10

## 2019-01-13 ASSESSMENT — PAIN SCALES - GENERAL
PAINLEVEL_OUTOF10: 6
PAINLEVEL_OUTOF10: 0
PAINLEVEL_OUTOF10: 1
PAINLEVEL_OUTOF10: 10
PAINLEVEL_OUTOF10: 8
PAINLEVEL_OUTOF10: 7
PAINLEVEL_OUTOF10: 8
PAINLEVEL_OUTOF10: 10
PAINLEVEL_OUTOF10: 7
PAINLEVEL_OUTOF10: 5
PAINLEVEL_OUTOF10: 3

## 2019-01-13 ASSESSMENT — PAIN DESCRIPTION - PAIN TYPE
TYPE: CHRONIC PAIN
TYPE: CHRONIC PAIN

## 2019-01-13 ASSESSMENT — ENCOUNTER SYMPTOMS
DIARRHEA: 0
ABDOMINAL PAIN: 0
VOMITING: 0
SHORTNESS OF BREATH: 0
COUGH: 0
NAUSEA: 0
CONSTIPATION: 0

## 2019-01-13 ASSESSMENT — PAIN DESCRIPTION - LOCATION
LOCATION: GENERALIZED
LOCATION: GENERALIZED

## 2019-01-14 VITALS
DIASTOLIC BLOOD PRESSURE: 58 MMHG | BODY MASS INDEX: 37.3 KG/M2 | TEMPERATURE: 98.6 F | HEART RATE: 88 BPM | OXYGEN SATURATION: 94 % | SYSTOLIC BLOOD PRESSURE: 131 MMHG | HEIGHT: 72 IN | WEIGHT: 275.35 LBS | RESPIRATION RATE: 20 BRPM

## 2019-01-14 PROCEDURE — 6370000000 HC RX 637 (ALT 250 FOR IP): Performed by: HOSPITALIST

## 2019-01-14 PROCEDURE — 94640 AIRWAY INHALATION TREATMENT: CPT

## 2019-01-14 PROCEDURE — 6360000002 HC RX W HCPCS: Performed by: INTERNAL MEDICINE

## 2019-01-14 PROCEDURE — 6370000000 HC RX 637 (ALT 250 FOR IP): Performed by: STUDENT IN AN ORGANIZED HEALTH CARE EDUCATION/TRAINING PROGRAM

## 2019-01-14 PROCEDURE — 6360000002 HC RX W HCPCS: Performed by: STUDENT IN AN ORGANIZED HEALTH CARE EDUCATION/TRAINING PROGRAM

## 2019-01-14 PROCEDURE — 94761 N-INVAS EAR/PLS OXIMETRY MLT: CPT

## 2019-01-14 PROCEDURE — 2580000003 HC RX 258: Performed by: STUDENT IN AN ORGANIZED HEALTH CARE EDUCATION/TRAINING PROGRAM

## 2019-01-14 PROCEDURE — 99239 HOSP IP/OBS DSCHRG MGMT >30: CPT | Performed by: INTERNAL MEDICINE

## 2019-01-14 PROCEDURE — 2700000000 HC OXYGEN THERAPY PER DAY

## 2019-01-14 RX ORDER — PSEUDOEPHEDRINE HCL 30 MG
100 TABLET ORAL DAILY
Qty: 60 CAPSULE | Refills: 2 | Status: SHIPPED | OUTPATIENT
Start: 2019-01-15 | End: 2019-01-18

## 2019-01-14 RX ORDER — HYDRALAZINE HYDROCHLORIDE 50 MG/1
50 TABLET, FILM COATED ORAL EVERY 8 HOURS SCHEDULED
Qty: 90 TABLET | Refills: 3 | Status: SHIPPED | OUTPATIENT
Start: 2019-01-14 | End: 2019-01-18

## 2019-01-14 RX ORDER — ALPRAZOLAM 0.5 MG/1
0.5 TABLET ORAL
Qty: 20 TABLET | Refills: 0 | Status: ON HOLD | OUTPATIENT
Start: 2019-01-14 | End: 2019-01-19

## 2019-01-14 RX ORDER — OXYCODONE HYDROCHLORIDE 15 MG/1
15 TABLET ORAL EVERY 4 HOURS PRN
Qty: 20 TABLET | Refills: 0 | Status: ON HOLD | OUTPATIENT
Start: 2019-01-14 | End: 2019-01-19

## 2019-01-14 RX ADMIN — SEVELAMER CARBONATE 2400 MG: 800 TABLET, FILM COATED ORAL at 08:22

## 2019-01-14 RX ADMIN — Medication 10 ML: at 08:25

## 2019-01-14 RX ADMIN — ISOSORBIDE MONONITRATE 60 MG: 60 TABLET, EXTENDED RELEASE ORAL at 08:23

## 2019-01-14 RX ADMIN — METHYLPREDNISOLONE SODIUM SUCCINATE 40 MG: 40 INJECTION, POWDER, FOR SOLUTION INTRAMUSCULAR; INTRAVENOUS at 02:29

## 2019-01-14 RX ADMIN — OXYCODONE HYDROCHLORIDE 15 MG: 5 TABLET ORAL at 06:25

## 2019-01-14 RX ADMIN — IPRATROPIUM BROMIDE AND ALBUTEROL SULFATE 3 ML: .5; 3 SOLUTION RESPIRATORY (INHALATION) at 07:12

## 2019-01-14 RX ADMIN — METHYLPREDNISOLONE SODIUM SUCCINATE 40 MG: 40 INJECTION, POWDER, FOR SOLUTION INTRAMUSCULAR; INTRAVENOUS at 08:24

## 2019-01-14 RX ADMIN — MICONAZOLE NITRATE: 20.6 POWDER TOPICAL at 08:25

## 2019-01-14 RX ADMIN — ASPIRIN 81 MG CHEWABLE TABLET 81 MG: 81 TABLET CHEWABLE at 08:23

## 2019-01-14 RX ADMIN — OXYCODONE HYDROCHLORIDE 15 MG: 5 TABLET ORAL at 16:03

## 2019-01-14 RX ADMIN — CETIRIZINE HYDROCHLORIDE 5 MG: 10 TABLET, FILM COATED ORAL at 08:23

## 2019-01-14 RX ADMIN — HYDRALAZINE HYDROCHLORIDE 50 MG: 50 TABLET, FILM COATED ORAL at 06:25

## 2019-01-14 RX ADMIN — TAMSULOSIN HYDROCHLORIDE 0.4 MG: 0.4 CAPSULE ORAL at 08:24

## 2019-01-14 RX ADMIN — IPRATROPIUM BROMIDE AND ALBUTEROL SULFATE 3 ML: .5; 3 SOLUTION RESPIRATORY (INHALATION) at 10:50

## 2019-01-14 RX ADMIN — FUROSEMIDE 40 MG: 10 INJECTION, SOLUTION INTRAMUSCULAR; INTRAVENOUS at 08:24

## 2019-01-14 RX ADMIN — FENTANYL CITRATE 25 MCG: 50 INJECTION, SOLUTION INTRAMUSCULAR; INTRAVENOUS at 00:25

## 2019-01-14 RX ADMIN — DOCUSATE SODIUM 100 MG: 100 CAPSULE, LIQUID FILLED ORAL at 08:23

## 2019-01-14 RX ADMIN — OXYCODONE HYDROCHLORIDE 15 MG: 5 TABLET ORAL at 02:29

## 2019-01-14 RX ADMIN — SENNOSIDES 8.6 MG: 8.6 TABLET, FILM COATED ORAL at 08:23

## 2019-01-14 RX ADMIN — FENTANYL CITRATE 25 MCG: 50 INJECTION, SOLUTION INTRAMUSCULAR; INTRAVENOUS at 12:36

## 2019-01-14 RX ADMIN — OXYCODONE HYDROCHLORIDE 15 MG: 5 TABLET ORAL at 10:36

## 2019-01-14 RX ADMIN — Medication 10 ML: at 00:26

## 2019-01-14 RX ADMIN — FENTANYL CITRATE 25 MCG: 50 INJECTION, SOLUTION INTRAMUSCULAR; INTRAVENOUS at 04:27

## 2019-01-14 RX ADMIN — ESCITALOPRAM OXALATE 10 MG: 10 TABLET ORAL at 08:24

## 2019-01-14 RX ADMIN — FENTANYL CITRATE 25 MCG: 50 INJECTION, SOLUTION INTRAMUSCULAR; INTRAVENOUS at 08:32

## 2019-01-14 RX ADMIN — Medication 10 ML: at 04:27

## 2019-01-14 RX ADMIN — FLUTICASONE PROPIONATE 1 SPRAY: 50 SPRAY, METERED NASAL at 08:37

## 2019-01-14 RX ADMIN — FINASTERIDE 5 MG: 5 TABLET, FILM COATED ORAL at 08:22

## 2019-01-14 ASSESSMENT — PAIN SCALES - GENERAL
PAINLEVEL_OUTOF10: 8
PAINLEVEL_OUTOF10: 10
PAINLEVEL_OUTOF10: 8
PAINLEVEL_OUTOF10: 10
PAINLEVEL_OUTOF10: 8
PAINLEVEL_OUTOF10: 8
PAINLEVEL_OUTOF10: 10
PAINLEVEL_OUTOF10: 10

## 2019-01-14 ASSESSMENT — PAIN DESCRIPTION - LOCATION
LOCATION: GENERALIZED
LOCATION: GENERALIZED
LOCATION: GENERALIZED;BACK
LOCATION: GENERALIZED

## 2019-01-14 ASSESSMENT — PAIN DESCRIPTION - PAIN TYPE
TYPE: CHRONIC PAIN

## 2019-01-18 ENCOUNTER — APPOINTMENT (OUTPATIENT)
Dept: GENERAL RADIOLOGY | Age: 69
DRG: 638 | End: 2019-01-18
Payer: COMMERCIAL

## 2019-01-18 ENCOUNTER — HOSPITAL ENCOUNTER (INPATIENT)
Age: 69
LOS: 1 days | Discharge: OTHER INSTITUTION | DRG: 638 | End: 2019-01-19
Attending: EMERGENCY MEDICINE | Admitting: INTERNAL MEDICINE
Payer: COMMERCIAL

## 2019-01-18 DIAGNOSIS — R10.9 LEFT FLANK PAIN: ICD-10-CM

## 2019-01-18 DIAGNOSIS — R07.81 RIB PAIN ON LEFT SIDE: Chronic | ICD-10-CM

## 2019-01-18 DIAGNOSIS — R41.82 ALTERED MENTAL STATUS, UNSPECIFIED ALTERED MENTAL STATUS TYPE: Primary | ICD-10-CM

## 2019-01-18 DIAGNOSIS — R06.03 RESPIRATORY DISTRESS: ICD-10-CM

## 2019-01-18 DIAGNOSIS — R42 DIZZINESS: ICD-10-CM

## 2019-01-18 DIAGNOSIS — E16.2 HYPOGLYCEMIA: ICD-10-CM

## 2019-01-18 LAB
ABSOLUTE EOS #: 0.2 K/UL (ref 0–0.4)
ABSOLUTE IMMATURE GRANULOCYTE: ABNORMAL K/UL (ref 0–0.3)
ABSOLUTE LYMPH #: 0.8 K/UL (ref 1–4.8)
ABSOLUTE MONO #: 0.8 K/UL (ref 0.1–1.3)
ACETAMINOPHEN LEVEL: <5 UG/ML (ref 10–30)
ALBUMIN SERPL-MCNC: 3.6 G/DL (ref 3.5–5.2)
ALBUMIN/GLOBULIN RATIO: ABNORMAL (ref 1–2.5)
ALLEN TEST: ABNORMAL
ALP BLD-CCNC: 181 U/L (ref 40–129)
ALT SERPL-CCNC: 29 U/L (ref 5–41)
ANION GAP SERPL CALCULATED.3IONS-SCNC: 12 MMOL/L (ref 9–17)
AST SERPL-CCNC: 21 U/L
BASOPHILS # BLD: 0 % (ref 0–2)
BASOPHILS ABSOLUTE: 0 K/UL (ref 0–0.2)
BILIRUB SERPL-MCNC: 0.8 MG/DL (ref 0.3–1.2)
BUN BLDV-MCNC: 61 MG/DL (ref 8–23)
BUN/CREAT BLD: ABNORMAL (ref 9–20)
CALCIUM SERPL-MCNC: 8.7 MG/DL (ref 8.6–10.4)
CARBOXYHEMOGLOBIN: 8.4 % (ref 0–5)
CHLORIDE BLD-SCNC: 98 MMOL/L (ref 98–107)
CO2: 30 MMOL/L (ref 20–31)
CREAT SERPL-MCNC: 6.04 MG/DL (ref 0.7–1.2)
DIFFERENTIAL TYPE: ABNORMAL
EOSINOPHILS RELATIVE PERCENT: 2 % (ref 0–4)
FIO2: ABNORMAL
GFR AFRICAN AMERICAN: 11 ML/MIN
GFR NON-AFRICAN AMERICAN: 9 ML/MIN
GFR SERPL CREATININE-BSD FRML MDRD: ABNORMAL ML/MIN/{1.73_M2}
GFR SERPL CREATININE-BSD FRML MDRD: ABNORMAL ML/MIN/{1.73_M2}
GLUCOSE BLD-MCNC: 104 MG/DL (ref 75–110)
GLUCOSE BLD-MCNC: 120 MG/DL (ref 75–110)
GLUCOSE BLD-MCNC: 132 MG/DL (ref 75–110)
GLUCOSE BLD-MCNC: 30 MG/DL (ref 75–110)
GLUCOSE BLD-MCNC: 33 MG/DL (ref 75–110)
GLUCOSE BLD-MCNC: 42 MG/DL (ref 70–99)
GLUCOSE BLD-MCNC: 49 MG/DL (ref 75–110)
GLUCOSE BLD-MCNC: 62 MG/DL (ref 75–110)
GLUCOSE BLD-MCNC: 62 MG/DL (ref 75–110)
GLUCOSE BLD-MCNC: 71 MG/DL (ref 75–110)
GLUCOSE BLD-MCNC: 74 MG/DL (ref 75–110)
GLUCOSE BLD-MCNC: 74 MG/DL (ref 75–110)
GLUCOSE BLD-MCNC: 80 MG/DL (ref 75–110)
GLUCOSE BLD-MCNC: 81 MG/DL (ref 75–110)
GLUCOSE BLD-MCNC: 82 MG/DL (ref 75–110)
HCO3 VENOUS: 27.9 MMOL/L (ref 24–30)
HCT VFR BLD CALC: 33.5 % (ref 41–53)
HEMOGLOBIN: 10.6 G/DL (ref 13.5–17.5)
IMMATURE GRANULOCYTES: ABNORMAL %
LYMPHOCYTES # BLD: 8 % (ref 24–44)
MCH RBC QN AUTO: 29.5 PG (ref 26–34)
MCHC RBC AUTO-ENTMCNC: 31.6 G/DL (ref 31–37)
MCV RBC AUTO: 93.1 FL (ref 80–100)
METHEMOGLOBIN: 1.3 % (ref 0–1.9)
MODE: ABNORMAL
MONOCYTES # BLD: 8 % (ref 1–7)
NEGATIVE BASE EXCESS, VEN: ABNORMAL MMOL/L (ref 0–2)
NOTIFICATION TIME: ABNORMAL
NOTIFICATION: ABNORMAL
NRBC AUTOMATED: ABNORMAL PER 100 WBC
O2 DEVICE/FLOW/%: ABNORMAL
O2 SAT, VEN: 84.7 % (ref 60–85)
OXYHEMOGLOBIN: ABNORMAL % (ref 95–98)
PATIENT TEMP: 37
PCO2, VEN, TEMP ADJ: ABNORMAL MMHG (ref 39–55)
PCO2, VEN: 46.3 (ref 39–55)
PDW BLD-RTO: 14.8 % (ref 11.5–14.9)
PEEP/CPAP: ABNORMAL
PH VENOUS: 7.39 (ref 7.32–7.42)
PH, VEN, TEMP ADJ: ABNORMAL (ref 7.32–7.42)
PLATELET # BLD: 167 K/UL (ref 150–450)
PLATELET ESTIMATE: ABNORMAL
PMV BLD AUTO: 7.6 FL (ref 6–12)
PO2, VEN, TEMP ADJ: ABNORMAL MMHG (ref 30–50)
PO2, VEN: 65.8 (ref 30–50)
POSITIVE BASE EXCESS, VEN: 2.9 MMOL/L (ref 0–2)
POTASSIUM SERPL-SCNC: 3.9 MMOL/L (ref 3.7–5.3)
PSV: ABNORMAL
PT. POSITION: ABNORMAL
RBC # BLD: 3.6 M/UL (ref 4.5–5.9)
RBC # BLD: ABNORMAL 10*6/UL
RESPIRATORY RATE: ABNORMAL
SALICYLATE LEVEL: <1 MG/DL (ref 3–10)
SAMPLE SITE: ABNORMAL
SEG NEUTROPHILS: 82 % (ref 36–66)
SEGMENTED NEUTROPHILS ABSOLUTE COUNT: 8.6 K/UL (ref 1.3–9.1)
SET RATE: ABNORMAL
SODIUM BLD-SCNC: 140 MMOL/L (ref 135–144)
TEXT FOR RESPIRATORY: ABNORMAL
TOTAL HB: ABNORMAL G/DL (ref 12–16)
TOTAL PROTEIN: 6.5 G/DL (ref 6.4–8.3)
TOTAL RATE: ABNORMAL
VT: ABNORMAL
WBC # BLD: 10.5 K/UL (ref 3.5–11)
WBC # BLD: ABNORMAL 10*3/UL

## 2019-01-18 PROCEDURE — 80053 COMPREHEN METABOLIC PANEL: CPT

## 2019-01-18 PROCEDURE — 93005 ELECTROCARDIOGRAM TRACING: CPT

## 2019-01-18 PROCEDURE — G0378 HOSPITAL OBSERVATION PER HR: HCPCS

## 2019-01-18 PROCEDURE — 96361 HYDRATE IV INFUSION ADD-ON: CPT

## 2019-01-18 PROCEDURE — 6360000002 HC RX W HCPCS: Performed by: STUDENT IN AN ORGANIZED HEALTH CARE EDUCATION/TRAINING PROGRAM

## 2019-01-18 PROCEDURE — 2580000003 HC RX 258: Performed by: STUDENT IN AN ORGANIZED HEALTH CARE EDUCATION/TRAINING PROGRAM

## 2019-01-18 PROCEDURE — 71045 X-RAY EXAM CHEST 1 VIEW: CPT

## 2019-01-18 PROCEDURE — 96374 THER/PROPH/DIAG INJ IV PUSH: CPT

## 2019-01-18 PROCEDURE — 82805 BLOOD GASES W/O2 SATURATION: CPT

## 2019-01-18 PROCEDURE — 82947 ASSAY GLUCOSE BLOOD QUANT: CPT

## 2019-01-18 PROCEDURE — 82800 BLOOD PH: CPT

## 2019-01-18 PROCEDURE — 96372 THER/PROPH/DIAG INJ SC/IM: CPT

## 2019-01-18 PROCEDURE — 99223 1ST HOSP IP/OBS HIGH 75: CPT | Performed by: INTERNAL MEDICINE

## 2019-01-18 PROCEDURE — 94660 CPAP INITIATION&MGMT: CPT

## 2019-01-18 PROCEDURE — 80307 DRUG TEST PRSMV CHEM ANLYZR: CPT

## 2019-01-18 PROCEDURE — 36415 COLL VENOUS BLD VENIPUNCTURE: CPT

## 2019-01-18 PROCEDURE — 85025 COMPLETE CBC W/AUTO DIFF WBC: CPT

## 2019-01-18 PROCEDURE — 99285 EMERGENCY DEPT VISIT HI MDM: CPT

## 2019-01-18 PROCEDURE — 96376 TX/PRO/DX INJ SAME DRUG ADON: CPT

## 2019-01-18 PROCEDURE — 1200000000 HC SEMI PRIVATE

## 2019-01-18 PROCEDURE — 6370000000 HC RX 637 (ALT 250 FOR IP): Performed by: EMERGENCY MEDICINE

## 2019-01-18 PROCEDURE — 2580000003 HC RX 258

## 2019-01-18 PROCEDURE — 6370000000 HC RX 637 (ALT 250 FOR IP)

## 2019-01-18 PROCEDURE — 6370000000 HC RX 637 (ALT 250 FOR IP): Performed by: STUDENT IN AN ORGANIZED HEALTH CARE EDUCATION/TRAINING PROGRAM

## 2019-01-18 PROCEDURE — 2580000003 HC RX 258: Performed by: EMERGENCY MEDICINE

## 2019-01-18 RX ORDER — LORAZEPAM 0.5 MG/1
0.5 TABLET ORAL NIGHTLY
Status: DISCONTINUED | OUTPATIENT
Start: 2019-01-18 | End: 2019-01-19 | Stop reason: HOSPADM

## 2019-01-18 RX ORDER — INSULIN GLARGINE 100 [IU]/ML
40 INJECTION, SOLUTION SUBCUTANEOUS 2 TIMES DAILY
Status: DISCONTINUED | OUTPATIENT
Start: 2019-01-18 | End: 2019-01-19 | Stop reason: HOSPADM

## 2019-01-18 RX ORDER — DEXTROSE MONOHYDRATE 100 MG/ML
INJECTION, SOLUTION INTRAVENOUS CONTINUOUS
Status: ACTIVE | OUTPATIENT
Start: 2019-01-18 | End: 2019-01-18

## 2019-01-18 RX ORDER — METOPROLOL TARTRATE 50 MG/1
50 TABLET, FILM COATED ORAL DAILY
Status: DISCONTINUED | OUTPATIENT
Start: 2019-01-18 | End: 2019-01-19 | Stop reason: HOSPADM

## 2019-01-18 RX ORDER — FENTANYL 25 UG/H
1 PATCH TRANSDERMAL
Status: ON HOLD | COMMUNITY
End: 2019-03-04 | Stop reason: SDUPTHER

## 2019-01-18 RX ORDER — PANTOPRAZOLE SODIUM 40 MG/1
40 TABLET, DELAYED RELEASE ORAL
Status: DISCONTINUED | OUTPATIENT
Start: 2019-01-19 | End: 2019-01-19 | Stop reason: HOSPADM

## 2019-01-18 RX ORDER — SEVELAMER CARBONATE 800 MG/1
2400 TABLET, FILM COATED ORAL
Status: DISCONTINUED | OUTPATIENT
Start: 2019-01-18 | End: 2019-01-19 | Stop reason: HOSPADM

## 2019-01-18 RX ORDER — DEXTROSE MONOHYDRATE 50 MG/ML
100 INJECTION, SOLUTION INTRAVENOUS PRN
Status: DISCONTINUED | OUTPATIENT
Start: 2019-01-18 | End: 2019-01-19 | Stop reason: HOSPADM

## 2019-01-18 RX ORDER — NICOTINE POLACRILEX 4 MG
15 LOZENGE BUCCAL
Status: COMPLETED | OUTPATIENT
Start: 2019-01-18 | End: 2019-01-18

## 2019-01-18 RX ORDER — CALCIUM CARBONATE 200(500)MG
500 TABLET,CHEWABLE ORAL EVERY 8 HOURS PRN
Status: DISCONTINUED | OUTPATIENT
Start: 2019-01-18 | End: 2019-01-19 | Stop reason: HOSPADM

## 2019-01-18 RX ORDER — ISOSORBIDE MONONITRATE 60 MG/1
60 TABLET, EXTENDED RELEASE ORAL DAILY
Status: DISCONTINUED | OUTPATIENT
Start: 2019-01-18 | End: 2019-01-19 | Stop reason: HOSPADM

## 2019-01-18 RX ORDER — NITROGLYCERIN 0.4 MG/1
0.4 TABLET SUBLINGUAL EVERY 5 MIN PRN
Status: DISCONTINUED | OUTPATIENT
Start: 2019-01-18 | End: 2019-01-19 | Stop reason: HOSPADM

## 2019-01-18 RX ORDER — DIPHENHYDRAMINE HCL 25 MG
25 TABLET ORAL EVERY 8 HOURS PRN
Status: DISCONTINUED | OUTPATIENT
Start: 2019-01-18 | End: 2019-01-19 | Stop reason: HOSPADM

## 2019-01-18 RX ORDER — TAMSULOSIN HYDROCHLORIDE 0.4 MG/1
0.4 CAPSULE ORAL DAILY
Status: DISCONTINUED | OUTPATIENT
Start: 2019-01-18 | End: 2019-01-19 | Stop reason: HOSPADM

## 2019-01-18 RX ORDER — LACTULOSE 10 G/15ML
10 SOLUTION ORAL DAILY
Status: DISCONTINUED | OUTPATIENT
Start: 2019-01-18 | End: 2019-01-19 | Stop reason: HOSPADM

## 2019-01-18 RX ORDER — NICOTINE POLACRILEX 4 MG
LOZENGE BUCCAL
Status: COMPLETED
Start: 2019-01-18 | End: 2019-01-18

## 2019-01-18 RX ORDER — DEXTROSE MONOHYDRATE 25 G/50ML
12.5 INJECTION, SOLUTION INTRAVENOUS PRN
Status: DISCONTINUED | OUTPATIENT
Start: 2019-01-18 | End: 2019-01-19 | Stop reason: HOSPADM

## 2019-01-18 RX ORDER — SENNA PLUS 8.6 MG/1
1 TABLET ORAL 2 TIMES DAILY
Status: DISCONTINUED | OUTPATIENT
Start: 2019-01-18 | End: 2019-01-19 | Stop reason: HOSPADM

## 2019-01-18 RX ORDER — ESCITALOPRAM OXALATE 10 MG/1
10 TABLET ORAL EVERY MORNING
Status: DISCONTINUED | OUTPATIENT
Start: 2019-01-19 | End: 2019-01-19 | Stop reason: HOSPADM

## 2019-01-18 RX ORDER — SODIUM CHLORIDE 0.9 % (FLUSH) 0.9 %
10 SYRINGE (ML) INJECTION PRN
Status: DISCONTINUED | OUTPATIENT
Start: 2019-01-18 | End: 2019-01-19 | Stop reason: HOSPADM

## 2019-01-18 RX ORDER — ALPRAZOLAM 0.5 MG/1
0.5 TABLET ORAL
Status: DISCONTINUED | OUTPATIENT
Start: 2019-01-21 | End: 2019-01-19 | Stop reason: HOSPADM

## 2019-01-18 RX ORDER — DEXTROSE MONOHYDRATE 25 G/50ML
25 INJECTION, SOLUTION INTRAVENOUS ONCE
Status: COMPLETED | OUTPATIENT
Start: 2019-01-18 | End: 2019-01-18

## 2019-01-18 RX ORDER — HEPARIN SODIUM 5000 [USP'U]/ML
5000 INJECTION, SOLUTION INTRAVENOUS; SUBCUTANEOUS EVERY 8 HOURS SCHEDULED
Status: DISCONTINUED | OUTPATIENT
Start: 2019-01-18 | End: 2019-01-19 | Stop reason: HOSPADM

## 2019-01-18 RX ORDER — NICOTINE POLACRILEX 4 MG
15 LOZENGE BUCCAL PRN
Status: DISCONTINUED | OUTPATIENT
Start: 2019-01-18 | End: 2019-01-19 | Stop reason: HOSPADM

## 2019-01-18 RX ORDER — FUROSEMIDE 40 MG/1
80 TABLET ORAL 2 TIMES DAILY
Status: DISCONTINUED | OUTPATIENT
Start: 2019-01-18 | End: 2019-01-19 | Stop reason: HOSPADM

## 2019-01-18 RX ORDER — CETIRIZINE HYDROCHLORIDE 10 MG/1
5 TABLET ORAL DAILY
Status: DISCONTINUED | OUTPATIENT
Start: 2019-01-18 | End: 2019-01-19 | Stop reason: HOSPADM

## 2019-01-18 RX ORDER — ONDANSETRON 2 MG/ML
4 INJECTION INTRAMUSCULAR; INTRAVENOUS EVERY 6 HOURS PRN
Status: DISCONTINUED | OUTPATIENT
Start: 2019-01-18 | End: 2019-01-19 | Stop reason: HOSPADM

## 2019-01-18 RX ORDER — ASPIRIN 81 MG/1
81 TABLET, CHEWABLE ORAL DAILY
Status: DISCONTINUED | OUTPATIENT
Start: 2019-01-18 | End: 2019-01-19 | Stop reason: HOSPADM

## 2019-01-18 RX ORDER — IPRATROPIUM BROMIDE AND ALBUTEROL SULFATE 2.5; .5 MG/3ML; MG/3ML
1 SOLUTION RESPIRATORY (INHALATION)
Status: DISCONTINUED | OUTPATIENT
Start: 2019-01-19 | End: 2019-01-19 | Stop reason: HOSPADM

## 2019-01-18 RX ORDER — OXYCODONE HYDROCHLORIDE 5 MG/1
15 TABLET ORAL EVERY 6 HOURS PRN
Status: DISCONTINUED | OUTPATIENT
Start: 2019-01-18 | End: 2019-01-19 | Stop reason: HOSPADM

## 2019-01-18 RX ORDER — FINASTERIDE 5 MG/1
5 TABLET, FILM COATED ORAL DAILY
Status: DISCONTINUED | OUTPATIENT
Start: 2019-01-18 | End: 2019-01-19 | Stop reason: HOSPADM

## 2019-01-18 RX ORDER — ACETAMINOPHEN 325 MG/1
650 TABLET ORAL EVERY 6 HOURS PRN
Status: DISCONTINUED | OUTPATIENT
Start: 2019-01-18 | End: 2019-01-19 | Stop reason: HOSPADM

## 2019-01-18 RX ORDER — DEXTROSE MONOHYDRATE 25 G/50ML
INJECTION, SOLUTION INTRAVENOUS
Status: COMPLETED
Start: 2019-01-18 | End: 2019-01-18

## 2019-01-18 RX ORDER — FENTANYL 25 UG/H
1 PATCH TRANSDERMAL
Status: DISCONTINUED | OUTPATIENT
Start: 2019-01-19 | End: 2019-01-19 | Stop reason: HOSPADM

## 2019-01-18 RX ORDER — MIDODRINE HYDROCHLORIDE 5 MG/1
5 TABLET ORAL
Status: DISCONTINUED | OUTPATIENT
Start: 2019-01-21 | End: 2019-01-19 | Stop reason: HOSPADM

## 2019-01-18 RX ORDER — SODIUM CHLORIDE 0.9 % (FLUSH) 0.9 %
10 SYRINGE (ML) INJECTION EVERY 12 HOURS SCHEDULED
Status: DISCONTINUED | OUTPATIENT
Start: 2019-01-18 | End: 2019-01-19 | Stop reason: HOSPADM

## 2019-01-18 RX ORDER — ONDANSETRON 4 MG/1
4 TABLET, FILM COATED ORAL EVERY 6 HOURS PRN
Status: DISCONTINUED | OUTPATIENT
Start: 2019-01-18 | End: 2019-01-19 | Stop reason: HOSPADM

## 2019-01-18 RX ORDER — POLYETHYLENE GLYCOL 3350 17 G/17G
17 POWDER, FOR SOLUTION ORAL NIGHTLY
Status: DISCONTINUED | OUTPATIENT
Start: 2019-01-18 | End: 2019-01-19 | Stop reason: HOSPADM

## 2019-01-18 RX ORDER — IPRATROPIUM BROMIDE AND ALBUTEROL SULFATE 2.5; .5 MG/3ML; MG/3ML
1 SOLUTION RESPIRATORY (INHALATION) ONCE
Status: DISCONTINUED | OUTPATIENT
Start: 2019-01-18 | End: 2019-01-18

## 2019-01-18 RX ORDER — FLUTICASONE PROPIONATE 50 MCG
1 SPRAY, SUSPENSION (ML) NASAL DAILY
Status: DISCONTINUED | OUTPATIENT
Start: 2019-01-18 | End: 2019-01-19 | Stop reason: HOSPADM

## 2019-01-18 RX ADMIN — HEPARIN SODIUM 5000 UNITS: 5000 INJECTION INTRAVENOUS; SUBCUTANEOUS at 21:39

## 2019-01-18 RX ADMIN — DEXTROSE MONOHYDRATE: 100 INJECTION, SOLUTION INTRAVENOUS at 14:10

## 2019-01-18 RX ADMIN — Medication 15 G: at 14:00

## 2019-01-18 RX ADMIN — DEXTROSE MONOHYDRATE 12.5 G: 25 INJECTION, SOLUTION INTRAVENOUS at 18:51

## 2019-01-18 RX ADMIN — DEXTROSE MONOHYDRATE 100 ML/HR: 50 INJECTION, SOLUTION INTRAVENOUS at 18:52

## 2019-01-18 RX ADMIN — DEXTROSE MONOHYDRATE 50 ML: 500 INJECTION PARENTERAL at 14:06

## 2019-01-18 RX ADMIN — DEXTROSE 15 G: 15 GEL ORAL at 23:15

## 2019-01-18 RX ADMIN — DEXTROSE MONOHYDRATE 12.5 G: 25 INJECTION, SOLUTION INTRAVENOUS at 19:15

## 2019-01-18 RX ADMIN — DEXTROSE 15 G: 15 GEL ORAL at 14:08

## 2019-01-18 RX ADMIN — DEXTROSE MONOHYDRATE 50 ML: 25 INJECTION, SOLUTION INTRAVENOUS at 14:06

## 2019-01-18 ASSESSMENT — PAIN SCALES - GENERAL: PAINLEVEL_OUTOF10: 3

## 2019-01-18 ASSESSMENT — PAIN DESCRIPTION - ORIENTATION: ORIENTATION: LEFT

## 2019-01-18 ASSESSMENT — PAIN DESCRIPTION - PAIN TYPE: TYPE: CHRONIC PAIN

## 2019-01-18 ASSESSMENT — PAIN DESCRIPTION - LOCATION: LOCATION: ARM

## 2019-01-19 VITALS
RESPIRATION RATE: 18 BRPM | BODY MASS INDEX: 37.08 KG/M2 | DIASTOLIC BLOOD PRESSURE: 60 MMHG | OXYGEN SATURATION: 92 % | HEART RATE: 72 BPM | SYSTOLIC BLOOD PRESSURE: 121 MMHG | TEMPERATURE: 98.2 F | WEIGHT: 273.37 LBS

## 2019-01-19 LAB
ANION GAP SERPL CALCULATED.3IONS-SCNC: 14 MMOL/L (ref 9–17)
BUN BLDV-MCNC: 64 MG/DL (ref 8–23)
BUN/CREAT BLD: ABNORMAL (ref 9–20)
CALCIUM SERPL-MCNC: 8.4 MG/DL (ref 8.6–10.4)
CHLORIDE BLD-SCNC: 97 MMOL/L (ref 98–107)
CO2: 28 MMOL/L (ref 20–31)
CREAT SERPL-MCNC: 6.44 MG/DL (ref 0.7–1.2)
GFR AFRICAN AMERICAN: 10 ML/MIN
GFR NON-AFRICAN AMERICAN: 9 ML/MIN
GFR SERPL CREATININE-BSD FRML MDRD: ABNORMAL ML/MIN/{1.73_M2}
GFR SERPL CREATININE-BSD FRML MDRD: ABNORMAL ML/MIN/{1.73_M2}
GLUCOSE BLD-MCNC: 104 MG/DL (ref 75–110)
GLUCOSE BLD-MCNC: 105 MG/DL (ref 70–99)
GLUCOSE BLD-MCNC: 124 MG/DL (ref 75–110)
GLUCOSE BLD-MCNC: 135 MG/DL (ref 75–110)
GLUCOSE BLD-MCNC: 138 MG/DL (ref 75–110)
GLUCOSE BLD-MCNC: 139 MG/DL (ref 75–110)
GLUCOSE BLD-MCNC: 155 MG/DL (ref 75–110)
GLUCOSE BLD-MCNC: 72 MG/DL (ref 75–110)
HCT VFR BLD CALC: 31.4 % (ref 41–53)
HEMOGLOBIN: 9.9 G/DL (ref 13.5–17.5)
MCH RBC QN AUTO: 29.5 PG (ref 26–34)
MCHC RBC AUTO-ENTMCNC: 31.7 G/DL (ref 31–37)
MCV RBC AUTO: 92.8 FL (ref 80–100)
NRBC AUTOMATED: ABNORMAL PER 100 WBC
PDW BLD-RTO: 15 % (ref 11.5–14.9)
PLATELET # BLD: 153 K/UL (ref 150–450)
PMV BLD AUTO: 7.5 FL (ref 6–12)
POTASSIUM SERPL-SCNC: 3.9 MMOL/L (ref 3.7–5.3)
RBC # BLD: 3.38 M/UL (ref 4.5–5.9)
SODIUM BLD-SCNC: 139 MMOL/L (ref 135–144)
WBC # BLD: 9.6 K/UL (ref 3.5–11)

## 2019-01-19 PROCEDURE — 2580000003 HC RX 258: Performed by: STUDENT IN AN ORGANIZED HEALTH CARE EDUCATION/TRAINING PROGRAM

## 2019-01-19 PROCEDURE — 80048 BASIC METABOLIC PNL TOTAL CA: CPT

## 2019-01-19 PROCEDURE — 94762 N-INVAS EAR/PLS OXIMTRY CONT: CPT

## 2019-01-19 PROCEDURE — 93005 ELECTROCARDIOGRAM TRACING: CPT

## 2019-01-19 PROCEDURE — 36415 COLL VENOUS BLD VENIPUNCTURE: CPT

## 2019-01-19 PROCEDURE — G8979 MOBILITY GOAL STATUS: HCPCS

## 2019-01-19 PROCEDURE — 96372 THER/PROPH/DIAG INJ SC/IM: CPT

## 2019-01-19 PROCEDURE — 6370000000 HC RX 637 (ALT 250 FOR IP): Performed by: STUDENT IN AN ORGANIZED HEALTH CARE EDUCATION/TRAINING PROGRAM

## 2019-01-19 PROCEDURE — 97161 PT EVAL LOW COMPLEX 20 MIN: CPT

## 2019-01-19 PROCEDURE — 82947 ASSAY GLUCOSE BLOOD QUANT: CPT

## 2019-01-19 PROCEDURE — 96361 HYDRATE IV INFUSION ADD-ON: CPT

## 2019-01-19 PROCEDURE — 1200000000 HC SEMI PRIVATE

## 2019-01-19 PROCEDURE — 6360000002 HC RX W HCPCS: Performed by: STUDENT IN AN ORGANIZED HEALTH CARE EDUCATION/TRAINING PROGRAM

## 2019-01-19 PROCEDURE — 99239 HOSP IP/OBS DSCHRG MGMT >30: CPT | Performed by: INTERNAL MEDICINE

## 2019-01-19 PROCEDURE — 6370000000 HC RX 637 (ALT 250 FOR IP): Performed by: INTERNAL MEDICINE

## 2019-01-19 PROCEDURE — 85027 COMPLETE CBC AUTOMATED: CPT

## 2019-01-19 PROCEDURE — G8978 MOBILITY CURRENT STATUS: HCPCS

## 2019-01-19 PROCEDURE — 94640 AIRWAY INHALATION TREATMENT: CPT

## 2019-01-19 RX ORDER — ALPRAZOLAM 0.5 MG/1
0.5 TABLET ORAL
Qty: 3 TABLET | Refills: 0 | Status: SHIPPED | OUTPATIENT
Start: 2019-01-21 | End: 2019-01-28

## 2019-01-19 RX ORDER — OXYCODONE HYDROCHLORIDE 15 MG/1
15 TABLET ORAL EVERY 6 HOURS PRN
Qty: 8 TABLET | Refills: 0 | Status: SHIPPED | OUTPATIENT
Start: 2019-01-19 | End: 2019-01-21

## 2019-01-19 RX ORDER — INSULIN GLARGINE 100 [IU]/ML
20 INJECTION, SOLUTION SUBCUTANEOUS 2 TIMES DAILY
Qty: 1 VIAL | Refills: 3 | Status: ON HOLD | OUTPATIENT
Start: 2019-01-19 | End: 2019-03-04 | Stop reason: SDUPTHER

## 2019-01-19 RX ADMIN — OXYCODONE HYDROCHLORIDE 15 MG: 5 TABLET ORAL at 07:08

## 2019-01-19 RX ADMIN — Medication 10 ML: at 14:10

## 2019-01-19 RX ADMIN — HEPARIN SODIUM 5000 UNITS: 5000 INJECTION INTRAVENOUS; SUBCUTANEOUS at 14:05

## 2019-01-19 RX ADMIN — ASPIRIN 81 MG 81 MG: 81 TABLET ORAL at 08:14

## 2019-01-19 RX ADMIN — METOPROLOL TARTRATE 50 MG: 50 TABLET ORAL at 08:14

## 2019-01-19 RX ADMIN — IPRATROPIUM BROMIDE AND ALBUTEROL SULFATE 1 AMPULE: .5; 3 SOLUTION RESPIRATORY (INHALATION) at 07:32

## 2019-01-19 RX ADMIN — OXYCODONE HYDROCHLORIDE 15 MG: 5 TABLET ORAL at 14:12

## 2019-01-19 RX ADMIN — OXYCODONE HYDROCHLORIDE 15 MG: 5 TABLET ORAL at 00:57

## 2019-01-19 RX ADMIN — ISOSORBIDE MONONITRATE 60 MG: 60 TABLET, EXTENDED RELEASE ORAL at 08:15

## 2019-01-19 RX ADMIN — ANTACID TABLETS 500 MG: 500 TABLET, CHEWABLE ORAL at 08:15

## 2019-01-19 RX ADMIN — TAMSULOSIN HYDROCHLORIDE 0.4 MG: 0.4 CAPSULE ORAL at 08:14

## 2019-01-19 RX ADMIN — SENNOSIDES 8.6 MG: 8.6 TABLET, FILM COATED ORAL at 08:14

## 2019-01-19 RX ADMIN — SEVELAMER CARBONATE 2400 MG: 800 TABLET, FILM COATED ORAL at 14:09

## 2019-01-19 RX ADMIN — FINASTERIDE 5 MG: 5 TABLET, FILM COATED ORAL at 08:15

## 2019-01-19 RX ADMIN — SEVELAMER CARBONATE 2400 MG: 800 TABLET, FILM COATED ORAL at 08:14

## 2019-01-19 RX ADMIN — LACTULOSE 10 G: 20 SOLUTION ORAL at 08:14

## 2019-01-19 RX ADMIN — FLUTICASONE PROPIONATE 1 SPRAY: 50 SPRAY, METERED NASAL at 08:25

## 2019-01-19 RX ADMIN — FUROSEMIDE 80 MG: 40 TABLET ORAL at 08:14

## 2019-01-19 RX ADMIN — IPRATROPIUM BROMIDE AND ALBUTEROL SULFATE 1 AMPULE: .5; 3 SOLUTION RESPIRATORY (INHALATION) at 12:23

## 2019-01-19 RX ADMIN — CETIRIZINE HYDROCHLORIDE 5 MG: 10 TABLET, FILM COATED ORAL at 08:15

## 2019-01-19 RX ADMIN — HEPARIN SODIUM 5000 UNITS: 5000 INJECTION INTRAVENOUS; SUBCUTANEOUS at 05:14

## 2019-01-19 RX ADMIN — ESCITALOPRAM OXALATE 10 MG: 10 TABLET, FILM COATED ORAL at 08:14

## 2019-01-19 RX ADMIN — PANTOPRAZOLE SODIUM 40 MG: 40 TABLET, DELAYED RELEASE ORAL at 05:14

## 2019-01-19 ASSESSMENT — PAIN DESCRIPTION - ONSET: ONSET: ON-GOING

## 2019-01-19 ASSESSMENT — PAIN DESCRIPTION - PAIN TYPE
TYPE: CHRONIC PAIN

## 2019-01-19 ASSESSMENT — PAIN SCALES - GENERAL
PAINLEVEL_OUTOF10: 8
PAINLEVEL_OUTOF10: 9
PAINLEVEL_OUTOF10: 5
PAINLEVEL_OUTOF10: 5
PAINLEVEL_OUTOF10: 7
PAINLEVEL_OUTOF10: 10
PAINLEVEL_OUTOF10: 8

## 2019-01-19 ASSESSMENT — PAIN DESCRIPTION - ORIENTATION
ORIENTATION: LEFT
ORIENTATION: RIGHT
ORIENTATION: LOWER;LEFT
ORIENTATION: LEFT;LOWER

## 2019-01-19 ASSESSMENT — PAIN DESCRIPTION - DESCRIPTORS
DESCRIPTORS: CONSTANT
DESCRIPTORS: ACHING
DESCRIPTORS: CONSTANT

## 2019-01-19 ASSESSMENT — PAIN DESCRIPTION - FREQUENCY
FREQUENCY: CONTINUOUS

## 2019-01-19 ASSESSMENT — PAIN DESCRIPTION - LOCATION
LOCATION: BACK
LOCATION: BACK;NECK;ABDOMEN
LOCATION: BACK
LOCATION: ARM

## 2019-01-22 LAB
EKG ATRIAL RATE: 59 BPM
EKG P-R INTERVAL: 136 MS
EKG Q-T INTERVAL: 474 MS
EKG QRS DURATION: 148 MS
EKG QTC CALCULATION (BAZETT): 469 MS
EKG R AXIS: -38 DEGREES
EKG T AXIS: 28 DEGREES
EKG VENTRICULAR RATE: 59 BPM

## 2019-03-02 ENCOUNTER — APPOINTMENT (OUTPATIENT)
Dept: GENERAL RADIOLOGY | Age: 69
End: 2019-03-02
Payer: COMMERCIAL

## 2019-03-02 ENCOUNTER — HOSPITAL ENCOUNTER (OUTPATIENT)
Age: 69
Setting detail: OBSERVATION
Discharge: OTHER INSTITUTION | End: 2019-03-04
Attending: EMERGENCY MEDICINE | Admitting: INTERNAL MEDICINE
Payer: COMMERCIAL

## 2019-03-02 ENCOUNTER — HOSPITAL ENCOUNTER (OUTPATIENT)
Age: 69
Setting detail: SPECIMEN
Discharge: HOME | End: 2019-03-02
Payer: COMMERCIAL

## 2019-03-02 DIAGNOSIS — N18.6 STAGE 5 CHRONIC KIDNEY DISEASE ON CHRONIC DIALYSIS (HCC): Primary | ICD-10-CM

## 2019-03-02 DIAGNOSIS — R07.81 RIB PAIN ON LEFT SIDE: Chronic | ICD-10-CM

## 2019-03-02 DIAGNOSIS — Z99.2 STAGE 5 CHRONIC KIDNEY DISEASE ON CHRONIC DIALYSIS (HCC): Primary | ICD-10-CM

## 2019-03-02 PROBLEM — N18.5 CRF (CHRONIC RENAL FAILURE), STAGE 5 (HCC): Status: ACTIVE | Noted: 2019-03-02

## 2019-03-02 PROBLEM — N18.30 CHRONIC RENAL FAILURE, STAGE 3 (MODERATE) (HCC): Status: ACTIVE | Noted: 2019-03-02

## 2019-03-02 LAB
ANION GAP SERPL CALCULATED.3IONS-SCNC: 14 MMOL/L (ref 9–17)
BUN BLDV-MCNC: 71 MG/DL (ref 8–23)
BUN/CREAT BLD: 10 (ref 9–20)
CALCIUM SERPL-MCNC: 8.5 MG/DL (ref 8.6–10.4)
CHLORIDE BLD-SCNC: 111 MMOL/L (ref 98–107)
CO2: 22 MMOL/L (ref 20–31)
CREAT SERPL-MCNC: 7.02 MG/DL (ref 0.7–1.2)
GFR AFRICAN AMERICAN: 9 ML/MIN
GFR NON-AFRICAN AMERICAN: 8 ML/MIN
GFR SERPL CREATININE-BSD FRML MDRD: ABNORMAL ML/MIN/{1.73_M2}
GFR SERPL CREATININE-BSD FRML MDRD: ABNORMAL ML/MIN/{1.73_M2}
GLUCOSE BLD-MCNC: 128 MG/DL (ref 75–110)
GLUCOSE BLD-MCNC: 131 MG/DL (ref 70–99)
HCT VFR BLD CALC: 24.9 % (ref 41–53)
HEMOGLOBIN: 8 G/DL (ref 13.5–17.5)
MCH RBC QN AUTO: 29.6 PG (ref 26–34)
MCHC RBC AUTO-ENTMCNC: 31.8 G/DL (ref 31–37)
MCV RBC AUTO: 93.1 FL (ref 80–100)
NRBC AUTOMATED: ABNORMAL PER 100 WBC
PDW BLD-RTO: 17.5 % (ref 11.5–14.5)
PLATELET # BLD: 91 K/UL (ref 130–400)
PMV BLD AUTO: 8 FL (ref 6–12)
POTASSIUM SERPL-SCNC: 4.3 MMOL/L (ref 3.7–5.3)
RBC # BLD: 2.67 M/UL (ref 4.5–5.9)
SODIUM BLD-SCNC: 147 MMOL/L (ref 135–144)
WBC # BLD: 6.4 K/UL (ref 3.5–11)

## 2019-03-02 PROCEDURE — 82947 ASSAY GLUCOSE BLOOD QUANT: CPT

## 2019-03-02 PROCEDURE — P9603 ONE-WAY ALLOW PRORATED MILES: HCPCS

## 2019-03-02 PROCEDURE — 36415 COLL VENOUS BLD VENIPUNCTURE: CPT

## 2019-03-02 PROCEDURE — G0378 HOSPITAL OBSERVATION PER HR: HCPCS

## 2019-03-02 PROCEDURE — 71045 X-RAY EXAM CHEST 1 VIEW: CPT

## 2019-03-02 PROCEDURE — 85027 COMPLETE CBC AUTOMATED: CPT

## 2019-03-02 PROCEDURE — 2580000003 HC RX 258: Performed by: INTERNAL MEDICINE

## 2019-03-02 PROCEDURE — 80048 BASIC METABOLIC PNL TOTAL CA: CPT

## 2019-03-02 PROCEDURE — 99285 EMERGENCY DEPT VISIT HI MDM: CPT

## 2019-03-02 RX ORDER — SODIUM CHLORIDE 0.9 % (FLUSH) 0.9 %
10 SYRINGE (ML) INJECTION PRN
Status: DISCONTINUED | OUTPATIENT
Start: 2019-03-02 | End: 2019-03-04 | Stop reason: HOSPADM

## 2019-03-02 RX ORDER — OXYCODONE HYDROCHLORIDE 5 MG/1
10 TABLET ORAL EVERY 4 HOURS PRN
Status: ON HOLD | COMMUNITY
End: 2019-03-04 | Stop reason: SDUPTHER

## 2019-03-02 RX ORDER — OXYCODONE HYDROCHLORIDE 5 MG/1
10 TABLET ORAL EVERY 4 HOURS PRN
Status: DISCONTINUED | OUTPATIENT
Start: 2019-03-02 | End: 2019-03-04 | Stop reason: HOSPADM

## 2019-03-02 RX ORDER — ACETAMINOPHEN 325 MG/1
650 TABLET ORAL EVERY 4 HOURS PRN
Status: DISCONTINUED | OUTPATIENT
Start: 2019-03-02 | End: 2019-03-04 | Stop reason: HOSPADM

## 2019-03-02 RX ORDER — SODIUM CHLORIDE 0.9 % (FLUSH) 0.9 %
10 SYRINGE (ML) INJECTION EVERY 12 HOURS SCHEDULED
Status: DISCONTINUED | OUTPATIENT
Start: 2019-03-02 | End: 2019-03-04 | Stop reason: HOSPADM

## 2019-03-02 RX ADMIN — Medication 10 ML: at 21:38

## 2019-03-02 ASSESSMENT — ENCOUNTER SYMPTOMS
SHORTNESS OF BREATH: 1
DIARRHEA: 0
NAUSEA: 0
BACK PAIN: 0
WHEEZING: 0
BLOOD IN STOOL: 0
COLOR CHANGE: 0
ABDOMINAL PAIN: 0
EYE REDNESS: 0
FACIAL SWELLING: 0
SINUS PRESSURE: 0
VOMITING: 0
COUGH: 0
SORE THROAT: 0
TROUBLE SWALLOWING: 0
CHEST TIGHTNESS: 0
EYE DISCHARGE: 0
EYE PAIN: 0
CONSTIPATION: 0
RHINORRHEA: 0

## 2019-03-02 ASSESSMENT — PAIN SCALES - GENERAL: PAINLEVEL_OUTOF10: 0

## 2019-03-03 LAB
GLUCOSE BLD-MCNC: 120 MG/DL (ref 75–110)
GLUCOSE BLD-MCNC: 125 MG/DL (ref 75–110)
GLUCOSE BLD-MCNC: 173 MG/DL (ref 75–110)
HBV SURFACE AB TITR SER: <3.5 MIU/ML
HEPATITIS B SURFACE ANTIGEN: NONREACTIVE

## 2019-03-03 PROCEDURE — 99220 PR INITIAL OBSERVATION CARE/DAY 70 MINUTES: CPT | Performed by: INTERNAL MEDICINE

## 2019-03-03 PROCEDURE — 96365 THER/PROPH/DIAG IV INF INIT: CPT

## 2019-03-03 PROCEDURE — 96375 TX/PRO/DX INJ NEW DRUG ADDON: CPT

## 2019-03-03 PROCEDURE — 2580000003 HC RX 258: Performed by: INTERNAL MEDICINE

## 2019-03-03 PROCEDURE — 96372 THER/PROPH/DIAG INJ SC/IM: CPT

## 2019-03-03 PROCEDURE — 6370000000 HC RX 637 (ALT 250 FOR IP): Performed by: INTERNAL MEDICINE

## 2019-03-03 PROCEDURE — 90937 HEMODIALYSIS REPEATED EVAL: CPT

## 2019-03-03 PROCEDURE — 87340 HEPATITIS B SURFACE AG IA: CPT

## 2019-03-03 PROCEDURE — 86317 IMMUNOASSAY INFECTIOUS AGENT: CPT

## 2019-03-03 PROCEDURE — 6360000002 HC RX W HCPCS: Performed by: INTERNAL MEDICINE

## 2019-03-03 PROCEDURE — 36415 COLL VENOUS BLD VENIPUNCTURE: CPT

## 2019-03-03 PROCEDURE — G0378 HOSPITAL OBSERVATION PER HR: HCPCS

## 2019-03-03 PROCEDURE — 90935 HEMODIALYSIS ONE EVALUATION: CPT

## 2019-03-03 PROCEDURE — 82947 ASSAY GLUCOSE BLOOD QUANT: CPT

## 2019-03-03 RX ORDER — HYDRALAZINE HYDROCHLORIDE 20 MG/ML
10 INJECTION INTRAMUSCULAR; INTRAVENOUS EVERY 6 HOURS PRN
Status: DISCONTINUED | OUTPATIENT
Start: 2019-03-03 | End: 2019-03-04 | Stop reason: HOSPADM

## 2019-03-03 RX ORDER — HYDRALAZINE HYDROCHLORIDE 20 MG/ML
10 INJECTION INTRAMUSCULAR; INTRAVENOUS ONCE
Status: DISCONTINUED | OUTPATIENT
Start: 2019-03-03 | End: 2019-03-04 | Stop reason: HOSPADM

## 2019-03-03 RX ORDER — CALCIUM CARBONATE 200(500)MG
1 TABLET,CHEWABLE ORAL EVERY 8 HOURS PRN
Status: DISCONTINUED | OUTPATIENT
Start: 2019-03-03 | End: 2019-03-04 | Stop reason: HOSPADM

## 2019-03-03 RX ORDER — METOPROLOL SUCCINATE 50 MG/1
50 TABLET, EXTENDED RELEASE ORAL DAILY
Status: DISCONTINUED | OUTPATIENT
Start: 2019-03-03 | End: 2019-03-04 | Stop reason: HOSPADM

## 2019-03-03 RX ORDER — ONDANSETRON 2 MG/ML
4 INJECTION INTRAMUSCULAR; INTRAVENOUS EVERY 6 HOURS PRN
Status: DISCONTINUED | OUTPATIENT
Start: 2019-03-03 | End: 2019-03-04 | Stop reason: HOSPADM

## 2019-03-03 RX ADMIN — IRON SUCROSE 200 MG: 20 INJECTION, SOLUTION INTRAVENOUS at 20:45

## 2019-03-03 RX ADMIN — ACETAMINOPHEN 650 MG: 325 TABLET, FILM COATED ORAL at 08:11

## 2019-03-03 RX ADMIN — METOPROLOL SUCCINATE 50 MG: 50 TABLET, EXTENDED RELEASE ORAL at 16:43

## 2019-03-03 RX ADMIN — OXYCODONE HYDROCHLORIDE 10 MG: 5 TABLET ORAL at 05:46

## 2019-03-03 RX ADMIN — Medication 10 ML: at 08:12

## 2019-03-03 RX ADMIN — OXYCODONE HYDROCHLORIDE 10 MG: 5 TABLET ORAL at 01:41

## 2019-03-03 RX ADMIN — DARBEPOETIN ALFA 100 MCG: 100 INJECTION, SOLUTION INTRAVENOUS; SUBCUTANEOUS at 16:42

## 2019-03-03 RX ADMIN — INSULIN LISPRO 1 UNITS: 100 INJECTION, SOLUTION INTRAVENOUS; SUBCUTANEOUS at 22:30

## 2019-03-03 RX ADMIN — Medication 10 ML: at 20:45

## 2019-03-03 RX ADMIN — Medication 10 ML: at 01:41

## 2019-03-03 RX ADMIN — ONDANSETRON 4 MG: 2 INJECTION INTRAMUSCULAR; INTRAVENOUS at 01:40

## 2019-03-03 RX ADMIN — OXYCODONE HYDROCHLORIDE 10 MG: 5 TABLET ORAL at 12:55

## 2019-03-03 ASSESSMENT — PAIN SCALES - GENERAL
PAINLEVEL_OUTOF10: 5
PAINLEVEL_OUTOF10: 6
PAINLEVEL_OUTOF10: 8
PAINLEVEL_OUTOF10: 3
PAINLEVEL_OUTOF10: 1
PAINLEVEL_OUTOF10: 0
PAINLEVEL_OUTOF10: 9
PAINLEVEL_OUTOF10: 3
PAINLEVEL_OUTOF10: 0

## 2019-03-03 ASSESSMENT — PAIN DESCRIPTION - ORIENTATION
ORIENTATION: RIGHT;LEFT
ORIENTATION: RIGHT
ORIENTATION: RIGHT

## 2019-03-03 ASSESSMENT — PAIN DESCRIPTION - LOCATION
LOCATION: BACK
LOCATION: ABDOMEN
LOCATION: ABDOMEN

## 2019-03-03 ASSESSMENT — PAIN DESCRIPTION - FREQUENCY: FREQUENCY: INTERMITTENT

## 2019-03-03 ASSESSMENT — PAIN DESCRIPTION - DESCRIPTORS
DESCRIPTORS: ACHING
DESCRIPTORS: ACHING

## 2019-03-03 ASSESSMENT — PAIN DESCRIPTION - PAIN TYPE
TYPE: CHRONIC PAIN

## 2019-03-04 VITALS
RESPIRATION RATE: 16 BRPM | DIASTOLIC BLOOD PRESSURE: 75 MMHG | HEART RATE: 67 BPM | HEIGHT: 72 IN | WEIGHT: 280.43 LBS | SYSTOLIC BLOOD PRESSURE: 140 MMHG | BODY MASS INDEX: 37.98 KG/M2 | TEMPERATURE: 97.7 F | OXYGEN SATURATION: 100 %

## 2019-03-04 LAB
ANION GAP SERPL CALCULATED.3IONS-SCNC: 11 MMOL/L (ref 9–17)
BUN BLDV-MCNC: 51 MG/DL (ref 8–23)
BUN/CREAT BLD: ABNORMAL (ref 9–20)
CALCIUM SERPL-MCNC: 8.4 MG/DL (ref 8.6–10.4)
CHLORIDE BLD-SCNC: 107 MMOL/L (ref 98–107)
CO2: 25 MMOL/L (ref 20–31)
CREAT SERPL-MCNC: 5.95 MG/DL (ref 0.7–1.2)
GFR AFRICAN AMERICAN: 11 ML/MIN
GFR NON-AFRICAN AMERICAN: 9 ML/MIN
GFR SERPL CREATININE-BSD FRML MDRD: ABNORMAL ML/MIN/{1.73_M2}
GFR SERPL CREATININE-BSD FRML MDRD: ABNORMAL ML/MIN/{1.73_M2}
GLUCOSE BLD-MCNC: 119 MG/DL (ref 70–99)
GLUCOSE BLD-MCNC: 121 MG/DL (ref 75–110)
PHOSPHORUS: 3.9 MG/DL (ref 2.5–4.5)
POTASSIUM SERPL-SCNC: 3.8 MMOL/L (ref 3.7–5.3)
SODIUM BLD-SCNC: 143 MMOL/L (ref 135–144)

## 2019-03-04 PROCEDURE — G0378 HOSPITAL OBSERVATION PER HR: HCPCS

## 2019-03-04 PROCEDURE — 96376 TX/PRO/DX INJ SAME DRUG ADON: CPT

## 2019-03-04 PROCEDURE — 90935 HEMODIALYSIS ONE EVALUATION: CPT

## 2019-03-04 PROCEDURE — 90937 HEMODIALYSIS REPEATED EVAL: CPT

## 2019-03-04 PROCEDURE — 36415 COLL VENOUS BLD VENIPUNCTURE: CPT

## 2019-03-04 PROCEDURE — 6370000000 HC RX 637 (ALT 250 FOR IP): Performed by: INTERNAL MEDICINE

## 2019-03-04 PROCEDURE — 2580000003 HC RX 258: Performed by: INTERNAL MEDICINE

## 2019-03-04 PROCEDURE — 82947 ASSAY GLUCOSE BLOOD QUANT: CPT

## 2019-03-04 PROCEDURE — 6360000002 HC RX W HCPCS: Performed by: INTERNAL MEDICINE

## 2019-03-04 PROCEDURE — 84100 ASSAY OF PHOSPHORUS: CPT

## 2019-03-04 PROCEDURE — 99239 HOSP IP/OBS DSCHRG MGMT >30: CPT | Performed by: INTERNAL MEDICINE

## 2019-03-04 PROCEDURE — 80048 BASIC METABOLIC PNL TOTAL CA: CPT

## 2019-03-04 RX ORDER — NICOTINE POLACRILEX 4 MG
15 LOZENGE BUCCAL PRN
Status: DISCONTINUED | OUTPATIENT
Start: 2019-03-04 | End: 2019-03-04 | Stop reason: HOSPADM

## 2019-03-04 RX ORDER — DEXTROSE MONOHYDRATE 25 G/50ML
12.5 INJECTION, SOLUTION INTRAVENOUS PRN
Status: DISCONTINUED | OUTPATIENT
Start: 2019-03-04 | End: 2019-03-04 | Stop reason: HOSPADM

## 2019-03-04 RX ORDER — FENTANYL 25 UG/H
1 PATCH TRANSDERMAL
Qty: 1 PATCH | Refills: 0 | Status: SHIPPED | OUTPATIENT
Start: 2019-03-04 | End: 2019-03-07

## 2019-03-04 RX ORDER — INSULIN GLARGINE 100 [IU]/ML
10 INJECTION, SOLUTION SUBCUTANEOUS 2 TIMES DAILY
Qty: 1 VIAL | Refills: 3 | Status: SHIPPED | OUTPATIENT
Start: 2019-03-04

## 2019-03-04 RX ORDER — DEXTROSE MONOHYDRATE 50 MG/ML
100 INJECTION, SOLUTION INTRAVENOUS PRN
Status: DISCONTINUED | OUTPATIENT
Start: 2019-03-04 | End: 2019-03-04 | Stop reason: HOSPADM

## 2019-03-04 RX ORDER — OXYCODONE HYDROCHLORIDE 5 MG/1
10 TABLET ORAL EVERY 4 HOURS PRN
Qty: 30 TABLET | Refills: 0 | Status: SHIPPED | OUTPATIENT
Start: 2019-03-04 | End: 2019-03-09

## 2019-03-04 RX ADMIN — Medication 10 ML: at 07:55

## 2019-03-04 RX ADMIN — ACETAMINOPHEN 650 MG: 325 TABLET, FILM COATED ORAL at 07:54

## 2019-03-04 RX ADMIN — OXYCODONE HYDROCHLORIDE 10 MG: 5 TABLET ORAL at 05:42

## 2019-03-04 RX ADMIN — ONDANSETRON 4 MG: 2 INJECTION INTRAMUSCULAR; INTRAVENOUS at 05:41

## 2019-03-04 RX ADMIN — METOPROLOL SUCCINATE 50 MG: 50 TABLET, EXTENDED RELEASE ORAL at 07:54

## 2019-03-04 ASSESSMENT — PAIN SCALES - GENERAL
PAINLEVEL_OUTOF10: 0
PAINLEVEL_OUTOF10: 0
PAINLEVEL_OUTOF10: 7
PAINLEVEL_OUTOF10: 0
PAINLEVEL_OUTOF10: 0
PAINLEVEL_OUTOF10: 2
PAINLEVEL_OUTOF10: 7

## 2019-03-04 ASSESSMENT — PAIN DESCRIPTION - LOCATION: LOCATION: BACK

## 2019-03-04 ASSESSMENT — PAIN DESCRIPTION - PAIN TYPE: TYPE: ACUTE PAIN

## 2019-05-07 ENCOUNTER — ANESTHESIA EVENT (OUTPATIENT)
Dept: OPERATING ROOM | Age: 69
End: 2019-05-07
Payer: COMMERCIAL

## 2019-05-07 ENCOUNTER — HOSPITAL ENCOUNTER (OUTPATIENT)
Age: 69
Setting detail: OUTPATIENT SURGERY
Discharge: NURSING, REHAB OR LONG-TERM CARE | End: 2019-05-07
Attending: OPHTHALMOLOGY | Admitting: OPHTHALMOLOGY
Payer: COMMERCIAL

## 2019-05-07 ENCOUNTER — ANESTHESIA (OUTPATIENT)
Dept: OPERATING ROOM | Age: 69
End: 2019-05-07
Payer: COMMERCIAL

## 2019-05-07 VITALS
SYSTOLIC BLOOD PRESSURE: 169 MMHG | OXYGEN SATURATION: 100 % | RESPIRATION RATE: 14 BRPM | DIASTOLIC BLOOD PRESSURE: 77 MMHG

## 2019-05-07 VITALS
HEIGHT: 72 IN | DIASTOLIC BLOOD PRESSURE: 67 MMHG | TEMPERATURE: 97 F | OXYGEN SATURATION: 98 % | WEIGHT: 280 LBS | HEART RATE: 61 BPM | SYSTOLIC BLOOD PRESSURE: 142 MMHG | RESPIRATION RATE: 20 BRPM | BODY MASS INDEX: 37.93 KG/M2

## 2019-05-07 PROBLEM — H25.11 AGE-RELATED NUCLEAR CATARACT, RIGHT: Status: RESOLVED | Noted: 2019-05-07 | Resolved: 2019-05-07

## 2019-05-07 LAB
GLUCOSE BLD-MCNC: 68 MG/DL (ref 75–110)
GLUCOSE BLD-MCNC: 72 MG/DL (ref 75–110)

## 2019-05-07 PROCEDURE — 6370000000 HC RX 637 (ALT 250 FOR IP): Performed by: OPHTHALMOLOGY

## 2019-05-07 PROCEDURE — 7100000030 HC ASPR PHASE II RECOVERY - FIRST 15 MIN: Performed by: OPHTHALMOLOGY

## 2019-05-07 PROCEDURE — 2580000003 HC RX 258: Performed by: ANESTHESIOLOGY

## 2019-05-07 PROCEDURE — 2500000003 HC RX 250 WO HCPCS: Performed by: OPHTHALMOLOGY

## 2019-05-07 PROCEDURE — 3600000012 HC SURGERY LEVEL 2 ADDTL 15MIN: Performed by: OPHTHALMOLOGY

## 2019-05-07 PROCEDURE — 82947 ASSAY GLUCOSE BLOOD QUANT: CPT

## 2019-05-07 PROCEDURE — 7100000001 HC PACU RECOVERY - ADDTL 15 MIN: Performed by: OPHTHALMOLOGY

## 2019-05-07 PROCEDURE — 3600000002 HC SURGERY LEVEL 2 BASE: Performed by: OPHTHALMOLOGY

## 2019-05-07 PROCEDURE — 7100000031 HC ASPR PHASE II RECOVERY - ADDTL 15 MIN: Performed by: OPHTHALMOLOGY

## 2019-05-07 PROCEDURE — 6360000002 HC RX W HCPCS: Performed by: SPECIALIST

## 2019-05-07 PROCEDURE — 6360000002 HC RX W HCPCS: Performed by: OPHTHALMOLOGY

## 2019-05-07 PROCEDURE — 3700000000 HC ANESTHESIA ATTENDED CARE: Performed by: OPHTHALMOLOGY

## 2019-05-07 PROCEDURE — 3700000001 HC ADD 15 MINUTES (ANESTHESIA): Performed by: OPHTHALMOLOGY

## 2019-05-07 PROCEDURE — 7100000010 HC PHASE II RECOVERY - FIRST 15 MIN: Performed by: OPHTHALMOLOGY

## 2019-05-07 PROCEDURE — 7100000000 HC PACU RECOVERY - FIRST 15 MIN: Performed by: OPHTHALMOLOGY

## 2019-05-07 PROCEDURE — 7100000011 HC PHASE II RECOVERY - ADDTL 15 MIN: Performed by: OPHTHALMOLOGY

## 2019-05-07 PROCEDURE — 2709999900 HC NON-CHARGEABLE SUPPLY: Performed by: OPHTHALMOLOGY

## 2019-05-07 PROCEDURE — V2632 POST CHMBR INTRAOCULAR LENS: HCPCS | Performed by: OPHTHALMOLOGY

## 2019-05-07 DEVICE — LENS IOL SN60WF 19.0D: Type: IMPLANTABLE DEVICE | Site: EYE | Status: FUNCTIONAL

## 2019-05-07 RX ORDER — TOBRAMYCIN 3 MG/ML
1 SOLUTION/ DROPS OPHTHALMIC EVERY 4 HOURS
COMMUNITY
End: 2019-01-01 | Stop reason: ALTCHOICE

## 2019-05-07 RX ORDER — KETOROLAC TROMETHAMINE 5 MG/ML
1 SOLUTION OPHTHALMIC 4 TIMES DAILY
COMMUNITY
End: 2020-01-01

## 2019-05-07 RX ORDER — MIDAZOLAM HYDROCHLORIDE 1 MG/ML
INJECTION INTRAMUSCULAR; INTRAVENOUS PRN
Status: DISCONTINUED | OUTPATIENT
Start: 2019-05-07 | End: 2019-05-07 | Stop reason: SDUPTHER

## 2019-05-07 RX ORDER — SODIUM CHLORIDE 9 MG/ML
INJECTION, SOLUTION INTRAVENOUS CONTINUOUS
Status: DISCONTINUED | OUTPATIENT
Start: 2019-05-07 | End: 2019-05-07 | Stop reason: HOSPADM

## 2019-05-07 RX ORDER — LIDOCAINE HYDROCHLORIDE 10 MG/ML
INJECTION, SOLUTION INFILTRATION; PERINEURAL PRN
Status: DISCONTINUED | OUTPATIENT
Start: 2019-05-07 | End: 2019-05-07 | Stop reason: ALTCHOICE

## 2019-05-07 RX ORDER — TOBRAMYCIN 3 MG/ML
1 SOLUTION/ DROPS OPHTHALMIC EVERY 5 MIN PRN
Status: DISCONTINUED | OUTPATIENT
Start: 2019-05-07 | End: 2019-05-07 | Stop reason: HOSPADM

## 2019-05-07 RX ORDER — DIFLUPREDNATE 0.5 MG/ML
EMULSION OPHTHALMIC PRN
Status: DISCONTINUED | OUTPATIENT
Start: 2019-05-07 | End: 2019-05-07 | Stop reason: ALTCHOICE

## 2019-05-07 RX ORDER — SODIUM CHLORIDE, SODIUM LACTATE, POTASSIUM CHLORIDE, CALCIUM CHLORIDE 600; 310; 30; 20 MG/100ML; MG/100ML; MG/100ML; MG/100ML
INJECTION, SOLUTION INTRAVENOUS CONTINUOUS
Status: DISCONTINUED | OUTPATIENT
Start: 2019-05-07 | End: 2019-05-07

## 2019-05-07 RX ORDER — TOBRAMYCIN 3 MG/ML
SOLUTION/ DROPS OPHTHALMIC PRN
Status: DISCONTINUED | OUTPATIENT
Start: 2019-05-07 | End: 2019-05-07 | Stop reason: ALTCHOICE

## 2019-05-07 RX ORDER — BUPIVACAINE HYDROCHLORIDE 5 MG/ML
1 INJECTION, SOLUTION EPIDURAL; INTRACAUDAL SEE ADMIN INSTRUCTIONS
Status: DISCONTINUED | OUTPATIENT
Start: 2019-05-07 | End: 2019-05-07 | Stop reason: HOSPADM

## 2019-05-07 RX ORDER — TIMOLOL MALEATE 5 MG/ML
SOLUTION/ DROPS OPHTHALMIC PRN
Status: DISCONTINUED | OUTPATIENT
Start: 2019-05-07 | End: 2019-05-07 | Stop reason: ALTCHOICE

## 2019-05-07 RX ORDER — KETOROLAC TROMETHAMINE 5 MG/ML
1 SOLUTION OPHTHALMIC EVERY 5 MIN PRN
Status: COMPLETED | OUTPATIENT
Start: 2019-05-07 | End: 2019-05-07

## 2019-05-07 RX ORDER — BALANCED SALT SOLUTION ENRICHED WITH BICARBONATE, DEXTROSE, AND GLUTATHIONE
KIT INTRAOCULAR PRN
Status: DISCONTINUED | OUTPATIENT
Start: 2019-05-07 | End: 2019-05-07 | Stop reason: ALTCHOICE

## 2019-05-07 RX ORDER — CYCLOPENTOLATE HYDROCHLORIDE 10 MG/ML
1 SOLUTION/ DROPS OPHTHALMIC EVERY 5 MIN PRN
Status: COMPLETED | OUTPATIENT
Start: 2019-05-07 | End: 2019-05-07

## 2019-05-07 RX ORDER — LORAZEPAM 1 MG/1
1 TABLET ORAL ONCE
Status: COMPLETED | OUTPATIENT
Start: 2019-05-07 | End: 2019-05-07

## 2019-05-07 RX ORDER — PHENYLEPHRINE HCL 2.5 %
1 DROPS OPHTHALMIC (EYE) EVERY 5 MIN PRN
Status: COMPLETED | OUTPATIENT
Start: 2019-05-07 | End: 2019-05-07

## 2019-05-07 RX ORDER — FENTANYL 50 UG/H
1 PATCH TRANSDERMAL
COMMUNITY
End: 2019-01-01 | Stop reason: DRUGHIGH

## 2019-05-07 RX ORDER — OXYCODONE HYDROCHLORIDE 5 MG/1
10 TABLET ORAL EVERY 4 HOURS PRN
Status: ON HOLD | COMMUNITY
End: 2020-01-01 | Stop reason: SDUPTHER

## 2019-05-07 RX ORDER — LIDOCAINE HYDROCHLORIDE 40 MG/ML
INJECTION, SOLUTION RETROBULBAR; TOPICAL PRN
Status: DISCONTINUED | OUTPATIENT
Start: 2019-05-07 | End: 2019-05-07 | Stop reason: ALTCHOICE

## 2019-05-07 RX ORDER — GUAIFENESIN AND DEXTROMETHORPHAN HYDROBROMIDE 100; 10 MG/5ML; MG/5ML
10 SOLUTION ORAL EVERY 6 HOURS PRN
COMMUNITY
End: 2019-01-01 | Stop reason: ALTCHOICE

## 2019-05-07 RX ORDER — BUPIVACAINE HYDROCHLORIDE 5 MG/ML
INJECTION, SOLUTION EPIDURAL; INTRACAUDAL PRN
Status: DISCONTINUED | OUTPATIENT
Start: 2019-05-07 | End: 2019-05-07 | Stop reason: ALTCHOICE

## 2019-05-07 RX ADMIN — PHENYLEPHRINE HYDROCHLORIDE 1 DROP: 25 SOLUTION/ DROPS OPHTHALMIC at 07:13

## 2019-05-07 RX ADMIN — LORAZEPAM 1 MG: 1 TABLET ORAL at 07:07

## 2019-05-07 RX ADMIN — MIDAZOLAM 0.5 MG: 1 INJECTION INTRAMUSCULAR; INTRAVENOUS at 08:54

## 2019-05-07 RX ADMIN — TOBRAMYCIN 1 DROP: 3 SOLUTION OPHTHALMIC at 07:13

## 2019-05-07 RX ADMIN — SODIUM CHLORIDE: 9 INJECTION, SOLUTION INTRAVENOUS at 07:27

## 2019-05-07 RX ADMIN — KETOROLAC TROMETHAMINE 1 DROP: 5 SOLUTION/ DROPS OPHTHALMIC at 07:13

## 2019-05-07 RX ADMIN — PHENYLEPHRINE HYDROCHLORIDE 1 DROP: 25 SOLUTION/ DROPS OPHTHALMIC at 07:34

## 2019-05-07 RX ADMIN — CYCLOPENTOLATE HYDROCHLORIDE 1 DROP: 10 SOLUTION/ DROPS OPHTHALMIC at 07:34

## 2019-05-07 RX ADMIN — BUPIVACAINE HYDROCHLORIDE 5 MG: 5 INJECTION, SOLUTION EPIDURAL; INTRACAUDAL; PERINEURAL at 07:13

## 2019-05-07 RX ADMIN — BUPIVACAINE HYDROCHLORIDE 5 MG: 5 INJECTION, SOLUTION EPIDURAL; INTRACAUDAL; PERINEURAL at 07:33

## 2019-05-07 RX ADMIN — SODIUM CHLORIDE: 9 INJECTION, SOLUTION INTRAVENOUS at 08:28

## 2019-05-07 RX ADMIN — KETOROLAC TROMETHAMINE 1 DROP: 5 SOLUTION/ DROPS OPHTHALMIC at 07:34

## 2019-05-07 RX ADMIN — TOBRAMYCIN 1 DROP: 3 SOLUTION OPHTHALMIC at 07:34

## 2019-05-07 RX ADMIN — CYCLOPENTOLATE HYDROCHLORIDE 1 DROP: 10 SOLUTION/ DROPS OPHTHALMIC at 07:13

## 2019-05-07 RX ADMIN — MIDAZOLAM 0.5 MG: 1 INJECTION INTRAMUSCULAR; INTRAVENOUS at 08:35

## 2019-05-07 ASSESSMENT — PAIN SCALES - GENERAL
PAINLEVEL_OUTOF10: 0

## 2019-05-07 ASSESSMENT — PULMONARY FUNCTION TESTS
PIF_VALUE: 1

## 2019-05-07 ASSESSMENT — PAIN - FUNCTIONAL ASSESSMENT: PAIN_FUNCTIONAL_ASSESSMENT: 0-10

## 2019-05-07 NOTE — OP NOTE
[id number] 70 [facility]    Preoperative Diagnosis: Cataract the right eye    Postoperative Diagnosis: Cataract the right eye     Procedure: Phacoemulsification with intraocular lens implantation, the right eye    Surgeon: Cody Oropeza MD    Anesthesia: peribulbar  With monitored sedation      Complications: none    Specimens: none    Indications for procedure: The patient is a 70y.o. year old with decreased vision, glare and halos around lights, and trouble with activities of daily living. Examination revealed a visually significant cataract in the the right eye. Risks, benefits, and alternatives to surgery were discussed with the patient and the patient elected to proceed with phacoemulsification with lens implantation. Details of the procedure: Following informed consent, the patient was taken to the operating room and placed in the supine position. A 50/50 mixture of .5% Marcaine and 4% Xylocaine with Wydase was given in a peribulbar block. The eye was prepped and draped in the usual sterile fashion using aseptic technique for cataract surgery and a lid speculum was placed between the lids. Several drops of topical .5% Marcaine were place on the eye. A crescent blade was use to make a 2 mm tunnel at the limbus into clear cornea and a 2.75 mm keratome blade was used to enter the eye at the 9 o'clock postion. A side port incision was made in the    2 o'clock position. A small amount of 1% non-preserved lidocaine was placed in the eye. A small amount of 1:4000 epi was injected into the A/C to help with dilation. The eye was filled with viscoelastic and a continuous tear capsulorhexis was performed. The lens was hydrodissected and hydrodilineated with balanced salt solution. Phacoemulsification was performed in a divide and conquer technique. Irrigation/aspiration was used to remove all cortical material from the capsular bag.   The eye was filled with viscoelastic and a foldable posterior chamber intraocular lens was injected into the capsular bag and rotated into position model [id number] 19 diopter power. Irrigation/aspiration was used to remove all excess viscoelastic. The eye was pressurized and the wounds were checked for leaks and none were found. The patient had antibiotic, steroid, timoptic and antibiotic/steroid ointment placed in the eye. The lid speculum was removed. The eye was covered with a shield. The patient went to the PACU in excellent condition, having tolerated the procedure extremely well and will follow up with me tomorrow for postop day 1 care. Post-op instructions were discussed with patient and family.      Satinder Luevano MD

## 2019-05-07 NOTE — H&P
Cristo Phipps is a 70y.o. year old who presents for elective outpatient ophthalmic surgery with Vicky Hansen. The patient complains of decreased vision, glare and halos around lights, and trouble with vision for activities of daily living. Past Medical History:   Diagnosis Date    Acute combined systolic and diastolic congestive heart failure ([address] 11/25/2016    Anemia     BPH (benign prostatic hyperplasia)     CAD (coronary artery disease)     Cancer (HCC)     left ear 8/2013    CKD (chronic kidney disease) stage 3, GFR 30-59 ml/min ([facility])     Constipation     COPD (chronic obstructive pulmonary disease) ([facility])     Dementia     Depression     GERD (gastroesophageal reflux disease)     [facility]     HTN (hypertension)     Hx of blood clots     \" rt.shoulder/neck\"    Hyperparathyroidism ([address]     Hypothyroidism     IDDM (insulin dependent diabetes mellitus) ([address]     Insomnia     MDRO (multiple drug resistant organisms) resistance     hx. c-dif.  Neurofibromatosis ([facility]     Osteoarthritis     Paraplegia ([address]     Peptic ulcer     PVD (peripheral vascular disease) ([address]     Schizo affective schizophrenia ([address]     Secondary hyperparathyroidism ([address]     Sinus disorder     Syncope     Type II or unspecified type diabetes mellitus without mention of complication, not stated as uncontrolled     Venous thrombosis        Past Surgical History:   Procedure Laterality Date    CHOLECYSTECTOMY      COLONOSCOPY  08/22/2011    2 POLYPS REMOBED TUBULAR ADENOMA    DIALYSIS FISTULA CREATION Left 06/13/2016    LEFT WRIST    SKIN CANCER EXCISION Left     ear.     SKIN GRAFT      right axilla    TOTAL NEPHRECTOMY Right     as a donor    TUNNELED VENOUS CATHETER PLACEMENT Right 09/03/2016    TURP           Current Facility-Administered Medications:     tobramycin (TOBREX) 0.3 % ophthalmic solution 1 drop, 1 drop, Right Eye, Q5 Min PRN, Vicky FULLER

## 2019-05-07 NOTE — ANESTHESIA POSTPROCEDURE EVALUATION
Department of Anesthesiology  Postprocedure Note    Patient: Vannessa Tian  MRN: [id number]  YOB: 1950  Date of evaluation: 5/7/2019  Time:  10:25 AM     Procedure Summary     Date:  05/07/19 Room / Location:  [address] [place] [address] Yu Nunez 06 / [address]    Anesthesia Start:  5809 Anesthesia Stop:  3311    Procedure:  EYE CATARACT EMULSIFICATION IOL IMPLANT (Right Eye) Diagnosis:  (RIGHT EYE CATARACT)    Surgeon:  Tiffany Rodriguez MD Responsible Provider:  Jhonny Wasserman MD    Anesthesia Type:  MAC ASA Status:  3          Anesthesia Type: MAC    Landon Phase I: Landon Score: 9    Landon Phase II:      Last vitals: Reviewed and per EMR flowsheets.        Anesthesia Post Evaluation    Comments: POST- ANESTHESIA EVALUATION       Pt Name: Vannessa Tian  MRN: [id number]  [place]: 1950  Date of evaluation: 5/7/2019  Time:  10:25 AM      /81   Pulse 65   Temp 97 °F (36.1 °C) (Temporal)   Resp 16   Ht 6' (1.829 m)   Wt 280 lb (127 kg)   SpO2 98%   BMI 37.97 kg/m²      Consciousness Level  Awake  Cardiopulmonary Status  Stable  Pain Adequately Treated YES  Nausea / Vomiting  NO  Adequate Hydration  YES  Anesthesia Related Complications NONE      Electronically signed by Jhonny Wasserman MD on 5/7/2019 at 10:25 AM

## 2019-05-07 NOTE — ANESTHESIA PRE PROCEDURE
Department of Anesthesiology  Preprocedure Note       Name:  Jeffrey Young   Age:  70 y.o.  :  1950                                          MRN:  [id number]         Date:  2019      Surgeon: Ariadne Singer):  Britt Hoover MD    Procedure: EYE CATARACT EMULSIFICATION IOL IMPLANT (Right Eye)    Medications prior to admission:   Prior to Admission medications    Medication Sig Start Date End Date Taking? Authorizing Provider   lidocaine-prilocaine (EMLA) 2.5-2.5 % cream Apply topically three times a week Apply topically to arm/port three times weekly on Monday, Wednesday, and Friday for dialysis   Yes Historical Provider, MD   acetaminophen (TYLENOL) 325 MG tablet Take 2 tablets by mouth every 6 hours as needed for Pain 18  Yes Jessica Trejo MD   diphenhydrAMINE (BENADRYL) 25 MG tablet Take 25 mg by mouth every 8 hours as needed for Itching    Yes Historical Provider, MD   aspirin 81 MG chewable tablet Take 81 mg by mouth daily   Yes Historical Provider, MD   escitalopram (LEXAPRO) 10 MG tablet Take 10 mg by mouth every morning    Yes Historical Provider, MD   fentaNYL (DURAGESIC) 50 MCG/HR Place 1 patch onto the skin.     Historical Provider, MD   insulin glargine (LANTUS) 100 UNIT/ML injection vial Inject 10 Units into the skin 2 times daily 3/4/19   Genny Gong MD   glucagon 1 MG injection Infuse 1 kit intravenously as needed (for BG < 60)    Historical Provider, MD   Menthol-Methyl Salicylate (ICY HOT BALM EXTRA STRENGTH) 7.6-29 % OINT Apply topically daily as needed (pain in left knee)    Historical Provider, MD   lactulose (CHRONULAC) 10 GM/15ML solution Take 10 g by mouth daily    Historical Provider, MD   loratadine (CLARITIN) 10 MG tablet Take 10 mg by mouth daily     Historical Provider, MD   ondansetron (ZOFRAN) 4 MG tablet Take 4 mg by mouth every 6 hours as needed for Nausea or Vomiting    Historical Provider, MD   calcium carbonate (TUMS) 500 MG chewable tablet Take 1 tablet by mouth every 8 hours as needed for Heartburn    Historical Provider, MD   midodrine (PROAMATINE) 5 MG tablet Take 5 mg by mouth three times a week On Monday, Wednesday, and Friday for dialysis    Historical Provider, MD   ramelteon (ROZEREM) 8 MG tablet Take 8 mg by mouth nightly    Historical Provider, MD   ipratropium-albuterol (DUONEB) 0.5-2.5 (3) MG/3ML SOLN nebulizer solution Inhale 3 mLs into the lungs every 4 hours (while awake) 11/30/16   KELECHI Colin - CNP   sevelamer (RENVELA) 800 MG tablet Take 3 tablets by mouth 3 times daily (with meals)     Historical Provider, MD   senna (SENOKOT) 8.6 MG tablet Take 1 tablet by mouth 2 times daily    Historical Provider, MD   isosorbide mononitrate (IMDUR) 60 MG CR tablet Take 60 mg by mouth daily    Historical Provider, MD   nitroGLYCERIN (NITROSTAT) 0.4 MG SL tablet Place 0.4 mg under the tongue every 5 minutes as needed for Chest pain. Historical Provider, MD   fluticasone (FLONASE) 50 MCG/ACT nasal spray 1 spray by Nasal route daily     Historical Provider, MD   finasteride (PROSCAR) 5 MG tablet Take 5 mg by mouth daily. Historical Provider, MD   omeprazole (PRILOSEC) 20 MG capsule Take 20 mg by mouth every morning     Historical Provider, MD   metoprolol (LOPRESSOR) 50 MG tablet Take 50 mg by mouth daily Hold for SBP less than 110 or HR less than 60    Historical Provider, MD   tamsulosin (FLOMAX) 0.4 MG capsule Take 0.4 mg by mouth daily.       Historical Provider, MD       Current medications:    Current Facility-Administered Medications   Medication Dose Route Frequency Provider Last Rate Last Dose    phenylephrine (MYDFRIN) 2.5 % ophthalmic solution 1 drop  1 drop Right Eye Q5 Min PRN Logan Knapp MD        cyclopentolate (CYCLOGYL) 1 % ophthalmic solution 1 drop  1 drop Right Eye Q5 Min PRN Logan Knapp MD        tobramycin (TOBREX) 0.3 % ophthalmic solution 1 drop  1 drop Right Eye Q5 Min PRN Logan Knapp MD        ketorolac Texie Ohs) 0.5 % ophthalmic solution 1 drop  1 drop Right Eye Q5 Min PRN Alexandrea Batista MD        bupivacaine (PF) (MARCAINE) 0.5 % injection 5 mg  1 mL Ophthalmic See Admin Instructions Alexandrea Batista MD        LORazepam (ATIVAN) tablet 1 mg  1 mg Oral Once Mimi Denver Raffoul, MD        0.9 % sodium chloride infusion   Intravenous Continuous Emmanuel Cai MD           Allergies: Allergies   Allergen Reactions    Cymbalta [Duloxetine Hcl]      Intolerance.     Tromethamine Other (See Comments)    Toradol [Ketorolac Tromethamine] Other (See Comments)     Headaches        Problem List:    Patient Active Problem List   Diagnosis Code    CKD (chronic kidney disease) stage 3, GFR 30-59 ml/min ([facility]) N18.3    HTN (hypertension) I10    Secondary hyperparathyroidism ([facility]) N25.81    [facility]fibromatosis [facility] Q85.00    Type 2 diabetes mellitus with renal complication ([facility]) L82.11    Pulmonary emphysema ([facility]) J43.9    Squamous cell carcinoma skin left ear and external auricular canal C44.229    Cellulitis of scrotum L03.90    Anemia, chronic renal failure N18.9, D63.1    Thrombocytopenia ([facility]) D69.6    Chest pain R07.9    Left flank pain R10.9    ESRD (end stage renal disease) ([facility]) N18.6    Venous stasis dermatitis of both lower extremities I87.2    Dizziness R42    Hypocalcemia E83.51    Acute combined systolic and diastolic congestive heart failure ([facility]) I50.41    Respiratory distress R06.03    Acute on chronic respiratory failure with hypoxia ([facility]) J96.21    Fluid overload E87.70    Rib pain on left side R07.81    Hypoglycemia E16.2    CRF (chronic renal failure), stage 5 ([facility]) N18.5    Chronic renal failure, stage 3 (moderate) ([facility]) N18.3       Past Medical History:        Diagnosis Date    Acute combined systolic and diastolic congestive heart failure ([facility] 11/25/2016    Anemia     BPH (benign prostatic hyperplasia)     CAD (coronary artery disease)     Cancer ([facility])     left ear 8/2013    CKD (chronic kidney disease) stage 3, GFR 30-59 ml/min (HCC)     Constipation     COPD (chronic obstructive pulmonary disease) (HCC)     Dementia     Depression     GERD (gastroesophageal reflux disease)     [facility]     HTN (hypertension)     Hx of blood clots     \" rt.shoulder/neck\"    Hyperparathyroidism ([facility]     Hypothyroidism     IDDM (insulin dependent diabetes mellitus) ([facility]     Insomnia     MDRO (multiple drug resistant organisms) resistance     hx. c-dif.  Neurofibromatosis ([facility]     Osteoarthritis     Paraplegia ([facility]     Peptic ulcer     PVD (peripheral vascular disease) ([facility]     Schizo affective schizophrenia ([facility]     Secondary hyperparathyroidism ([facility]     Sinus disorder     Syncope     Type II or unspecified type diabetes mellitus without mention of complication, not stated as uncontrolled     Venous thrombosis        Past Surgical History:        Procedure Laterality Date    CHOLECYSTECTOMY      COLONOSCOPY  08/22/2011    2 POLYPS REMOBED TUBULAR ADENOMA    DIALYSIS FISTULA CREATION Left 06/13/2016    LEFT WRIST    SKIN CANCER EXCISION Left     ear.  SKIN GRAFT      right axilla    TOTAL NEPHRECTOMY Right     as a donor    TUNNELED VENOUS CATHETER PLACEMENT Right 09/03/2016    TURP         Social History:    Social History     Tobacco Use    Smoking status: Current Every Day Smoker     Packs/day: 1.00     Types: Cigarettes    Smokeless tobacco: Never Used   Substance Use Topics    Alcohol use:  No                                Ready to quit: Not Answered  Counseling given: Not Answered      Vital Signs (Current):   Vitals:    05/07/19 0646   BP: (!) 167/61   Pulse: 64   Resp: 16   Temp: 98.1 °F (36.7 °C)   TempSrc: Oral   SpO2: 100%   Weight: 280 lb (127 kg)   Height: 6' (1.829 m)                                              BP Readings from Last 3 Encounters:   05/07/19 (!) 167/61   03/04/19 (!) 140/75   01/19/19 121/60       NPO Status: BMI:   Wt Readings from Last 3 Encounters:   05/07/19 280 lb (127 kg)   03/04/19 280 lb 6.8 oz (127.2 kg)   01/18/19 273 lb 5.9 oz (124 kg)     Body mass index is 37.97 kg/m². CBC:   Lab Results   Component Value Date    WBC 6.4 03/02/2019    RBC 2.67 03/02/2019    RBC 4.53 04/24/2012    HGB 8.0 03/02/2019    HCT 24.9 03/02/2019    MCV 93.1 03/02/2019    RDW 17.5 03/02/2019    PLT 91 03/02/2019     04/24/2012       CMP:   Lab Results   Component Value Date     03/04/2019    K 3.8 03/04/2019     03/04/2019    CO2 25 03/04/2019    BUN 51 03/04/2019    CREATININE 5.95 03/04/2019    GFRAA 11 03/04/2019    LABGLOM 9 03/04/2019    GLUCOSE 119 03/04/2019    GLUCOSE 135 06/01/2012    PROT 6.5 01/18/2019    CALCIUM 8.4 03/04/2019    BILITOT 0.80 01/18/2019    ALKPHOS 181 01/18/2019    AST 21 01/18/2019    ALT 29 01/18/2019       POC Tests: No results for input(s): POCGLU, POCNA, POCK, POCCL, POCBUN, POCHEMO, POCHCT in the last 72 hours.     Coags:   Lab Results   Component Value Date    PROTIME 10.6 02/09/2017    INR 1.0 02/09/2017    APTT 27.3 02/09/2017       HCG (If Applicable): No results found for: PREGTESTUR, PREGSERUM, HCG, HCGQUANT     ABGs:   Lab Results   Component Value Date    PHART 7.408 01/13/2019    PO2ART 63.8 01/13/2019    SWT2QVX 46.8 01/13/2019    FDF0BEH 29.5 01/13/2019    P5TAFEBK 89.7 01/13/2019        Type & Screen (If Applicable):  No results found for: LABABO, [address]    Anesthesia Evaluation  Patient summary reviewed and Nursing notes reviewed  Airway: Mallampati: III  TM distance: >3 FB   Neck ROM: full  Mouth opening: > = 3 FB Dental:    (+) edentulous      Pulmonary: breath sounds clear to auscultation  (+) COPD:                             Cardiovascular:    (+) hypertension:, CAD:, CHF:,       ECG reviewed  Rhythm: regular  Rate: normal  Echocardiogram reviewed                  Neuro/Psych:   (+) psychiatric history:            GI/Hepatic/Renal:   (+) GERD:, PUD, renal disease: CRI, morbid obesity          Endo/Other:    (+) DiabetesType II DM, , hypothyroidism::., .                 Abdominal:   (+) obese,         Vascular:                                        Anesthesia Plan      MAC     ASA 3       Induction: intravenous. Anesthetic plan and risks discussed with patient. Plan discussed with CRNA.                   Xavier Rico MD   5/7/2019

## 2019-05-28 PROBLEM — H25.12 AGE-RELATED NUCLEAR CATARACT, LEFT: Status: RESOLVED | Noted: 2019-01-01 | Resolved: 2019-01-01

## 2019-05-28 NOTE — PROGRESS NOTES
Pt arrived by taxi and no one with pt. Dr. Henrique Smith, Dr. Nitesh Tan and Jeff Long, Manager, notified. Everyone approved surgery with extended recovery time. Pt will return to [address] [place], [place] [id number] by taxi.

## 2019-05-28 NOTE — OP NOTE
Baldo Rogers [id number] 70 y.o. OPERATIVE NOTE    Preoperative Diagnosis: Cataract the left eye    Postoperative Diagnosis: Cataract the left eye     Procedure: Phacoemulsification with intraocular lens implantation, the left eye    Surgeon: Londell Hatchet, MD    Anesthesia: MAC  With monitored sedation      Complications: none    Specimens: none    Indications for procedure: The patient is a 70y.o. year old with decreased vision, glare and halos around lights, and trouble with activities of daily living. Examination revealed a visually significant cataract in the the left eye. Risks, benefits, and alternatives to surgery were discussed with the patient and the patient elected to proceed with phacoemulsification with lens implantation. Details of the procedure: Following informed consent, the patient was taken to the operating room and placed in the supine position. The eye was prepped and draped in the usual sterile fashion using aseptic technique for cataract surgery and a lid speculum was placed between the lids. Several drops of topical .5% Marcaine were place on the eye. A crescent blade was use to make a 2 mm tunnel at the limbus into clear cornea and a 2.75 mm keratome blade was used to enter the eye at the 3 o'clock postion. A side port incision was made in the    5 o'clock position. A small amount of 1% non-preserved lidocaine was placed in the eye. The eye was filled with viscoelastic and a continuous tear capsulorhexis was performed. The lens was hydrodissected and hydrodilineated with balanced salt solution. Phacoemulsification was performed in a divide and conquer technique. Irrigation/aspiration was used to remove all cortical material from the capsular bag. The eye was filled with viscoelastic and a foldable posterior chamber intraocular lens was injected into the capsular bag and rotated into position model [id number] diopter power.   Irrigation/aspiration was used to remove all excess viscoelastic. The eye was pressurized and the wounds were checked for leaks and none were found. The patient had antibiotic, steroid, timoptic and antibiotic/steroid ointment placed in the eye. The lid speculum was removed. The eye was covered with a shield. The patient went to the PACU in excellent condition, having tolerated the procedure extremely well and will follow up with me tomorrow for postop day 1 care. Post-op instructions were discussed with patient and family.      Doug Jamison MD

## 2019-05-28 NOTE — H&P
donor    TUNNELED VENOUS CATHETER PLACEMENT Right 09/03/2016    TURALAN           Current Facility-Administered Medications:     phenylephrine (MYDFRIN) 2.5 % ophthalmic solution 1 drop, 1 drop, Left Eye, Q5 Min PRN, Matt Donaldson MD    cyclopentolate (CYCLOGYL) 1 % ophthalmic solution 1 drop, 1 drop, Left Eye, Q5 Min PRN, Matt Donaldson MD    tobramycin (TOBREX) 0.3 % ophthalmic solution 1 drop, 1 drop, Left Eye, Q5 Min PRN, Matt Donaldson MD    ketorolac (ACULAR) 0.5 % ophthalmic solution 1 drop, 1 drop, Left Eye, Q5 Min PRN, Matt Donaldson MD    bupivacaine (PF) (MARCAINE) 0.5 % injection 5 mg, 1 mL, Ophthalmic, See Admin Instructions, Matt Donaldson MD    LORazepam (ATIVAN) tablet 1 mg, 1 mg, Oral, Once, Kiana Hansen MD    0.9 % sodium chloride infusion, , Intravenous, Continuous, [place] MD Bobby      Allergies   Allergen Reactions    Cymbalta [Duloxetine Hcl]      Intolerance.  Tromethamine Other (See Comments)    Toradol [Ketorolac Tromethamine] Other (See Comments)     Headaches          PHYSICAL EXAMINATION    Vitals:    05/28/19 0756   BP: (!) 182/69   Pulse: 58   Resp: 16   Temp: 98.6 °F (37 °C)   SpO2: 93%       Gen: NAD  HEENT: Visually significant cataract   Pulm: CTA Bilaterally  Heart: RRR, no murmurs  Abd: soft, non-tender  Ext: no edema  Neuro: no focal deficits    Assessment:   1. Visually significant cataract   2. See preoperative ophthalmology notes    Plan:   1. Risks, benefits, alternatives to surgery discussed with the patient and family. 2. All questions were answered to their satisfaction. 3. Ok to proceed with surgery as planned.     Kiana Hansen

## 2019-05-28 NOTE — ANESTHESIA POSTPROCEDURE EVALUATION
Department of Anesthesiology  Postprocedure Note    Patient: Ezio Mayer  MRN: [id number]  YOB: 1950  Date of evaluation: 5/28/2019  Time:  10:52 AM     Procedure Summary     Date:  05/28/19 Room / Location:  [address] [place] [address] [address] / [id number]    Anesthesia Start:  0902 Anesthesia Stop:  0930    Procedure:  EYE CATARACT EMULSIFICATION IOL IMPLANT (Left Eye) Diagnosis:  (LEFT EYE CATARACT  (PAT PER ANES ON ADMIT))    Surgeon:  Kay Betancourt MD Responsible Provider:  Tish Lozano MD    Anesthesia Type:  MAC ASA Status:  4          Anesthesia Type: MAC    Landon Phase I: Landon Score: 10    Landon Phase II: Landon Score: 10    Last vitals: Reviewed and per EMR flowsheets.        Anesthesia Post Evaluation    Comments: POST- ANESTHESIA EVALUATION       Pt Name: Ezio Mayer  MRN: [id number]  [place]: 1950  Date of evaluation: 5/28/2019  Time:  10:52 AM      /78   Pulse 62   Temp 98.7 °F (37.1 °C) (Infrared)   Resp 18   Ht 6' (1.829 m)   Wt 26 lb (11.8 kg)   SpO2 100%   BMI 3.53 kg/m²      Consciousness Level  Awake  Cardiopulmonary Status  Stable  Pain Adequately Treated YES  Nausea / Vomiting  NO  Adequate Hydration  YES  Anesthesia Related Complications NONE      Electronically signed by Tish Lozano MD on 5/28/2019 at 10:52 AM

## 2019-05-28 NOTE — ANESTHESIA PRE PROCEDURE
Historical Provider, MD   ramelteon (ROZEREM) 8 MG tablet Take 8 mg by mouth nightly    Historical Provider, MD   diphenhydrAMINE (BENADRYL) 25 MG tablet Take 25 mg by mouth every 8 hours as needed for Itching     Historical Provider, MD   ipratropium-albuterol (Jhonnie Starcher) 0.5-2.5 (3) MG/3ML SOLN nebulizer solution Inhale 3 mLs into the lungs every 4 hours (while awake) 11/30/16   KELECHI Wade - CNP   aspirin 81 MG chewable tablet Take 81 mg by mouth daily    Historical Provider, MD   sevelamer (RENVELA) 800 MG tablet Take 3 tablets by mouth 3 times daily (with meals)     Historical Provider, MD   senna (SENOKOT) 8.6 MG tablet Take 1 tablet by mouth 2 times daily    Historical Provider, MD   escitalopram (LEXAPRO) 10 MG tablet Take 10 mg by mouth every morning     Historical Provider, MD   isosorbide mononitrate (IMDUR) 60 MG CR tablet Take 60 mg by mouth daily    Historical Provider, MD   nitroGLYCERIN (NITROSTAT) 0.4 MG SL tablet Place 0.4 mg under the tongue every 5 minutes as needed for Chest pain. Historical Provider, MD   fluticasone (FLONASE) 50 MCG/ACT nasal spray 1 spray by Nasal route daily     Historical Provider, MD   finasteride (PROSCAR) 5 MG tablet Take 5 mg by mouth daily. Historical Provider, MD   omeprazole (PRILOSEC) 20 MG capsule Take 20 mg by mouth every morning     Historical Provider, MD   metoprolol (LOPRESSOR) 50 MG tablet Take 50 mg by mouth daily Hold for SBP less than 110 or HR less than 60    Historical Provider, MD   tamsulosin (FLOMAX) 0.4 MG capsule Take 0.4 mg by mouth daily. Historical Provider, MD       Current medications:    No current facility-administered medications for this encounter. Allergies: Allergies   Allergen Reactions    Cymbalta [Duloxetine Hcl]      Intolerance.    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 C44.229    Cellulitis of scrotum L03.90    Anemia, chronic renal failure N18.9, D63.1    Thrombocytopenia (HCC) D69.6    Chest pain R07.9    Left flank pain R10.9    ESRD (end stage renal disease) ([facility] N18.6    Venous stasis dermatitis of both lower extremities I87.2    Dizziness R42    Hypocalcemia E83.51    Acute combined systolic and diastolic congestive heart failure (HCC) I50.41    Respiratory distress R06.03    Acute on chronic respiratory failure with hypoxia ([facility]) J96.21    Fluid overload E87.70    Rib pain on left side R07.81    Hypoglycemia E16.2    CRF (chronic renal failure), stage 5 (HCC) N18.5    Chronic renal failure, stage 3 (moderate) (HCC) N18.3       Past Medical History:        Diagnosis Date    Acute combined systolic and diastolic congestive heart failure (HCC) 11/25/2016    Anemia     BPH (benign prostatic hyperplasia)     CAD (coronary artery disease)     Cancer (HCC)     left ear 8/2013    CKD (chronic kidney disease) stage 3, GFR 30-59 ml/min (HCC)     Constipation     COPD (chronic obstructive pulmonary disease) (HCC)     Dementia     Depression     GERD (gastroesophageal reflux disease)     Hemodialysis patient ([facility]     HTN (hypertension)     Hx of blood clots     \" rt.shoulder/neck\"    Hyperparathyroidism ([address]     Hypothyroidism     IDDM (insulin dependent diabetes mellitus) ([address]     Insomnia     MDRO (multiple drug resistant organisms) resistance     hx. c-dif.     Neurofibromatosis ([address]     Osteoarthritis     Paraplegia ([address]     Peptic ulcer     PVD (peripheral vascular disease) ([address]     Schizo affective schizophrenia ([facility]     Secondary hyperparathyroidism ([facility]     Sinus disorder     Syncope     Type II or unspecified type diabetes mellitus without mention of complication, not stated as uncontrolled     Venous thrombosis        Past Surgical History:        Procedure Laterality Date    CATARACT REMOVAL WITH IMPLANT Right 05/07/2019    Raffoul/Meekshilpa    CHOLECYSTECTOMY      COLONOSCOPY  08/22/2011    2 POLYPS REMOBED TUBULAR ADENOMA    DIALYSIS FISTULA CREATION Left 06/13/2016    LEFT WRIST    INTRACAPSULAR CATARACT EXTRACTION Right 5/7/2019    EYE CATARACT EMULSIFICATION IOL IMPLANT performed by Jean Carlos Lemons MD at [address] [facility] Left     ear.  SKIN GRAFT      right axilla    TOTAL NEPHRECTOMY Right     as a donor    TUNNELED VENOUS CATHETER PLACEMENT Right 09/03/2016    TURP         Social History:    Social History     Tobacco Use    Smoking status: Current Every Day Smoker     Packs/day: 1.00     Types: Cigarettes    Smokeless tobacco: Never Used   Substance Use Topics    Alcohol use: No                                Ready to quit: Not Answered  Counseling given: Not Answered      Vital Signs (Current): There were no vitals filed for this visit.                                            BP Readings from Last 3 Encounters:   05/07/19 (!) 142/67   05/07/19 (!) 169/77   03/04/19 (!) 140/75       NPO Status:                                                                                 BMI:   Wt Readings from Last 3 Encounters:   05/07/19 280 lb (127 kg)   03/04/19 280 lb 6.8 oz (127.2 kg)   01/18/19 273 lb 5.9 oz (124 kg)     There is no height or weight on file to calculate BMI.    CBC:   Lab Results   Component Value Date    WBC 6.4 03/02/2019    RBC 2.67 03/02/2019    RBC 4.53 04/24/2012    HGB 8.0 03/02/2019    HCT 24.9 03/02/2019    MCV 93.1 03/02/2019    RDW 17.5 03/02/2019    PLT 91 03/02/2019     04/24/2012       CMP:   Lab Results Vascular:                                    Anesthesia Plan      MAC     ASA 4       Induction: intravenous. MIPS: Postoperative opioids intended and Prophylactic antiemetics administered. Anesthetic plan and risks discussed with patient. Plan discussed with CRNA.           Pt had clear fluids about 4 hrs ago, denies any solids after midnight    Has a DNR order - understands this will be on hold during the perioperative period        Maxim Loving MD   5/28/2019

## 2019-06-24 NOTE — H&P
HISTORY and [id number]       NAME:  Ezio Mayer  MRN: [id number]   YOB: 1950   Date: 6/24/2019   Age: 70 y.o. Gender: male       COMPLAINT AND PRESENT HISTORY:     Ezio Mayer is 70 y.o.,  male, here for IR FISTULAGRAM.   Patient has end stage renal disease and is on dialysis. pt has had present fisula for 3 years. Pt has reported worsening pain on dialysis, particular from shoulder to the elbow on the left side. Pt has hx of difficult cannulation and poor blood flow. Pt rates the pain in his arm at 5/10. The symptoms started 3 months ago. No hand discoloration or numbness. No fever/chills. PAST MEDICAL HISTORY     Past Medical History:   Diagnosis Date    Acute combined systolic and diastolic congestive heart failure ([address] 11/25/2016    Anemia     BPH (benign prostatic hyperplasia)     CAD (coronary artery disease)     Cancer (HCC)     left ear 8/2013    CKD (chronic kidney disease) stage 3, GFR 30-59 ml/min ([facility])     Constipation     COPD (chronic obstructive pulmonary disease) ([facility])     Dementia     Depression     GERD (gastroesophageal reflux disease)     Hemodialysis patient ([address]     HTN (hypertension)     Hx of blood clots     \" rt.shoulder/neck\"    Hyperparathyroidism ([address]     Hypothyroidism     IDDM (insulin dependent diabetes mellitus) ([address]     Insomnia     MDRO (multiple drug resistant organisms) resistance     hx. c-dif.     Neurofibromatosis ([address]     Osteoarthritis     Paraplegia ([address]     Peptic ulcer     PVD (peripheral vascular disease) ([address]     Schizo affective schizophrenia ([address]     Secondary hyperparathyroidism ([address]     Sinus disorder     Syncope     Type II or unspecified type diabetes mellitus without mention of complication, not stated as uncontrolled     Venous thrombosis        SURGICAL HISTORY       Past Surgical History:   Procedure Laterality Date    CATARACT REMOVAL WITH IMPLANT Right 05/07/2019    [facility]    CATARACT REMOVAL WITH IMPLANT Left 05/28/2019    [facility]    CHOLECYSTECTOMY      COLONOSCOPY  08/22/2011    2 POLYPS REMOBED TUBULAR ADENOMA    DIALYSIS FISTULA CREATION Left 06/13/2016    LEFT WRIST    INTRACAPSULAR CATARACT EXTRACTION Right 5/7/2019    EYE CATARACT EMULSIFICATION IOL IMPLANT performed by Londell Hatchet, MD at [facility] Left 5/28/2019    EYE CATARACT EMULSIFICATION IOL IMPLANT performed by Londell Hatchet, MD at [address] [place] [address]. Left     ear.     SKIN GRAFT      right axilla    TOTAL NEPHRECTOMY Right     as a donor    TUNNELED VENOUS CATHETER PLACEMENT Right 09/03/2016    TURP         FAMILY HISTORY       Family History   Family history unknown: Yes       SOCIAL HISTORY       Social History     Socioeconomic History    Marital status: Single     Spouse name: Not on file    Number of children: Not on file    Years of education: Not on file    Highest education level: Not on file   Occupational History    Not on file   Social Needs    Financial resource strain: Not on file    Food insecurity:     Worry: Not on file     Inability: Not on file    Transportation needs:     Medical: Not on file     Non-medical: Not on file   Tobacco Use    Smoking status: Current Every Day Smoker     Packs/day: 1.00     Types: Cigarettes    Smokeless tobacco: Never Used   Substance and Sexual Activity    Alcohol use: No    Drug use: No    Sexual activity: Not Currently   Lifestyle    Physical activity:     Days per week: Not on file     Minutes per session: Not on file    Stress: Not on file   Relationships    Social connections:     Talks on phone: Not on file     Gets together: Not on file     Attends Synagogue service: Not on file     Active member of club or organization: Not on file     Attends meetings of clubs or organizations: Not on file     Relationship status: Not on file  Intimate partner violence:     Fear of current or ex partner: Not on file     Emotionally abused: Not on file     Physically abused: Not on file     Forced sexual activity: Not on file   Other Topics Concern    Not on file   Social History Narrative    Not on file           REVIEW OF SYSTEMS      Allergies   Allergen Reactions    Cymbalta [Duloxetine Hcl]      Intolerance.  Tromethamine Other (See Comments)    Toradol [Ketorolac Tromethamine] Other (See Comments)     Headaches        Current Outpatient Medications on File Prior to Encounter   Medication Sig Dispense Refill    fentaNYL (DURAGESIC) 50 MCG/HR Place 1 patch onto the skin.  Dextromethorphan-guaiFENesin  MG/5ML SYRP Take 10 mLs by mouth every 6 hours as needed for Cough      ketorolac (ACULAR) 0.5 % ophthalmic solution Place 1 drop into the right eye 4 times daily      oxyCODONE (ROXICODONE) 5 MG immediate release tablet Take 10 mg by mouth every 4 hours as needed for Pain.       tobramycin (TOBREX) 0.3 % ophthalmic solution Place 1 drop into the right eye every 4 hours      insulin glargine (LANTUS) 100 UNIT/ML injection vial Inject 10 Units into the skin 2 times daily 1 vial 3    lidocaine-prilocaine (EMLA) 2.5-2.5 % cream Apply topically three times a week Apply topically to arm/port three times weekly on Monday, Wednesday, and Friday for dialysis      glucagon 1 MG injection Infuse 1 kit intravenously as needed (for BG < 60)      lactulose (CHRONULAC) 10 GM/15ML solution Take 10 g by mouth daily      ondansetron (ZOFRAN) 4 MG tablet Take 4 mg by mouth every 6 hours as needed for Nausea or Vomiting      acetaminophen (TYLENOL) 325 MG tablet Take 2 tablets by mouth every 6 hours as needed for Pain 60 tablet 0    midodrine (PROAMATINE) 5 MG tablet Take 5 mg by mouth three times a week On Monday, Wednesday, and Friday for dialysis      ramelteon (ROZEREM) 8 MG tablet Take 8 mg by mouth nightly      diphenhydrAMINE (BENADRYL) 25 MG tablet Take 25 mg by mouth every 8 hours as needed for Itching       ipratropium-albuterol (DUONEB) 0.5-2.5 (3) MG/3ML SOLN nebulizer solution Inhale 3 mLs into the lungs every 4 hours (while awake) 360 mL 3    aspirin 81 MG chewable tablet Take 81 mg by mouth daily      sevelamer (RENVELA) 800 MG tablet Take 3 tablets by mouth 3 times daily (with meals)       senna (SENOKOT) 8.6 MG tablet Take 1 tablet by mouth 2 times daily      escitalopram (LEXAPRO) 10 MG tablet Take 10 mg by mouth every morning       isosorbide mononitrate (IMDUR) 60 MG CR tablet Take 60 mg by mouth daily      nitroGLYCERIN (NITROSTAT) 0.4 MG SL tablet Place 0.4 mg under the tongue every 5 minutes as needed for Chest pain.  fluticasone (FLONASE) 50 MCG/ACT nasal spray 1 spray by Nasal route daily       finasteride (PROSCAR) 5 MG tablet Take 5 mg by mouth daily.  omeprazole (PRILOSEC) 20 MG capsule Take 20 mg by mouth every morning       metoprolol (LOPRESSOR) 50 MG tablet Take 50 mg by mouth daily Hold for SBP less than 110 or HR less than 60      tamsulosin (FLOMAX) 0.4 MG capsule Take 0.4 mg by mouth daily. No current facility-administered medications on file prior to encounter. Negative except for what is mentioned in the HPI. GENERAL PHYSICAL EXAM     Vitals: BP (!) 150/68   Pulse 61   Temp 97.1 °F (36.2 °C) (Oral)   Resp 18   Ht 6' (1.829 m)   Wt 270 lb (122.5 kg)   SpO2 94%   BMI 36.62 kg/m²  Body mass index is 36.62 kg/m². GENERAL APPEARANCE:   Lonnie Maharaj is 70 y.o.,  male, moderately obese, nourished, conscious, alert. Does not appear to be distress or pain at this time. SKIN:  Warm, dry, no cyanosis or jaundice. Pt has multiple bumps to his face. HEAD:  Normocephalic, atraumatic, no swelling or tenderness. EYES:  Pupils equal, reactive to light.            EARS:  No discharge, no marked hearing loss.               NOSE:  No rhinorrhea, epistaxis or septal deformity. THROAT:  Not congested. No ulceration bleeding or discharge. NECK:  No stiffness, trachea central.  No palpable masses or L.N.                 CHEST:  Symmetrical and equal on expansion. HEART:  RRR S1 > S2. No audible murmurs or gallops. LUNGS:  Equal on expansion, normal breath sounds. No adventitious sounds. ABDOMEN:  Obese. Soft on palpation. No localized tenderness. No guarding or rigidity. No palpable hepatosplenomegaly. LYMPHATICS:  No palpable cervical lymphadenopathy. LOCOMOTOR, BACK AND SPINE:  No tenderness or deformities. EXTREMITIES:  Symmetrical, no pretibial edema. Shanitas sign negative. No discoloration or ulcerations. LAF present with good thrill and bruit. Vascular changes noted to gracie WILLIAMSON.     NEUROLOGIC:  The patient is conscious, alert, oriented,Cranial nerve II-XII intact, taste was not examined. No apparent focal sensory or motor deficits.              PROVISIONAL DIAGNOSES / SURGERY:      DIFFICULT CANNULATION AND POOR BLOOD FLOW    IR FISTULAGRAM    Patient Active Problem List    Diagnosis Date Noted    Chest pain 09/01/2016     Priority: Medium    Left flank pain 09/01/2016     Priority: Medium    CRF (chronic renal failure), stage 5 ([address] 03/02/2019    Chronic renal failure, stage 3 (moderate) ([facility]) 03/02/2019    Hypoglycemia 01/18/2019    Fluid overload 01/11/2019    Rib pain on left side 01/11/2019    Acute on chronic respiratory failure with hypoxia ([facility]) 12/01/2016    Acute combined systolic and diastolic congestive heart failure ([address] 11/25/2016    Respiratory distress 11/25/2016    Venous stasis dermatitis of both lower extremities 09/17/2016    Dizziness 09/17/2016    Hypocalcemia     ESRD (end stage renal disease) ([address] 09/01/2016    Anemia, chronic renal failure 02/28/2016    Thrombocytopenia ([address] 02/28/2016    Cellulitis of scrotum 02/23/2016    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

## 2019-06-24 NOTE — OP NOTE
Brief Postoperative Note    Noel Blind  YOB: 1950  [id number]    Pre-operative Diagnosis: esrd    Post-operative Diagnosis: Same    Procedure: fistulagram    Anesthesia: Local     Surgeons/Assistants: Tatyana Queen MD     Estimated Blood Loss: minimal    Complications: none immediate    Specimens: were not obtained      Electronically signed by Tatyana Queen MD on 6/24/2019 at 11:31 AM

## 2020-01-01 ENCOUNTER — HOSPITAL ENCOUNTER (OUTPATIENT)
Age: 70
Setting detail: SPECIMEN
Discharge: HOME | End: 2020-02-11
Payer: COMMERCIAL

## 2020-01-01 ENCOUNTER — HOSPITAL ENCOUNTER (INPATIENT)
Age: 70
LOS: 2 days | Discharge: NURSING, REHAB OR LONG-TERM CARE | DRG: 640 | End: 2020-01-04
Attending: EMERGENCY MEDICINE | Admitting: INTERNAL MEDICINE
Payer: COMMERCIAL

## 2020-01-01 ENCOUNTER — HOSPITAL ENCOUNTER (EMERGENCY)
Age: 70
Discharge: HOME | End: 2020-02-27
Attending: EMERGENCY MEDICINE
Payer: COMMERCIAL

## 2020-01-01 ENCOUNTER — APPOINTMENT (OUTPATIENT)
Dept: DIALYSIS | Age: 70
DRG: 177 | End: 2020-01-01
Attending: INTERNAL MEDICINE
Payer: COMMERCIAL

## 2020-01-01 ENCOUNTER — APPOINTMENT (OUTPATIENT)
Dept: NUCLEAR MEDICINE | Age: 70
DRG: 280 | End: 2020-01-01
Payer: COMMERCIAL

## 2020-01-01 ENCOUNTER — APPOINTMENT (OUTPATIENT)
Dept: CT IMAGING | Age: 70
End: 2020-01-01
Payer: COMMERCIAL

## 2020-01-01 ENCOUNTER — DIRECT ADMIT ORDERS (OUTPATIENT)
Dept: INTERNAL MEDICINE CLINIC | Age: 70
End: 2020-01-01

## 2020-01-01 ENCOUNTER — APPOINTMENT (OUTPATIENT)
Dept: CT IMAGING | Age: 70
DRG: 152 | End: 2020-01-01
Payer: COMMERCIAL

## 2020-01-01 ENCOUNTER — APPOINTMENT (OUTPATIENT)
Dept: GENERAL RADIOLOGY | Age: 70
DRG: 177 | End: 2020-01-01
Attending: INTERNAL MEDICINE
Payer: COMMERCIAL

## 2020-01-01 ENCOUNTER — HOSPITAL ENCOUNTER (EMERGENCY)
Age: 70
Discharge: ANOTHER HOSPITAL | End: 2020-05-18
Attending: EMERGENCY MEDICINE
Payer: COMMERCIAL

## 2020-01-01 ENCOUNTER — HOSPITAL ENCOUNTER (OUTPATIENT)
Age: 70
Setting detail: SPECIMEN
Discharge: HOME | End: 2020-02-06
Payer: COMMERCIAL

## 2020-01-01 ENCOUNTER — APPOINTMENT (OUTPATIENT)
Dept: GENERAL RADIOLOGY | Age: 70
DRG: 280 | End: 2020-01-01
Payer: COMMERCIAL

## 2020-01-01 ENCOUNTER — HOSPITAL ENCOUNTER (INPATIENT)
Age: 70
LOS: 4 days | DRG: 177 | End: 2020-05-28
Attending: INTERNAL MEDICINE | Admitting: INTERNAL MEDICINE
Payer: COMMERCIAL

## 2020-01-01 ENCOUNTER — APPOINTMENT (OUTPATIENT)
Dept: CT IMAGING | Age: 70
DRG: 280 | End: 2020-01-01
Payer: COMMERCIAL

## 2020-01-01 ENCOUNTER — APPOINTMENT (OUTPATIENT)
Dept: ULTRASOUND IMAGING | Age: 70
DRG: 280 | End: 2020-01-01
Payer: COMMERCIAL

## 2020-01-01 ENCOUNTER — HOSPITAL ENCOUNTER (EMERGENCY)
Age: 70
Discharge: ANOTHER HOSPITAL | End: 2020-05-24
Attending: EMERGENCY MEDICINE
Payer: COMMERCIAL

## 2020-01-01 ENCOUNTER — HOSPITAL ENCOUNTER (OUTPATIENT)
Age: 70
Setting detail: SPECIMEN
Discharge: HOME | End: 2020-04-20
Payer: COMMERCIAL

## 2020-01-01 ENCOUNTER — HOSPITAL ENCOUNTER (INPATIENT)
Age: 70
LOS: 5 days | Discharge: NURSING, REHAB OR LONG-TERM CARE | DRG: 280 | End: 2020-03-07
Attending: EMERGENCY MEDICINE | Admitting: INTERNAL MEDICINE
Payer: COMMERCIAL

## 2020-01-01 ENCOUNTER — HOSPITAL ENCOUNTER (OUTPATIENT)
Age: 70
Setting detail: SPECIMEN
Discharge: HOME | End: 2020-04-13
Payer: COMMERCIAL

## 2020-01-01 ENCOUNTER — HOSPITAL ENCOUNTER (EMERGENCY)
Age: 70
Discharge: STILL ADMITTED OR ADMINISTRATIVE | End: 2020-02-03
Attending: EMERGENCY MEDICINE
Payer: COMMERCIAL

## 2020-01-01 ENCOUNTER — APPOINTMENT (OUTPATIENT)
Dept: GENERAL RADIOLOGY | Age: 70
DRG: 152 | End: 2020-01-01
Payer: COMMERCIAL

## 2020-01-01 ENCOUNTER — OFFICE VISIT (OUTPATIENT)
Dept: INFECTIOUS DISEASES | Age: 70
End: 2020-01-01
Payer: COMMERCIAL

## 2020-01-01 ENCOUNTER — APPOINTMENT (OUTPATIENT)
Dept: GENERAL RADIOLOGY | Age: 70
End: 2020-01-01
Payer: COMMERCIAL

## 2020-01-01 ENCOUNTER — HOSPITAL ENCOUNTER (INPATIENT)
Age: 70
LOS: 4 days | Discharge: NURSING, REHAB OR LONG-TERM CARE | DRG: 177 | End: 2020-05-22
Attending: INTERNAL MEDICINE | Admitting: INTERNAL MEDICINE
Payer: COMMERCIAL

## 2020-01-01 ENCOUNTER — HOSPITAL ENCOUNTER (INPATIENT)
Age: 70
LOS: 5 days | Discharge: NURSING, REHAB OR LONG-TERM CARE | DRG: 152 | End: 2020-03-14
Attending: EMERGENCY MEDICINE | Admitting: INTERNAL MEDICINE
Payer: COMMERCIAL

## 2020-01-01 ENCOUNTER — APPOINTMENT (OUTPATIENT)
Dept: INTERVENTIONAL RADIOLOGY/VASCULAR | Age: 70
DRG: 280 | End: 2020-01-01
Payer: COMMERCIAL

## 2020-01-01 ENCOUNTER — APPOINTMENT (OUTPATIENT)
Dept: GENERAL RADIOLOGY | Age: 70
DRG: 640 | End: 2020-01-01
Payer: COMMERCIAL

## 2020-01-01 ENCOUNTER — APPOINTMENT (OUTPATIENT)
Dept: CT IMAGING | Age: 70
DRG: 640 | End: 2020-01-01
Payer: COMMERCIAL

## 2020-01-01 ENCOUNTER — APPOINTMENT (OUTPATIENT)
Dept: INTERVENTIONAL RADIOLOGY/VASCULAR | Age: 70
DRG: 152 | End: 2020-01-01
Payer: COMMERCIAL

## 2020-01-01 ENCOUNTER — HOSPITAL ENCOUNTER (OUTPATIENT)
Age: 70
Setting detail: SPECIMEN
Discharge: HOME | End: 2020-05-11
Payer: COMMERCIAL

## 2020-01-01 VITALS
HEIGHT: 72 IN | TEMPERATURE: 98.6 F | DIASTOLIC BLOOD PRESSURE: 52 MMHG | HEART RATE: 81 BPM | BODY MASS INDEX: 36.91 KG/M2 | OXYGEN SATURATION: 97 % | WEIGHT: 272.49 LBS | RESPIRATION RATE: 21 BRPM | SYSTOLIC BLOOD PRESSURE: 101 MMHG

## 2020-01-01 VITALS
SYSTOLIC BLOOD PRESSURE: 142 MMHG | DIASTOLIC BLOOD PRESSURE: 86 MMHG | WEIGHT: 280 LBS | RESPIRATION RATE: 19 BRPM | TEMPERATURE: 98.6 F | BODY MASS INDEX: 37.93 KG/M2 | HEIGHT: 72 IN | OXYGEN SATURATION: 99 % | HEART RATE: 68 BPM

## 2020-01-01 VITALS
SYSTOLIC BLOOD PRESSURE: 144 MMHG | DIASTOLIC BLOOD PRESSURE: 50 MMHG | HEIGHT: 72 IN | TEMPERATURE: 98.6 F | HEART RATE: 61 BPM | BODY MASS INDEX: 39.51 KG/M2 | WEIGHT: 291.67 LBS | RESPIRATION RATE: 16 BRPM | OXYGEN SATURATION: 100 %

## 2020-01-01 VITALS
SYSTOLIC BLOOD PRESSURE: 129 MMHG | TEMPERATURE: 97.3 F | DIASTOLIC BLOOD PRESSURE: 53 MMHG | BODY MASS INDEX: 37.25 KG/M2 | HEIGHT: 72 IN | WEIGHT: 275 LBS | HEART RATE: 61 BPM

## 2020-01-01 VITALS
RESPIRATION RATE: 25 BRPM | BODY MASS INDEX: 36.57 KG/M2 | OXYGEN SATURATION: 93 % | DIASTOLIC BLOOD PRESSURE: 78 MMHG | HEIGHT: 72 IN | TEMPERATURE: 98 F | HEART RATE: 122 BPM | WEIGHT: 270 LBS | SYSTOLIC BLOOD PRESSURE: 118 MMHG

## 2020-01-01 VITALS
TEMPERATURE: 98.5 F | SYSTOLIC BLOOD PRESSURE: 139 MMHG | WEIGHT: 286 LBS | HEART RATE: 70 BPM | OXYGEN SATURATION: 91 % | BODY MASS INDEX: 38.74 KG/M2 | HEIGHT: 72 IN | RESPIRATION RATE: 19 BRPM | DIASTOLIC BLOOD PRESSURE: 60 MMHG

## 2020-01-01 VITALS
RESPIRATION RATE: 16 BRPM | OXYGEN SATURATION: 100 % | TEMPERATURE: 97.7 F | HEART RATE: 61 BPM | WEIGHT: 286.16 LBS | SYSTOLIC BLOOD PRESSURE: 141 MMHG | HEIGHT: 72 IN | DIASTOLIC BLOOD PRESSURE: 53 MMHG | BODY MASS INDEX: 38.76 KG/M2

## 2020-01-01 VITALS
DIASTOLIC BLOOD PRESSURE: 52 MMHG | OXYGEN SATURATION: 97 % | WEIGHT: 300.27 LBS | BODY MASS INDEX: 40.67 KG/M2 | TEMPERATURE: 97.5 F | HEIGHT: 72 IN | RESPIRATION RATE: 18 BRPM | SYSTOLIC BLOOD PRESSURE: 111 MMHG | HEART RATE: 87 BPM

## 2020-01-01 VITALS
OXYGEN SATURATION: 96 % | WEIGHT: 257.94 LBS | DIASTOLIC BLOOD PRESSURE: 51 MMHG | BODY MASS INDEX: 34.98 KG/M2 | TEMPERATURE: 99 F | RESPIRATION RATE: 21 BRPM | HEART RATE: 96 BPM | SYSTOLIC BLOOD PRESSURE: 99 MMHG

## 2020-01-01 LAB
% CKMB: 11 % (ref 0–3.5)
-: ABNORMAL
-: ABNORMAL
-: NORMAL
ABO/RH: NORMAL
ABO/RH: NORMAL
ABSOLUTE BANDS #: 0.03 K/UL (ref 0–1)
ABSOLUTE BANDS #: 0.16 K/UL (ref 0–1)
ABSOLUTE BANDS #: 0.39 K/UL (ref 0–1)
ABSOLUTE BANDS #: 2.35 K/UL (ref 0–1)
ABSOLUTE EOS #: 0 K/UL (ref 0–0.4)
ABSOLUTE EOS #: 0 K/UL (ref 0–0.44)
ABSOLUTE EOS #: 0 K/UL (ref 0–0.44)
ABSOLUTE EOS #: 0.05 K/UL (ref 0–0.4)
ABSOLUTE EOS #: 0.05 K/UL (ref 0–0.4)
ABSOLUTE EOS #: 0.07 K/UL (ref 0–0.4)
ABSOLUTE EOS #: 0.07 K/UL (ref 0–0.44)
ABSOLUTE EOS #: 0.1 K/UL (ref 0–0.4)
ABSOLUTE EOS #: 0.2 K/UL (ref 0–0.4)
ABSOLUTE IMMATURE GRANULOCYTE: 0 K/UL (ref 0–0.3)
ABSOLUTE IMMATURE GRANULOCYTE: 0.06 K/UL (ref 0–0.3)
ABSOLUTE IMMATURE GRANULOCYTE: 0.07 K/UL (ref 0–0.3)
ABSOLUTE IMMATURE GRANULOCYTE: 0.14 K/UL (ref 0–0.3)
ABSOLUTE IMMATURE GRANULOCYTE: 0.14 K/UL (ref 0–0.3)
ABSOLUTE IMMATURE GRANULOCYTE: 0.18 K/UL (ref 0–0.3)
ABSOLUTE IMMATURE GRANULOCYTE: 0.2 K/UL (ref 0–0.3)
ABSOLUTE IMMATURE GRANULOCYTE: 0.23 K/UL (ref 0–0.3)
ABSOLUTE IMMATURE GRANULOCYTE: 0.32 K/UL (ref 0–0.3)
ABSOLUTE IMMATURE GRANULOCYTE: ABNORMAL K/UL (ref 0–0.3)
ABSOLUTE LYMPH #: 0.08 K/UL (ref 1–4.8)
ABSOLUTE LYMPH #: 0.13 K/UL (ref 1–4.8)
ABSOLUTE LYMPH #: 0.14 K/UL (ref 1–4.8)
ABSOLUTE LYMPH #: 0.14 K/UL (ref 1–4.8)
ABSOLUTE LYMPH #: 0.19 K/UL (ref 1–4.8)
ABSOLUTE LYMPH #: 0.2 K/UL (ref 1–4.8)
ABSOLUTE LYMPH #: 0.25 K/UL (ref 1–4.8)
ABSOLUTE LYMPH #: 0.25 K/UL (ref 1–4.8)
ABSOLUTE LYMPH #: 0.28 K/UL (ref 1–4.8)
ABSOLUTE LYMPH #: 0.28 K/UL (ref 1–4.8)
ABSOLUTE LYMPH #: 0.33 K/UL (ref 1.1–3.7)
ABSOLUTE LYMPH #: 0.39 K/UL (ref 1.1–3.7)
ABSOLUTE LYMPH #: 0.4 K/UL (ref 1–4.8)
ABSOLUTE LYMPH #: 0.4 K/UL (ref 1–4.8)
ABSOLUTE LYMPH #: 0.53 K/UL (ref 1.1–3.7)
ABSOLUTE LYMPH #: 0.54 K/UL (ref 1–4.8)
ABSOLUTE LYMPH #: 0.61 K/UL (ref 1–4.8)
ABSOLUTE MONO #: 0 K/UL (ref 0.1–0.8)
ABSOLUTE MONO #: 0 K/UL (ref 0.1–0.8)
ABSOLUTE MONO #: 0.04 K/UL (ref 0.1–0.8)
ABSOLUTE MONO #: 0.08 K/UL (ref 0.1–0.8)
ABSOLUTE MONO #: 0.13 K/UL (ref 0.1–1.2)
ABSOLUTE MONO #: 0.13 K/UL (ref 0.1–1.3)
ABSOLUTE MONO #: 0.14 K/UL (ref 0.1–0.8)
ABSOLUTE MONO #: 0.14 K/UL (ref 0.1–0.8)
ABSOLUTE MONO #: 0.14 K/UL (ref 0.1–1.3)
ABSOLUTE MONO #: 0.27 K/UL (ref 0.1–1.3)
ABSOLUTE MONO #: 0.32 K/UL (ref 0.1–1.2)
ABSOLUTE MONO #: 0.38 K/UL (ref 0.1–1.3)
ABSOLUTE MONO #: 0.39 K/UL (ref 0.1–1.2)
ABSOLUTE MONO #: 0.41 K/UL (ref 0.1–1.3)
ABSOLUTE MONO #: 0.5 K/UL (ref 0.1–1.3)
ABSOLUTE MONO #: 0.59 K/UL (ref 0.1–1.3)
ABSOLUTE MONO #: 0.6 K/UL (ref 0.1–1.3)
ABSOLUTE RETIC #: 0.03 M/UL (ref 0.03–0.08)
ACETAMINOPHEN LEVEL: <5 UG/ML (ref 10–30)
ADENOVIRUS PCR: NOT DETECTED
ALBUMIN SERPL-MCNC: 2.9 G/DL (ref 3.5–5.2)
ALBUMIN SERPL-MCNC: 3 G/DL (ref 3.5–5.2)
ALBUMIN SERPL-MCNC: 3 G/DL (ref 3.5–5.2)
ALBUMIN SERPL-MCNC: 3.1 G/DL (ref 3.5–5.2)
ALBUMIN SERPL-MCNC: 3.2 G/DL (ref 3.5–5.2)
ALBUMIN SERPL-MCNC: 3.3 G/DL (ref 3.5–5.2)
ALBUMIN SERPL-MCNC: 3.4 G/DL (ref 3.5–5.2)
ALBUMIN SERPL-MCNC: 3.6 G/DL (ref 3.5–5.2)
ALBUMIN/GLOBULIN RATIO: 1.1 (ref 1–2.5)
ALBUMIN/GLOBULIN RATIO: 1.2 (ref 1–2.5)
ALBUMIN/GLOBULIN RATIO: 1.2 (ref 1–2.5)
ALBUMIN/GLOBULIN RATIO: 1.3 (ref 1–2.5)
ALBUMIN/GLOBULIN RATIO: 1.3 (ref 1–2.5)
ALBUMIN/GLOBULIN RATIO: 1.4 (ref 1–2.5)
ALBUMIN/GLOBULIN RATIO: ABNORMAL (ref 1–2.5)
ALLEN TEST: ABNORMAL
ALLEN TEST: POSITIVE
ALP BLD-CCNC: 137 U/L (ref 40–129)
ALP BLD-CCNC: 144 U/L (ref 40–129)
ALP BLD-CCNC: 155 U/L (ref 40–129)
ALP BLD-CCNC: 158 U/L (ref 40–129)
ALP BLD-CCNC: 160 U/L (ref 40–129)
ALP BLD-CCNC: 165 U/L (ref 40–129)
ALP BLD-CCNC: 178 U/L (ref 40–129)
ALP BLD-CCNC: 178 U/L (ref 40–129)
ALP BLD-CCNC: 179 U/L (ref 40–129)
ALP BLD-CCNC: 181 U/L (ref 40–129)
ALP BLD-CCNC: 184 U/L (ref 40–129)
ALP BLD-CCNC: 187 U/L (ref 40–129)
ALP BLD-CCNC: 244 U/L (ref 40–129)
ALT SERPL-CCNC: 22 U/L (ref 5–41)
ALT SERPL-CCNC: 27 U/L (ref 5–41)
ALT SERPL-CCNC: 28 U/L (ref 5–41)
ALT SERPL-CCNC: 29 U/L (ref 5–41)
ALT SERPL-CCNC: 29 U/L (ref 5–41)
ALT SERPL-CCNC: 31 U/L (ref 5–41)
ALT SERPL-CCNC: 31 U/L (ref 5–41)
ALT SERPL-CCNC: 37 U/L (ref 5–41)
ALT SERPL-CCNC: 39 U/L (ref 5–41)
ALT SERPL-CCNC: 39 U/L (ref 5–41)
ALT SERPL-CCNC: 41 U/L (ref 5–41)
ALT SERPL-CCNC: 43 U/L (ref 5–41)
ALT SERPL-CCNC: 44 U/L (ref 5–41)
AMMONIA: 45 UMOL/L (ref 16–60)
AMORPHOUS: ABNORMAL
AMORPHOUS: ABNORMAL
ANION GAP SERPL CALCULATED.3IONS-SCNC: 11 MMOL/L (ref 9–17)
ANION GAP SERPL CALCULATED.3IONS-SCNC: 14 MMOL/L (ref 9–17)
ANION GAP SERPL CALCULATED.3IONS-SCNC: 15 MMOL/L (ref 9–17)
ANION GAP SERPL CALCULATED.3IONS-SCNC: 16 MMOL/L (ref 9–17)
ANION GAP SERPL CALCULATED.3IONS-SCNC: 17 MMOL/L (ref 9–17)
ANION GAP SERPL CALCULATED.3IONS-SCNC: 18 MMOL/L (ref 9–17)
ANION GAP SERPL CALCULATED.3IONS-SCNC: 18 MMOL/L (ref 9–17)
ANION GAP SERPL CALCULATED.3IONS-SCNC: 19 MMOL/L (ref 9–17)
ANION GAP SERPL CALCULATED.3IONS-SCNC: 20 MMOL/L (ref 9–17)
ANION GAP SERPL CALCULATED.3IONS-SCNC: 21 MMOL/L (ref 9–17)
ANION GAP SERPL CALCULATED.3IONS-SCNC: 21 MMOL/L (ref 9–17)
ANION GAP SERPL CALCULATED.3IONS-SCNC: 22 MMOL/L (ref 9–17)
ANION GAP SERPL CALCULATED.3IONS-SCNC: 24 MMOL/L (ref 9–17)
ANION GAP SERPL CALCULATED.3IONS-SCNC: 24 MMOL/L (ref 9–17)
ANTI-XA UNFRAC HEPARIN: 0.11 IU/L (ref 0.3–0.7)
ANTI-XA UNFRAC HEPARIN: 0.14 IU/L (ref 0.3–0.7)
ANTI-XA UNFRAC HEPARIN: 0.15 IU/L (ref 0.3–0.7)
ANTI-XA UNFRAC HEPARIN: 0.26 IU/L (ref 0.3–0.7)
ANTI-XA UNFRAC HEPARIN: 0.26 IU/L (ref 0.3–0.7)
ANTI-XA UNFRAC HEPARIN: 0.27 IU/L (ref 0.3–0.7)
ANTI-XA UNFRAC HEPARIN: 0.29 IU/L (ref 0.3–0.7)
ANTI-XA UNFRAC HEPARIN: 0.3 IU/L (ref 0.3–0.7)
ANTI-XA UNFRAC HEPARIN: 0.3 IU/L (ref 0.3–0.7)
ANTI-XA UNFRAC HEPARIN: 0.31 IU/L (ref 0.3–0.7)
ANTI-XA UNFRAC HEPARIN: 0.34 IU/L (ref 0.3–0.7)
ANTI-XA UNFRAC HEPARIN: 0.41 IU/L (ref 0.3–0.7)
ANTI-XA UNFRAC HEPARIN: 0.42 IU/L (ref 0.3–0.7)
ANTI-XA UNFRAC HEPARIN: 0.46 IU/L (ref 0.3–0.7)
ANTI-XA UNFRAC HEPARIN: 0.5 IU/L (ref 0.3–0.7)
ANTI-XA UNFRAC HEPARIN: 0.98 IU/L (ref 0.3–0.7)
ANTIBODY SCREEN: NEGATIVE
ARM BAND NUMBER: NORMAL
AST SERPL-CCNC: 18 U/L
AST SERPL-CCNC: 23 U/L
AST SERPL-CCNC: 23 U/L
AST SERPL-CCNC: 24 U/L
AST SERPL-CCNC: 25 U/L
AST SERPL-CCNC: 26 U/L
AST SERPL-CCNC: 27 U/L
AST SERPL-CCNC: 29 U/L
AST SERPL-CCNC: 31 U/L
AST SERPL-CCNC: 36 U/L
AST SERPL-CCNC: 36 U/L
BACTERIA: ABNORMAL
BACTERIA: ABNORMAL
BANDS: 1 % (ref 0–10)
BANDS: 11 % (ref 0–10)
BANDS: 3 % (ref 0–10)
BANDS: 34 % (ref 0–10)
BASOPHILS # BLD: 0 % (ref 0–2)
BASOPHILS # BLD: 1 % (ref 0–2)
BASOPHILS # BLD: 2 % (ref 0–2)
BASOPHILS ABSOLUTE: 0 K/UL (ref 0–0.2)
BASOPHILS ABSOLUTE: 0.03 K/UL (ref 0–0.2)
BASOPHILS ABSOLUTE: 0.07 K/UL (ref 0–0.2)
BASOPHILS ABSOLUTE: 0.11 K/UL (ref 0–0.2)
BILIRUB SERPL-MCNC: 0.31 MG/DL (ref 0.3–1.2)
BILIRUB SERPL-MCNC: 0.32 MG/DL (ref 0.3–1.2)
BILIRUB SERPL-MCNC: 0.33 MG/DL (ref 0.3–1.2)
BILIRUB SERPL-MCNC: 0.33 MG/DL (ref 0.3–1.2)
BILIRUB SERPL-MCNC: 0.38 MG/DL (ref 0.3–1.2)
BILIRUB SERPL-MCNC: 0.45 MG/DL (ref 0.3–1.2)
BILIRUB SERPL-MCNC: 0.5 MG/DL (ref 0.3–1.2)
BILIRUB SERPL-MCNC: 0.52 MG/DL (ref 0.3–1.2)
BILIRUB SERPL-MCNC: 0.63 MG/DL (ref 0.3–1.2)
BILIRUB SERPL-MCNC: 0.89 MG/DL (ref 0.3–1.2)
BILIRUBIN DIRECT: 0.13 MG/DL
BILIRUBIN DIRECT: 0.16 MG/DL
BILIRUBIN DIRECT: 0.17 MG/DL
BILIRUBIN DIRECT: 0.18 MG/DL
BILIRUBIN DIRECT: 0.18 MG/DL
BILIRUBIN DIRECT: 0.2 MG/DL
BILIRUBIN URINE: ABNORMAL
BILIRUBIN URINE: NEGATIVE
BILIRUBIN, INDIRECT: 0.17 MG/DL (ref 0–1)
BILIRUBIN, INDIRECT: 0.18 MG/DL (ref 0–1)
BILIRUBIN, INDIRECT: 0.2 MG/DL (ref 0–1)
BILIRUBIN, INDIRECT: 0.27 MG/DL (ref 0–1)
BILIRUBIN, INDIRECT: 0.32 MG/DL (ref 0–1)
BLD PROD TYP BPU: NORMAL
BNP INTERPRETATION: ABNORMAL
BORDETELLA PARAPERTUSSIS: NOT DETECTED
BORDETELLA PERTUSSIS PCR: NOT DETECTED
BUN BLDV-MCNC: 100 MG/DL (ref 8–23)
BUN BLDV-MCNC: 103 MG/DL (ref 8–23)
BUN BLDV-MCNC: 113 MG/DL (ref 8–23)
BUN BLDV-MCNC: 117 MG/DL (ref 8–23)
BUN BLDV-MCNC: 26 MG/DL (ref 8–23)
BUN BLDV-MCNC: 30 MG/DL (ref 8–23)
BUN BLDV-MCNC: 31 MG/DL (ref 8–23)
BUN BLDV-MCNC: 37 MG/DL (ref 8–23)
BUN BLDV-MCNC: 37 MG/DL (ref 8–23)
BUN BLDV-MCNC: 38 MG/DL (ref 8–23)
BUN BLDV-MCNC: 39 MG/DL (ref 8–23)
BUN BLDV-MCNC: 41 MG/DL (ref 8–23)
BUN BLDV-MCNC: 41 MG/DL (ref 8–23)
BUN BLDV-MCNC: 47 MG/DL (ref 8–23)
BUN BLDV-MCNC: 49 MG/DL (ref 8–23)
BUN BLDV-MCNC: 54 MG/DL (ref 8–23)
BUN BLDV-MCNC: 56 MG/DL (ref 8–23)
BUN BLDV-MCNC: 58 MG/DL (ref 8–23)
BUN BLDV-MCNC: 59 MG/DL (ref 8–23)
BUN BLDV-MCNC: 66 MG/DL (ref 8–23)
BUN BLDV-MCNC: 71 MG/DL (ref 8–23)
BUN BLDV-MCNC: 79 MG/DL (ref 8–23)
BUN BLDV-MCNC: 90 MG/DL (ref 8–23)
BUN BLDV-MCNC: 92 MG/DL (ref 8–23)
BUN BLDV-MCNC: 92 MG/DL (ref 8–23)
BUN BLDV-MCNC: 98 MG/DL (ref 8–23)
BUN/CREAT BLD: ABNORMAL (ref 9–20)
C-REACTIVE PROTEIN: 116.6 MG/L (ref 0–5)
C-REACTIVE PROTEIN: 122.5 MG/L (ref 0–5)
C-REACTIVE PROTEIN: 25.4 MG/L (ref 0–5)
C-REACTIVE PROTEIN: 69.3 MG/L (ref 0–5)
C-REACTIVE PROTEIN: 71.6 MG/L (ref 0–5)
C-REACTIVE PROTEIN: 89.5 MG/L (ref 0–5)
C-REACTIVE PROTEIN: 98.9 MG/L (ref 0–5)
CALCIUM IONIZED: 1.14 MMOL/L (ref 1.13–1.33)
CALCIUM SERPL-MCNC: 7.4 MG/DL (ref 8.6–10.4)
CALCIUM SERPL-MCNC: 7.7 MG/DL (ref 8.6–10.4)
CALCIUM SERPL-MCNC: 7.7 MG/DL (ref 8.6–10.4)
CALCIUM SERPL-MCNC: 8 MG/DL (ref 8.6–10.4)
CALCIUM SERPL-MCNC: 8.1 MG/DL (ref 8.6–10.4)
CALCIUM SERPL-MCNC: 8.2 MG/DL (ref 8.6–10.4)
CALCIUM SERPL-MCNC: 8.3 MG/DL (ref 8.6–10.4)
CALCIUM SERPL-MCNC: 8.3 MG/DL (ref 8.6–10.4)
CALCIUM SERPL-MCNC: 8.4 MG/DL (ref 8.6–10.4)
CALCIUM SERPL-MCNC: 8.4 MG/DL (ref 8.6–10.4)
CALCIUM SERPL-MCNC: 8.5 MG/DL (ref 8.6–10.4)
CALCIUM SERPL-MCNC: 8.5 MG/DL (ref 8.6–10.4)
CALCIUM SERPL-MCNC: 8.6 MG/DL (ref 8.6–10.4)
CALCIUM SERPL-MCNC: 8.6 MG/DL (ref 8.6–10.4)
CALCIUM SERPL-MCNC: 8.7 MG/DL (ref 8.6–10.4)
CALCIUM SERPL-MCNC: 8.8 MG/DL (ref 8.6–10.4)
CALCIUM SERPL-MCNC: 8.8 MG/DL (ref 8.6–10.4)
CALCIUM SERPL-MCNC: 8.9 MG/DL (ref 8.6–10.4)
CALCIUM SERPL-MCNC: 9 MG/DL (ref 8.6–10.4)
CALCIUM SERPL-MCNC: 9 MG/DL (ref 8.6–10.4)
CARBOXYHEMOGLOBIN: 1 % (ref 0–5)
CARBOXYHEMOGLOBIN: 1.3 % (ref 0–5)
CARBOXYHEMOGLOBIN: 3.7 % (ref 0–5)
CASTS UA: ABNORMAL /LPF
CHLAMYDIA PNEUMONIAE BY PCR: NOT DETECTED
CHLORIDE BLD-SCNC: 100 MMOL/L (ref 98–107)
CHLORIDE BLD-SCNC: 101 MMOL/L (ref 98–107)
CHLORIDE BLD-SCNC: 103 MMOL/L (ref 98–107)
CHLORIDE BLD-SCNC: 108 MMOL/L (ref 98–107)
CHLORIDE BLD-SCNC: 109 MMOL/L (ref 98–107)
CHLORIDE BLD-SCNC: 90 MMOL/L (ref 98–107)
CHLORIDE BLD-SCNC: 92 MMOL/L (ref 98–107)
CHLORIDE BLD-SCNC: 94 MMOL/L (ref 98–107)
CHLORIDE BLD-SCNC: 95 MMOL/L (ref 98–107)
CHLORIDE BLD-SCNC: 96 MMOL/L (ref 98–107)
CHLORIDE BLD-SCNC: 96 MMOL/L (ref 98–107)
CHLORIDE BLD-SCNC: 97 MMOL/L (ref 98–107)
CHLORIDE BLD-SCNC: 98 MMOL/L (ref 98–107)
CHP ED QC CHECK: NORMAL
CHP ED QC CHECK: YES
CHP ED QC CHECK: YES
CK MB: 2.2 NG/ML
CKMB INTERPRETATION: ABNORMAL
CO2: 14 MMOL/L (ref 20–31)
CO2: 17 MMOL/L (ref 20–31)
CO2: 17 MMOL/L (ref 20–31)
CO2: 18 MMOL/L (ref 20–31)
CO2: 20 MMOL/L (ref 20–31)
CO2: 21 MMOL/L (ref 20–31)
CO2: 21 MMOL/L (ref 20–31)
CO2: 22 MMOL/L (ref 20–31)
CO2: 23 MMOL/L (ref 20–31)
CO2: 24 MMOL/L (ref 20–31)
CO2: 25 MMOL/L (ref 20–31)
CO2: 26 MMOL/L (ref 20–31)
CO2: 28 MMOL/L (ref 20–31)
COLOR: ABNORMAL
COLOR: YELLOW
COMMENT UA: ABNORMAL
COMMENT UA: ABNORMAL
CORONAVIRUS 229E PCR: NOT DETECTED
CORONAVIRUS HKU1 PCR: NOT DETECTED
CORONAVIRUS NL63 PCR: NOT DETECTED
CORONAVIRUS OC43 PCR: NOT DETECTED
CREAT SERPL-MCNC: 4.34 MG/DL (ref 0.7–1.2)
CREAT SERPL-MCNC: 4.67 MG/DL (ref 0.7–1.2)
CREAT SERPL-MCNC: 4.99 MG/DL (ref 0.7–1.2)
CREAT SERPL-MCNC: 5.42 MG/DL (ref 0.7–1.2)
CREAT SERPL-MCNC: 5.58 MG/DL (ref 0.7–1.2)
CREAT SERPL-MCNC: 5.59 MG/DL (ref 0.7–1.2)
CREAT SERPL-MCNC: 5.82 MG/DL (ref 0.7–1.2)
CREAT SERPL-MCNC: 5.89 MG/DL (ref 0.7–1.2)
CREAT SERPL-MCNC: 6.05 MG/DL (ref 0.7–1.2)
CREAT SERPL-MCNC: 6.28 MG/DL (ref 0.7–1.2)
CREAT SERPL-MCNC: 6.33 MG/DL (ref 0.7–1.2)
CREAT SERPL-MCNC: 6.63 MG/DL (ref 0.7–1.2)
CREAT SERPL-MCNC: 6.9 MG/DL (ref 0.7–1.2)
CREAT SERPL-MCNC: 7.29 MG/DL (ref 0.7–1.2)
CREAT SERPL-MCNC: 7.31 MG/DL (ref 0.7–1.2)
CREAT SERPL-MCNC: 7.32 MG/DL (ref 0.7–1.2)
CREAT SERPL-MCNC: 7.37 MG/DL (ref 0.7–1.2)
CREAT SERPL-MCNC: 7.44 MG/DL (ref 0.7–1.2)
CREAT SERPL-MCNC: 7.47 MG/DL (ref 0.7–1.2)
CREAT SERPL-MCNC: 7.55 MG/DL (ref 0.7–1.2)
CREAT SERPL-MCNC: 7.65 MG/DL (ref 0.7–1.2)
CREAT SERPL-MCNC: 8.02 MG/DL (ref 0.7–1.2)
CREAT SERPL-MCNC: 8.29 MG/DL (ref 0.7–1.2)
CREAT SERPL-MCNC: 8.84 MG/DL (ref 0.7–1.2)
CREAT SERPL-MCNC: 8.86 MG/DL (ref 0.7–1.2)
CREAT SERPL-MCNC: 9.06 MG/DL (ref 0.7–1.2)
CROSSMATCH RESULT: NORMAL
CRYSTALS, UA: ABNORMAL /HPF
CRYSTALS, UA: ABNORMAL /HPF
CULTURE: NO GROWTH
CULTURE: NORMAL
D-DIMER QUANTITATIVE: 0.45 MG/L FEU
DATE, STOOL #1: ABNORMAL
DATE, STOOL #1: NORMAL
DATE, STOOL #2: ABNORMAL
DATE, STOOL #2: NORMAL
DATE, STOOL #3: ABNORMAL
DATE, STOOL #3: NORMAL
DIFFERENTIAL TYPE: ABNORMAL
DIRECT EXAM: NORMAL
DISPENSE STATUS BLOOD BANK: NORMAL
EKG ATRIAL RATE: 133 BPM
EKG ATRIAL RATE: 133 BPM
EKG ATRIAL RATE: 208 BPM
EKG ATRIAL RATE: 258 BPM
EKG ATRIAL RATE: 66 BPM
EKG ATRIAL RATE: 66 BPM
EKG ATRIAL RATE: 68 BPM
EKG ATRIAL RATE: 74 BPM
EKG ATRIAL RATE: 75 BPM
EKG ATRIAL RATE: 77 BPM
EKG ATRIAL RATE: 80 BPM
EKG ATRIAL RATE: 98 BPM
EKG P AXIS: -5 DEGREES
EKG P AXIS: -7 DEGREES
EKG P AXIS: 148 DEGREES
EKG P AXIS: 4 DEGREES
EKG P AXIS: 59 DEGREES
EKG P AXIS: 85 DEGREES
EKG P-R INTERVAL: 130 MS
EKG P-R INTERVAL: 144 MS
EKG P-R INTERVAL: 156 MS
EKG P-R INTERVAL: 156 MS
EKG P-R INTERVAL: 166 MS
EKG P-R INTERVAL: 168 MS
EKG P-R INTERVAL: 184 MS
EKG Q-T INTERVAL: 348 MS
EKG Q-T INTERVAL: 350 MS
EKG Q-T INTERVAL: 350 MS
EKG Q-T INTERVAL: 354 MS
EKG Q-T INTERVAL: 394 MS
EKG Q-T INTERVAL: 400 MS
EKG Q-T INTERVAL: 402 MS
EKG Q-T INTERVAL: 422 MS
EKG Q-T INTERVAL: 424 MS
EKG Q-T INTERVAL: 432 MS
EKG QRS DURATION: 126 MS
EKG QRS DURATION: 128 MS
EKG QRS DURATION: 136 MS
EKG QRS DURATION: 140 MS
EKG QRS DURATION: 142 MS
EKG QRS DURATION: 144 MS
EKG QRS DURATION: 146 MS
EKG QRS DURATION: 148 MS
EKG QTC CALCULATION (BAZETT): 419 MS
EKG QTC CALCULATION (BAZETT): 444 MS
EKG QTC CALCULATION (BAZETT): 446 MS
EKG QTC CALCULATION (BAZETT): 448 MS
EKG QTC CALCULATION (BAZETT): 454 MS
EKG QTC CALCULATION (BAZETT): 459 MS
EKG QTC CALCULATION (BAZETT): 480 MS
EKG QTC CALCULATION (BAZETT): 486 MS
EKG QTC CALCULATION (BAZETT): 516 MS
EKG QTC CALCULATION (BAZETT): 520 MS
EKG QTC CALCULATION (BAZETT): 531 MS
EKG QTC CALCULATION (BAZETT): 532 MS
EKG R AXIS: -24 DEGREES
EKG R AXIS: -36 DEGREES
EKG R AXIS: -42 DEGREES
EKG R AXIS: -48 DEGREES
EKG R AXIS: -50 DEGREES
EKG R AXIS: -51 DEGREES
EKG R AXIS: -57 DEGREES
EKG R AXIS: -59 DEGREES
EKG R AXIS: -62 DEGREES
EKG R AXIS: -65 DEGREES
EKG R AXIS: -86 DEGREES
EKG R AXIS: 108 DEGREES
EKG T AXIS: 17 DEGREES
EKG T AXIS: 23 DEGREES
EKG T AXIS: 24 DEGREES
EKG T AXIS: 26 DEGREES
EKG T AXIS: 28 DEGREES
EKG T AXIS: 30 DEGREES
EKG T AXIS: 30 DEGREES
EKG T AXIS: 31 DEGREES
EKG T AXIS: 34 DEGREES
EKG T AXIS: 43 DEGREES
EKG T AXIS: 45 DEGREES
EKG T AXIS: 48 DEGREES
EKG VENTRICULAR RATE: 105 BPM
EKG VENTRICULAR RATE: 113 BPM
EKG VENTRICULAR RATE: 128 BPM
EKG VENTRICULAR RATE: 139 BPM
EKG VENTRICULAR RATE: 140 BPM
EKG VENTRICULAR RATE: 66 BPM
EKG VENTRICULAR RATE: 66 BPM
EKG VENTRICULAR RATE: 68 BPM
EKG VENTRICULAR RATE: 74 BPM
EKG VENTRICULAR RATE: 75 BPM
EKG VENTRICULAR RATE: 77 BPM
EKG VENTRICULAR RATE: 80 BPM
EOSINOPHILS RELATIVE PERCENT: 0 % (ref 0–4)
EOSINOPHILS RELATIVE PERCENT: 0 % (ref 1–4)
EOSINOPHILS RELATIVE PERCENT: 1 % (ref 0–4)
EOSINOPHILS RELATIVE PERCENT: 1 % (ref 1–4)
EOSINOPHILS RELATIVE PERCENT: 1 % (ref 1–4)
EOSINOPHILS RELATIVE PERCENT: 2 % (ref 0–4)
EOSINOPHILS RELATIVE PERCENT: 4 % (ref 0–4)
EPITHELIAL CELLS UA: ABNORMAL /HPF
EPITHELIAL CELLS UA: ABNORMAL /HPF
ETHANOL PERCENT: <0.01 %
ETHANOL: <10 MG/DL
EXPIRATION DATE: NORMAL
FERRITIN: 1298 UG/L (ref 30–400)
FERRITIN: 1728 UG/L (ref 30–400)
FERRITIN: 3356 UG/L (ref 30–400)
FERRITIN: 3723 UG/L (ref 30–400)
FERRITIN: 5984 UG/L (ref 30–400)
FIBRINOGEN: 550 MG/DL (ref 140–420)
FIO2: 80
FIO2: ABNORMAL
GFR AFRICAN AMERICAN: 10 ML/MIN
GFR AFRICAN AMERICAN: 10 ML/MIN
GFR AFRICAN AMERICAN: 11 ML/MIN
GFR AFRICAN AMERICAN: 12 ML/MIN
GFR AFRICAN AMERICAN: 13 ML/MIN
GFR AFRICAN AMERICAN: 14 ML/MIN
GFR AFRICAN AMERICAN: 15 ML/MIN
GFR AFRICAN AMERICAN: 16 ML/MIN
GFR AFRICAN AMERICAN: 7 ML/MIN
GFR AFRICAN AMERICAN: 8 ML/MIN
GFR AFRICAN AMERICAN: 8 ML/MIN
GFR AFRICAN AMERICAN: 9 ML/MIN
GFR NON-AFRICAN AMERICAN: 10 ML/MIN
GFR NON-AFRICAN AMERICAN: 11 ML/MIN
GFR NON-AFRICAN AMERICAN: 12 ML/MIN
GFR NON-AFRICAN AMERICAN: 12 ML/MIN
GFR NON-AFRICAN AMERICAN: 14 ML/MIN
GFR NON-AFRICAN AMERICAN: 6 ML/MIN
GFR NON-AFRICAN AMERICAN: 7 ML/MIN
GFR NON-AFRICAN AMERICAN: 8 ML/MIN
GFR NON-AFRICAN AMERICAN: 8 ML/MIN
GFR NON-AFRICAN AMERICAN: 9 ML/MIN
GFR SERPL CREATININE-BSD FRML MDRD: ABNORMAL ML/MIN/{1.73_M2}
GLOBULIN: ABNORMAL G/DL (ref 1.5–3.8)
GLUCOSE BLD-MCNC: 100 MG/DL (ref 70–99)
GLUCOSE BLD-MCNC: 100 MG/DL (ref 75–110)
GLUCOSE BLD-MCNC: 102 MG/DL (ref 70–99)
GLUCOSE BLD-MCNC: 104 MG/DL (ref 70–99)
GLUCOSE BLD-MCNC: 105 MG/DL (ref 75–110)
GLUCOSE BLD-MCNC: 106 MG/DL (ref 70–99)
GLUCOSE BLD-MCNC: 106 MG/DL (ref 75–110)
GLUCOSE BLD-MCNC: 106 MG/DL (ref 75–110)
GLUCOSE BLD-MCNC: 110 MG/DL (ref 75–110)
GLUCOSE BLD-MCNC: 112 MG/DL (ref 75–110)
GLUCOSE BLD-MCNC: 113 MG/DL (ref 70–99)
GLUCOSE BLD-MCNC: 114 MG/DL (ref 70–99)
GLUCOSE BLD-MCNC: 115 MG/DL (ref 70–99)
GLUCOSE BLD-MCNC: 117 MG/DL (ref 70–99)
GLUCOSE BLD-MCNC: 117 MG/DL (ref 75–110)
GLUCOSE BLD-MCNC: 119 MG/DL (ref 70–99)
GLUCOSE BLD-MCNC: 121 MG/DL (ref 75–110)
GLUCOSE BLD-MCNC: 121 MG/DL (ref 75–110)
GLUCOSE BLD-MCNC: 122 MG/DL (ref 75–110)
GLUCOSE BLD-MCNC: 123 MG/DL
GLUCOSE BLD-MCNC: 123 MG/DL (ref 75–110)
GLUCOSE BLD-MCNC: 124 MG/DL (ref 70–99)
GLUCOSE BLD-MCNC: 124 MG/DL (ref 75–110)
GLUCOSE BLD-MCNC: 125 MG/DL (ref 70–99)
GLUCOSE BLD-MCNC: 125 MG/DL (ref 70–99)
GLUCOSE BLD-MCNC: 126 MG/DL (ref 75–110)
GLUCOSE BLD-MCNC: 127 MG/DL (ref 70–99)
GLUCOSE BLD-MCNC: 127 MG/DL (ref 75–110)
GLUCOSE BLD-MCNC: 127 MG/DL (ref 75–110)
GLUCOSE BLD-MCNC: 128 MG/DL (ref 75–110)
GLUCOSE BLD-MCNC: 129 MG/DL (ref 75–110)
GLUCOSE BLD-MCNC: 131 MG/DL (ref 70–99)
GLUCOSE BLD-MCNC: 131 MG/DL (ref 75–110)
GLUCOSE BLD-MCNC: 131 MG/DL (ref 75–110)
GLUCOSE BLD-MCNC: 134 MG/DL (ref 70–99)
GLUCOSE BLD-MCNC: 136 MG/DL (ref 75–110)
GLUCOSE BLD-MCNC: 140 MG/DL (ref 75–110)
GLUCOSE BLD-MCNC: 140 MG/DL (ref 75–110)
GLUCOSE BLD-MCNC: 141 MG/DL (ref 75–110)
GLUCOSE BLD-MCNC: 142 MG/DL (ref 75–110)
GLUCOSE BLD-MCNC: 144 MG/DL (ref 75–110)
GLUCOSE BLD-MCNC: 145 MG/DL (ref 75–110)
GLUCOSE BLD-MCNC: 146 MG/DL (ref 75–110)
GLUCOSE BLD-MCNC: 147 MG/DL (ref 70–99)
GLUCOSE BLD-MCNC: 154 MG/DL
GLUCOSE BLD-MCNC: 154 MG/DL (ref 75–110)
GLUCOSE BLD-MCNC: 154 MG/DL (ref 75–110)
GLUCOSE BLD-MCNC: 159 MG/DL (ref 75–110)
GLUCOSE BLD-MCNC: 159 MG/DL (ref 75–110)
GLUCOSE BLD-MCNC: 160 MG/DL (ref 70–99)
GLUCOSE BLD-MCNC: 160 MG/DL (ref 75–110)
GLUCOSE BLD-MCNC: 161 MG/DL (ref 75–110)
GLUCOSE BLD-MCNC: 164 MG/DL (ref 75–110)
GLUCOSE BLD-MCNC: 165 MG/DL (ref 70–99)
GLUCOSE BLD-MCNC: 167 MG/DL (ref 70–99)
GLUCOSE BLD-MCNC: 169 MG/DL (ref 70–99)
GLUCOSE BLD-MCNC: 172 MG/DL (ref 75–110)
GLUCOSE BLD-MCNC: 173 MG/DL (ref 75–110)
GLUCOSE BLD-MCNC: 177 MG/DL (ref 75–110)
GLUCOSE BLD-MCNC: 190 MG/DL (ref 75–110)
GLUCOSE BLD-MCNC: 193 MG/DL (ref 75–110)
GLUCOSE BLD-MCNC: 195 MG/DL (ref 70–99)
GLUCOSE BLD-MCNC: 195 MG/DL (ref 75–110)
GLUCOSE BLD-MCNC: 210 MG/DL (ref 75–110)
GLUCOSE BLD-MCNC: 213 MG/DL (ref 75–110)
GLUCOSE BLD-MCNC: 68 MG/DL (ref 75–110)
GLUCOSE BLD-MCNC: 77 MG/DL (ref 75–110)
GLUCOSE BLD-MCNC: 79 MG/DL (ref 75–110)
GLUCOSE BLD-MCNC: 82 MG/DL (ref 75–110)
GLUCOSE BLD-MCNC: 84 MG/DL (ref 75–110)
GLUCOSE BLD-MCNC: 87 MG/DL
GLUCOSE BLD-MCNC: 87 MG/DL (ref 75–110)
GLUCOSE BLD-MCNC: 87 MG/DL (ref 75–110)
GLUCOSE BLD-MCNC: 88 MG/DL (ref 75–110)
GLUCOSE BLD-MCNC: 88 MG/DL (ref 75–110)
GLUCOSE BLD-MCNC: 89 MG/DL (ref 75–110)
GLUCOSE BLD-MCNC: 90 MG/DL (ref 75–110)
GLUCOSE BLD-MCNC: 92 MG/DL (ref 70–99)
GLUCOSE BLD-MCNC: 92 MG/DL (ref 75–110)
GLUCOSE BLD-MCNC: 94 MG/DL (ref 70–99)
GLUCOSE BLD-MCNC: 95 MG/DL (ref 70–99)
GLUCOSE BLD-MCNC: 96 MG/DL (ref 75–110)
GLUCOSE BLD-MCNC: 96 MG/DL (ref 75–110)
GLUCOSE BLD-MCNC: 97 MG/DL (ref 70–99)
GLUCOSE BLD-MCNC: 97 MG/DL (ref 75–110)
GLUCOSE BLD-MCNC: 98 MG/DL (ref 70–99)
GLUCOSE BLD-MCNC: 98 MG/DL (ref 75–110)
GLUCOSE BLD-MCNC: 98 MG/DL (ref 75–110)
GLUCOSE BLD-MCNC: 99 MG/DL (ref 75–110)
GLUCOSE BLD-MCNC: 99 MG/DL (ref 75–110)
GLUCOSE URINE: NEGATIVE
GLUCOSE URINE: NEGATIVE
HAPTOGLOBIN: 291 MG/DL (ref 30–200)
HBV SURFACE AB TITR SER: 467.3 MIU/ML
HCO3 ARTERIAL: 22 MMOL/L (ref 22–26)
HCO3 ARTERIAL: 27.1 MMOL/L (ref 22–26)
HCO3 VENOUS: 21 MMOL/L (ref 24–30)
HCT VFR BLD CALC: 20.1 % (ref 40.7–50.3)
HCT VFR BLD CALC: 23.3 % (ref 40.7–50.3)
HCT VFR BLD CALC: 23.4 % (ref 40.7–50.3)
HCT VFR BLD CALC: 23.9 % (ref 40.7–50.3)
HCT VFR BLD CALC: 24.1 % (ref 40.7–50.3)
HCT VFR BLD CALC: 24.4 % (ref 40.7–50.3)
HCT VFR BLD CALC: 24.6 % (ref 40.7–50.3)
HCT VFR BLD CALC: 24.9 % (ref 41–53)
HCT VFR BLD CALC: 25.5 % (ref 41–53)
HCT VFR BLD CALC: 25.7 % (ref 40.7–50.3)
HCT VFR BLD CALC: 25.9 % (ref 40.7–50.3)
HCT VFR BLD CALC: 26.4 % (ref 41–53)
HCT VFR BLD CALC: 26.8 % (ref 41–53)
HCT VFR BLD CALC: 26.9 % (ref 41–53)
HCT VFR BLD CALC: 27.4 % (ref 41–53)
HCT VFR BLD CALC: 27.8 % (ref 41–53)
HCT VFR BLD CALC: 27.9 % (ref 41–53)
HCT VFR BLD CALC: 28.3 % (ref 40.7–50.3)
HCT VFR BLD CALC: 28.3 % (ref 41–53)
HCT VFR BLD CALC: 29.6 % (ref 41–53)
HCT VFR BLD CALC: 29.8 % (ref 41–53)
HCT VFR BLD CALC: 30.8 % (ref 41–53)
HCT VFR BLD CALC: 31.1 % (ref 41–53)
HCT VFR BLD CALC: 31.7 % (ref 41–53)
HCT VFR BLD CALC: 32.1 % (ref 41–53)
HCT VFR BLD CALC: 32.6 % (ref 41–53)
HCT VFR BLD CALC: 33 % (ref 41–53)
HCT VFR BLD CALC: 34.5 % (ref 41–53)
HEMOCCULT SP1 STL QL: NEGATIVE
HEMOCCULT SP1 STL QL: POSITIVE
HEMOCCULT SP2 STL QL: ABNORMAL
HEMOCCULT SP2 STL QL: NORMAL
HEMOCCULT SP3 STL QL: ABNORMAL
HEMOCCULT SP3 STL QL: NORMAL
HEMOGLOBIN: 10.1 G/DL (ref 13.5–17.5)
HEMOGLOBIN: 10.1 G/DL (ref 13.5–17.5)
HEMOGLOBIN: 10.4 G/DL (ref 13.5–17.5)
HEMOGLOBIN: 10.7 G/DL (ref 13.5–17.5)
HEMOGLOBIN: 6 G/DL (ref 13–17)
HEMOGLOBIN: 7 G/DL (ref 13–17)
HEMOGLOBIN: 7.1 G/DL (ref 13–17)
HEMOGLOBIN: 7.2 G/DL (ref 13.5–17.5)
HEMOGLOBIN: 7.2 G/DL (ref 13–17)
HEMOGLOBIN: 7.5 G/DL (ref 13–17)
HEMOGLOBIN: 7.5 G/DL (ref 13–17)
HEMOGLOBIN: 7.6 G/DL (ref 13–17)
HEMOGLOBIN: 7.9 G/DL (ref 13.5–17.5)
HEMOGLOBIN: 8.2 G/DL (ref 13.5–17.5)
HEMOGLOBIN: 8.3 G/DL (ref 13.5–17.5)
HEMOGLOBIN: 8.3 G/DL (ref 13–17)
HEMOGLOBIN: 8.5 G/DL (ref 13.5–17.5)
HEMOGLOBIN: 8.6 G/DL (ref 13.5–17.5)
HEMOGLOBIN: 8.7 G/DL (ref 13.5–17.5)
HEMOGLOBIN: 8.9 G/DL (ref 13.5–17.5)
HEMOGLOBIN: 9 G/DL (ref 13.5–17.5)
HEMOGLOBIN: 9.4 G/DL (ref 13.5–17.5)
HEMOGLOBIN: 9.4 G/DL (ref 13.5–17.5)
HEMOGLOBIN: 9.7 G/DL (ref 13.5–17.5)
HEMOGLOBIN: 9.8 G/DL (ref 13.5–17.5)
HEMOGLOBIN: 9.8 G/DL (ref 13.5–17.5)
HEPATITIS B CORE TOTAL ANTIBODY: NONREACTIVE
HEPATITIS B SURFACE ANTIGEN: NONREACTIVE
HEPATITIS C ANTIBODY: NONREACTIVE
HUMAN METAPNEUMOVIRUS PCR: NOT DETECTED
IMMATURE GRANULOCYTES: 0 %
IMMATURE GRANULOCYTES: 3 %
IMMATURE GRANULOCYTES: 4 %
IMMATURE GRANULOCYTES: 4 %
IMMATURE GRANULOCYTES: ABNORMAL %
IMMATURE RETIC FRACT: 10.6 % (ref 2.7–18.3)
INFLUENZA A BY PCR: NOT DETECTED
INFLUENZA A H1 (2009) PCR: NORMAL
INFLUENZA A H1 PCR: NORMAL
INFLUENZA A H3 PCR: NORMAL
INFLUENZA B BY PCR: NOT DETECTED
INR BLD: 1
INR BLD: 1.1
INR BLD: 1.1
INR BLD: 1.4
INTERVENTION: NORMAL
IRON SATURATION: 36 % (ref 20–55)
IRON: 52 UG/DL (ref 59–158)
KETONES, URINE: NEGATIVE
KETONES, URINE: NEGATIVE
LACTATE DEHYDROGENASE: 173 U/L (ref 135–225)
LACTATE DEHYDROGENASE: 185 U/L (ref 135–225)
LACTATE DEHYDROGENASE: 375 U/L (ref 135–225)
LACTATE DEHYDROGENASE: 475 U/L (ref 135–225)
LACTATE DEHYDROGENASE: 539 U/L (ref 135–225)
LACTATE DEHYDROGENASE: 575 U/L (ref 135–225)
LACTIC ACID, SEPSIS WHOLE BLOOD: 1.5 MMOL/L (ref 0.5–1.9)
LACTIC ACID, SEPSIS WHOLE BLOOD: 1.5 MMOL/L (ref 0.5–1.9)
LACTIC ACID, SEPSIS WHOLE BLOOD: NORMAL MMOL/L (ref 0.5–1.9)
LACTIC ACID, SEPSIS: 1.1 MMOL/L (ref 0.5–1.9)
LACTIC ACID, SEPSIS: NORMAL MMOL/L (ref 0.5–1.9)
LACTIC ACID, SEPSIS: NORMAL MMOL/L (ref 0.5–1.9)
LACTIC ACID, WHOLE BLOOD: 1.3 MMOL/L (ref 0.7–2.1)
LACTIC ACID: 0.5 MMOL/L (ref 0.5–2.2)
LACTIC ACID: 0.6 MMOL/L (ref 0.5–2.2)
LACTIC ACID: 1.2 MMOL/L (ref 0.5–2.2)
LACTIC ACID: 1.5 MMOL/L (ref 0.5–2.2)
LACTIC ACID: NORMAL MMOL/L
LEGIONELLA PNEUMOPHILIA AG, URINE: NEGATIVE
LEUKOCYTE ESTERASE, URINE: NEGATIVE
LEUKOCYTE ESTERASE, URINE: NEGATIVE
LIPASE: 51 U/L (ref 13–60)
LV EF: 55 %
LVEF MODALITY: NORMAL
LYMPHOCYTES # BLD: 10 % (ref 24–44)
LYMPHOCYTES # BLD: 10 % (ref 24–44)
LYMPHOCYTES # BLD: 3 % (ref 24–44)
LYMPHOCYTES # BLD: 3 % (ref 24–44)
LYMPHOCYTES # BLD: 4 % (ref 24–44)
LYMPHOCYTES # BLD: 5 % (ref 24–43)
LYMPHOCYTES # BLD: 5 % (ref 24–44)
LYMPHOCYTES # BLD: 6 % (ref 24–44)
LYMPHOCYTES # BLD: 7 % (ref 24–44)
LYMPHOCYTES # BLD: 8 % (ref 24–44)
Lab: NORMAL
MAGNESIUM: 1.6 MG/DL (ref 1.6–2.6)
MAGNESIUM: 1.7 MG/DL (ref 1.6–2.6)
MAGNESIUM: 1.9 MG/DL (ref 1.6–2.6)
MAGNESIUM: 2 MG/DL (ref 1.6–2.6)
MAGNESIUM: 2 MG/DL (ref 1.6–2.6)
MAGNESIUM: 2.1 MG/DL (ref 1.6–2.6)
MAGNESIUM: 2.2 MG/DL (ref 1.6–2.6)
MCH RBC QN AUTO: 28.8 PG (ref 26–34)
MCH RBC QN AUTO: 28.9 PG (ref 25.2–33.5)
MCH RBC QN AUTO: 28.9 PG (ref 26–34)
MCH RBC QN AUTO: 28.9 PG (ref 26–34)
MCH RBC QN AUTO: 29 PG (ref 25.2–33.5)
MCH RBC QN AUTO: 29.1 PG (ref 26–34)
MCH RBC QN AUTO: 29.2 PG (ref 26–34)
MCH RBC QN AUTO: 29.3 PG (ref 26–34)
MCH RBC QN AUTO: 29.5 PG (ref 26–34)
MCH RBC QN AUTO: 29.5 PG (ref 26–34)
MCH RBC QN AUTO: 29.6 PG (ref 26–34)
MCH RBC QN AUTO: 29.6 PG (ref 26–34)
MCH RBC QN AUTO: 29.7 PG (ref 25.2–33.5)
MCH RBC QN AUTO: 29.7 PG (ref 25.2–33.5)
MCH RBC QN AUTO: 29.8 PG (ref 25.2–33.5)
MCH RBC QN AUTO: 29.9 PG (ref 26–34)
MCH RBC QN AUTO: 30 PG (ref 25.2–33.5)
MCH RBC QN AUTO: 30 PG (ref 25.2–33.5)
MCH RBC QN AUTO: 30 PG (ref 26–34)
MCH RBC QN AUTO: 30.3 PG (ref 26–34)
MCH RBC QN AUTO: 30.7 PG (ref 26–34)
MCH RBC QN AUTO: 31.2 PG (ref 26–34)
MCH RBC QN AUTO: 31.3 PG (ref 26–34)
MCHC RBC AUTO-ENTMCNC: 28.7 G/DL (ref 28.4–34.8)
MCHC RBC AUTO-ENTMCNC: 28.9 G/DL (ref 31–37)
MCHC RBC AUTO-ENTMCNC: 29 G/DL (ref 28.4–34.8)
MCHC RBC AUTO-ENTMCNC: 29.3 G/DL (ref 28.4–34.8)
MCHC RBC AUTO-ENTMCNC: 29.3 G/DL (ref 28.4–34.8)
MCHC RBC AUTO-ENTMCNC: 29.5 G/DL (ref 28.4–34.8)
MCHC RBC AUTO-ENTMCNC: 29.6 G/DL (ref 28.4–34.8)
MCHC RBC AUTO-ENTMCNC: 29.9 G/DL (ref 28.4–34.8)
MCHC RBC AUTO-ENTMCNC: 29.9 G/DL (ref 28.4–34.8)
MCHC RBC AUTO-ENTMCNC: 30.5 G/DL (ref 28.4–34.8)
MCHC RBC AUTO-ENTMCNC: 30.5 G/DL (ref 31–37)
MCHC RBC AUTO-ENTMCNC: 30.6 G/DL (ref 31–37)
MCHC RBC AUTO-ENTMCNC: 30.9 G/DL (ref 31–37)
MCHC RBC AUTO-ENTMCNC: 31 G/DL (ref 31–37)
MCHC RBC AUTO-ENTMCNC: 31.1 G/DL (ref 31–37)
MCHC RBC AUTO-ENTMCNC: 31.1 G/DL (ref 31–37)
MCHC RBC AUTO-ENTMCNC: 31.3 G/DL (ref 31–37)
MCHC RBC AUTO-ENTMCNC: 31.4 G/DL (ref 31–37)
MCHC RBC AUTO-ENTMCNC: 31.6 G/DL (ref 31–37)
MCHC RBC AUTO-ENTMCNC: 31.6 G/DL (ref 31–37)
MCHC RBC AUTO-ENTMCNC: 31.7 G/DL (ref 31–37)
MCHC RBC AUTO-ENTMCNC: 31.8 G/DL (ref 31–37)
MCHC RBC AUTO-ENTMCNC: 31.9 G/DL (ref 31–37)
MCHC RBC AUTO-ENTMCNC: 32.1 G/DL (ref 31–37)
MCHC RBC AUTO-ENTMCNC: 32.3 G/DL (ref 31–37)
MCV RBC AUTO: 100.8 FL (ref 80–100)
MCV RBC AUTO: 100.8 FL (ref 82.6–102.9)
MCV RBC AUTO: 100.8 FL (ref 82.6–102.9)
MCV RBC AUTO: 101.7 FL (ref 82.6–102.9)
MCV RBC AUTO: 102.8 FL (ref 82.6–102.9)
MCV RBC AUTO: 104.7 FL (ref 82.6–102.9)
MCV RBC AUTO: 92.9 FL (ref 80–100)
MCV RBC AUTO: 93.2 FL (ref 80–100)
MCV RBC AUTO: 93.6 FL (ref 80–100)
MCV RBC AUTO: 93.8 FL (ref 80–100)
MCV RBC AUTO: 94 FL (ref 80–100)
MCV RBC AUTO: 94 FL (ref 80–100)
MCV RBC AUTO: 94.1 FL (ref 80–100)
MCV RBC AUTO: 94.3 FL (ref 80–100)
MCV RBC AUTO: 94.4 FL (ref 80–100)
MCV RBC AUTO: 94.5 FL (ref 80–100)
MCV RBC AUTO: 94.6 FL (ref 80–100)
MCV RBC AUTO: 94.8 FL (ref 80–100)
MCV RBC AUTO: 95.1 FL (ref 80–100)
MCV RBC AUTO: 95.5 FL (ref 80–100)
MCV RBC AUTO: 96.5 FL (ref 80–100)
MCV RBC AUTO: 96.7 FL (ref 82.6–102.9)
MCV RBC AUTO: 97 FL (ref 80–100)
MCV RBC AUTO: 98.4 FL (ref 80–100)
MCV RBC AUTO: 98.4 FL (ref 82.6–102.9)
MCV RBC AUTO: 99 FL (ref 82.6–102.9)
MCV RBC AUTO: 99.5 FL (ref 82.6–102.9)
METAMYELOCYTES ABSOLUTE COUNT: 0.04 K/UL
METAMYELOCYTES ABSOLUTE COUNT: 0.07 K/UL
METAMYELOCYTES: 1 %
METAMYELOCYTES: 1 %
METHEMOGLOBIN: 0.5 % (ref 0–1.9)
METHEMOGLOBIN: 0.6 % (ref 0–1.9)
METHEMOGLOBIN: 1.1 % (ref 0–1.9)
MISCELLANEOUS LAB TEST RESULT: NORMAL
MODE: ABNORMAL
MONOCYTES # BLD: 0 % (ref 1–7)
MONOCYTES # BLD: 0 % (ref 1–7)
MONOCYTES # BLD: 1 % (ref 1–7)
MONOCYTES # BLD: 12 % (ref 1–7)
MONOCYTES # BLD: 2 % (ref 1–7)
MONOCYTES # BLD: 2 % (ref 3–12)
MONOCYTES # BLD: 3 % (ref 1–7)
MONOCYTES # BLD: 3 % (ref 1–7)
MONOCYTES # BLD: 3 % (ref 3–12)
MONOCYTES # BLD: 4 % (ref 1–7)
MONOCYTES # BLD: 4 % (ref 1–7)
MONOCYTES # BLD: 5 % (ref 3–12)
MONOCYTES # BLD: 6 % (ref 1–7)
MONOCYTES # BLD: 7 % (ref 1–7)
MONOCYTES # BLD: 8 % (ref 1–7)
MONOCYTES # BLD: 9 % (ref 1–7)
MONOCYTES # BLD: 9 % (ref 1–7)
MORPHOLOGY: ABNORMAL
MORPHOLOGY: NORMAL
MORPHOLOGY: NORMAL
MUCUS: ABNORMAL
MUCUS: ABNORMAL
MYCOPLASMA PNEUMONIAE IGM: 0.44
MYCOPLASMA PNEUMONIAE PCR: NOT DETECTED
MYELOCYTES ABSOLUTE COUNT: 0.07 K/UL
MYELOCYTES ABSOLUTE COUNT: 0.14 K/UL
MYELOCYTES: 1 %
MYELOCYTES: 4 %
MYOGLOBIN: 208 NG/ML (ref 28–72)
MYOGLOBIN: 307 NG/ML (ref 28–72)
MYOGLOBIN: 334 NG/ML (ref 28–72)
MYOGLOBIN: 370 NG/ML (ref 28–72)
NEGATIVE BASE EXCESS, ART: 3 (ref 0–2)
NEGATIVE BASE EXCESS, ART: 3.8 MMOL/L (ref 0–2)
NEGATIVE BASE EXCESS, ART: ABNORMAL MMOL/L (ref 0–2)
NEGATIVE BASE EXCESS, VEN: 5.3 MMOL/L (ref 0–2)
NITRITE, URINE: NEGATIVE
NITRITE, URINE: NEGATIVE
NOTIFICATION TIME: ABNORMAL
NOTIFICATION: ABNORMAL
NRBC AUTOMATED: 0 PER 100 WBC
NRBC AUTOMATED: ABNORMAL PER 100 WBC
O2 DEVICE/FLOW/%: ABNORMAL
O2 SAT, ARTERIAL: 84 % (ref 95–98)
O2 SAT, ARTERIAL: 90 % (ref 95–98)
O2 SAT, VEN: 76.8 % (ref 60–85)
OTHER OBSERVATIONS UA: ABNORMAL
OTHER OBSERVATIONS UA: ABNORMAL
OXYHEMOGLOBIN: ABNORMAL % (ref 95–98)
PARAINFLUENZA 1 PCR: NOT DETECTED
PARAINFLUENZA 2 PCR: NOT DETECTED
PARAINFLUENZA 3 PCR: NOT DETECTED
PARAINFLUENZA 4 PCR: NOT DETECTED
PARTIAL THROMBOPLASTIN TIME: 106.4 SEC (ref 24–36)
PARTIAL THROMBOPLASTIN TIME: 122.1 SEC (ref 24–36)
PARTIAL THROMBOPLASTIN TIME: 32.9 SEC (ref 24–36)
PARTIAL THROMBOPLASTIN TIME: 37.9 SEC (ref 24–36)
PARTIAL THROMBOPLASTIN TIME: 38.5 SEC (ref 24–36)
PARTIAL THROMBOPLASTIN TIME: 46.8 SEC (ref 24–36)
PARTIAL THROMBOPLASTIN TIME: 74.7 SEC (ref 24–36)
PARTIAL THROMBOPLASTIN TIME: 83.5 SEC (ref 24–36)
PARTIAL THROMBOPLASTIN TIME: 92 SEC (ref 24–36)
PATHOLOGIST REVIEW: NORMAL
PATIENT TEMP: 37
PATIENT TEMP: ABNORMAL
PCO2 ARTERIAL: 40.5 MMHG (ref 35–45)
PCO2 ARTERIAL: 47.1 MMHG (ref 35–45)
PCO2, ART, TEMP ADJ: ABNORMAL (ref 35–45)
PCO2, ART, TEMP ADJ: ABNORMAL (ref 35–45)
PCO2, VEN, TEMP ADJ: ABNORMAL MMHG (ref 39–55)
PCO2, VEN: 41 (ref 39–55)
PDW BLD-RTO: 14 % (ref 11.8–14.4)
PDW BLD-RTO: 14.2 % (ref 11.8–14.4)
PDW BLD-RTO: 14.3 % (ref 11.8–14.4)
PDW BLD-RTO: 14.3 % (ref 11.8–14.4)
PDW BLD-RTO: 14.7 % (ref 11.8–14.4)
PDW BLD-RTO: 14.7 % (ref 11.8–14.4)
PDW BLD-RTO: 14.8 % (ref 11.8–14.4)
PDW BLD-RTO: 14.8 % (ref 11.8–14.4)
PDW BLD-RTO: 15.3 % (ref 11.5–14.9)
PDW BLD-RTO: 15.7 % (ref 11.5–14.9)
PDW BLD-RTO: 15.7 % (ref 11.5–14.9)
PDW BLD-RTO: 15.8 % (ref 11.5–14.9)
PDW BLD-RTO: 15.9 % (ref 11.5–14.9)
PDW BLD-RTO: 16 % (ref 11.5–14.9)
PDW BLD-RTO: 16.1 % (ref 11.5–14.9)
PDW BLD-RTO: 16.1 % (ref 11.5–14.9)
PDW BLD-RTO: 16.2 % (ref 11.5–14.9)
PDW BLD-RTO: 16.4 % (ref 11.5–14.9)
PDW BLD-RTO: 16.5 % (ref 11.5–14.9)
PDW BLD-RTO: 16.5 % (ref 11.5–14.9)
PDW BLD-RTO: 16.5 % (ref 11.8–14.4)
PDW BLD-RTO: 16.6 % (ref 11.5–14.9)
PDW BLD-RTO: 16.6 % (ref 11.5–14.9)
PDW BLD-RTO: 16.7 % (ref 11.5–14.9)
PDW BLD-RTO: 16.7 % (ref 11.5–14.9)
PEEP/CPAP: ABNORMAL
PH ARTERIAL: 7.34 (ref 7.35–7.45)
PH ARTERIAL: 7.37 (ref 7.35–7.45)
PH UA: 5.5 (ref 5–8)
PH UA: 5.5 (ref 5–8)
PH VENOUS: 7.31 (ref 7.32–7.42)
PH, ART, TEMP ADJ: ABNORMAL (ref 7.35–7.45)
PH, ART, TEMP ADJ: ABNORMAL (ref 7.35–7.45)
PH, VEN, TEMP ADJ: ABNORMAL (ref 7.32–7.42)
PHOSPHORUS: 4 MG/DL (ref 2.5–4.5)
PHOSPHORUS: 4.3 MG/DL (ref 2.5–4.5)
PHOSPHORUS: 4.4 MG/DL (ref 2.5–4.5)
PHOSPHORUS: 4.9 MG/DL (ref 2.5–4.5)
PHOSPHORUS: 5.1 MG/DL (ref 2.5–4.5)
PHOSPHORUS: 5.1 MG/DL (ref 2.5–4.5)
PHOSPHORUS: 5.6 MG/DL (ref 2.5–4.5)
PHOSPHORUS: 5.8 MG/DL (ref 2.5–4.5)
PHOSPHORUS: 6 MG/DL (ref 2.5–4.5)
PHOSPHORUS: 6.1 MG/DL (ref 2.5–4.5)
PHOSPHORUS: 6.5 MG/DL (ref 2.5–4.5)
PHOSPHORUS: 7.1 MG/DL (ref 2.5–4.5)
PLATELET # BLD: 103 K/UL (ref 150–450)
PLATELET # BLD: 105 K/UL (ref 150–450)
PLATELET # BLD: 110 K/UL (ref 150–450)
PLATELET # BLD: 112 K/UL (ref 150–450)
PLATELET # BLD: 119 K/UL (ref 150–450)
PLATELET # BLD: 123 K/UL (ref 150–450)
PLATELET # BLD: 131 K/UL (ref 138–453)
PLATELET # BLD: 131 K/UL (ref 150–450)
PLATELET # BLD: 134 K/UL (ref 138–453)
PLATELET # BLD: 134 K/UL (ref 138–453)
PLATELET # BLD: 135 K/UL (ref 150–450)
PLATELET # BLD: 138 K/UL (ref 150–450)
PLATELET # BLD: 141 K/UL (ref 150–450)
PLATELET # BLD: 144 K/UL (ref 150–450)
PLATELET # BLD: 144 K/UL (ref 150–450)
PLATELET # BLD: 147 K/UL (ref 150–450)
PLATELET # BLD: 150 K/UL (ref 150–450)
PLATELET # BLD: 150 K/UL (ref 150–450)
PLATELET # BLD: 153 K/UL (ref 138–453)
PLATELET # BLD: 155 K/UL (ref 150–450)
PLATELET # BLD: 164 K/UL (ref 150–450)
PLATELET # BLD: 177 K/UL (ref 138–453)
PLATELET # BLD: 67 K/UL (ref 138–453)
PLATELET # BLD: 69 K/UL (ref 138–453)
PLATELET # BLD: 70 K/UL (ref 138–453)
PLATELET # BLD: 82 K/UL (ref 150–450)
PLATELET # BLD: 84 K/UL (ref 138–453)
PLATELET ESTIMATE: ABNORMAL
PMV BLD AUTO: 10 FL (ref 8.1–13.5)
PMV BLD AUTO: 10.2 FL (ref 8.1–13.5)
PMV BLD AUTO: 10.3 FL (ref 8.1–13.5)
PMV BLD AUTO: 10.4 FL (ref 8.1–13.5)
PMV BLD AUTO: 10.5 FL (ref 8.1–13.5)
PMV BLD AUTO: 10.7 FL (ref 8.1–13.5)
PMV BLD AUTO: 10.7 FL (ref 8.1–13.5)
PMV BLD AUTO: 6.4 FL (ref 6–12)
PMV BLD AUTO: 6.5 FL (ref 6–12)
PMV BLD AUTO: 6.5 FL (ref 6–12)
PMV BLD AUTO: 6.7 FL (ref 6–12)
PMV BLD AUTO: 6.7 FL (ref 6–12)
PMV BLD AUTO: 6.8 FL (ref 6–12)
PMV BLD AUTO: 6.9 FL (ref 6–12)
PMV BLD AUTO: 7.1 FL (ref 6–12)
PMV BLD AUTO: 7.5 FL (ref 6–12)
PMV BLD AUTO: 7.5 FL (ref 6–12)
PMV BLD AUTO: 7.7 FL (ref 6–12)
PMV BLD AUTO: 7.9 FL (ref 6–12)
PMV BLD AUTO: 8.1 FL (ref 6–12)
PMV BLD AUTO: 8.1 FL (ref 6–12)
PMV BLD AUTO: 8.3 FL (ref 6–12)
PMV BLD AUTO: 8.8 FL (ref 6–12)
PMV BLD AUTO: 9.2 FL (ref 8.1–13.5)
PMV BLD AUTO: 9.8 FL (ref 8.1–13.5)
PO2 ARTERIAL: 57.6 MMHG (ref 80–100)
PO2 ARTERIAL: 67.5 MMHG (ref 80–100)
PO2, ART, TEMP ADJ: ABNORMAL MMHG (ref 80–100)
PO2, ART, TEMP ADJ: ABNORMAL MMHG (ref 80–100)
PO2, VEN, TEMP ADJ: ABNORMAL MMHG (ref 30–50)
PO2, VEN: 48 (ref 30–50)
POC HCO3: 22.9 MMOL/L (ref 21–28)
POC O2 SATURATION: 94 % (ref 94–98)
POC PCO2 TEMP: ABNORMAL MM HG
POC PCO2: 46.3 MM HG (ref 35–48)
POC PH TEMP: ABNORMAL
POC PH: 7.3 (ref 7.35–7.45)
POC PO2 TEMP: ABNORMAL MM HG
POC PO2: 78.4 MM HG (ref 83–108)
POSITIVE BASE EXCESS, ART: 1.8 MMOL/L (ref 0–2)
POSITIVE BASE EXCESS, ART: ABNORMAL (ref 0–3)
POSITIVE BASE EXCESS, ART: ABNORMAL MMOL/L (ref 0–2)
POSITIVE BASE EXCESS, VEN: ABNORMAL MMOL/L (ref 0–2)
POTASSIUM SERPL-SCNC: 4.2 MMOL/L (ref 3.7–5.3)
POTASSIUM SERPL-SCNC: 4.3 MMOL/L (ref 3.7–5.3)
POTASSIUM SERPL-SCNC: 4.4 MMOL/L (ref 3.7–5.3)
POTASSIUM SERPL-SCNC: 4.4 MMOL/L (ref 3.7–5.3)
POTASSIUM SERPL-SCNC: 4.5 MMOL/L (ref 3.7–5.3)
POTASSIUM SERPL-SCNC: 4.5 MMOL/L (ref 3.7–5.3)
POTASSIUM SERPL-SCNC: 4.6 MMOL/L (ref 3.7–5.3)
POTASSIUM SERPL-SCNC: 4.7 MMOL/L (ref 3.7–5.3)
POTASSIUM SERPL-SCNC: 4.8 MMOL/L (ref 3.7–5.3)
POTASSIUM SERPL-SCNC: 4.8 MMOL/L (ref 3.7–5.3)
POTASSIUM SERPL-SCNC: 4.9 MMOL/L (ref 3.7–5.3)
POTASSIUM SERPL-SCNC: 5 MMOL/L (ref 3.7–5.3)
POTASSIUM SERPL-SCNC: 5.1 MMOL/L (ref 3.7–5.3)
POTASSIUM SERPL-SCNC: 5.2 MMOL/L (ref 3.7–5.3)
POTASSIUM SERPL-SCNC: 5.3 MMOL/L (ref 3.7–5.3)
POTASSIUM SERPL-SCNC: 5.5 MMOL/L (ref 3.7–5.3)
POTASSIUM SERPL-SCNC: 5.7 MMOL/L (ref 3.7–5.3)
POTASSIUM SERPL-SCNC: 6.3 MMOL/L (ref 3.7–5.3)
POTASSIUM SERPL-SCNC: 6.5 MMOL/L (ref 3.7–5.3)
PRO-BNP: 2938 PG/ML
PRO-BNP: 3452 PG/ML
PRO-BNP: 7221 PG/ML
PRO-BNP: ABNORMAL PG/ML
PROCALCITONIN: 0.75 NG/ML
PROCALCITONIN: 1.43 NG/ML
PROCALCITONIN: 3.01 NG/ML
PROTEIN UA: ABNORMAL
PROTEIN UA: ABNORMAL
PROTHROMBIN TIME: 10.9 SEC (ref 9–12)
PROTHROMBIN TIME: 12.9 SEC (ref 11.8–14.6)
PROTHROMBIN TIME: 13.1 SEC (ref 11.8–14.6)
PROTHROMBIN TIME: 13.2 SEC (ref 11.8–14.6)
PROTHROMBIN TIME: 13.6 SEC (ref 11.8–14.6)
PROTHROMBIN TIME: 14.5 SEC (ref 11.8–14.6)
PROTHROMBIN TIME: 16.9 SEC (ref 11.8–14.6)
PSV: ABNORMAL
PT. POSITION: ABNORMAL
RBC # BLD: 2.02 M/UL (ref 4.21–5.77)
RBC # BLD: 2.33 M/UL (ref 4.21–5.77)
RBC # BLD: 2.35 M/UL (ref 4.21–5.77)
RBC # BLD: 2.39 M/UL (ref 4.21–5.77)
RBC # BLD: 2.42 M/UL (ref 4.21–5.77)
RBC # BLD: 2.47 M/UL (ref 4.5–5.9)
RBC # BLD: 2.5 M/UL (ref 4.21–5.77)
RBC # BLD: 2.52 M/UL (ref 4.21–5.77)
RBC # BLD: 2.55 M/UL (ref 4.21–5.77)
RBC # BLD: 2.75 M/UL (ref 4.5–5.9)
RBC # BLD: 2.82 M/UL (ref 4.5–5.9)
RBC # BLD: 2.84 M/UL (ref 4.5–5.9)
RBC # BLD: 2.86 M/UL (ref 4.21–5.77)
RBC # BLD: 2.86 M/UL (ref 4.5–5.9)
RBC # BLD: 2.88 M/UL (ref 4.5–5.9)
RBC # BLD: 2.88 M/UL (ref 4.5–5.9)
RBC # BLD: 2.92 M/UL (ref 4.5–5.9)
RBC # BLD: 3 M/UL (ref 4.5–5.9)
RBC # BLD: 3.05 M/UL (ref 4.5–5.9)
RBC # BLD: 3.13 M/UL (ref 4.5–5.9)
RBC # BLD: 3.25 M/UL (ref 4.5–5.9)
RBC # BLD: 3.31 M/UL (ref 4.5–5.9)
RBC # BLD: 3.36 M/UL (ref 4.5–5.9)
RBC # BLD: 3.4 M/UL (ref 4.5–5.9)
RBC # BLD: 3.45 M/UL (ref 4.5–5.9)
RBC # BLD: 3.52 M/UL (ref 4.5–5.9)
RBC # BLD: 3.61 M/UL (ref 4.5–5.9)
RBC # BLD: ABNORMAL 10*6/UL
RBC UA: ABNORMAL /HPF
RBC UA: ABNORMAL /HPF
REASON FOR REJECTION: NORMAL
RENAL EPITHELIAL, UA: ABNORMAL /HPF
RENAL EPITHELIAL, UA: ABNORMAL /HPF
RESP SYNCYTIAL VIRUS PCR: NOT DETECTED
RESPIRATORY RATE: 19
RESPIRATORY RATE: 24
RESPIRATORY RATE: ABNORMAL
RETIC %: 1.3 % (ref 0.5–1.9)
RETIC HEMOGLOBIN: 20.9 PG (ref 28.2–35.7)
RHINO/ENTEROVIRUS PCR: NOT DETECTED
SALICYLATE LEVEL: <1 MG/DL (ref 3–10)
SAMPLE SITE: ABNORMAL
SARS-COV-2, PCR: ABNORMAL
SARS-COV-2, RAPID: DETECTED
SARS-COV-2: ABNORMAL
SEG NEUTROPHILS: 53 % (ref 36–66)
SEG NEUTROPHILS: 77 % (ref 36–66)
SEG NEUTROPHILS: 77 % (ref 36–66)
SEG NEUTROPHILS: 78 % (ref 36–66)
SEG NEUTROPHILS: 79 % (ref 36–66)
SEG NEUTROPHILS: 81 % (ref 36–66)
SEG NEUTROPHILS: 84 % (ref 36–66)
SEG NEUTROPHILS: 86 % (ref 36–66)
SEG NEUTROPHILS: 87 % (ref 36–65)
SEG NEUTROPHILS: 89 % (ref 36–65)
SEG NEUTROPHILS: 89 % (ref 36–65)
SEG NEUTROPHILS: 89 % (ref 36–66)
SEG NEUTROPHILS: 90 % (ref 36–66)
SEG NEUTROPHILS: 91 % (ref 36–66)
SEG NEUTROPHILS: 95 % (ref 36–66)
SEGMENTED NEUTROPHILS ABSOLUTE COUNT: 1.68 K/UL (ref 1.8–7.7)
SEGMENTED NEUTROPHILS ABSOLUTE COUNT: 2 K/UL (ref 1.8–7.7)
SEGMENTED NEUTROPHILS ABSOLUTE COUNT: 2.66 K/UL (ref 1.3–9.1)
SEGMENTED NEUTROPHILS ABSOLUTE COUNT: 2.68 K/UL (ref 1.3–9.1)
SEGMENTED NEUTROPHILS ABSOLUTE COUNT: 3.18 K/UL (ref 1.8–7.7)
SEGMENTED NEUTROPHILS ABSOLUTE COUNT: 3.65 K/UL (ref 1.3–9.1)
SEGMENTED NEUTROPHILS ABSOLUTE COUNT: 3.86 K/UL (ref 1.3–9.1)
SEGMENTED NEUTROPHILS ABSOLUTE COUNT: 3.95 K/UL (ref 1.8–7.7)
SEGMENTED NEUTROPHILS ABSOLUTE COUNT: 4.16 K/UL (ref 1.3–9.1)
SEGMENTED NEUTROPHILS ABSOLUTE COUNT: 4.18 K/UL (ref 1.8–7.7)
SEGMENTED NEUTROPHILS ABSOLUTE COUNT: 4.5 K/UL (ref 1.3–9.1)
SEGMENTED NEUTROPHILS ABSOLUTE COUNT: 5.4 K/UL (ref 1.3–9.1)
SEGMENTED NEUTROPHILS ABSOLUTE COUNT: 5.49 K/UL (ref 1.8–7.7)
SEGMENTED NEUTROPHILS ABSOLUTE COUNT: 5.87 K/UL (ref 1.5–8.1)
SEGMENTED NEUTROPHILS ABSOLUTE COUNT: 6.08 K/UL (ref 1.3–9.1)
SEGMENTED NEUTROPHILS ABSOLUTE COUNT: 6.69 K/UL (ref 1.5–8.1)
SEGMENTED NEUTROPHILS ABSOLUTE COUNT: 9.33 K/UL (ref 1.5–8.1)
SET RATE: ABNORMAL
SODIUM BLD-SCNC: 132 MMOL/L (ref 135–144)
SODIUM BLD-SCNC: 134 MMOL/L (ref 135–144)
SODIUM BLD-SCNC: 135 MMOL/L (ref 135–144)
SODIUM BLD-SCNC: 136 MMOL/L (ref 135–144)
SODIUM BLD-SCNC: 137 MMOL/L (ref 135–144)
SODIUM BLD-SCNC: 138 MMOL/L (ref 135–144)
SODIUM BLD-SCNC: 139 MMOL/L (ref 135–144)
SODIUM BLD-SCNC: 140 MMOL/L (ref 135–144)
SODIUM BLD-SCNC: 141 MMOL/L (ref 135–144)
SODIUM BLD-SCNC: 141 MMOL/L (ref 135–144)
SODIUM BLD-SCNC: 143 MMOL/L (ref 135–144)
SODIUM BLD-SCNC: 143 MMOL/L (ref 135–144)
SOURCE: ABNORMAL
SOURCE: NORMAL
SPECIFIC GRAVITY UA: 1.01 (ref 1–1.03)
SPECIFIC GRAVITY UA: 1.02 (ref 1–1.03)
SPECIMEN DESCRIPTION: NORMAL
STREP PNEUMONIAE ANTIGEN: NEGATIVE
SURGICAL PATHOLOGY REPORT: NORMAL
TCO2 (CALC), ART: 24 MMOL/L (ref 22–29)
TEST NAME: NORMAL
TEXT FOR RESPIRATORY: ABNORMAL
TIME, STOOL #1: 1000
TIME, STOOL #1: 1800
TIME, STOOL #1: 1800
TIME, STOOL #1: 845
TIME, STOOL #2: ABNORMAL
TIME, STOOL #2: NORMAL
TIME, STOOL #3: ABNORMAL
TIME, STOOL #3: NORMAL
TOTAL CK: 20 U/L (ref 39–308)
TOTAL HB: ABNORMAL G/DL (ref 12–16)
TOTAL IRON BINDING CAPACITY: 144 UG/DL (ref 250–450)
TOTAL PROTEIN: 5.1 G/DL (ref 6.4–8.3)
TOTAL PROTEIN: 5.1 G/DL (ref 6.4–8.3)
TOTAL PROTEIN: 5.2 G/DL (ref 6.4–8.3)
TOTAL PROTEIN: 5.6 G/DL (ref 6.4–8.3)
TOTAL PROTEIN: 5.6 G/DL (ref 6.4–8.3)
TOTAL PROTEIN: 5.7 G/DL (ref 6.4–8.3)
TOTAL PROTEIN: 5.8 G/DL (ref 6.4–8.3)
TOTAL PROTEIN: 5.8 G/DL (ref 6.4–8.3)
TOTAL PROTEIN: 5.9 G/DL (ref 6.4–8.3)
TOTAL PROTEIN: 5.9 G/DL (ref 6.4–8.3)
TOTAL PROTEIN: 6.1 G/DL (ref 6.4–8.3)
TOTAL PROTEIN: 6.1 G/DL (ref 6.4–8.3)
TOTAL PROTEIN: 6.2 G/DL (ref 6.4–8.3)
TOTAL RATE: ABNORMAL
TOXIC TRICYCLIC SC,BLOOD: ABNORMAL
TRANSFUSION STATUS: NORMAL
TRICHOMONAS: ABNORMAL
TRICHOMONAS: ABNORMAL
TROPONIN INTERP: ABNORMAL
TROPONIN T: ABNORMAL NG/ML
TROPONIN, HIGH SENSITIVITY: 121 NG/L (ref 0–22)
TROPONIN, HIGH SENSITIVITY: 123 NG/L (ref 0–22)
TROPONIN, HIGH SENSITIVITY: 126 NG/L (ref 0–22)
TROPONIN, HIGH SENSITIVITY: 142 NG/L (ref 0–22)
TROPONIN, HIGH SENSITIVITY: 146 NG/L (ref 0–22)
TROPONIN, HIGH SENSITIVITY: 146 NG/L (ref 0–22)
TROPONIN, HIGH SENSITIVITY: 148 NG/L (ref 0–22)
TROPONIN, HIGH SENSITIVITY: 150 NG/L (ref 0–22)
TROPONIN, HIGH SENSITIVITY: 159 NG/L (ref 0–22)
TROPONIN, HIGH SENSITIVITY: 167 NG/L (ref 0–22)
TROPONIN, HIGH SENSITIVITY: 180 NG/L (ref 0–22)
TROPONIN, HIGH SENSITIVITY: 182 NG/L (ref 0–22)
TROPONIN, HIGH SENSITIVITY: 192 NG/L (ref 0–22)
TROPONIN, HIGH SENSITIVITY: 192 NG/L (ref 0–22)
TROPONIN, HIGH SENSITIVITY: 199 NG/L (ref 0–22)
TROPONIN, HIGH SENSITIVITY: 436 NG/L (ref 0–22)
TROPONIN, HIGH SENSITIVITY: 450 NG/L (ref 0–22)
TSH SERPL DL<=0.05 MIU/L-ACNC: 2.4 MIU/L (ref 0.3–5)
TSH SERPL DL<=0.05 MIU/L-ACNC: 2.55 MIU/L (ref 0.3–5)
TSH SERPL DL<=0.05 MIU/L-ACNC: 3.08 MIU/L (ref 0.3–5)
TURBIDITY: ABNORMAL
TURBIDITY: CLEAR
UNIT DIVISION: 0
UNIT NUMBER: NORMAL
UNSATURATED IRON BINDING CAPACITY: 92 UG/DL (ref 112–347)
URINE HGB: ABNORMAL
URINE HGB: NEGATIVE
UROBILINOGEN, URINE: NORMAL
UROBILINOGEN, URINE: NORMAL
VANCOMYCIN RANDOM DATE LAST DOSE: NORMAL
VANCOMYCIN RANDOM DOSE AMOUNT: 2500
VANCOMYCIN RANDOM DOSE AMOUNT: NORMAL
VANCOMYCIN RANDOM DOSE AMOUNT: NORMAL
VANCOMYCIN RANDOM TIME LAST DOSE: 150
VANCOMYCIN RANDOM TIME LAST DOSE: NORMAL
VANCOMYCIN RANDOM TIME LAST DOSE: NORMAL
VANCOMYCIN RANDOM: 18.5 UG/ML
VANCOMYCIN RANDOM: 21.2 UG/ML
VANCOMYCIN RANDOM: 21.2 UG/ML
VT: ABNORMAL
WBC # BLD: 1.9 K/UL (ref 3.5–11.3)
WBC # BLD: 10.5 K/UL (ref 3.5–11.3)
WBC # BLD: 2.2 K/UL (ref 3.5–11.3)
WBC # BLD: 3.4 K/UL (ref 3.5–11)
WBC # BLD: 3.5 K/UL (ref 3.5–11)
WBC # BLD: 3.5 K/UL (ref 3.5–11)
WBC # BLD: 3.5 K/UL (ref 3.5–11.3)
WBC # BLD: 3.7 K/UL (ref 3.5–11)
WBC # BLD: 3.9 K/UL (ref 3.5–11)
WBC # BLD: 4 K/UL (ref 3.5–11)
WBC # BLD: 4.1 K/UL (ref 3.5–11)
WBC # BLD: 4.1 K/UL (ref 3.5–11)
WBC # BLD: 4.3 K/UL (ref 3.5–11)
WBC # BLD: 4.5 K/UL (ref 3.5–11)
WBC # BLD: 4.6 K/UL (ref 3.5–11.3)
WBC # BLD: 4.6 K/UL (ref 3.5–11.3)
WBC # BLD: 4.9 K/UL (ref 3.5–11)
WBC # BLD: 5.4 K/UL (ref 3.5–11)
WBC # BLD: 5.5 K/UL (ref 3.5–11)
WBC # BLD: 6.1 K/UL (ref 3.5–11.3)
WBC # BLD: 6.4 K/UL (ref 3.5–11)
WBC # BLD: 6.4 K/UL (ref 3.5–11)
WBC # BLD: 6.6 K/UL (ref 3.5–11.3)
WBC # BLD: 6.9 K/UL (ref 3.5–11)
WBC # BLD: 7.7 K/UL (ref 3.5–11.3)
WBC # BLD: ABNORMAL 10*3/UL
WBC UA: ABNORMAL /HPF
WBC UA: ABNORMAL /HPF
YEAST: ABNORMAL
YEAST: ABNORMAL
ZZ NTE CLEAN UP: ORDERED TEST: NORMAL
ZZ NTE WITH NAME CLEAN UP: SPECIMEN SOURCE: NORMAL

## 2020-01-01 PROCEDURE — 71045 X-RAY EXAM CHEST 1 VIEW: CPT

## 2020-01-01 PROCEDURE — 6370000000 HC RX 637 (ALT 250 FOR IP): Performed by: NURSE PRACTITIONER

## 2020-01-01 PROCEDURE — 87040 BLOOD CULTURE FOR BACTERIA: CPT

## 2020-01-01 PROCEDURE — 96372 THER/PROPH/DIAG INJ SC/IM: CPT

## 2020-01-01 PROCEDURE — 86140 C-REACTIVE PROTEIN: CPT

## 2020-01-01 PROCEDURE — 93005 ELECTROCARDIOGRAM TRACING: CPT | Performed by: STUDENT IN AN ORGANIZED HEALTH CARE EDUCATION/TRAINING PROGRAM

## 2020-01-01 PROCEDURE — 82272 OCCULT BLD FECES 1-3 TESTS: CPT

## 2020-01-01 PROCEDURE — 99284 EMERGENCY DEPT VISIT MOD MDM: CPT

## 2020-01-01 PROCEDURE — 94761 N-INVAS EAR/PLS OXIMETRY MLT: CPT

## 2020-01-01 PROCEDURE — 85025 COMPLETE CBC W/AUTO DIFF WBC: CPT

## 2020-01-01 PROCEDURE — 2500000003 HC RX 250 WO HCPCS: Performed by: INTERNAL MEDICINE

## 2020-01-01 PROCEDURE — 90935 HEMODIALYSIS ONE EVALUATION: CPT

## 2020-01-01 PROCEDURE — 36430 TRANSFUSION BLD/BLD COMPNT: CPT

## 2020-01-01 PROCEDURE — APPSS30 APP SPLIT SHARED TIME 16-30 MINUTES: Performed by: PHYSICIAN ASSISTANT

## 2020-01-01 PROCEDURE — 76937 US GUIDE VASCULAR ACCESS: CPT | Performed by: RADIOLOGY

## 2020-01-01 PROCEDURE — 93010 ELECTROCARDIOGRAM REPORT: CPT | Performed by: INTERNAL MEDICINE

## 2020-01-01 PROCEDURE — 96365 THER/PROPH/DIAG IV INF INIT: CPT

## 2020-01-01 PROCEDURE — 6360000002 HC RX W HCPCS: Performed by: STUDENT IN AN ORGANIZED HEALTH CARE EDUCATION/TRAINING PROGRAM

## 2020-01-01 PROCEDURE — 84132 ASSAY OF SERUM POTASSIUM: CPT

## 2020-01-01 PROCEDURE — 2060000000 HC ICU INTERMEDIATE R&B

## 2020-01-01 PROCEDURE — 82947 ASSAY GLUCOSE BLOOD QUANT: CPT

## 2020-01-01 PROCEDURE — 84484 ASSAY OF TROPONIN QUANT: CPT

## 2020-01-01 PROCEDURE — 96375 TX/PRO/DX INJ NEW DRUG ADDON: CPT

## 2020-01-01 PROCEDURE — 2580000003 HC RX 258: Performed by: INTERNAL MEDICINE

## 2020-01-01 PROCEDURE — 2500000003 HC RX 250 WO HCPCS: Performed by: STUDENT IN AN ORGANIZED HEALTH CARE EDUCATION/TRAINING PROGRAM

## 2020-01-01 PROCEDURE — 6360000002 HC RX W HCPCS

## 2020-01-01 PROCEDURE — 6360000002 HC RX W HCPCS: Performed by: PHYSICIAN ASSISTANT

## 2020-01-01 PROCEDURE — 36415 COLL VENOUS BLD VENIPUNCTURE: CPT

## 2020-01-01 PROCEDURE — 85610 PROTHROMBIN TIME: CPT

## 2020-01-01 PROCEDURE — 83880 ASSAY OF NATRIURETIC PEPTIDE: CPT

## 2020-01-01 PROCEDURE — 6370000000 HC RX 637 (ALT 250 FOR IP): Performed by: INTERNAL MEDICINE

## 2020-01-01 PROCEDURE — 83010 ASSAY OF HAPTOGLOBIN QUANT: CPT

## 2020-01-01 PROCEDURE — 99232 SBSQ HOSP IP/OBS MODERATE 35: CPT | Performed by: INTERNAL MEDICINE

## 2020-01-01 PROCEDURE — 6360000002 HC RX W HCPCS: Performed by: INTERNAL MEDICINE

## 2020-01-01 PROCEDURE — 2700000000 HC OXYGEN THERAPY PER DAY

## 2020-01-01 PROCEDURE — 6360000002 HC RX W HCPCS: Performed by: NURSE PRACTITIONER

## 2020-01-01 PROCEDURE — 80048 BASIC METABOLIC PNL TOTAL CA: CPT

## 2020-01-01 PROCEDURE — 82728 ASSAY OF FERRITIN: CPT

## 2020-01-01 PROCEDURE — 99233 SBSQ HOSP IP/OBS HIGH 50: CPT | Performed by: INTERNAL MEDICINE

## 2020-01-01 PROCEDURE — 74176 CT ABD & PELVIS W/O CONTRAST: CPT

## 2020-01-01 PROCEDURE — 72125 CT NECK SPINE W/O DYE: CPT

## 2020-01-01 PROCEDURE — 36556 INSERT NON-TUNNEL CV CATH: CPT

## 2020-01-01 PROCEDURE — 85520 HEPARIN ASSAY: CPT

## 2020-01-01 PROCEDURE — 83605 ASSAY OF LACTIC ACID: CPT

## 2020-01-01 PROCEDURE — 84145 PROCALCITONIN (PCT): CPT

## 2020-01-01 PROCEDURE — 80053 COMPREHEN METABOLIC PANEL: CPT

## 2020-01-01 PROCEDURE — 87804 INFLUENZA ASSAY W/OPTIC: CPT

## 2020-01-01 PROCEDURE — 99239 HOSP IP/OBS DSCHRG MGMT >30: CPT | Performed by: INTERNAL MEDICINE

## 2020-01-01 PROCEDURE — 94770 HC ETCO2 MONITOR DAILY: CPT

## 2020-01-01 PROCEDURE — 82140 ASSAY OF AMMONIA: CPT

## 2020-01-01 PROCEDURE — 6370000000 HC RX 637 (ALT 250 FOR IP): Performed by: PHYSICIAN ASSISTANT

## 2020-01-01 PROCEDURE — 2580000003 HC RX 258: Performed by: STUDENT IN AN ORGANIZED HEALTH CARE EDUCATION/TRAINING PROGRAM

## 2020-01-01 PROCEDURE — 86901 BLOOD TYPING SEROLOGIC RH(D): CPT

## 2020-01-01 PROCEDURE — 93005 ELECTROCARDIOGRAM TRACING: CPT | Performed by: EMERGENCY MEDICINE

## 2020-01-01 PROCEDURE — 83874 ASSAY OF MYOGLOBIN: CPT

## 2020-01-01 PROCEDURE — 82330 ASSAY OF CALCIUM: CPT

## 2020-01-01 PROCEDURE — 84443 ASSAY THYROID STIM HORMONE: CPT

## 2020-01-01 PROCEDURE — P9016 RBC LEUKOCYTES REDUCED: HCPCS

## 2020-01-01 PROCEDURE — 2580000003 HC RX 258: Performed by: NURSE PRACTITIONER

## 2020-01-01 PROCEDURE — G8428 CUR MEDS NOT DOCUMENT: HCPCS | Performed by: INTERNAL MEDICINE

## 2020-01-01 PROCEDURE — 85027 COMPLETE CBC AUTOMATED: CPT

## 2020-01-01 PROCEDURE — 83520 IMMUNOASSAY QUANT NOS NONAB: CPT

## 2020-01-01 PROCEDURE — 97166 OT EVAL MOD COMPLEX 45 MIN: CPT

## 2020-01-01 PROCEDURE — 2000000000 HC ICU R&B

## 2020-01-01 PROCEDURE — 36556 INSERT NON-TUNNEL CV CATH: CPT | Performed by: RADIOLOGY

## 2020-01-01 PROCEDURE — 3017F COLORECTAL CA SCREEN DOC REV: CPT | Performed by: INTERNAL MEDICINE

## 2020-01-01 PROCEDURE — 99291 CRITICAL CARE FIRST HOUR: CPT | Performed by: INTERNAL MEDICINE

## 2020-01-01 PROCEDURE — C8929 TTE W OR WO FOL WCON,DOPPLER: HCPCS

## 2020-01-01 PROCEDURE — 3430000000 HC RX DIAGNOSTIC RADIOPHARMACEUTICAL: Performed by: STUDENT IN AN ORGANIZED HEALTH CARE EDUCATION/TRAINING PROGRAM

## 2020-01-01 PROCEDURE — 6360000002 HC RX W HCPCS: Performed by: PSYCHIATRY & NEUROLOGY

## 2020-01-01 PROCEDURE — 70450 CT HEAD/BRAIN W/O DYE: CPT

## 2020-01-01 PROCEDURE — 83735 ASSAY OF MAGNESIUM: CPT

## 2020-01-01 PROCEDURE — P9047 ALBUMIN (HUMAN), 25%, 50ML: HCPCS | Performed by: INTERNAL MEDICINE

## 2020-01-01 PROCEDURE — 97110 THERAPEUTIC EXERCISES: CPT

## 2020-01-01 PROCEDURE — 80076 HEPATIC FUNCTION PANEL: CPT

## 2020-01-01 PROCEDURE — 99232 SBSQ HOSP IP/OBS MODERATE 35: CPT | Performed by: PSYCHIATRY & NEUROLOGY

## 2020-01-01 PROCEDURE — 83550 IRON BINDING TEST: CPT

## 2020-01-01 PROCEDURE — 6360000002 HC RX W HCPCS: Performed by: SURGERY

## 2020-01-01 PROCEDURE — 99233 SBSQ HOSP IP/OBS HIGH 50: CPT | Performed by: PSYCHIATRY & NEUROLOGY

## 2020-01-01 PROCEDURE — 97535 SELF CARE MNGMENT TRAINING: CPT

## 2020-01-01 PROCEDURE — 85018 HEMOGLOBIN: CPT

## 2020-01-01 PROCEDURE — G0378 HOSPITAL OBSERVATION PER HR: HCPCS

## 2020-01-01 PROCEDURE — 99222 1ST HOSP IP/OBS MODERATE 55: CPT | Performed by: NURSE PRACTITIONER

## 2020-01-01 PROCEDURE — 85014 HEMATOCRIT: CPT

## 2020-01-01 PROCEDURE — 82805 BLOOD GASES W/O2 SATURATION: CPT

## 2020-01-01 PROCEDURE — 96368 THER/DIAG CONCURRENT INF: CPT

## 2020-01-01 PROCEDURE — 99223 1ST HOSP IP/OBS HIGH 75: CPT | Performed by: PHYSICIAN ASSISTANT

## 2020-01-01 PROCEDURE — 1111F DSCHRG MED/CURRENT MED MERGE: CPT | Performed by: INTERNAL MEDICINE

## 2020-01-01 PROCEDURE — 99214 OFFICE O/P EST MOD 30 MIN: CPT | Performed by: INTERNAL MEDICINE

## 2020-01-01 PROCEDURE — 6360000002 HC RX W HCPCS: Performed by: EMERGENCY MEDICINE

## 2020-01-01 PROCEDURE — 82550 ASSAY OF CK (CPK): CPT

## 2020-01-01 PROCEDURE — 6370000000 HC RX 637 (ALT 250 FOR IP): Performed by: EMERGENCY MEDICINE

## 2020-01-01 PROCEDURE — 99232 SBSQ HOSP IP/OBS MODERATE 35: CPT | Performed by: FAMILY MEDICINE

## 2020-01-01 PROCEDURE — 2580000003 HC RX 258: Performed by: EMERGENCY MEDICINE

## 2020-01-01 PROCEDURE — 93970 EXTREMITY STUDY: CPT

## 2020-01-01 PROCEDURE — 85730 THROMBOPLASTIN TIME PARTIAL: CPT

## 2020-01-01 PROCEDURE — 84100 ASSAY OF PHOSPHORUS: CPT

## 2020-01-01 PROCEDURE — 5A1D70Z PERFORMANCE OF URINARY FILTRATION, INTERMITTENT, LESS THAN 6 HOURS PER DAY: ICD-10-PCS | Performed by: INTERNAL MEDICINE

## 2020-01-01 PROCEDURE — 6370000000 HC RX 637 (ALT 250 FOR IP): Performed by: STUDENT IN AN ORGANIZED HEALTH CARE EDUCATION/TRAINING PROGRAM

## 2020-01-01 PROCEDURE — 99223 1ST HOSP IP/OBS HIGH 75: CPT | Performed by: INTERNAL MEDICINE

## 2020-01-01 PROCEDURE — 87340 HEPATITIS B SURFACE AG IA: CPT

## 2020-01-01 PROCEDURE — 99222 1ST HOSP IP/OBS MODERATE 55: CPT | Performed by: INTERNAL MEDICINE

## 2020-01-01 PROCEDURE — 6360000002 HC RX W HCPCS: Performed by: FAMILY MEDICINE

## 2020-01-01 PROCEDURE — 99285 EMERGENCY DEPT VISIT HI MDM: CPT

## 2020-01-01 PROCEDURE — 94664 DEMO&/EVAL PT USE INHALER: CPT

## 2020-01-01 PROCEDURE — 93005 ELECTROCARDIOGRAM TRACING: CPT | Performed by: INTERNAL MEDICINE

## 2020-01-01 PROCEDURE — 87086 URINE CULTURE/COLONY COUNT: CPT

## 2020-01-01 PROCEDURE — 4040F PNEUMOC VAC/ADMIN/RCVD: CPT | Performed by: INTERNAL MEDICINE

## 2020-01-01 PROCEDURE — 51701 INSERT BLADDER CATHETER: CPT

## 2020-01-01 PROCEDURE — A9500 TC99M SESTAMIBI: HCPCS | Performed by: STUDENT IN AN ORGANIZED HEALTH CARE EDUCATION/TRAINING PROGRAM

## 2020-01-01 PROCEDURE — 6360000004 HC RX CONTRAST MEDICATION: Performed by: EMERGENCY MEDICINE

## 2020-01-01 PROCEDURE — 83615 LACTATE (LD) (LDH) ENZYME: CPT

## 2020-01-01 PROCEDURE — 71250 CT THORAX DX C-: CPT

## 2020-01-01 PROCEDURE — 80202 ASSAY OF VANCOMYCIN: CPT

## 2020-01-01 PROCEDURE — 86850 RBC ANTIBODY SCREEN: CPT

## 2020-01-01 PROCEDURE — 86803 HEPATITIS C AB TEST: CPT

## 2020-01-01 PROCEDURE — 2709999900 IR FLUORO GUIDED CVA DEVICE PLMT/REPLACE/REMOVAL

## 2020-01-01 PROCEDURE — 85384 FIBRINOGEN ACTIVITY: CPT

## 2020-01-01 PROCEDURE — 97162 PT EVAL MOD COMPLEX 30 MIN: CPT

## 2020-01-01 PROCEDURE — 87449 NOS EACH ORGANISM AG IA: CPT

## 2020-01-01 PROCEDURE — 80307 DRUG TEST PRSMV CHEM ANLYZR: CPT

## 2020-01-01 PROCEDURE — 36600 WITHDRAWAL OF ARTERIAL BLOOD: CPT

## 2020-01-01 PROCEDURE — U0002 COVID-19 LAB TEST NON-CDC: HCPCS

## 2020-01-01 PROCEDURE — 99232 SBSQ HOSP IP/OBS MODERATE 35: CPT | Performed by: NURSE PRACTITIONER

## 2020-01-01 PROCEDURE — 80069 RENAL FUNCTION PANEL: CPT

## 2020-01-01 PROCEDURE — 96367 TX/PROPH/DG ADDL SEQ IV INF: CPT

## 2020-01-01 PROCEDURE — 86900 BLOOD TYPING SEROLOGIC ABO: CPT

## 2020-01-01 PROCEDURE — 5A1D70Z PERFORMANCE OF URINARY FILTRATION, INTERMITTENT, LESS THAN 6 HOURS PER DAY: ICD-10-PCS | Performed by: SPECIALIST

## 2020-01-01 PROCEDURE — 76882 US LMTD JT/FCL EVL NVASC XTR: CPT

## 2020-01-01 PROCEDURE — 77001 FLUOROGUIDE FOR VEIN DEVICE: CPT | Performed by: RADIOLOGY

## 2020-01-01 PROCEDURE — 82803 BLOOD GASES ANY COMBINATION: CPT

## 2020-01-01 PROCEDURE — 99233 SBSQ HOSP IP/OBS HIGH 50: CPT | Performed by: FAMILY MEDICINE

## 2020-01-01 PROCEDURE — 51798 US URINE CAPACITY MEASURE: CPT

## 2020-01-01 PROCEDURE — 36592 COLLECT BLOOD FROM PICC: CPT

## 2020-01-01 PROCEDURE — 83690 ASSAY OF LIPASE: CPT

## 2020-01-01 PROCEDURE — G0480 DRUG TEST DEF 1-7 CLASSES: HCPCS

## 2020-01-01 PROCEDURE — 2500000003 HC RX 250 WO HCPCS: Performed by: NURSE PRACTITIONER

## 2020-01-01 PROCEDURE — 97530 THERAPEUTIC ACTIVITIES: CPT

## 2020-01-01 PROCEDURE — 94640 AIRWAY INHALATION TREATMENT: CPT

## 2020-01-01 PROCEDURE — 99223 1ST HOSP IP/OBS HIGH 75: CPT | Performed by: PSYCHIATRY & NEUROLOGY

## 2020-01-01 PROCEDURE — 83540 ASSAY OF IRON: CPT

## 2020-01-01 PROCEDURE — 2500000003 HC RX 250 WO HCPCS: Performed by: FAMILY MEDICINE

## 2020-01-01 PROCEDURE — 82553 CREATINE MB FRACTION: CPT

## 2020-01-01 PROCEDURE — 86920 COMPATIBILITY TEST SPIN: CPT

## 2020-01-01 PROCEDURE — 86317 IMMUNOASSAY INFECTIOUS AGENT: CPT

## 2020-01-01 PROCEDURE — 81001 URINALYSIS AUTO W/SCOPE: CPT

## 2020-01-01 PROCEDURE — 02HV33Z INSERTION OF INFUSION DEVICE INTO SUPERIOR VENA CAVA, PERCUTANEOUS APPROACH: ICD-10-PCS | Performed by: RADIOLOGY

## 2020-01-01 PROCEDURE — 85379 FIBRIN DEGRADATION QUANT: CPT

## 2020-01-01 PROCEDURE — 36556 INSERT NON-TUNNEL CV CATH: CPT | Performed by: SURGERY

## 2020-01-01 PROCEDURE — G0328 FECAL BLOOD SCRN IMMUNOASSAY: HCPCS

## 2020-01-01 PROCEDURE — 94760 N-INVAS EAR/PLS OXIMETRY 1: CPT

## 2020-01-01 PROCEDURE — 86738 MYCOPLASMA ANTIBODY: CPT

## 2020-01-01 PROCEDURE — 87205 SMEAR GRAM STAIN: CPT

## 2020-01-01 PROCEDURE — 6360000004 HC RX CONTRAST MEDICATION: Performed by: INTERNAL MEDICINE

## 2020-01-01 PROCEDURE — 76770 US EXAM ABDO BACK WALL COMP: CPT

## 2020-01-01 PROCEDURE — 96366 THER/PROPH/DIAG IV INF ADDON: CPT

## 2020-01-01 PROCEDURE — 1036F TOBACCO NON-USER: CPT | Performed by: INTERNAL MEDICINE

## 2020-01-01 PROCEDURE — 71260 CT THORAX DX C+: CPT

## 2020-01-01 PROCEDURE — APPNB30 APP NON BILLABLE TIME 0-30 MINS: Performed by: NURSE PRACTITIONER

## 2020-01-01 PROCEDURE — 94762 N-INVAS EAR/PLS OXIMTRY CONT: CPT

## 2020-01-01 PROCEDURE — 87899 AGENT NOS ASSAY W/OPTIC: CPT

## 2020-01-01 PROCEDURE — 96376 TX/PRO/DX INJ SAME DRUG ADON: CPT

## 2020-01-01 PROCEDURE — 70480 CT ORBIT/EAR/FOSSA W/O DYE: CPT

## 2020-01-01 PROCEDURE — 2500000003 HC RX 250 WO HCPCS: Performed by: EMERGENCY MEDICINE

## 2020-01-01 PROCEDURE — 99239 HOSP IP/OBS DSCHRG MGMT >30: CPT | Performed by: FAMILY MEDICINE

## 2020-01-01 PROCEDURE — G8417 CALC BMI ABV UP PARAM F/U: HCPCS | Performed by: INTERNAL MEDICINE

## 2020-01-01 PROCEDURE — 82247 BILIRUBIN TOTAL: CPT

## 2020-01-01 PROCEDURE — 87070 CULTURE OTHR SPECIMN AEROBIC: CPT

## 2020-01-01 PROCEDURE — 74177 CT ABD & PELVIS W/CONTRAST: CPT

## 2020-01-01 PROCEDURE — 82248 BILIRUBIN DIRECT: CPT

## 2020-01-01 PROCEDURE — 85045 AUTOMATED RETICULOCYTE COUNT: CPT

## 2020-01-01 PROCEDURE — 0100U HC RESPIRPTHGN MULT REV TRANS & AMP PRB TECH 21 TRGT: CPT

## 2020-01-01 PROCEDURE — 1123F ACP DISCUSS/DSCN MKR DOCD: CPT | Performed by: INTERNAL MEDICINE

## 2020-01-01 PROCEDURE — 99222 1ST HOSP IP/OBS MODERATE 55: CPT | Performed by: FAMILY MEDICINE

## 2020-01-01 PROCEDURE — 93005 ELECTROCARDIOGRAM TRACING: CPT | Performed by: PHYSICIAN ASSISTANT

## 2020-01-01 PROCEDURE — 82800 BLOOD PH: CPT

## 2020-01-01 PROCEDURE — 96374 THER/PROPH/DIAG INJ IV PUSH: CPT

## 2020-01-01 PROCEDURE — 86704 HEP B CORE ANTIBODY TOTAL: CPT

## 2020-01-01 PROCEDURE — 06HY33Z INSERTION OF INFUSION DEVICE INTO LOWER VEIN, PERCUTANEOUS APPROACH: ICD-10-PCS | Performed by: SURGERY

## 2020-01-01 RX ORDER — PANTOPRAZOLE SODIUM 40 MG/1
40 TABLET, DELAYED RELEASE ORAL
Status: DISCONTINUED | OUTPATIENT
Start: 2020-01-01 | End: 2020-01-01 | Stop reason: HOSPADM

## 2020-01-01 RX ORDER — MORPHINE SULFATE 2 MG/ML
2 INJECTION, SOLUTION INTRAMUSCULAR; INTRAVENOUS
Status: DISCONTINUED | OUTPATIENT
Start: 2020-01-01 | End: 2020-01-01

## 2020-01-01 RX ORDER — ACETAMINOPHEN 325 MG/1
650 TABLET ORAL EVERY 6 HOURS PRN
Status: DISCONTINUED | OUTPATIENT
Start: 2020-01-01 | End: 2020-01-01 | Stop reason: HOSPADM

## 2020-01-01 RX ORDER — MIDODRINE HYDROCHLORIDE 5 MG/1
10 TABLET ORAL PRN
Status: DISCONTINUED | OUTPATIENT
Start: 2020-01-01 | End: 2020-01-01

## 2020-01-01 RX ORDER — OXYCODONE HYDROCHLORIDE 10 MG/1
10 TABLET ORAL EVERY 6 HOURS PRN
Qty: 20 TABLET | Refills: 0 | Status: ON HOLD | OUTPATIENT
Start: 2020-01-01 | End: 2020-01-01 | Stop reason: HOSPADM

## 2020-01-01 RX ORDER — SODIUM CHLORIDE 0.9 % (FLUSH) 0.9 %
10 SYRINGE (ML) INJECTION EVERY 12 HOURS SCHEDULED
Status: DISCONTINUED | OUTPATIENT
Start: 2020-01-01 | End: 2020-01-01 | Stop reason: HOSPADM

## 2020-01-01 RX ORDER — SODIUM CHLORIDE 0.9 % (FLUSH) 0.9 %
10 SYRINGE (ML) INJECTION PRN
Status: DISCONTINUED | OUTPATIENT
Start: 2020-01-01 | End: 2020-01-01 | Stop reason: HOSPADM

## 2020-01-01 RX ORDER — PROMETHAZINE HYDROCHLORIDE 25 MG/1
12.5 TABLET ORAL EVERY 6 HOURS PRN
Status: CANCELLED | OUTPATIENT
Start: 2020-01-01

## 2020-01-01 RX ORDER — OXYCODONE HYDROCHLORIDE 5 MG/1
10 TABLET ORAL EVERY 4 HOURS PRN
Status: DISCONTINUED | OUTPATIENT
Start: 2020-01-01 | End: 2020-01-01

## 2020-01-01 RX ORDER — HEPARIN SODIUM 10000 [USP'U]/100ML
12 INJECTION, SOLUTION INTRAVENOUS CONTINUOUS
Status: DISCONTINUED | OUTPATIENT
Start: 2020-01-01 | End: 2020-01-01

## 2020-01-01 RX ORDER — FENTANYL CITRATE 50 UG/ML
50 INJECTION, SOLUTION INTRAMUSCULAR; INTRAVENOUS ONCE
Status: COMPLETED | OUTPATIENT
Start: 2020-01-01 | End: 2020-01-01

## 2020-01-01 RX ORDER — CETIRIZINE HYDROCHLORIDE 10 MG/1
5 TABLET ORAL DAILY
Status: DISCONTINUED | OUTPATIENT
Start: 2020-01-01 | End: 2020-01-01 | Stop reason: HOSPADM

## 2020-01-01 RX ORDER — ONDANSETRON 2 MG/ML
4 INJECTION INTRAMUSCULAR; INTRAVENOUS EVERY 8 HOURS PRN
Status: DISCONTINUED | OUTPATIENT
Start: 2020-01-01 | End: 2020-01-01 | Stop reason: HOSPADM

## 2020-01-01 RX ORDER — DIPHENHYDRAMINE HCL 25 MG
25 CAPSULE ORAL EVERY 6 HOURS PRN
COMMUNITY

## 2020-01-01 RX ORDER — DOXYCYCLINE 100 MG/1
100 CAPSULE ORAL EVERY 12 HOURS SCHEDULED
Status: DISCONTINUED | OUTPATIENT
Start: 2020-01-01 | End: 2020-01-01 | Stop reason: HOSPADM

## 2020-01-01 RX ORDER — ISOSORBIDE MONONITRATE 60 MG/1
60 TABLET, EXTENDED RELEASE ORAL DAILY
Status: DISCONTINUED | OUTPATIENT
Start: 2020-01-01 | End: 2020-01-01 | Stop reason: HOSPADM

## 2020-01-01 RX ORDER — RAMELTEON 8 MG/1
8 TABLET ORAL NIGHTLY
Status: DISCONTINUED | OUTPATIENT
Start: 2020-01-01 | End: 2020-01-01 | Stop reason: RX

## 2020-01-01 RX ORDER — FLUCONAZOLE 100 MG/1
200 TABLET ORAL ONCE
Status: COMPLETED | OUTPATIENT
Start: 2020-01-01 | End: 2020-01-01

## 2020-01-01 RX ORDER — MIDODRINE HYDROCHLORIDE 5 MG/1
5 TABLET ORAL
Status: DISCONTINUED | OUTPATIENT
Start: 2020-01-01 | End: 2020-01-01 | Stop reason: HOSPADM

## 2020-01-01 RX ORDER — ASPIRIN 81 MG/1
81 TABLET, CHEWABLE ORAL DAILY
Status: DISCONTINUED | OUTPATIENT
Start: 2020-01-01 | End: 2020-01-01 | Stop reason: HOSPADM

## 2020-01-01 RX ORDER — IPRATROPIUM BROMIDE AND ALBUTEROL SULFATE 2.5; .5 MG/3ML; MG/3ML
3 SOLUTION RESPIRATORY (INHALATION) ONCE
Status: DISCONTINUED | OUTPATIENT
Start: 2020-01-01 | End: 2020-01-01

## 2020-01-01 RX ORDER — FLUTICASONE PROPIONATE 50 MCG
1 SPRAY, SUSPENSION (ML) NASAL DAILY
Status: DISCONTINUED | OUTPATIENT
Start: 2020-01-01 | End: 2020-01-01 | Stop reason: HOSPADM

## 2020-01-01 RX ORDER — ONDANSETRON 2 MG/ML
4 INJECTION INTRAMUSCULAR; INTRAVENOUS EVERY 6 HOURS PRN
Status: DISCONTINUED | OUTPATIENT
Start: 2020-01-01 | End: 2020-01-01 | Stop reason: HOSPADM

## 2020-01-01 RX ORDER — ONDANSETRON 4 MG/1
4 TABLET, FILM COATED ORAL EVERY 6 HOURS PRN
Status: DISCONTINUED | OUTPATIENT
Start: 2020-01-01 | End: 2020-01-01 | Stop reason: HOSPADM

## 2020-01-01 RX ORDER — SODIUM POLYSTYRENE SULFONATE 4.1 MEQ/G
45 POWDER, FOR SUSPENSION ORAL; RECTAL ONCE
Status: DISCONTINUED | OUTPATIENT
Start: 2020-01-01 | End: 2020-01-01 | Stop reason: HOSPADM

## 2020-01-01 RX ORDER — METOPROLOL TARTRATE 5 MG/5ML
2.5 INJECTION INTRAVENOUS ONCE
Status: COMPLETED | OUTPATIENT
Start: 2020-01-01 | End: 2020-01-01

## 2020-01-01 RX ORDER — HEPARIN SODIUM 5000 [USP'U]/ML
5000 INJECTION, SOLUTION INTRAVENOUS; SUBCUTANEOUS EVERY 8 HOURS SCHEDULED
Status: DISCONTINUED | OUTPATIENT
Start: 2020-01-01 | End: 2020-01-01 | Stop reason: HOSPADM

## 2020-01-01 RX ORDER — POLYETHYLENE GLYCOL 3350 17 G/17G
17 POWDER, FOR SOLUTION ORAL DAILY PRN
Status: DISCONTINUED | OUTPATIENT
Start: 2020-01-01 | End: 2020-01-01 | Stop reason: HOSPADM

## 2020-01-01 RX ORDER — RAMELTEON 8 MG/1
8 TABLET ORAL NIGHTLY
Status: DISCONTINUED | OUTPATIENT
Start: 2020-01-01 | End: 2020-01-01

## 2020-01-01 RX ORDER — ESCITALOPRAM OXALATE 10 MG/1
10 TABLET ORAL EVERY MORNING
Status: DISCONTINUED | OUTPATIENT
Start: 2020-01-01 | End: 2020-01-01 | Stop reason: HOSPADM

## 2020-01-01 RX ORDER — MIDODRINE HYDROCHLORIDE 5 MG/1
5 TABLET ORAL
Status: DISCONTINUED | OUTPATIENT
Start: 2020-01-01 | End: 2020-01-01

## 2020-01-01 RX ORDER — LACTULOSE 10 G/15ML
10 SOLUTION ORAL DAILY
Status: DISCONTINUED | OUTPATIENT
Start: 2020-01-01 | End: 2020-01-01 | Stop reason: HOSPADM

## 2020-01-01 RX ORDER — DEXTROSE MONOHYDRATE 50 MG/ML
100 INJECTION, SOLUTION INTRAVENOUS PRN
Status: DISCONTINUED | OUTPATIENT
Start: 2020-01-01 | End: 2020-01-01 | Stop reason: HOSPADM

## 2020-01-01 RX ORDER — ACETAMINOPHEN 650 MG/1
650 SUPPOSITORY RECTAL EVERY 6 HOURS PRN
Status: CANCELLED | OUTPATIENT
Start: 2020-01-01

## 2020-01-01 RX ORDER — LORAZEPAM 1 MG/1
1 TABLET ORAL ONCE
Status: COMPLETED | OUTPATIENT
Start: 2020-01-01 | End: 2020-01-01

## 2020-01-01 RX ORDER — ZINC SULFATE 50(220)MG
50 CAPSULE ORAL DAILY
Status: DISCONTINUED | OUTPATIENT
Start: 2020-01-01 | End: 2020-01-01 | Stop reason: HOSPADM

## 2020-01-01 RX ORDER — SENNA PLUS 8.6 MG/1
1 TABLET ORAL 2 TIMES DAILY
Status: DISCONTINUED | OUTPATIENT
Start: 2020-01-01 | End: 2020-01-01 | Stop reason: HOSPADM

## 2020-01-01 RX ORDER — 0.9 % SODIUM CHLORIDE 0.9 %
20 INTRAVENOUS SOLUTION INTRAVENOUS ONCE
Status: DISCONTINUED | OUTPATIENT
Start: 2020-01-01 | End: 2020-01-01 | Stop reason: HOSPADM

## 2020-01-01 RX ORDER — HEPARIN SODIUM 5000 [USP'U]/ML
2000 INJECTION, SOLUTION INTRAVENOUS; SUBCUTANEOUS PRN
Status: DISCONTINUED | OUTPATIENT
Start: 2020-01-01 | End: 2020-01-01 | Stop reason: HOSPADM

## 2020-01-01 RX ORDER — ZINC SULFATE 50(220)MG
50 CAPSULE ORAL DAILY
Qty: 30 CAPSULE | Refills: 3 | COMMUNITY
Start: 2020-01-01

## 2020-01-01 RX ORDER — MORPHINE SULFATE 4 MG/ML
4 INJECTION, SOLUTION INTRAMUSCULAR; INTRAVENOUS
Status: DISCONTINUED | OUTPATIENT
Start: 2020-01-01 | End: 2020-01-01

## 2020-01-01 RX ORDER — FENTANYL 25 UG/H
1 PATCH TRANSDERMAL
Status: ON HOLD | COMMUNITY
End: 2020-01-01 | Stop reason: SDUPTHER

## 2020-01-01 RX ORDER — METOPROLOL TARTRATE 5 MG/5ML
2.5 INJECTION INTRAVENOUS EVERY 6 HOURS
Status: DISCONTINUED | OUTPATIENT
Start: 2020-01-01 | End: 2020-01-01 | Stop reason: HOSPADM

## 2020-01-01 RX ORDER — DILTIAZEM HYDROCHLORIDE 5 MG/ML
20 INJECTION INTRAVENOUS ONCE
Status: DISCONTINUED | OUTPATIENT
Start: 2020-01-01 | End: 2020-01-01

## 2020-01-01 RX ORDER — ASPIRIN 81 MG/1
324 TABLET, CHEWABLE ORAL ONCE
Status: DISCONTINUED | OUTPATIENT
Start: 2020-01-01 | End: 2020-01-01

## 2020-01-01 RX ORDER — NITROGLYCERIN 0.4 MG/1
0.4 TABLET SUBLINGUAL EVERY 5 MIN PRN
Status: ACTIVE | OUTPATIENT
Start: 2020-01-01 | End: 2020-01-01

## 2020-01-01 RX ORDER — LEVALBUTEROL 1.25 MG/.5ML
1.25 SOLUTION, CONCENTRATE RESPIRATORY (INHALATION) EVERY 6 HOURS PRN
Status: DISCONTINUED | OUTPATIENT
Start: 2020-01-01 | End: 2020-01-01 | Stop reason: HOSPADM

## 2020-01-01 RX ORDER — LIDOCAINE 4 G/G
1 PATCH TOPICAL DAILY
Status: DISCONTINUED | OUTPATIENT
Start: 2020-01-01 | End: 2020-01-01 | Stop reason: HOSPADM

## 2020-01-01 RX ORDER — FENTANYL 25 UG/H
1 PATCH TRANSDERMAL
Qty: 1 PATCH | Refills: 0 | Status: SHIPPED | OUTPATIENT
Start: 2020-01-01 | End: 2020-01-01

## 2020-01-01 RX ORDER — ACETAMINOPHEN 650 MG/1
650 SUPPOSITORY RECTAL EVERY 6 HOURS PRN
Status: DISCONTINUED | OUTPATIENT
Start: 2020-01-01 | End: 2020-01-01 | Stop reason: HOSPADM

## 2020-01-01 RX ORDER — LIDOCAINE 50 MG/G
OINTMENT TOPICAL PRN
Status: DISCONTINUED | OUTPATIENT
Start: 2020-01-01 | End: 2020-01-01 | Stop reason: HOSPADM

## 2020-01-01 RX ORDER — SODIUM CHLORIDE 0.9 % (FLUSH) 0.9 %
10 SYRINGE (ML) INJECTION PRN
Status: CANCELLED | OUTPATIENT
Start: 2020-01-01

## 2020-01-01 RX ORDER — DIPHENHYDRAMINE HCL 25 MG
25 TABLET ORAL EVERY 6 HOURS PRN
Status: DISCONTINUED | OUTPATIENT
Start: 2020-01-01 | End: 2020-01-01 | Stop reason: HOSPADM

## 2020-01-01 RX ORDER — FENTANYL 25 UG/H
1 PATCH TRANSDERMAL
Status: DISCONTINUED | OUTPATIENT
Start: 2020-01-01 | End: 2020-01-01 | Stop reason: HOSPADM

## 2020-01-01 RX ORDER — HYDROXYCHLOROQUINE SULFATE 200 MG/1
200 TABLET, FILM COATED ORAL 2 TIMES DAILY
Status: DISCONTINUED | OUTPATIENT
Start: 2020-01-01 | End: 2020-01-01

## 2020-01-01 RX ORDER — LORAZEPAM 2 MG/ML
2 INJECTION INTRAMUSCULAR ONCE
Status: COMPLETED | OUTPATIENT
Start: 2020-01-01 | End: 2020-01-01

## 2020-01-01 RX ORDER — MORPHINE SULFATE 4 MG/ML
INJECTION, SOLUTION INTRAMUSCULAR; INTRAVENOUS
Status: COMPLETED
Start: 2020-01-01 | End: 2020-01-01

## 2020-01-01 RX ORDER — NICOTINE POLACRILEX 4 MG
15 LOZENGE BUCCAL PRN
Status: DISCONTINUED | OUTPATIENT
Start: 2020-01-01 | End: 2020-01-01 | Stop reason: HOSPADM

## 2020-01-01 RX ORDER — MORPHINE SULFATE 2 MG/ML
1 INJECTION, SOLUTION INTRAMUSCULAR; INTRAVENOUS
Status: DISCONTINUED | OUTPATIENT
Start: 2020-01-01 | End: 2020-01-01 | Stop reason: HOSPADM

## 2020-01-01 RX ORDER — LIDOCAINE AND PRILOCAINE 25; 25 MG/G; MG/G
CREAM TOPICAL
Status: DISCONTINUED | OUTPATIENT
Start: 2020-01-01 | End: 2020-01-01 | Stop reason: HOSPADM

## 2020-01-01 RX ORDER — SEVELAMER CARBONATE 800 MG/1
2400 TABLET, FILM COATED ORAL
Status: DISCONTINUED | OUTPATIENT
Start: 2020-01-01 | End: 2020-01-01 | Stop reason: HOSPADM

## 2020-01-01 RX ORDER — 0.9 % SODIUM CHLORIDE 0.9 %
1000 INTRAVENOUS SOLUTION INTRAVENOUS ONCE
Status: DISCONTINUED | OUTPATIENT
Start: 2020-01-01 | End: 2020-01-01

## 2020-01-01 RX ORDER — DEXTROSE AND SODIUM CHLORIDE 5; .9 G/100ML; G/100ML
INJECTION, SOLUTION INTRAVENOUS CONTINUOUS
Status: DISCONTINUED | OUTPATIENT
Start: 2020-01-01 | End: 2020-01-01 | Stop reason: HOSPADM

## 2020-01-01 RX ORDER — OXYCODONE HYDROCHLORIDE 10 MG/1
10 TABLET ORAL EVERY 6 HOURS PRN
Qty: 20 TABLET | Refills: 0 | Status: SHIPPED | OUTPATIENT
Start: 2020-01-01 | End: 2020-01-01

## 2020-01-01 RX ORDER — DEXTROSE MONOHYDRATE 25 G/50ML
12.5 INJECTION, SOLUTION INTRAVENOUS PRN
Status: DISCONTINUED | OUTPATIENT
Start: 2020-01-01 | End: 2020-01-01 | Stop reason: HOSPADM

## 2020-01-01 RX ORDER — ALBUTEROL SULFATE 90 UG/1
2 AEROSOL, METERED RESPIRATORY (INHALATION) EVERY 6 HOURS PRN
Status: DISCONTINUED | OUTPATIENT
Start: 2020-01-01 | End: 2020-01-01 | Stop reason: HOSPADM

## 2020-01-01 RX ORDER — SODIUM CHLORIDE 0.9 % (FLUSH) 0.9 %
10 SYRINGE (ML) INJECTION PRN
Status: ACTIVE | OUTPATIENT
Start: 2020-01-01 | End: 2020-01-01

## 2020-01-01 RX ORDER — SODIUM CHLORIDE 9 MG/ML
500 INJECTION, SOLUTION INTRAVENOUS CONTINUOUS PRN
Status: ACTIVE | OUTPATIENT
Start: 2020-01-01 | End: 2020-01-01

## 2020-01-01 RX ORDER — LIDOCAINE AND PRILOCAINE 25; 25 MG/G; MG/G
CREAM TOPICAL PRN
Status: DISCONTINUED | OUTPATIENT
Start: 2020-01-01 | End: 2020-01-01 | Stop reason: HOSPADM

## 2020-01-01 RX ORDER — TRAMADOL HYDROCHLORIDE 50 MG/1
50 TABLET ORAL EVERY 12 HOURS PRN
Status: DISCONTINUED | OUTPATIENT
Start: 2020-01-01 | End: 2020-01-01 | Stop reason: HOSPADM

## 2020-01-01 RX ORDER — FENTANYL CITRATE 50 UG/ML
50 INJECTION, SOLUTION INTRAMUSCULAR; INTRAVENOUS ONCE
Status: DISCONTINUED | OUTPATIENT
Start: 2020-01-01 | End: 2020-01-01

## 2020-01-01 RX ORDER — METOPROLOL TARTRATE 5 MG/5ML
5 INJECTION INTRAVENOUS ONCE
Status: COMPLETED | OUTPATIENT
Start: 2020-01-01 | End: 2020-01-01

## 2020-01-01 RX ORDER — ALBUTEROL SULFATE 90 UG/1
2 AEROSOL, METERED RESPIRATORY (INHALATION) PRN
Status: ACTIVE | OUTPATIENT
Start: 2020-01-01 | End: 2020-01-01

## 2020-01-01 RX ORDER — ALPRAZOLAM 0.5 MG/1
0.5 TABLET ORAL 2 TIMES DAILY
Status: DISCONTINUED | OUTPATIENT
Start: 2020-01-01 | End: 2020-01-01 | Stop reason: HOSPADM

## 2020-01-01 RX ORDER — METOPROLOL TARTRATE 5 MG/5ML
5 INJECTION INTRAVENOUS EVERY 4 HOURS PRN
Status: DISCONTINUED | OUTPATIENT
Start: 2020-01-01 | End: 2020-01-01 | Stop reason: SDUPTHER

## 2020-01-01 RX ORDER — TRAMADOL HYDROCHLORIDE 50 MG/1
50 TABLET ORAL EVERY 12 HOURS PRN
Qty: 15 TABLET | Refills: 0 | Status: SHIPPED | OUTPATIENT
Start: 2020-01-01 | End: 2020-05-29

## 2020-01-01 RX ORDER — MORPHINE SULFATE 4 MG/ML
4 INJECTION, SOLUTION INTRAMUSCULAR; INTRAVENOUS ONCE
Status: COMPLETED | OUTPATIENT
Start: 2020-01-01 | End: 2020-01-01

## 2020-01-01 RX ORDER — LIDOCAINE 4 G/G
1 PATCH TOPICAL DAILY
Qty: 1 BOX | Refills: 0 | Status: SHIPPED | OUTPATIENT
Start: 2020-01-01

## 2020-01-01 RX ORDER — FENTANYL 25 UG/H
1 PATCH TRANSDERMAL
Qty: 2 PATCH | Refills: 0 | Status: ON HOLD | OUTPATIENT
Start: 2020-01-01 | End: 2020-01-01 | Stop reason: HOSPADM

## 2020-01-01 RX ORDER — TRAMADOL HYDROCHLORIDE 50 MG/1
50 TABLET ORAL EVERY 4 HOURS PRN
Status: DISCONTINUED | OUTPATIENT
Start: 2020-01-01 | End: 2020-01-01

## 2020-01-01 RX ORDER — 0.9 % SODIUM CHLORIDE 0.9 %
250 INTRAVENOUS SOLUTION INTRAVENOUS ONCE
Status: COMPLETED | OUTPATIENT
Start: 2020-01-01 | End: 2020-01-01

## 2020-01-01 RX ORDER — MIDODRINE HYDROCHLORIDE 5 MG/1
5 TABLET ORAL PRN
Status: DISCONTINUED | OUTPATIENT
Start: 2020-01-01 | End: 2020-01-01 | Stop reason: HOSPADM

## 2020-01-01 RX ORDER — DIPHENHYDRAMINE HCL 25 MG
25 TABLET ORAL EVERY 8 HOURS PRN
Status: DISCONTINUED | OUTPATIENT
Start: 2020-01-01 | End: 2020-01-01 | Stop reason: HOSPADM

## 2020-01-01 RX ORDER — ACETAMINOPHEN 650 MG/1
650 SUPPOSITORY RECTAL EVERY 6 HOURS PRN
Status: DISCONTINUED | OUTPATIENT
Start: 2020-01-01 | End: 2020-01-01 | Stop reason: SDUPTHER

## 2020-01-01 RX ORDER — MORPHINE SULFATE 2 MG/ML
1 INJECTION, SOLUTION INTRAMUSCULAR; INTRAVENOUS EVERY 4 HOURS PRN
Status: DISCONTINUED | OUTPATIENT
Start: 2020-01-01 | End: 2020-01-01 | Stop reason: HOSPADM

## 2020-01-01 RX ORDER — FINASTERIDE 5 MG/1
5 TABLET, FILM COATED ORAL DAILY
Status: DISCONTINUED | OUTPATIENT
Start: 2020-01-01 | End: 2020-01-01 | Stop reason: HOSPADM

## 2020-01-01 RX ORDER — NICOTINE 21 MG/24HR
1 PATCH, TRANSDERMAL 24 HOURS TRANSDERMAL DAILY PRN
Status: DISCONTINUED | OUTPATIENT
Start: 2020-01-01 | End: 2020-01-01 | Stop reason: HOSPADM

## 2020-01-01 RX ORDER — ACETAMINOPHEN 325 MG/1
650 TABLET ORAL EVERY 6 HOURS PRN
Status: DISCONTINUED | OUTPATIENT
Start: 2020-01-01 | End: 2020-01-01 | Stop reason: SDUPTHER

## 2020-01-01 RX ORDER — DILTIAZEM HYDROCHLORIDE 240 MG/1
240 CAPSULE, COATED, EXTENDED RELEASE ORAL DAILY
Qty: 30 CAPSULE | Refills: 0 | Status: SHIPPED | OUTPATIENT
Start: 2020-01-01

## 2020-01-01 RX ORDER — HEPARIN SODIUM 5000 [USP'U]/ML
4000 INJECTION, SOLUTION INTRAVENOUS; SUBCUTANEOUS PRN
Status: DISCONTINUED | OUTPATIENT
Start: 2020-01-01 | End: 2020-01-01

## 2020-01-01 RX ORDER — HEPARIN SODIUM 1000 [USP'U]/ML
60 INJECTION, SOLUTION INTRAVENOUS; SUBCUTANEOUS ONCE
Status: DISCONTINUED | OUTPATIENT
Start: 2020-01-01 | End: 2020-01-01 | Stop reason: CLARIF

## 2020-01-01 RX ORDER — BUDESONIDE AND FORMOTEROL FUMARATE DIHYDRATE 80; 4.5 UG/1; UG/1
2 AEROSOL RESPIRATORY (INHALATION) 2 TIMES DAILY
Status: DISCONTINUED | OUTPATIENT
Start: 2020-01-01 | End: 2020-01-01 | Stop reason: HOSPADM

## 2020-01-01 RX ORDER — ALPRAZOLAM 0.5 MG/1
0.5 TABLET ORAL 2 TIMES DAILY
Status: ON HOLD | COMMUNITY
End: 2020-01-01 | Stop reason: SDUPTHER

## 2020-01-01 RX ORDER — LIDOCAINE 40 MG/G
CREAM TOPICAL PRN
Status: DISCONTINUED | OUTPATIENT
Start: 2020-01-01 | End: 2020-01-01 | Stop reason: HOSPADM

## 2020-01-01 RX ORDER — ACETAMINOPHEN 500 MG
1000 TABLET ORAL EVERY 8 HOURS PRN
Status: DISCONTINUED | OUTPATIENT
Start: 2020-01-01 | End: 2020-01-01 | Stop reason: HOSPADM

## 2020-01-01 RX ORDER — LORAZEPAM 0.5 MG/1
0.5 TABLET ORAL ONCE
Status: COMPLETED | OUTPATIENT
Start: 2020-01-01 | End: 2020-01-01

## 2020-01-01 RX ORDER — LEVALBUTEROL 1.25 MG/.5ML
1.25 SOLUTION, CONCENTRATE RESPIRATORY (INHALATION) 4 TIMES DAILY
Status: DISCONTINUED | OUTPATIENT
Start: 2020-01-01 | End: 2020-01-01 | Stop reason: HOSPADM

## 2020-01-01 RX ORDER — FLUTICASONE PROPIONATE 50 MCG
1 SPRAY, SUSPENSION (ML) NASAL DAILY
Status: DISCONTINUED | OUTPATIENT
Start: 2020-01-01 | End: 2020-01-01

## 2020-01-01 RX ORDER — HEPARIN SODIUM 10000 [USP'U]/100ML
7.35 INJECTION, SOLUTION INTRAVENOUS CONTINUOUS
Status: DISCONTINUED | OUTPATIENT
Start: 2020-01-01 | End: 2020-01-01

## 2020-01-01 RX ORDER — OXYCODONE HYDROCHLORIDE AND ACETAMINOPHEN 5; 325 MG/1; MG/1
1 TABLET ORAL ONCE
Status: COMPLETED | OUTPATIENT
Start: 2020-01-01 | End: 2020-01-01

## 2020-01-01 RX ORDER — ONDANSETRON 4 MG/1
8 TABLET, ORALLY DISINTEGRATING ORAL EVERY 8 HOURS PRN
Status: DISCONTINUED | OUTPATIENT
Start: 2020-01-01 | End: 2020-01-01

## 2020-01-01 RX ORDER — FLUCONAZOLE 100 MG/1
200 TABLET ORAL DAILY
Status: DISCONTINUED | OUTPATIENT
Start: 2020-01-01 | End: 2020-01-01

## 2020-01-01 RX ORDER — ASPIRIN 300 MG/1
300 SUPPOSITORY RECTAL EVERY 6 HOURS PRN
Status: DISCONTINUED | OUTPATIENT
Start: 2020-01-01 | End: 2020-01-01 | Stop reason: HOSPADM

## 2020-01-01 RX ORDER — PROMETHAZINE HYDROCHLORIDE 25 MG/1
12.5 TABLET ORAL EVERY 6 HOURS PRN
Status: DISCONTINUED | OUTPATIENT
Start: 2020-01-01 | End: 2020-01-01 | Stop reason: HOSPADM

## 2020-01-01 RX ORDER — INSULIN GLARGINE 100 [IU]/ML
10 INJECTION, SOLUTION SUBCUTANEOUS 2 TIMES DAILY
Status: DISCONTINUED | OUTPATIENT
Start: 2020-01-01 | End: 2020-01-01 | Stop reason: HOSPADM

## 2020-01-01 RX ORDER — FLUTICASONE PROPIONATE 50 MCG
2 SPRAY, SUSPENSION (ML) NASAL DAILY
Status: DISCONTINUED | OUTPATIENT
Start: 2020-01-01 | End: 2020-01-01 | Stop reason: HOSPADM

## 2020-01-01 RX ORDER — OXYCODONE HYDROCHLORIDE 5 MG/1
5 TABLET ORAL EVERY 6 HOURS PRN
Status: DISCONTINUED | OUTPATIENT
Start: 2020-01-01 | End: 2020-01-01 | Stop reason: HOSPADM

## 2020-01-01 RX ORDER — NICOTINE POLACRILEX 4 MG
15 LOZENGE BUCCAL PRN
COMMUNITY

## 2020-01-01 RX ORDER — GUAIFENESIN AND DEXTROMETHORPHAN HYDROBROMIDE 100; 10 MG/5ML; MG/5ML
5 SOLUTION ORAL EVERY 6 HOURS PRN
Status: ON HOLD | COMMUNITY
End: 2020-01-01 | Stop reason: HOSPADM

## 2020-01-01 RX ORDER — TAMSULOSIN HYDROCHLORIDE 0.4 MG/1
0.4 CAPSULE ORAL DAILY
Status: DISCONTINUED | OUTPATIENT
Start: 2020-01-01 | End: 2020-01-01 | Stop reason: HOSPADM

## 2020-01-01 RX ORDER — DILTIAZEM HYDROCHLORIDE 5 MG/ML
10 INJECTION INTRAVENOUS ONCE
Status: COMPLETED | OUTPATIENT
Start: 2020-01-01 | End: 2020-01-01

## 2020-01-01 RX ORDER — HEPARIN SODIUM 1000 [USP'U]/ML
30 INJECTION, SOLUTION INTRAVENOUS; SUBCUTANEOUS PRN
Status: DISCONTINUED | OUTPATIENT
Start: 2020-01-01 | End: 2020-01-01 | Stop reason: CLARIF

## 2020-01-01 RX ORDER — DOXYCYCLINE HYCLATE 100 MG/1
100 CAPSULE ORAL 2 TIMES DAILY
COMMUNITY
Start: 2020-01-01 | End: 2020-01-01 | Stop reason: SDUPTHER

## 2020-01-01 RX ORDER — FLUTICASONE FUROATE AND VILANTEROL 100; 25 UG/1; UG/1
1 POWDER RESPIRATORY (INHALATION) DAILY
COMMUNITY

## 2020-01-01 RX ORDER — DILTIAZEM HYDROCHLORIDE 240 MG/1
240 CAPSULE, COATED, EXTENDED RELEASE ORAL DAILY
Status: DISCONTINUED | OUTPATIENT
Start: 2020-01-01 | End: 2020-01-01 | Stop reason: HOSPADM

## 2020-01-01 RX ORDER — METOPROLOL TARTRATE 50 MG/1
50 TABLET, FILM COATED ORAL 2 TIMES DAILY
Status: DISCONTINUED | OUTPATIENT
Start: 2020-01-01 | End: 2020-01-01 | Stop reason: HOSPADM

## 2020-01-01 RX ORDER — MORPHINE SULFATE 10 MG/ML
2 INJECTION, SOLUTION INTRAMUSCULAR; INTRAVENOUS
Status: DISCONTINUED | OUTPATIENT
Start: 2020-01-01 | End: 2020-01-01

## 2020-01-01 RX ORDER — OXYCODONE HYDROCHLORIDE 10 MG/1
10 TABLET ORAL EVERY 4 HOURS PRN
Qty: 12 TABLET | Refills: 0 | Status: SHIPPED | OUTPATIENT
Start: 2020-01-01 | End: 2020-01-01

## 2020-01-01 RX ORDER — ACETAMINOPHEN 325 MG/1
650 TABLET ORAL EVERY 6 HOURS PRN
Status: CANCELLED | OUTPATIENT
Start: 2020-01-01

## 2020-01-01 RX ORDER — OXYCODONE HYDROCHLORIDE 5 MG/1
5 TABLET ORAL EVERY 4 HOURS PRN
Status: DISCONTINUED | OUTPATIENT
Start: 2020-01-01 | End: 2020-01-01

## 2020-01-01 RX ORDER — MORPHINE SULFATE 2 MG/ML
2 INJECTION, SOLUTION INTRAMUSCULAR; INTRAVENOUS
Status: DISCONTINUED | OUTPATIENT
Start: 2020-01-01 | End: 2020-01-01 | Stop reason: HOSPADM

## 2020-01-01 RX ORDER — OSELTAMIVIR PHOSPHATE 75 MG/1
75 CAPSULE ORAL 2 TIMES DAILY
Status: ON HOLD | COMMUNITY
Start: 2020-01-01 | End: 2020-01-01 | Stop reason: HOSPADM

## 2020-01-01 RX ORDER — DOXYCYCLINE 100 MG/1
100 CAPSULE ORAL EVERY 12 HOURS SCHEDULED
Qty: 10 CAPSULE | Refills: 0 | Status: ON HOLD | OUTPATIENT
Start: 2020-01-01 | End: 2020-01-01 | Stop reason: HOSPADM

## 2020-01-01 RX ORDER — NITROGLYCERIN 0.4 MG/1
0.4 TABLET SUBLINGUAL EVERY 5 MIN PRN
Status: DISCONTINUED | OUTPATIENT
Start: 2020-01-01 | End: 2020-01-01 | Stop reason: HOSPADM

## 2020-01-01 RX ORDER — ALBUMIN (HUMAN) 12.5 G/50ML
25 SOLUTION INTRAVENOUS PRN
Status: DISCONTINUED | OUTPATIENT
Start: 2020-01-01 | End: 2020-01-01 | Stop reason: HOSPADM

## 2020-01-01 RX ORDER — NICOTINE POLACRILEX 4 MG
15 LOZENGE BUCCAL PRN
Status: CANCELLED | OUTPATIENT
Start: 2020-01-01

## 2020-01-01 RX ORDER — POTASSIUM CHLORIDE 7.45 MG/ML
10 INJECTION INTRAVENOUS PRN
Status: DISCONTINUED | OUTPATIENT
Start: 2020-01-01 | End: 2020-01-01

## 2020-01-01 RX ORDER — LORAZEPAM 2 MG/ML
1 INJECTION INTRAMUSCULAR
Status: DISCONTINUED | OUTPATIENT
Start: 2020-01-01 | End: 2020-01-01 | Stop reason: HOSPADM

## 2020-01-01 RX ORDER — SODIUM CHLORIDE 0.9 % (FLUSH) 0.9 %
10 SYRINGE (ML) INJECTION EVERY 12 HOURS SCHEDULED
Status: CANCELLED | OUTPATIENT
Start: 2020-01-01

## 2020-01-01 RX ORDER — GLYCOPYRROLATE 0.2 MG/ML
0.2 INJECTION INTRAMUSCULAR; INTRAVENOUS EVERY 4 HOURS PRN
Status: DISCONTINUED | OUTPATIENT
Start: 2020-01-01 | End: 2020-01-01 | Stop reason: HOSPADM

## 2020-01-01 RX ORDER — FENTANYL CITRATE 50 UG/ML
100 INJECTION, SOLUTION INTRAMUSCULAR; INTRAVENOUS ONCE
Status: DISCONTINUED | OUTPATIENT
Start: 2020-01-01 | End: 2020-01-01 | Stop reason: HOSPADM

## 2020-01-01 RX ORDER — HEPARIN SODIUM 5000 [USP'U]/ML
4000 INJECTION, SOLUTION INTRAVENOUS; SUBCUTANEOUS ONCE
Status: COMPLETED | OUTPATIENT
Start: 2020-01-01 | End: 2020-01-01

## 2020-01-01 RX ORDER — AZITHROMYCIN 250 MG/1
250 TABLET, FILM COATED ORAL DAILY
Status: ON HOLD | COMMUNITY
End: 2020-01-01 | Stop reason: HOSPADM

## 2020-01-01 RX ORDER — FENTANYL CITRATE 50 UG/ML
INJECTION, SOLUTION INTRAMUSCULAR; INTRAVENOUS
Status: DISPENSED
Start: 2020-01-01 | End: 2020-01-01

## 2020-01-01 RX ORDER — OXYCODONE HYDROCHLORIDE 10 MG/1
10 TABLET ORAL EVERY 6 HOURS PRN
Status: DISCONTINUED | OUTPATIENT
Start: 2020-01-01 | End: 2020-01-01 | Stop reason: HOSPADM

## 2020-01-01 RX ORDER — METOPROLOL TARTRATE 5 MG/5ML
5 INJECTION INTRAVENOUS EVERY 5 MIN PRN
Status: ACTIVE | OUTPATIENT
Start: 2020-01-01 | End: 2020-01-01

## 2020-01-01 RX ORDER — AMIODARONE HYDROCHLORIDE 50 MG/ML
300 INJECTION, SOLUTION INTRAVENOUS ONCE
Status: DISCONTINUED | OUTPATIENT
Start: 2020-01-01 | End: 2020-01-01 | Stop reason: DRUGHIGH

## 2020-01-01 RX ORDER — ACETAMINOPHEN 325 MG/1
650 TABLET ORAL EVERY 4 HOURS PRN
Status: DISCONTINUED | OUTPATIENT
Start: 2020-01-01 | End: 2020-01-01 | Stop reason: HOSPADM

## 2020-01-01 RX ORDER — DEXTROSE MONOHYDRATE 25 G/50ML
25 INJECTION, SOLUTION INTRAVENOUS ONCE
Status: COMPLETED | OUTPATIENT
Start: 2020-01-01 | End: 2020-01-01

## 2020-01-01 RX ORDER — ALPRAZOLAM 0.5 MG/1
0.5 TABLET ORAL 2 TIMES DAILY
Qty: 10 TABLET | Refills: 0 | Status: ON HOLD | OUTPATIENT
Start: 2020-01-01 | End: 2020-01-01 | Stop reason: HOSPADM

## 2020-01-01 RX ORDER — HEPARIN SODIUM 5000 [USP'U]/ML
5000 INJECTION, SOLUTION INTRAVENOUS; SUBCUTANEOUS EVERY 8 HOURS SCHEDULED
Status: DISCONTINUED | OUTPATIENT
Start: 2020-01-01 | End: 2020-01-01 | Stop reason: SDUPTHER

## 2020-01-01 RX ORDER — METOPROLOL TARTRATE 5 MG/5ML
5 INJECTION INTRAVENOUS EVERY 4 HOURS PRN
Status: DISCONTINUED | OUTPATIENT
Start: 2020-01-01 | End: 2020-01-01

## 2020-01-01 RX ORDER — METHYLPREDNISOLONE 4 MG/1
4 TABLET ORAL EVERY EVENING
Status: DISCONTINUED | OUTPATIENT
Start: 2020-01-01 | End: 2020-01-01 | Stop reason: HOSPADM

## 2020-01-01 RX ORDER — NICOTINE 21 MG/24HR
1 PATCH, TRANSDERMAL 24 HOURS TRANSDERMAL DAILY PRN
Status: CANCELLED | OUTPATIENT
Start: 2020-01-01

## 2020-01-01 RX ORDER — SODIUM CHLORIDE, SODIUM LACTATE, POTASSIUM CHLORIDE, AND CALCIUM CHLORIDE .6; .31; .03; .02 G/100ML; G/100ML; G/100ML; G/100ML
1000 INJECTION, SOLUTION INTRAVENOUS ONCE
Status: DISCONTINUED | OUTPATIENT
Start: 2020-01-01 | End: 2020-01-01

## 2020-01-01 RX ORDER — NITROGLYCERIN 0.4 MG/1
0.4 TABLET SUBLINGUAL EVERY 5 MIN PRN
COMMUNITY

## 2020-01-01 RX ORDER — DIGOXIN 0.25 MG/ML
250 INJECTION INTRAMUSCULAR; INTRAVENOUS EVERY 4 HOURS
Status: COMPLETED | OUTPATIENT
Start: 2020-01-01 | End: 2020-01-01

## 2020-01-01 RX ORDER — AMIODARONE HYDROCHLORIDE 200 MG/1
200 TABLET ORAL 2 TIMES DAILY
Status: DISCONTINUED | OUTPATIENT
Start: 2020-01-01 | End: 2020-01-01 | Stop reason: HOSPADM

## 2020-01-01 RX ORDER — HEPARIN SODIUM 1000 [USP'U]/ML
40 INJECTION, SOLUTION INTRAVENOUS; SUBCUTANEOUS PRN
Status: DISCONTINUED | OUTPATIENT
Start: 2020-01-01 | End: 2020-01-01 | Stop reason: ALTCHOICE

## 2020-01-01 RX ORDER — METHYLPREDNISOLONE 4 MG/1
4 TABLET ORAL EVERY EVENING
Status: ON HOLD | COMMUNITY
End: 2020-01-01 | Stop reason: HOSPADM

## 2020-01-01 RX ORDER — SODIUM CHLORIDE, SODIUM LACTATE, POTASSIUM CHLORIDE, AND CALCIUM CHLORIDE .6; .31; .03; .02 G/100ML; G/100ML; G/100ML; G/100ML
500 INJECTION, SOLUTION INTRAVENOUS ONCE
Status: COMPLETED | OUTPATIENT
Start: 2020-01-01 | End: 2020-01-01

## 2020-01-01 RX ORDER — PROMETHAZINE HYDROCHLORIDE 25 MG/ML
25 INJECTION, SOLUTION INTRAMUSCULAR; INTRAVENOUS ONCE
Status: COMPLETED | OUTPATIENT
Start: 2020-01-01 | End: 2020-01-01

## 2020-01-01 RX ORDER — ALPRAZOLAM 0.25 MG/1
0.5 TABLET ORAL 2 TIMES DAILY
Status: DISCONTINUED | OUTPATIENT
Start: 2020-01-01 | End: 2020-01-01 | Stop reason: HOSPADM

## 2020-01-01 RX ORDER — MAGNESIUM SULFATE 1 G/100ML
1 INJECTION INTRAVENOUS PRN
Status: DISCONTINUED | OUTPATIENT
Start: 2020-01-01 | End: 2020-01-01

## 2020-01-01 RX ORDER — SODIUM CHLORIDE FOR INHALATION 0.9 %
3 VIAL, NEBULIZER (ML) INHALATION EVERY 8 HOURS PRN
Status: DISCONTINUED | OUTPATIENT
Start: 2020-01-01 | End: 2020-01-01 | Stop reason: HOSPADM

## 2020-01-01 RX ORDER — POTASSIUM CHLORIDE 20 MEQ/1
40 TABLET, EXTENDED RELEASE ORAL PRN
Status: DISCONTINUED | OUTPATIENT
Start: 2020-01-01 | End: 2020-01-01

## 2020-01-01 RX ORDER — HEPARIN SODIUM 1000 [USP'U]/ML
10000 INJECTION, SOLUTION INTRAVENOUS; SUBCUTANEOUS ONCE
Status: COMPLETED | OUTPATIENT
Start: 2020-01-01 | End: 2020-01-01

## 2020-01-01 RX ORDER — ONDANSETRON 2 MG/ML
4 INJECTION INTRAMUSCULAR; INTRAVENOUS EVERY 6 HOURS PRN
Status: CANCELLED | OUTPATIENT
Start: 2020-01-01

## 2020-01-01 RX ORDER — HEPARIN SODIUM 5000 [USP'U]/ML
2000 INJECTION, SOLUTION INTRAVENOUS; SUBCUTANEOUS PRN
Status: DISCONTINUED | OUTPATIENT
Start: 2020-01-01 | End: 2020-01-01

## 2020-01-01 RX ORDER — DILTIAZEM HYDROCHLORIDE 120 MG/1
120 CAPSULE, COATED, EXTENDED RELEASE ORAL DAILY
Status: DISCONTINUED | OUTPATIENT
Start: 2020-01-01 | End: 2020-01-01

## 2020-01-01 RX ORDER — 0.9 % SODIUM CHLORIDE 0.9 %
20 INTRAVENOUS SOLUTION INTRAVENOUS ONCE
Status: COMPLETED | OUTPATIENT
Start: 2020-01-01 | End: 2020-01-01

## 2020-01-01 RX ORDER — METOPROLOL TARTRATE 50 MG/1
50 TABLET, FILM COATED ORAL ONCE
Status: COMPLETED | OUTPATIENT
Start: 2020-01-01 | End: 2020-01-01

## 2020-01-01 RX ORDER — FENTANYL CITRATE 50 UG/ML
25 INJECTION, SOLUTION INTRAMUSCULAR; INTRAVENOUS ONCE
Status: COMPLETED | OUTPATIENT
Start: 2020-01-01 | End: 2020-01-01

## 2020-01-01 RX ORDER — HEPARIN SODIUM 10000 [USP'U]/100ML
18 INJECTION, SOLUTION INTRAVENOUS CONTINUOUS
Status: DISCONTINUED | OUTPATIENT
Start: 2020-01-01 | End: 2020-01-01

## 2020-01-01 RX ORDER — HEPARIN SODIUM 5000 [USP'U]/ML
4000 INJECTION, SOLUTION INTRAVENOUS; SUBCUTANEOUS PRN
Status: DISCONTINUED | OUTPATIENT
Start: 2020-01-01 | End: 2020-01-01 | Stop reason: HOSPADM

## 2020-01-01 RX ORDER — GLUCAGON 1 MG/ML
1 KIT INJECTION PRN
Status: CANCELLED | OUTPATIENT
Start: 2020-01-01

## 2020-01-01 RX ORDER — ACETAMINOPHEN 500 MG
1000 TABLET ORAL EVERY 8 HOURS PRN
COMMUNITY

## 2020-01-01 RX ORDER — LORAZEPAM 2 MG/ML
0.5 INJECTION INTRAMUSCULAR
Status: DISCONTINUED | OUTPATIENT
Start: 2020-01-01 | End: 2020-01-01 | Stop reason: HOSPADM

## 2020-01-01 RX ORDER — AMINOPHYLLINE DIHYDRATE 25 MG/ML
50 INJECTION, SOLUTION INTRAVENOUS PRN
Status: ACTIVE | OUTPATIENT
Start: 2020-01-01 | End: 2020-01-01

## 2020-01-01 RX ORDER — 0.9 % SODIUM CHLORIDE 0.9 %
80 INTRAVENOUS SOLUTION INTRAVENOUS ONCE
Status: COMPLETED | OUTPATIENT
Start: 2020-01-01 | End: 2020-01-01

## 2020-01-01 RX ORDER — LORAZEPAM 2 MG/ML
INJECTION INTRAMUSCULAR
Status: DISPENSED
Start: 2020-01-01 | End: 2020-01-01

## 2020-01-01 RX ORDER — DILTIAZEM HYDROCHLORIDE 240 MG/1
240 CAPSULE, COATED, EXTENDED RELEASE ORAL DAILY
Status: DISCONTINUED | OUTPATIENT
Start: 2020-01-01 | End: 2020-01-01

## 2020-01-01 RX ORDER — FENTANYL CITRATE 50 UG/ML
25 INJECTION, SOLUTION INTRAMUSCULAR; INTRAVENOUS
Status: DISCONTINUED | OUTPATIENT
Start: 2020-01-01 | End: 2020-01-01 | Stop reason: HOSPADM

## 2020-01-01 RX ORDER — METOPROLOL TARTRATE 50 MG/1
50 TABLET, FILM COATED ORAL 2 TIMES DAILY
Qty: 60 TABLET | Refills: 3 | DISCHARGE
Start: 2020-01-01

## 2020-01-01 RX ORDER — DIFLUPREDNATE 0.5 MG/ML
1 EMULSION OPHTHALMIC 2 TIMES DAILY
Status: DISCONTINUED | OUTPATIENT
Start: 2020-01-01 | End: 2020-01-01 | Stop reason: RX

## 2020-01-01 RX ORDER — DOXYCYCLINE HYCLATE 100 MG
100 TABLET ORAL EVERY 12 HOURS SCHEDULED
Status: DISCONTINUED | OUTPATIENT
Start: 2020-01-01 | End: 2020-01-01

## 2020-01-01 RX ORDER — METOPROLOL TARTRATE 5 MG/5ML
5 INJECTION INTRAVENOUS EVERY 5 MIN PRN
Status: DISCONTINUED | OUTPATIENT
Start: 2020-01-01 | End: 2020-01-01

## 2020-01-01 RX ORDER — METOPROLOL TARTRATE 5 MG/5ML
INJECTION INTRAVENOUS
Status: DISPENSED
Start: 2020-01-01 | End: 2020-01-01

## 2020-01-01 RX ORDER — OXYCODONE HYDROCHLORIDE 5 MG/1
5 TABLET ORAL EVERY 6 HOURS PRN
Qty: 15 TABLET | Refills: 0 | Status: ON HOLD | OUTPATIENT
Start: 2020-01-01 | End: 2020-01-01 | Stop reason: HOSPADM

## 2020-01-01 RX ORDER — DEXTROSE MONOHYDRATE 50 MG/ML
100 INJECTION, SOLUTION INTRAVENOUS PRN
Status: CANCELLED | OUTPATIENT
Start: 2020-01-01

## 2020-01-01 RX ORDER — HEPARIN SODIUM 1000 [USP'U]/ML
60 INJECTION, SOLUTION INTRAVENOUS; SUBCUTANEOUS PRN
Status: DISCONTINUED | OUTPATIENT
Start: 2020-01-01 | End: 2020-01-01 | Stop reason: CLARIF

## 2020-01-01 RX ORDER — DEXTROSE MONOHYDRATE 25 G/50ML
12.5 INJECTION, SOLUTION INTRAVENOUS PRN
Status: CANCELLED | OUTPATIENT
Start: 2020-01-01

## 2020-01-01 RX ORDER — SODIUM POLYSTYRENE SULFONATE 4.1 MEQ/G
15 POWDER, FOR SUSPENSION ORAL; RECTAL ONCE
Status: DISCONTINUED | OUTPATIENT
Start: 2020-01-01 | End: 2020-01-01

## 2020-01-01 RX ORDER — IPRATROPIUM BROMIDE AND ALBUTEROL SULFATE 2.5; .5 MG/3ML; MG/3ML
3 SOLUTION RESPIRATORY (INHALATION)
Status: DISCONTINUED | OUTPATIENT
Start: 2020-01-01 | End: 2020-01-01 | Stop reason: HOSPADM

## 2020-01-01 RX ORDER — ISOSORBIDE MONONITRATE 30 MG/1
30 TABLET, EXTENDED RELEASE ORAL DAILY
Status: DISCONTINUED | OUTPATIENT
Start: 2020-01-01 | End: 2020-01-01 | Stop reason: HOSPADM

## 2020-01-01 RX ORDER — ISOSORBIDE MONONITRATE 60 MG/1
60 TABLET, EXTENDED RELEASE ORAL DAILY
Status: DISCONTINUED | OUTPATIENT
Start: 2020-01-01 | End: 2020-01-01

## 2020-01-01 RX ORDER — HYDROXYCHLOROQUINE SULFATE 200 MG/1
400 TABLET, FILM COATED ORAL 2 TIMES DAILY
Status: COMPLETED | OUTPATIENT
Start: 2020-01-01 | End: 2020-01-01

## 2020-01-01 RX ORDER — HEPARIN SODIUM 10000 [USP'U]/100ML
7.87 INJECTION, SOLUTION INTRAVENOUS CONTINUOUS
Status: DISCONTINUED | OUTPATIENT
Start: 2020-01-01 | End: 2020-01-01 | Stop reason: HOSPADM

## 2020-01-01 RX ORDER — 0.9 % SODIUM CHLORIDE 0.9 %
20 INTRAVENOUS SOLUTION INTRAVENOUS ONCE
Status: DISCONTINUED | OUTPATIENT
Start: 2020-01-01 | End: 2020-01-01

## 2020-01-01 RX ORDER — FENTANYL CITRATE 50 UG/ML
25 INJECTION, SOLUTION INTRAMUSCULAR; INTRAVENOUS ONCE
Status: DISCONTINUED | OUTPATIENT
Start: 2020-01-01 | End: 2020-01-01

## 2020-01-01 RX ORDER — ATROPINE SULFATE 0.1 MG/ML
0.5 INJECTION INTRAVENOUS EVERY 5 MIN PRN
Status: ACTIVE | OUTPATIENT
Start: 2020-01-01 | End: 2020-01-01

## 2020-01-01 RX ORDER — FENTANYL 25 UG/H
1 PATCH TRANSDERMAL
Qty: 2 PATCH | Refills: 0 | Status: ON HOLD | OUTPATIENT
Start: 2020-01-01 | End: 2020-01-01

## 2020-01-01 RX ORDER — SODIUM CHLORIDE 0.9 % (FLUSH) 0.9 %
10 SYRINGE (ML) INJECTION ONCE
Status: COMPLETED | OUTPATIENT
Start: 2020-01-01 | End: 2020-01-01

## 2020-01-01 RX ORDER — ONDANSETRON 2 MG/ML
4 INJECTION INTRAMUSCULAR; INTRAVENOUS ONCE
Status: COMPLETED | OUTPATIENT
Start: 2020-01-01 | End: 2020-01-01

## 2020-01-01 RX ORDER — LORAZEPAM 0.5 MG/1
0.5 TABLET ORAL EVERY 4 HOURS PRN
Status: DISCONTINUED | OUTPATIENT
Start: 2020-01-01 | End: 2020-01-01

## 2020-01-01 RX ORDER — LORAZEPAM 2 MG/ML
1 INJECTION INTRAMUSCULAR ONCE
Status: COMPLETED | OUTPATIENT
Start: 2020-01-01 | End: 2020-01-01

## 2020-01-01 RX ORDER — FENTANYL CITRATE 50 UG/ML
INJECTION, SOLUTION INTRAMUSCULAR; INTRAVENOUS
Status: COMPLETED
Start: 2020-01-01 | End: 2020-01-01

## 2020-01-01 RX ORDER — FENTANYL CITRATE 50 UG/ML
50 INJECTION, SOLUTION INTRAMUSCULAR; INTRAVENOUS
Status: DISCONTINUED | OUTPATIENT
Start: 2020-01-01 | End: 2020-01-01 | Stop reason: HOSPADM

## 2020-01-01 RX ORDER — OXYCODONE HYDROCHLORIDE 10 MG/1
10 TABLET ORAL EVERY 6 HOURS PRN
Status: ON HOLD | COMMUNITY
Start: 2020-01-01 | End: 2020-01-01

## 2020-01-01 RX ORDER — MIDODRINE HYDROCHLORIDE 5 MG/1
10 TABLET ORAL PRN
Status: DISCONTINUED | OUTPATIENT
Start: 2020-01-01 | End: 2020-01-01 | Stop reason: HOSPADM

## 2020-01-01 RX ORDER — MIDODRINE HYDROCHLORIDE 10 MG/1
10 TABLET ORAL ONCE
Status: COMPLETED | OUTPATIENT
Start: 2020-01-01 | End: 2020-01-01

## 2020-01-01 RX ORDER — LIDOCAINE 40 MG/G
CREAM TOPICAL
Status: DISCONTINUED | OUTPATIENT
Start: 2020-01-01 | End: 2020-01-01 | Stop reason: HOSPADM

## 2020-01-01 RX ORDER — GUAIFENESIN/DEXTROMETHORPHAN 100-10MG/5
5 SYRUP ORAL EVERY 6 HOURS PRN
Status: DISCONTINUED | OUTPATIENT
Start: 2020-01-01 | End: 2020-01-01 | Stop reason: HOSPADM

## 2020-01-01 RX ORDER — METHYLPREDNISOLONE SODIUM SUCCINATE 40 MG/ML
40 INJECTION, POWDER, LYOPHILIZED, FOR SOLUTION INTRAMUSCULAR; INTRAVENOUS 2 TIMES DAILY
Status: DISCONTINUED | OUTPATIENT
Start: 2020-01-01 | End: 2020-01-01 | Stop reason: HOSPADM

## 2020-01-01 RX ORDER — FENTANYL CITRATE 50 UG/ML
100 INJECTION, SOLUTION INTRAMUSCULAR; INTRAVENOUS
Status: DISCONTINUED | OUTPATIENT
Start: 2020-01-01 | End: 2020-01-01 | Stop reason: HOSPADM

## 2020-01-01 RX ORDER — HEPARIN SODIUM 1000 [USP'U]/ML
10000 INJECTION, SOLUTION INTRAVENOUS; SUBCUTANEOUS PRN
Status: DISCONTINUED | OUTPATIENT
Start: 2020-01-01 | End: 2020-01-01 | Stop reason: ALTCHOICE

## 2020-01-01 RX ORDER — METOPROLOL TARTRATE 50 MG/1
25 TABLET, FILM COATED ORAL DAILY
Status: DISCONTINUED | OUTPATIENT
Start: 2020-01-01 | End: 2020-01-01 | Stop reason: HOSPADM

## 2020-01-01 RX ORDER — METOPROLOL TARTRATE 50 MG/1
50 TABLET, FILM COATED ORAL DAILY
Status: DISCONTINUED | OUTPATIENT
Start: 2020-01-01 | End: 2020-01-01 | Stop reason: HOSPADM

## 2020-01-01 RX ORDER — PROMETHAZINE HYDROCHLORIDE 25 MG/ML
12.5 INJECTION, SOLUTION INTRAMUSCULAR; INTRAVENOUS ONCE
Status: DISCONTINUED | OUTPATIENT
Start: 2020-01-01 | End: 2020-01-01 | Stop reason: HOSPADM

## 2020-01-01 RX ADMIN — SEVELAMER CARBONATE 2400 MG: 800 TABLET, FILM COATED ORAL at 20:13

## 2020-01-01 RX ADMIN — TAMSULOSIN HYDROCHLORIDE 0.4 MG: 0.4 CAPSULE ORAL at 08:01

## 2020-01-01 RX ADMIN — ANTI-FUNGAL POWDER MICONAZOLE NITRATE TALC FREE: 1.42 POWDER TOPICAL at 09:00

## 2020-01-01 RX ADMIN — AMIODARONE HYDROCHLORIDE 0.5 MG/MIN: 150 INJECTION, SOLUTION INTRAVENOUS at 04:57

## 2020-01-01 RX ADMIN — LORAZEPAM 2 MG: 2 INJECTION INTRAMUSCULAR at 00:33

## 2020-01-01 RX ADMIN — ANTI-FUNGAL POWDER MICONAZOLE NITRATE TALC FREE: 1.42 POWDER TOPICAL at 09:13

## 2020-01-01 RX ADMIN — ALPRAZOLAM 0.5 MG: 0.5 TABLET ORAL at 09:26

## 2020-01-01 RX ADMIN — ESCITALOPRAM OXALATE 10 MG: 10 TABLET ORAL at 08:22

## 2020-01-01 RX ADMIN — Medication 10 ML: at 08:17

## 2020-01-01 RX ADMIN — METOPROLOL TARTRATE 25 MG: 50 TABLET, FILM COATED ORAL at 09:47

## 2020-01-01 RX ADMIN — MIDODRINE HYDROCHLORIDE 5 MG: 5 TABLET ORAL at 19:44

## 2020-01-01 RX ADMIN — DOXYCYCLINE HYCLATE 100 MG: 100 TABLET, COATED ORAL at 08:02

## 2020-01-01 RX ADMIN — ESCITALOPRAM OXALATE 10 MG: 10 TABLET ORAL at 07:48

## 2020-01-01 RX ADMIN — TAMSULOSIN HYDROCHLORIDE 0.4 MG: 0.4 CAPSULE ORAL at 12:20

## 2020-01-01 RX ADMIN — ASPIRIN 81 MG 81 MG: 81 TABLET ORAL at 09:49

## 2020-01-01 RX ADMIN — AMIODARONE HYDROCHLORIDE 1 MG/MIN: 50 INJECTION, SOLUTION INTRAVENOUS at 16:32

## 2020-01-01 RX ADMIN — HEPARIN SODIUM AND DEXTROSE 7.35 UNITS/KG/HR: 10000; 5 INJECTION INTRAVENOUS at 15:35

## 2020-01-01 RX ADMIN — ESCITALOPRAM OXALATE 10 MG: 10 TABLET ORAL at 08:07

## 2020-01-01 RX ADMIN — ASPIRIN 81 MG: 81 TABLET, CHEWABLE ORAL at 08:28

## 2020-01-01 RX ADMIN — METOPROLOL TARTRATE 50 MG: 50 TABLET, FILM COATED ORAL at 20:45

## 2020-01-01 RX ADMIN — INSULIN GLARGINE 10 UNITS: 100 INJECTION, SOLUTION SUBCUTANEOUS at 21:58

## 2020-01-01 RX ADMIN — Medication 2 PUFF: at 07:36

## 2020-01-01 RX ADMIN — ESCITALOPRAM OXALATE 10 MG: 10 TABLET ORAL at 08:01

## 2020-01-01 RX ADMIN — IPRATROPIUM BROMIDE AND ALBUTEROL SULFATE 3 ML: .5; 3 SOLUTION RESPIRATORY (INHALATION) at 15:11

## 2020-01-01 RX ADMIN — SENNOSIDES 8.6 MG: 8.6 TABLET, FILM COATED ORAL at 16:44

## 2020-01-01 RX ADMIN — DILTIAZEM HYDROCHLORIDE 240 MG: 240 CAPSULE, COATED, EXTENDED RELEASE ORAL at 17:12

## 2020-01-01 RX ADMIN — INSULIN GLARGINE 10 UNITS: 100 INJECTION, SOLUTION SUBCUTANEOUS at 20:37

## 2020-01-01 RX ADMIN — ONDANSETRON 4 MG: 2 INJECTION INTRAMUSCULAR; INTRAVENOUS at 05:24

## 2020-01-01 RX ADMIN — TAMSULOSIN HYDROCHLORIDE 0.4 MG: 0.4 CAPSULE ORAL at 09:26

## 2020-01-01 RX ADMIN — INSULIN GLARGINE 10 UNITS: 100 INJECTION, SOLUTION SUBCUTANEOUS at 09:18

## 2020-01-01 RX ADMIN — APIXABAN 5 MG: 5 TABLET, FILM COATED ORAL at 21:19

## 2020-01-01 RX ADMIN — Medication 2 PUFF: at 09:27

## 2020-01-01 RX ADMIN — HEPARIN SODIUM 5000 UNITS: 5000 INJECTION INTRAVENOUS; SUBCUTANEOUS at 21:01

## 2020-01-01 RX ADMIN — MIDODRINE HYDROCHLORIDE 10 MG: 10 TABLET ORAL at 16:31

## 2020-01-01 RX ADMIN — INSULIN LISPRO 2 UNITS: 100 INJECTION, SOLUTION INTRAVENOUS; SUBCUTANEOUS at 09:19

## 2020-01-01 RX ADMIN — ASPIRIN 81 MG 81 MG: 81 TABLET ORAL at 07:34

## 2020-01-01 RX ADMIN — DILTIAZEM HYDROCHLORIDE 15 MG/HR: 5 INJECTION INTRAVENOUS at 23:11

## 2020-01-01 RX ADMIN — ZINC SULFATE 220 MG (50 MG) CAPSULE 50 MG: CAPSULE at 08:22

## 2020-01-01 RX ADMIN — TRAMADOL HYDROCHLORIDE 50 MG: 50 TABLET, FILM COATED ORAL at 09:25

## 2020-01-01 RX ADMIN — MIDODRINE HYDROCHLORIDE 5 MG: 5 TABLET ORAL at 21:24

## 2020-01-01 RX ADMIN — FINASTERIDE 5 MG: 5 TABLET, FILM COATED ORAL at 09:28

## 2020-01-01 RX ADMIN — CETIRIZINE HYDROCHLORIDE 5 MG: 10 TABLET, FILM COATED ORAL at 09:28

## 2020-01-01 RX ADMIN — DOXYCYCLINE HYCLATE 100 MG: 100 TABLET, COATED ORAL at 08:18

## 2020-01-01 RX ADMIN — INSULIN LISPRO 1 UNITS: 100 INJECTION, SOLUTION INTRAVENOUS; SUBCUTANEOUS at 18:03

## 2020-01-01 RX ADMIN — CETIRIZINE HYDROCHLORIDE 5 MG: 10 TABLET, FILM COATED ORAL at 12:20

## 2020-01-01 RX ADMIN — AMIODARONE HYDROCHLORIDE 150 MG: 50 INJECTION, SOLUTION INTRAVENOUS at 12:18

## 2020-01-01 RX ADMIN — LEVALBUTEROL 1.25 MG: 1.25 SOLUTION, CONCENTRATE RESPIRATORY (INHALATION) at 11:56

## 2020-01-01 RX ADMIN — ESCITALOPRAM OXALATE 10 MG: 10 TABLET ORAL at 09:48

## 2020-01-01 RX ADMIN — TRAMADOL HYDROCHLORIDE 50 MG: 50 TABLET, FILM COATED ORAL at 08:07

## 2020-01-01 RX ADMIN — METOPROLOL TARTRATE 5 MG: 5 INJECTION, SOLUTION INTRAVENOUS at 17:33

## 2020-01-01 RX ADMIN — AMIODARONE HYDROCHLORIDE 1 MG/MIN: 1.8 INJECTION, SOLUTION INTRAVENOUS at 07:25

## 2020-01-01 RX ADMIN — ALPRAZOLAM 0.5 MG: 0.25 TABLET ORAL at 09:47

## 2020-01-01 RX ADMIN — ANTI-FUNGAL POWDER MICONAZOLE NITRATE TALC FREE: 1.42 POWDER TOPICAL at 08:55

## 2020-01-01 RX ADMIN — TOCILIZUMAB 400 MG: 20 INJECTION, SOLUTION, CONCENTRATE INTRAVENOUS at 11:22

## 2020-01-01 RX ADMIN — FINASTERIDE 5 MG: 5 TABLET, FILM COATED ORAL at 08:08

## 2020-01-01 RX ADMIN — METOPROLOL TARTRATE 25 MG: 25 TABLET ORAL at 17:00

## 2020-01-01 RX ADMIN — Medication 2 PUFF: at 21:42

## 2020-01-01 RX ADMIN — MORPHINE SULFATE 4 MG: 4 INJECTION INTRAVENOUS at 11:46

## 2020-01-01 RX ADMIN — LORAZEPAM 0.5 MG: 0.5 TABLET ORAL at 23:08

## 2020-01-01 RX ADMIN — BUDESONIDE AND FORMOTEROL FUMARATE DIHYDRATE 2 PUFF: 80; 4.5 AEROSOL RESPIRATORY (INHALATION) at 00:28

## 2020-01-01 RX ADMIN — BUDESONIDE AND FORMOTEROL FUMARATE DIHYDRATE 2 PUFF: 80; 4.5 AEROSOL RESPIRATORY (INHALATION) at 19:42

## 2020-01-01 RX ADMIN — Medication 10 ML: at 09:00

## 2020-01-01 RX ADMIN — DOXYCYCLINE HYCLATE 100 MG: 100 TABLET, COATED ORAL at 19:59

## 2020-01-01 RX ADMIN — MORPHINE SULFATE 4 MG: 4 INJECTION, SOLUTION INTRAMUSCULAR; INTRAVENOUS at 22:55

## 2020-01-01 RX ADMIN — TRAMADOL HYDROCHLORIDE 50 MG: 50 TABLET, FILM COATED ORAL at 00:17

## 2020-01-01 RX ADMIN — METOPROLOL TARTRATE 2.5 MG: 1 INJECTION, SOLUTION INTRAVENOUS at 17:13

## 2020-01-01 RX ADMIN — METHYLPREDNISOLONE SODIUM SUCCINATE 40 MG: 40 INJECTION, POWDER, FOR SOLUTION INTRAMUSCULAR; INTRAVENOUS at 08:18

## 2020-01-01 RX ADMIN — METOPROLOL TARTRATE 2.5 MG: 1 INJECTION, SOLUTION INTRAVENOUS at 16:09

## 2020-01-01 RX ADMIN — CETIRIZINE HYDROCHLORIDE 5 MG: 10 TABLET ORAL at 16:42

## 2020-01-01 RX ADMIN — ASPIRIN 81 MG 81 MG: 81 TABLET ORAL at 08:08

## 2020-01-01 RX ADMIN — METHYLPREDNISOLONE SODIUM SUCCINATE 40 MG: 40 INJECTION, POWDER, FOR SOLUTION INTRAMUSCULAR; INTRAVENOUS at 20:36

## 2020-01-01 RX ADMIN — HEPARIN SODIUM AND DEXTROSE 24 UNITS/KG/HR: 10000; 5 INJECTION INTRAVENOUS at 22:44

## 2020-01-01 RX ADMIN — VANCOMYCIN HYDROCHLORIDE 2500 MG: 1 INJECTION, POWDER, LYOPHILIZED, FOR SOLUTION INTRAVENOUS at 01:50

## 2020-01-01 RX ADMIN — CALCIUM ACETATE 2001 MG: 667 CAPSULE ORAL at 12:48

## 2020-01-01 RX ADMIN — TAMSULOSIN HYDROCHLORIDE 0.4 MG: 0.4 CAPSULE ORAL at 17:12

## 2020-01-01 RX ADMIN — ISOSORBIDE MONONITRATE 60 MG: 60 TABLET ORAL at 16:00

## 2020-01-01 RX ADMIN — ISOSORBIDE MONONITRATE 30 MG: 30 TABLET ORAL at 07:48

## 2020-01-01 RX ADMIN — Medication 4 UNITS: at 04:10

## 2020-01-01 RX ADMIN — CALCIUM ACETATE 2001 MG: 667 CAPSULE ORAL at 07:48

## 2020-01-01 RX ADMIN — HEPARIN SODIUM AND DEXTROSE 26 UNITS/KG/HR: 10000; 5 INJECTION INTRAVENOUS at 14:39

## 2020-01-01 RX ADMIN — HEPARIN SODIUM AND DEXTROSE 22 UNITS/KG/HR: 10000; 5 INJECTION INTRAVENOUS at 05:33

## 2020-01-01 RX ADMIN — ASPIRIN 81 MG 81 MG: 81 TABLET ORAL at 20:16

## 2020-01-01 RX ADMIN — MORPHINE SULFATE 2 MG: 2 INJECTION, SOLUTION INTRAMUSCULAR; INTRAVENOUS at 05:37

## 2020-01-01 RX ADMIN — AMIODARONE HYDROCHLORIDE 1 MG/MIN: 50 INJECTION, SOLUTION INTRAVENOUS at 13:03

## 2020-01-01 RX ADMIN — SODIUM CHLORIDE, PRESERVATIVE FREE 10 ML: 5 INJECTION INTRAVENOUS at 20:39

## 2020-01-01 RX ADMIN — AMIODARONE HYDROCHLORIDE 1 MG/MIN: 1.8 INJECTION, SOLUTION INTRAVENOUS at 19:44

## 2020-01-01 RX ADMIN — FLUCONAZOLE 200 MG: 100 TABLET ORAL at 18:30

## 2020-01-01 RX ADMIN — Medication 2 PUFF: at 22:21

## 2020-01-01 RX ADMIN — DIGOXIN 250 MCG: 0.25 INJECTION INTRAMUSCULAR; INTRAVENOUS at 09:52

## 2020-01-01 RX ADMIN — APIXABAN 5 MG: 5 TABLET, FILM COATED ORAL at 21:31

## 2020-01-01 RX ADMIN — Medication 10 ML: at 02:41

## 2020-01-01 RX ADMIN — BUDESONIDE AND FORMOTEROL FUMARATE DIHYDRATE 2 PUFF: 80; 4.5 AEROSOL RESPIRATORY (INHALATION) at 21:26

## 2020-01-01 RX ADMIN — ZINC SULFATE 220 MG (50 MG) CAPSULE 50 MG: CAPSULE at 16:33

## 2020-01-01 RX ADMIN — Medication 2 PUFF: at 20:10

## 2020-01-01 RX ADMIN — ALBUMIN (HUMAN) 25 G: 0.25 INJECTION, SOLUTION INTRAVENOUS at 10:10

## 2020-01-01 RX ADMIN — AMIODARONE HYDROCHLORIDE 1 MG/MIN: 50 INJECTION, SOLUTION INTRAVENOUS at 01:35

## 2020-01-01 RX ADMIN — DEXTROSE 15 G: 15 GEL ORAL at 17:30

## 2020-01-01 RX ADMIN — SENNOSIDES 8.6 MG: 8.6 TABLET, FILM COATED ORAL at 21:49

## 2020-01-01 RX ADMIN — METHYLPREDNISOLONE SODIUM SUCCINATE 40 MG: 40 INJECTION, POWDER, FOR SOLUTION INTRAMUSCULAR; INTRAVENOUS at 07:51

## 2020-01-01 RX ADMIN — ONDANSETRON 4 MG: 2 INJECTION INTRAMUSCULAR; INTRAVENOUS at 05:12

## 2020-01-01 RX ADMIN — SENNOSIDES 8.6 MG: 8.6 TABLET, FILM COATED ORAL at 21:20

## 2020-01-01 RX ADMIN — SEVELAMER CARBONATE 2400 MG: 800 TABLET, FILM COATED ORAL at 07:32

## 2020-01-01 RX ADMIN — BUDESONIDE AND FORMOTEROL FUMARATE DIHYDRATE 2 PUFF: 80; 4.5 AEROSOL RESPIRATORY (INHALATION) at 09:59

## 2020-01-01 RX ADMIN — BUDESONIDE AND FORMOTEROL FUMARATE DIHYDRATE 2 PUFF: 80; 4.5 AEROSOL RESPIRATORY (INHALATION) at 12:22

## 2020-01-01 RX ADMIN — INSULIN LISPRO 1 UNITS: 100 INJECTION, SOLUTION INTRAVENOUS; SUBCUTANEOUS at 21:21

## 2020-01-01 RX ADMIN — PROMETHAZINE HYDROCHLORIDE 12.5 MG: 25 TABLET ORAL at 13:16

## 2020-01-01 RX ADMIN — SENNOSIDES 8.6 MG: 8.6 TABLET, FILM COATED ORAL at 09:46

## 2020-01-01 RX ADMIN — SENNOSIDES 8.6 MG: 8.6 TABLET, FILM COATED ORAL at 07:51

## 2020-01-01 RX ADMIN — MORPHINE SULFATE 4 MG: 4 INJECTION INTRAVENOUS at 10:23

## 2020-01-01 RX ADMIN — SODIUM CHLORIDE, PRESERVATIVE FREE 10 ML: 5 INJECTION INTRAVENOUS at 22:00

## 2020-01-01 RX ADMIN — CEFEPIME 2 G: 2 INJECTION, POWDER, FOR SOLUTION INTRAVENOUS at 20:36

## 2020-01-01 RX ADMIN — ONDANSETRON 4 MG: 2 INJECTION INTRAMUSCULAR; INTRAVENOUS at 20:44

## 2020-01-01 RX ADMIN — LORAZEPAM 1 MG: 1 TABLET ORAL at 06:47

## 2020-01-01 RX ADMIN — OXYCODONE HYDROCHLORIDE 5 MG: 5 TABLET ORAL at 08:28

## 2020-01-01 RX ADMIN — MORPHINE SULFATE 1 MG: 2 INJECTION, SOLUTION INTRAMUSCULAR; INTRAVENOUS at 06:55

## 2020-01-01 RX ADMIN — ALPRAZOLAM 0.5 MG: 0.25 TABLET ORAL at 09:24

## 2020-01-01 RX ADMIN — ALBUMIN (HUMAN) 25 G: 12.5 SOLUTION INTRAVENOUS at 15:45

## 2020-01-01 RX ADMIN — Medication 2 PUFF: at 08:08

## 2020-01-01 RX ADMIN — ANTI-FUNGAL POWDER MICONAZOLE NITRATE TALC FREE: 1.42 POWDER TOPICAL at 16:24

## 2020-01-01 RX ADMIN — APIXABAN 5 MG: 5 TABLET, FILM COATED ORAL at 01:34

## 2020-01-01 RX ADMIN — SEVELAMER CARBONATE 2400 MG: 800 TABLET, FILM COATED ORAL at 09:51

## 2020-01-01 RX ADMIN — MORPHINE SULFATE 4 MG: 4 INJECTION, SOLUTION INTRAMUSCULAR; INTRAVENOUS at 04:25

## 2020-01-01 RX ADMIN — DIGOXIN 250 MCG: 0.25 INJECTION INTRAMUSCULAR; INTRAVENOUS at 13:59

## 2020-01-01 RX ADMIN — PANTOPRAZOLE SODIUM 40 MG: 40 TABLET, DELAYED RELEASE ORAL at 06:57

## 2020-01-01 RX ADMIN — LACTULOSE 10 G: 20 SOLUTION ORAL at 09:48

## 2020-01-01 RX ADMIN — CEFTRIAXONE SODIUM 2 G: 2 INJECTION, POWDER, FOR SOLUTION INTRAMUSCULAR; INTRAVENOUS at 00:40

## 2020-01-01 RX ADMIN — SEVELAMER CARBONATE 2400 MG: 800 TABLET, FILM COATED ORAL at 12:00

## 2020-01-01 RX ADMIN — ONDANSETRON 4 MG: 2 INJECTION INTRAMUSCULAR; INTRAVENOUS at 05:27

## 2020-01-01 RX ADMIN — ANTI-FUNGAL POWDER MICONAZOLE NITRATE TALC FREE: 1.42 POWDER TOPICAL at 21:51

## 2020-01-01 RX ADMIN — FINASTERIDE 5 MG: 5 TABLET, FILM COATED ORAL at 08:11

## 2020-01-01 RX ADMIN — OXYCODONE HYDROCHLORIDE 10 MG: 10 TABLET ORAL at 09:16

## 2020-01-01 RX ADMIN — METOPROLOL TARTRATE 5 MG: 1 INJECTION, SOLUTION INTRAVENOUS at 16:57

## 2020-01-01 RX ADMIN — HEPARIN SODIUM AND DEXTROSE 18 UNITS/KG/HR: 10000; 5 INJECTION INTRAVENOUS at 14:40

## 2020-01-01 RX ADMIN — PANTOPRAZOLE SODIUM 40 MG: 40 TABLET, DELAYED RELEASE ORAL at 06:52

## 2020-01-01 RX ADMIN — APIXABAN 5 MG: 5 TABLET, FILM COATED ORAL at 20:36

## 2020-01-01 RX ADMIN — ONDANSETRON HYDROCHLORIDE 4 MG: 4 TABLET, FILM COATED ORAL at 22:04

## 2020-01-01 RX ADMIN — ALPRAZOLAM 0.5 MG: 0.5 TABLET ORAL at 21:19

## 2020-01-01 RX ADMIN — DEXTROSE MONOHYDRATE 25 G: 25 INJECTION, SOLUTION INTRAVENOUS at 04:10

## 2020-01-01 RX ADMIN — BUDESONIDE AND FORMOTEROL FUMARATE DIHYDRATE 2 PUFF: 80; 4.5 AEROSOL RESPIRATORY (INHALATION) at 08:11

## 2020-01-01 RX ADMIN — SENNOSIDES 8.6 MG: 8.6 TABLET, FILM COATED ORAL at 07:33

## 2020-01-01 RX ADMIN — ALPRAZOLAM 0.5 MG: 0.25 TABLET ORAL at 22:01

## 2020-01-01 RX ADMIN — Medication 10 ML: at 21:30

## 2020-01-01 RX ADMIN — INSULIN LISPRO 1 UNITS: 100 INJECTION, SOLUTION INTRAVENOUS; SUBCUTANEOUS at 17:48

## 2020-01-01 RX ADMIN — METOPROLOL TARTRATE 5 MG: 5 INJECTION, SOLUTION INTRAVENOUS at 01:34

## 2020-01-01 RX ADMIN — AMIODARONE HYDROCHLORIDE 200 MG: 200 TABLET ORAL at 21:49

## 2020-01-01 RX ADMIN — ACYCLOVIR SODIUM 1360 MG: 50 INJECTION, SOLUTION INTRAVENOUS at 00:00

## 2020-01-01 RX ADMIN — FLUTICASONE PROPIONATE 1 SPRAY: 50 SPRAY, METERED NASAL at 08:16

## 2020-01-01 RX ADMIN — SODIUM CHLORIDE, PRESERVATIVE FREE 10 ML: 5 INJECTION INTRAVENOUS at 08:23

## 2020-01-01 RX ADMIN — HYDROXYCHLOROQUINE SULFATE 200 MG: 200 TABLET ORAL at 16:00

## 2020-01-01 RX ADMIN — SODIUM CHLORIDE, PRESERVATIVE FREE 10 ML: 5 INJECTION INTRAVENOUS at 20:37

## 2020-01-01 RX ADMIN — VANCOMYCIN HYDROCHLORIDE 1000 MG: 1 INJECTION, POWDER, LYOPHILIZED, FOR SOLUTION INTRAVENOUS at 17:23

## 2020-01-01 RX ADMIN — ALPRAZOLAM 0.5 MG: 0.25 TABLET ORAL at 21:24

## 2020-01-01 RX ADMIN — APIXABAN 5 MG: 5 TABLET, FILM COATED ORAL at 21:24

## 2020-01-01 RX ADMIN — SEVELAMER CARBONATE 2400 MG: 800 TABLET, FILM COATED ORAL at 12:24

## 2020-01-01 RX ADMIN — INSULIN LISPRO 1 UNITS: 100 INJECTION, SOLUTION INTRAVENOUS; SUBCUTANEOUS at 20:16

## 2020-01-01 RX ADMIN — HEPARIN SODIUM AND DEXTROSE 7.35 UNITS/KG/HR: 10000; 5 INJECTION INTRAVENOUS at 19:07

## 2020-01-01 RX ADMIN — LACTULOSE 10 G: 20 SOLUTION ORAL at 09:18

## 2020-01-01 RX ADMIN — SEVELAMER CARBONATE 2400 MG: 800 TABLET, FILM COATED ORAL at 12:36

## 2020-01-01 RX ADMIN — DILTIAZEM HYDROCHLORIDE 240 MG: 240 CAPSULE, COATED, EXTENDED RELEASE ORAL at 16:01

## 2020-01-01 RX ADMIN — OXYCODONE HYDROCHLORIDE 10 MG: 10 TABLET ORAL at 10:07

## 2020-01-01 RX ADMIN — DIPHENHYDRAMINE HCL 25 MG: 25 TABLET ORAL at 22:39

## 2020-01-01 RX ADMIN — TAMSULOSIN HYDROCHLORIDE 0.4 MG: 0.4 CAPSULE ORAL at 09:48

## 2020-01-01 RX ADMIN — MORPHINE SULFATE 2 MG: 2 INJECTION, SOLUTION INTRAMUSCULAR; INTRAVENOUS at 11:45

## 2020-01-01 RX ADMIN — CETIRIZINE HYDROCHLORIDE 5 MG: 10 TABLET, FILM COATED ORAL at 09:17

## 2020-01-01 RX ADMIN — ONDANSETRON 4 MG: 2 INJECTION INTRAMUSCULAR; INTRAVENOUS at 08:24

## 2020-01-01 RX ADMIN — ACETAMINOPHEN 650 MG: 325 TABLET ORAL at 08:09

## 2020-01-01 RX ADMIN — CEFTRIAXONE SODIUM 2 G: 2 INJECTION, POWDER, FOR SOLUTION INTRAMUSCULAR; INTRAVENOUS at 12:42

## 2020-01-01 RX ADMIN — INSULIN GLARGINE 10 UNITS: 100 INJECTION, SOLUTION SUBCUTANEOUS at 09:34

## 2020-01-01 RX ADMIN — ZINC SULFATE 220 MG (50 MG) CAPSULE 50 MG: CAPSULE at 08:28

## 2020-01-01 RX ADMIN — CETIRIZINE HYDROCHLORIDE 5 MG: 10 TABLET, FILM COATED ORAL at 08:11

## 2020-01-01 RX ADMIN — TRAMADOL HYDROCHLORIDE 50 MG: 50 TABLET, FILM COATED ORAL at 23:32

## 2020-01-01 RX ADMIN — SEVELAMER CARBONATE 2400 MG: 800 TABLET, FILM COATED ORAL at 17:22

## 2020-01-01 RX ADMIN — TETRAKIS(2-METHOXYISOBUTYLISOCYANIDE)COPPER(I) TETRAFLUOROBORATE 35.9 MILLICURIE: 1 INJECTION, POWDER, LYOPHILIZED, FOR SOLUTION INTRAVENOUS at 11:50

## 2020-01-01 RX ADMIN — APIXABAN 5 MG: 5 TABLET, FILM COATED ORAL at 09:48

## 2020-01-01 RX ADMIN — METHYLPREDNISOLONE SODIUM SUCCINATE 40 MG: 40 INJECTION, POWDER, FOR SOLUTION INTRAMUSCULAR; INTRAVENOUS at 00:29

## 2020-01-01 RX ADMIN — INSULIN LISPRO 1 UNITS: 100 INJECTION, SOLUTION INTRAVENOUS; SUBCUTANEOUS at 09:07

## 2020-01-01 RX ADMIN — AMIODARONE HYDROCHLORIDE 150 MG: 50 INJECTION, SOLUTION INTRAVENOUS at 05:03

## 2020-01-01 RX ADMIN — FLUTICASONE PROPIONATE 1 SPRAY: 50 SPRAY, METERED NASAL at 09:18

## 2020-01-01 RX ADMIN — OXYCODONE HYDROCHLORIDE 10 MG: 10 TABLET ORAL at 21:20

## 2020-01-01 RX ADMIN — OXYCODONE HYDROCHLORIDE 5 MG: 5 TABLET ORAL at 22:39

## 2020-01-01 RX ADMIN — SENNOSIDES 8.6 MG: 8.6 TABLET, FILM COATED ORAL at 13:48

## 2020-01-01 RX ADMIN — MORPHINE SULFATE 1 MG: 2 INJECTION, SOLUTION INTRAMUSCULAR; INTRAVENOUS at 03:21

## 2020-01-01 RX ADMIN — CALCIUM ACETATE 2001 MG: 667 CAPSULE ORAL at 16:00

## 2020-01-01 RX ADMIN — SODIUM CHLORIDE, PRESERVATIVE FREE 10 ML: 5 INJECTION INTRAVENOUS at 08:18

## 2020-01-01 RX ADMIN — DARBEPOETIN ALFA 60 MCG: 60 INJECTION, SOLUTION INTRAVENOUS; SUBCUTANEOUS at 17:22

## 2020-01-01 RX ADMIN — METOPROLOL TARTRATE 50 MG: 50 TABLET, FILM COATED ORAL at 08:31

## 2020-01-01 RX ADMIN — APIXABAN 5 MG: 5 TABLET, FILM COATED ORAL at 17:13

## 2020-01-01 RX ADMIN — DILTIAZEM HYDROCHLORIDE 240 MG: 240 CAPSULE, COATED, EXTENDED RELEASE ORAL at 08:01

## 2020-01-01 RX ADMIN — APIXABAN 5 MG: 5 TABLET, FILM COATED ORAL at 08:32

## 2020-01-01 RX ADMIN — FINASTERIDE 5 MG: 5 TABLET, FILM COATED ORAL at 16:41

## 2020-01-01 RX ADMIN — DARBEPOETIN ALFA 100 MCG: 100 INJECTION, SOLUTION INTRAVENOUS; SUBCUTANEOUS at 10:36

## 2020-01-01 RX ADMIN — SODIUM CHLORIDE, PRESERVATIVE FREE 10 ML: 5 INJECTION INTRAVENOUS at 20:14

## 2020-01-01 RX ADMIN — METHYLPREDNISOLONE SODIUM SUCCINATE 40 MG: 40 INJECTION, POWDER, FOR SOLUTION INTRAMUSCULAR; INTRAVENOUS at 08:21

## 2020-01-01 RX ADMIN — PANTOPRAZOLE SODIUM 40 MG: 40 TABLET, DELAYED RELEASE ORAL at 05:27

## 2020-01-01 RX ADMIN — FINASTERIDE 5 MG: 5 TABLET, FILM COATED ORAL at 23:08

## 2020-01-01 RX ADMIN — SENNOSIDES 8.6 MG: 8.6 TABLET, FILM COATED ORAL at 21:15

## 2020-01-01 RX ADMIN — FLUTICASONE PROPIONATE 1 SPRAY: 50 SPRAY, METERED NASAL at 09:28

## 2020-01-01 RX ADMIN — SODIUM CHLORIDE, PRESERVATIVE FREE 10 ML: 5 INJECTION INTRAVENOUS at 08:03

## 2020-01-01 RX ADMIN — INSULIN LISPRO 2 UNITS: 100 INJECTION, SOLUTION INTRAVENOUS; SUBCUTANEOUS at 19:13

## 2020-01-01 RX ADMIN — ALPRAZOLAM 0.5 MG: 0.5 TABLET ORAL at 20:10

## 2020-01-01 RX ADMIN — FLUTICASONE PROPIONATE 1 SPRAY: 50 SPRAY, METERED NASAL at 12:30

## 2020-01-01 RX ADMIN — SODIUM CHLORIDE, PRESERVATIVE FREE 10 ML: 5 INJECTION INTRAVENOUS at 11:01

## 2020-01-01 RX ADMIN — SEVELAMER CARBONATE 2400 MG: 800 TABLET, FILM COATED ORAL at 09:29

## 2020-01-01 RX ADMIN — CEFTRIAXONE SODIUM 2 G: 2 INJECTION, POWDER, FOR SOLUTION INTRAMUSCULAR; INTRAVENOUS at 23:30

## 2020-01-01 RX ADMIN — ONDANSETRON 4 MG: 2 INJECTION INTRAMUSCULAR; INTRAVENOUS at 23:03

## 2020-01-01 RX ADMIN — PROMETHAZINE HYDROCHLORIDE 12.5 MG: 25 TABLET ORAL at 17:58

## 2020-01-01 RX ADMIN — SENNOSIDES 8.6 MG: 8.6 TABLET, FILM COATED ORAL at 09:29

## 2020-01-01 RX ADMIN — DILTIAZEM HYDROCHLORIDE 10 MG: 5 INJECTION INTRAVENOUS at 10:59

## 2020-01-01 RX ADMIN — DIPHENHYDRAMINE HCL 25 MG: 25 TABLET ORAL at 23:32

## 2020-01-01 RX ADMIN — MORPHINE SULFATE 1 MG: 2 INJECTION, SOLUTION INTRAMUSCULAR; INTRAVENOUS at 08:03

## 2020-01-01 RX ADMIN — ONDANSETRON 4 MG: 2 INJECTION INTRAMUSCULAR; INTRAVENOUS at 14:03

## 2020-01-01 RX ADMIN — HYDROMORPHONE HYDROCHLORIDE 0.5 MG: 1 INJECTION, SOLUTION INTRAMUSCULAR; INTRAVENOUS; SUBCUTANEOUS at 08:18

## 2020-01-01 RX ADMIN — TAMSULOSIN HYDROCHLORIDE 0.4 MG: 0.4 CAPSULE ORAL at 08:07

## 2020-01-01 RX ADMIN — INSULIN GLARGINE 10 UNITS: 100 INJECTION, SOLUTION SUBCUTANEOUS at 08:20

## 2020-01-01 RX ADMIN — ANTI-FUNGAL POWDER MICONAZOLE NITRATE TALC FREE: 1.42 POWDER TOPICAL at 22:00

## 2020-01-01 RX ADMIN — OXYCODONE HYDROCHLORIDE 10 MG: 10 TABLET ORAL at 07:35

## 2020-01-01 RX ADMIN — SODIUM CHLORIDE, PRESERVATIVE FREE 10 ML: 5 INJECTION INTRAVENOUS at 07:49

## 2020-01-01 RX ADMIN — ZINC SULFATE 220 MG (50 MG) CAPSULE 50 MG: CAPSULE at 20:36

## 2020-01-01 RX ADMIN — INSULIN LISPRO 1 UNITS: 100 INJECTION, SOLUTION INTRAVENOUS; SUBCUTANEOUS at 21:51

## 2020-01-01 RX ADMIN — ONDANSETRON 4 MG: 2 INJECTION INTRAMUSCULAR; INTRAVENOUS at 07:59

## 2020-01-01 RX ADMIN — FLUTICASONE PROPIONATE 1 SPRAY: 50 SPRAY, METERED NASAL at 09:10

## 2020-01-01 RX ADMIN — ANTI-FUNGAL POWDER MICONAZOLE NITRATE TALC FREE: 1.42 POWDER TOPICAL at 09:27

## 2020-01-01 RX ADMIN — OXYCODONE HYDROCHLORIDE 5 MG: 5 TABLET ORAL at 14:39

## 2020-01-01 RX ADMIN — FENTANYL CITRATE 25 MCG: 50 INJECTION, SOLUTION INTRAMUSCULAR; INTRAVENOUS at 21:38

## 2020-01-01 RX ADMIN — METOPROLOL TARTRATE 2.5 MG: 5 INJECTION, SOLUTION INTRAVENOUS at 10:37

## 2020-01-01 RX ADMIN — Medication 2 PUFF: at 04:01

## 2020-01-01 RX ADMIN — VANCOMYCIN HYDROCHLORIDE 2500 MG: 1.5 INJECTION, POWDER, LYOPHILIZED, FOR SOLUTION INTRAVENOUS at 07:39

## 2020-01-01 RX ADMIN — INSULIN LISPRO 2 UNITS: 100 INJECTION, SOLUTION INTRAVENOUS; SUBCUTANEOUS at 12:36

## 2020-01-01 RX ADMIN — MIDODRINE HYDROCHLORIDE 5 MG: 5 TABLET ORAL at 21:19

## 2020-01-01 RX ADMIN — LEVALBUTEROL 1.25 MG: 1.25 SOLUTION, CONCENTRATE RESPIRATORY (INHALATION) at 16:06

## 2020-01-01 RX ADMIN — SEVELAMER CARBONATE 2400 MG: 800 TABLET, FILM COATED ORAL at 07:58

## 2020-01-01 RX ADMIN — ESCITALOPRAM OXALATE 10 MG: 10 TABLET ORAL at 08:21

## 2020-01-01 RX ADMIN — CEFTRIAXONE SODIUM 2 G: 2 INJECTION, POWDER, FOR SOLUTION INTRAMUSCULAR; INTRAVENOUS at 12:45

## 2020-01-01 RX ADMIN — SODIUM CHLORIDE 80 ML: 9 INJECTION, SOLUTION INTRAVENOUS at 02:40

## 2020-01-01 RX ADMIN — ONDANSETRON 4 MG: 2 INJECTION INTRAMUSCULAR; INTRAVENOUS at 16:01

## 2020-01-01 RX ADMIN — IOVERSOL 75 ML: 741 INJECTION INTRA-ARTERIAL; INTRAVENOUS at 02:41

## 2020-01-01 RX ADMIN — DILTIAZEM HYDROCHLORIDE 5 MG/HR: 5 INJECTION INTRAVENOUS at 11:00

## 2020-01-01 RX ADMIN — ASPIRIN 300 MG: 300 SUPPOSITORY RECTAL at 04:28

## 2020-01-01 RX ADMIN — SENNOSIDES 8.6 MG: 8.6 TABLET ORAL at 22:40

## 2020-01-01 RX ADMIN — AMIODARONE HYDROCHLORIDE 1 MG/MIN: 1.8 INJECTION, SOLUTION INTRAVENOUS at 01:30

## 2020-01-01 RX ADMIN — CETIRIZINE HYDROCHLORIDE 5 MG: 10 TABLET, FILM COATED ORAL at 08:08

## 2020-01-01 RX ADMIN — APIXABAN 5 MG: 5 TABLET, FILM COATED ORAL at 07:50

## 2020-01-01 RX ADMIN — SENNOSIDES 8.6 MG: 8.6 TABLET, FILM COATED ORAL at 20:10

## 2020-01-01 RX ADMIN — HYDROXYCHLOROQUINE SULFATE 400 MG: 200 TABLET ORAL at 14:00

## 2020-01-01 RX ADMIN — SENNOSIDES 8.6 MG: 8.6 TABLET, FILM COATED ORAL at 08:11

## 2020-01-01 RX ADMIN — ALPRAZOLAM 0.5 MG: 0.25 TABLET ORAL at 08:01

## 2020-01-01 RX ADMIN — SEVELAMER CARBONATE 2400 MG: 800 TABLET, FILM COATED ORAL at 16:55

## 2020-01-01 RX ADMIN — SODIUM CHLORIDE, PRESERVATIVE FREE 10 ML: 5 INJECTION INTRAVENOUS at 08:56

## 2020-01-01 RX ADMIN — LORAZEPAM 1 MG: 2 INJECTION INTRAMUSCULAR; INTRAVENOUS at 19:39

## 2020-01-01 RX ADMIN — BUDESONIDE AND FORMOTEROL FUMARATE DIHYDRATE 2 PUFF: 80; 4.5 AEROSOL RESPIRATORY (INHALATION) at 08:44

## 2020-01-01 RX ADMIN — INSULIN LISPRO 2 UNITS: 100 INJECTION, SOLUTION INTRAVENOUS; SUBCUTANEOUS at 11:49

## 2020-01-01 RX ADMIN — Medication 10 ML: at 08:04

## 2020-01-01 RX ADMIN — FENTANYL CITRATE 50 MCG: 50 INJECTION INTRAMUSCULAR; INTRAVENOUS at 17:36

## 2020-01-01 RX ADMIN — METOPROLOL TARTRATE 25 MG: 50 TABLET, FILM COATED ORAL at 09:24

## 2020-01-01 RX ADMIN — DILTIAZEM HYDROCHLORIDE 10 MG: 5 INJECTION INTRAVENOUS at 14:27

## 2020-01-01 RX ADMIN — SENNOSIDES 8.6 MG: 8.6 TABLET, FILM COATED ORAL at 12:20

## 2020-01-01 RX ADMIN — PROMETHAZINE HYDROCHLORIDE 12.5 MG: 25 TABLET ORAL at 08:18

## 2020-01-01 RX ADMIN — SODIUM CHLORIDE, PRESERVATIVE FREE 10 ML: 5 INJECTION INTRAVENOUS at 21:51

## 2020-01-01 RX ADMIN — VANCOMYCIN HYDROCHLORIDE 1750 MG: 1 INJECTION, POWDER, LYOPHILIZED, FOR SOLUTION INTRAVENOUS at 04:41

## 2020-01-01 RX ADMIN — ANTI-FUNGAL POWDER MICONAZOLE NITRATE TALC FREE: 1.42 POWDER TOPICAL at 22:37

## 2020-01-01 RX ADMIN — Medication 2 PUFF: at 08:53

## 2020-01-01 RX ADMIN — ESCITALOPRAM OXALATE 10 MG: 10 TABLET ORAL at 09:39

## 2020-01-01 RX ADMIN — APIXABAN 5 MG: 5 TABLET, FILM COATED ORAL at 10:55

## 2020-01-01 RX ADMIN — SODIUM CHLORIDE 250 ML: 0.9 INJECTION, SOLUTION INTRAVENOUS at 21:30

## 2020-01-01 RX ADMIN — HEPARIN SODIUM 5000 UNITS: 5000 INJECTION INTRAVENOUS; SUBCUTANEOUS at 06:46

## 2020-01-01 RX ADMIN — ESCITALOPRAM OXALATE 10 MG: 10 TABLET ORAL at 07:33

## 2020-01-01 RX ADMIN — PANTOPRAZOLE SODIUM 40 MG: 40 TABLET, DELAYED RELEASE ORAL at 05:44

## 2020-01-01 RX ADMIN — SODIUM CHLORIDE, PRESERVATIVE FREE 10 ML: 5 INJECTION INTRAVENOUS at 12:28

## 2020-01-01 RX ADMIN — APIXABAN 5 MG: 5 TABLET, FILM COATED ORAL at 16:32

## 2020-01-01 RX ADMIN — CALCIUM ACETATE 2001 MG: 667 CAPSULE ORAL at 08:22

## 2020-01-01 RX ADMIN — HYDROMORPHONE HYDROCHLORIDE 0.5 MG: 1 INJECTION, SOLUTION INTRAMUSCULAR; INTRAVENOUS; SUBCUTANEOUS at 17:27

## 2020-01-01 RX ADMIN — BUDESONIDE AND FORMOTEROL FUMARATE DIHYDRATE 2 PUFF: 80; 4.5 AEROSOL RESPIRATORY (INHALATION) at 12:26

## 2020-01-01 RX ADMIN — SEVELAMER CARBONATE 2400 MG: 800 TABLET, FILM COATED ORAL at 17:12

## 2020-01-01 RX ADMIN — METOPROLOL TARTRATE 2.5 MG: 1 INJECTION, SOLUTION INTRAVENOUS at 10:55

## 2020-01-01 RX ADMIN — HEPARIN SODIUM AND DEXTROSE 9.35 UNITS/KG/HR: 10000; 5 INJECTION INTRAVENOUS at 21:58

## 2020-01-01 RX ADMIN — ISOSORBIDE MONONITRATE 60 MG: 60 TABLET, EXTENDED RELEASE ORAL at 17:12

## 2020-01-01 RX ADMIN — ONDANSETRON HYDROCHLORIDE 4 MG: 4 TABLET, FILM COATED ORAL at 16:03

## 2020-01-01 RX ADMIN — ISOSORBIDE MONONITRATE 60 MG: 60 TABLET, EXTENDED RELEASE ORAL at 09:29

## 2020-01-01 RX ADMIN — FLUTICASONE PROPIONATE 1 SPRAY: 50 SPRAY, METERED NASAL at 08:09

## 2020-01-01 RX ADMIN — METOPROLOL TARTRATE 25 MG: 50 TABLET, FILM COATED ORAL at 09:58

## 2020-01-01 RX ADMIN — Medication 10 ML: at 21:24

## 2020-01-01 RX ADMIN — ACYCLOVIR SODIUM 500 MG: 50 INJECTION, SOLUTION INTRAVENOUS at 21:23

## 2020-01-01 RX ADMIN — HYDROXYCHLOROQUINE SULFATE 200 MG: 200 TABLET ORAL at 20:36

## 2020-01-01 RX ADMIN — SEVELAMER CARBONATE 2400 MG: 800 TABLET, FILM COATED ORAL at 16:41

## 2020-01-01 RX ADMIN — ESCITALOPRAM OXALATE 10 MG: 10 TABLET ORAL at 09:16

## 2020-01-01 RX ADMIN — FLUTICASONE PROPIONATE 1 SPRAY: 50 SPRAY, METERED NASAL at 09:49

## 2020-01-01 RX ADMIN — ACYCLOVIR SODIUM 500 MG: 50 INJECTION, SOLUTION INTRAVENOUS at 22:07

## 2020-01-01 RX ADMIN — PANTOPRAZOLE SODIUM 40 MG: 40 TABLET, DELAYED RELEASE ORAL at 06:22

## 2020-01-01 RX ADMIN — INSULIN LISPRO 2 UNITS: 100 INJECTION, SOLUTION INTRAVENOUS; SUBCUTANEOUS at 12:09

## 2020-01-01 RX ADMIN — INSULIN LISPRO 2 UNITS: 100 INJECTION, SOLUTION INTRAVENOUS; SUBCUTANEOUS at 16:54

## 2020-01-01 RX ADMIN — MIDODRINE HYDROCHLORIDE 5 MG: 5 TABLET ORAL at 15:46

## 2020-01-01 RX ADMIN — MORPHINE SULFATE 1 MG: 2 INJECTION, SOLUTION INTRAMUSCULAR; INTRAVENOUS at 02:31

## 2020-01-01 RX ADMIN — Medication 10 ML: at 09:51

## 2020-01-01 RX ADMIN — FINASTERIDE 5 MG: 5 TABLET, FILM COATED ORAL at 08:21

## 2020-01-01 RX ADMIN — TRAMADOL HYDROCHLORIDE 50 MG: 50 TABLET, FILM COATED ORAL at 18:26

## 2020-01-01 RX ADMIN — SEVELAMER CARBONATE 2400 MG: 800 TABLET, FILM COATED ORAL at 18:30

## 2020-01-01 RX ADMIN — Medication 10 ML: at 22:42

## 2020-01-01 RX ADMIN — SEVELAMER CARBONATE 2400 MG: 800 TABLET, FILM COATED ORAL at 11:53

## 2020-01-01 RX ADMIN — APIXABAN 5 MG: 5 TABLET, FILM COATED ORAL at 08:21

## 2020-01-01 RX ADMIN — Medication 10 ML: at 20:11

## 2020-01-01 RX ADMIN — CEFTRIAXONE SODIUM 1 G: 1 INJECTION, POWDER, FOR SOLUTION INTRAMUSCULAR; INTRAVENOUS at 23:37

## 2020-01-01 RX ADMIN — APIXABAN 5 MG: 5 TABLET, FILM COATED ORAL at 09:26

## 2020-01-01 RX ADMIN — TAMSULOSIN HYDROCHLORIDE 0.4 MG: 0.4 CAPSULE ORAL at 07:33

## 2020-01-01 RX ADMIN — MORPHINE SULFATE 4 MG: 4 INJECTION INTRAVENOUS at 03:22

## 2020-01-01 RX ADMIN — ANTI-FUNGAL POWDER MICONAZOLE NITRATE TALC FREE: 1.42 POWDER TOPICAL at 22:22

## 2020-01-01 RX ADMIN — OXYCODONE HYDROCHLORIDE 10 MG: 10 TABLET ORAL at 04:12

## 2020-01-01 RX ADMIN — INSULIN GLARGINE 10 UNITS: 100 INJECTION, SOLUTION SUBCUTANEOUS at 21:44

## 2020-01-01 RX ADMIN — FENTANYL CITRATE: 50 INJECTION, SOLUTION INTRAMUSCULAR; INTRAVENOUS at 00:33

## 2020-01-01 RX ADMIN — ASPIRIN 81 MG: 81 TABLET, CHEWABLE ORAL at 16:42

## 2020-01-01 RX ADMIN — AMIODARONE HYDROCHLORIDE 1 MG/MIN: 50 INJECTION, SOLUTION INTRAVENOUS at 05:19

## 2020-01-01 RX ADMIN — SODIUM CHLORIDE, POTASSIUM CHLORIDE, SODIUM LACTATE AND CALCIUM CHLORIDE 500 ML: 600; 310; 30; 20 INJECTION, SOLUTION INTRAVENOUS at 16:31

## 2020-01-01 RX ADMIN — HEPARIN SODIUM AND DEXTROSE 18 UNITS/KG/HR: 10000; 5 INJECTION INTRAVENOUS at 19:41

## 2020-01-01 RX ADMIN — ANTI-FUNGAL POWDER MICONAZOLE NITRATE TALC FREE: 1.42 POWDER TOPICAL at 02:47

## 2020-01-01 RX ADMIN — SENNOSIDES 8.6 MG: 8.6 TABLET, FILM COATED ORAL at 21:24

## 2020-01-01 RX ADMIN — MIDODRINE HYDROCHLORIDE 5 MG: 5 TABLET ORAL at 22:01

## 2020-01-01 RX ADMIN — PANTOPRAZOLE SODIUM 40 MG: 40 TABLET, DELAYED RELEASE ORAL at 07:05

## 2020-01-01 RX ADMIN — Medication 10 ML: at 09:29

## 2020-01-01 RX ADMIN — METOPROLOL TARTRATE 5 MG: 1 INJECTION, SOLUTION INTRAVENOUS at 01:51

## 2020-01-01 RX ADMIN — ANTI-FUNGAL POWDER MICONAZOLE NITRATE TALC FREE: 1.42 POWDER TOPICAL at 12:26

## 2020-01-01 RX ADMIN — PERFLUTREN 2.2 MG: 6.52 INJECTION, SUSPENSION INTRAVENOUS at 11:28

## 2020-01-01 RX ADMIN — FINASTERIDE 5 MG: 5 TABLET, FILM COATED ORAL at 16:42

## 2020-01-01 RX ADMIN — LACTULOSE 10 G: 20 SOLUTION ORAL at 16:59

## 2020-01-01 RX ADMIN — HEPARIN SODIUM AND DEXTROSE 11.35 UNITS/KG/HR: 10000; 5 INJECTION INTRAVENOUS at 09:55

## 2020-01-01 RX ADMIN — ESCITALOPRAM OXALATE 10 MG: 10 TABLET ORAL at 08:10

## 2020-01-01 RX ADMIN — INSULIN GLARGINE 10 UNITS: 100 INJECTION, SOLUTION SUBCUTANEOUS at 07:48

## 2020-01-01 RX ADMIN — DILTIAZEM HYDROCHLORIDE 240 MG: 240 CAPSULE, COATED, EXTENDED RELEASE ORAL at 09:39

## 2020-01-01 RX ADMIN — AMIODARONE HYDROCHLORIDE 300 MG: 1.5 INJECTION, SOLUTION INTRAVENOUS at 18:58

## 2020-01-01 RX ADMIN — ANTI-FUNGAL POWDER MICONAZOLE NITRATE TALC FREE: 1.42 POWDER TOPICAL at 01:00

## 2020-01-01 RX ADMIN — DEXTROSE MONOHYDRATE 25 G: 25 INJECTION, SOLUTION INTRAVENOUS at 04:07

## 2020-01-01 RX ADMIN — HEPARIN SODIUM 10000 UNITS: 1000 INJECTION INTRAVENOUS; SUBCUTANEOUS at 19:43

## 2020-01-01 RX ADMIN — PANTOPRAZOLE SODIUM 40 MG: 40 TABLET, DELAYED RELEASE ORAL at 07:35

## 2020-01-01 RX ADMIN — TOCILIZUMAB 400 MG: 20 INJECTION, SOLUTION, CONCENTRATE INTRAVENOUS at 18:12

## 2020-01-01 RX ADMIN — SENNOSIDES 8.6 MG: 8.6 TABLET, FILM COATED ORAL at 09:16

## 2020-01-01 RX ADMIN — DILTIAZEM HYDROCHLORIDE 240 MG: 240 CAPSULE, COATED, EXTENDED RELEASE ORAL at 17:00

## 2020-01-01 RX ADMIN — ALPRAZOLAM 0.5 MG: 0.25 TABLET ORAL at 08:11

## 2020-01-01 RX ADMIN — DOXYCYCLINE 100 MG: 100 CAPSULE ORAL at 21:19

## 2020-01-01 RX ADMIN — ANTI-FUNGAL POWDER MICONAZOLE NITRATE TALC FREE: 1.42 POWDER TOPICAL at 09:14

## 2020-01-01 RX ADMIN — LEVALBUTEROL 1.25 MG: 1.25 SOLUTION, CONCENTRATE RESPIRATORY (INHALATION) at 15:08

## 2020-01-01 RX ADMIN — ACYCLOVIR SODIUM 1000 MG: 50 INJECTION, SOLUTION INTRAVENOUS at 08:21

## 2020-01-01 RX ADMIN — Medication 2 PUFF: at 21:33

## 2020-01-01 RX ADMIN — INSULIN GLARGINE 10 UNITS: 100 INJECTION, SOLUTION SUBCUTANEOUS at 00:45

## 2020-01-01 RX ADMIN — OXYCODONE HYDROCHLORIDE 10 MG: 10 TABLET ORAL at 05:27

## 2020-01-01 RX ADMIN — MORPHINE SULFATE 4 MG: 4 INJECTION INTRAVENOUS at 22:55

## 2020-01-01 RX ADMIN — ALPRAZOLAM 0.5 MG: 0.25 TABLET ORAL at 10:35

## 2020-01-01 RX ADMIN — SEVELAMER CARBONATE 2400 MG: 800 TABLET, FILM COATED ORAL at 12:07

## 2020-01-01 RX ADMIN — SEVELAMER CARBONATE 2400 MG: 800 TABLET, FILM COATED ORAL at 12:16

## 2020-01-01 RX ADMIN — FINASTERIDE 5 MG: 5 TABLET, FILM COATED ORAL at 07:33

## 2020-01-01 RX ADMIN — HYDROMORPHONE HYDROCHLORIDE 0.5 MG: 1 INJECTION, SOLUTION INTRAMUSCULAR; INTRAVENOUS; SUBCUTANEOUS at 19:13

## 2020-01-01 RX ADMIN — ACETAMINOPHEN 650 MG: 325 TABLET, FILM COATED ORAL at 20:44

## 2020-01-01 RX ADMIN — ZINC SULFATE 220 MG (50 MG) CAPSULE 50 MG: CAPSULE at 08:18

## 2020-01-01 RX ADMIN — APIXABAN 5 MG: 5 TABLET, FILM COATED ORAL at 16:42

## 2020-01-01 RX ADMIN — ANTI-FUNGAL POWDER MICONAZOLE NITRATE TALC FREE: 1.42 POWDER TOPICAL at 08:11

## 2020-01-01 RX ADMIN — AMIODARONE HYDROCHLORIDE 1 MG/MIN: 50 INJECTION, SOLUTION INTRAVENOUS at 16:38

## 2020-01-01 RX ADMIN — DEXTROSE AND SODIUM CHLORIDE: 5; 900 INJECTION, SOLUTION INTRAVENOUS at 07:40

## 2020-01-01 RX ADMIN — CEFTRIAXONE SODIUM 2 G: 2 INJECTION, POWDER, FOR SOLUTION INTRAMUSCULAR; INTRAVENOUS at 01:10

## 2020-01-01 RX ADMIN — DILTIAZEM HYDROCHLORIDE 240 MG: 240 CAPSULE, COATED, EXTENDED RELEASE ORAL at 09:26

## 2020-01-01 RX ADMIN — DILTIAZEM HYDROCHLORIDE 10 MG/HR: 5 INJECTION INTRAVENOUS at 16:45

## 2020-01-01 RX ADMIN — METOPROLOL TARTRATE 5 MG: 5 INJECTION, SOLUTION INTRAVENOUS at 11:53

## 2020-01-01 RX ADMIN — MORPHINE SULFATE 1 MG: 2 INJECTION, SOLUTION INTRAMUSCULAR; INTRAVENOUS at 08:16

## 2020-01-01 RX ADMIN — HYDROXYCHLOROQUINE SULFATE 200 MG: 200 TABLET ORAL at 08:22

## 2020-01-01 RX ADMIN — SENNOSIDES 8.6 MG: 8.6 TABLET, FILM COATED ORAL at 08:01

## 2020-01-01 RX ADMIN — MORPHINE SULFATE 4 MG: 4 INJECTION INTRAVENOUS at 10:17

## 2020-01-01 RX ADMIN — ISOSORBIDE MONONITRATE 60 MG: 60 TABLET, EXTENDED RELEASE ORAL at 07:39

## 2020-01-01 RX ADMIN — INSULIN LISPRO 2 UNITS: 100 INJECTION, SOLUTION INTRAVENOUS; SUBCUTANEOUS at 12:40

## 2020-01-01 RX ADMIN — Medication 10 ML: at 13:41

## 2020-01-01 RX ADMIN — ESCITALOPRAM OXALATE 10 MG: 10 TABLET ORAL at 16:42

## 2020-01-01 RX ADMIN — INSULIN LISPRO 1 UNITS: 100 INJECTION, SOLUTION INTRAVENOUS; SUBCUTANEOUS at 12:00

## 2020-01-01 RX ADMIN — FLUTICASONE PROPIONATE 1 SPRAY: 50 SPRAY, METERED NASAL at 07:35

## 2020-01-01 RX ADMIN — ZINC SULFATE 220 MG (50 MG) CAPSULE 50 MG: CAPSULE at 07:48

## 2020-01-01 RX ADMIN — HEPARIN SODIUM AND DEXTROSE 26 UNITS/KG/HR: 10000; 5 INJECTION INTRAVENOUS at 07:16

## 2020-01-01 RX ADMIN — AMIODARONE HYDROCHLORIDE 1 MG/MIN: 50 INJECTION, SOLUTION INTRAVENOUS at 21:17

## 2020-01-01 RX ADMIN — Medication 10 ML: at 11:50

## 2020-01-01 RX ADMIN — ALPRAZOLAM 0.5 MG: 0.5 TABLET ORAL at 21:41

## 2020-01-01 RX ADMIN — CETIRIZINE HYDROCHLORIDE 5 MG: 10 TABLET, FILM COATED ORAL at 07:34

## 2020-01-01 RX ADMIN — INSULIN GLARGINE 10 UNITS: 100 INJECTION, SOLUTION SUBCUTANEOUS at 21:21

## 2020-01-01 RX ADMIN — MIDODRINE HYDROCHLORIDE 10 MG: 5 TABLET ORAL at 10:45

## 2020-01-01 RX ADMIN — Medication 10 ML: at 05:12

## 2020-01-01 RX ADMIN — LACTULOSE 10 G: 20 SOLUTION ORAL at 09:27

## 2020-01-01 RX ADMIN — SODIUM CHLORIDE 80 ML: 9 INJECTION, SOLUTION INTRAVENOUS at 22:42

## 2020-01-01 RX ADMIN — CALCIUM GLUCONATE 1 G: 98 INJECTION, SOLUTION INTRAVENOUS at 04:15

## 2020-01-01 RX ADMIN — HYDROMORPHONE HYDROCHLORIDE 0.5 MG: 1 INJECTION, SOLUTION INTRAMUSCULAR; INTRAVENOUS; SUBCUTANEOUS at 03:22

## 2020-01-01 RX ADMIN — HEPARIN SODIUM 5000 UNITS: 5000 INJECTION INTRAVENOUS; SUBCUTANEOUS at 06:21

## 2020-01-01 RX ADMIN — SENNOSIDES 8.6 MG: 8.6 TABLET, FILM COATED ORAL at 08:07

## 2020-01-01 RX ADMIN — IPRATROPIUM BROMIDE AND ALBUTEROL SULFATE 3 ML: .5; 3 SOLUTION RESPIRATORY (INHALATION) at 21:26

## 2020-01-01 RX ADMIN — FINASTERIDE 5 MG: 5 TABLET, FILM COATED ORAL at 09:16

## 2020-01-01 RX ADMIN — OXYCODONE HYDROCHLORIDE 10 MG: 10 TABLET ORAL at 16:54

## 2020-01-01 RX ADMIN — ANTI-FUNGAL POWDER MICONAZOLE NITRATE TALC FREE: 1.42 POWDER TOPICAL at 21:42

## 2020-01-01 RX ADMIN — SEVELAMER CARBONATE 2400 MG: 800 TABLET, FILM COATED ORAL at 10:55

## 2020-01-01 RX ADMIN — ZINC SULFATE 220 MG (50 MG) CAPSULE 50 MG: CAPSULE at 08:02

## 2020-01-01 RX ADMIN — SODIUM CHLORIDE, PRESERVATIVE FREE 10 ML: 5 INJECTION INTRAVENOUS at 00:57

## 2020-01-01 RX ADMIN — SODIUM CHLORIDE, PRESERVATIVE FREE 10 ML: 5 INJECTION INTRAVENOUS at 19:58

## 2020-01-01 RX ADMIN — FINASTERIDE 5 MG: 5 TABLET, FILM COATED ORAL at 09:39

## 2020-01-01 RX ADMIN — ALPRAZOLAM 0.5 MG: 0.5 TABLET ORAL at 17:12

## 2020-01-01 RX ADMIN — MORPHINE SULFATE 1 MG: 2 INJECTION, SOLUTION INTRAMUSCULAR; INTRAVENOUS at 22:26

## 2020-01-01 RX ADMIN — INSULIN GLARGINE 10 UNITS: 100 INJECTION, SOLUTION SUBCUTANEOUS at 21:15

## 2020-01-01 RX ADMIN — ONDANSETRON 4 MG: 2 INJECTION INTRAMUSCULAR; INTRAVENOUS at 17:41

## 2020-01-01 RX ADMIN — DILTIAZEM HYDROCHLORIDE 5 MG/HR: 5 INJECTION INTRAVENOUS at 14:26

## 2020-01-01 RX ADMIN — METOPROLOL TARTRATE 50 MG: 50 TABLET, FILM COATED ORAL at 06:37

## 2020-01-01 RX ADMIN — TAMSULOSIN HYDROCHLORIDE 0.4 MG: 0.4 CAPSULE ORAL at 08:11

## 2020-01-01 RX ADMIN — ALBUMIN (HUMAN) 25 G: 0.25 INJECTION, SOLUTION INTRAVENOUS at 21:16

## 2020-01-01 RX ADMIN — ANTI-FUNGAL POWDER MICONAZOLE NITRATE TALC FREE: 1.42 POWDER TOPICAL at 12:08

## 2020-01-01 RX ADMIN — FLUTICASONE PROPIONATE 1 SPRAY: 50 SPRAY, METERED NASAL at 08:20

## 2020-01-01 RX ADMIN — ISOSORBIDE MONONITRATE 60 MG: 60 TABLET, EXTENDED RELEASE ORAL at 08:11

## 2020-01-01 RX ADMIN — APIXABAN 5 MG: 5 TABLET, FILM COATED ORAL at 21:50

## 2020-01-01 RX ADMIN — FLUTICASONE PROPIONATE 1 SPRAY: 50 SPRAY, METERED NASAL at 09:16

## 2020-01-01 RX ADMIN — HYDROXYCHLOROQUINE SULFATE 200 MG: 200 TABLET ORAL at 00:29

## 2020-01-01 RX ADMIN — SODIUM CHLORIDE, PRESERVATIVE FREE 10 ML: 5 INJECTION INTRAVENOUS at 20:36

## 2020-01-01 RX ADMIN — VANCOMYCIN HYDROCHLORIDE 1000 MG: 1 INJECTION, POWDER, LYOPHILIZED, FOR SOLUTION INTRAVENOUS at 10:33

## 2020-01-01 RX ADMIN — CETIRIZINE HYDROCHLORIDE 5 MG: 10 TABLET, FILM COATED ORAL at 08:01

## 2020-01-01 RX ADMIN — HEPARIN SODIUM 2000 UNITS: 5000 INJECTION INTRAVENOUS; SUBCUTANEOUS at 04:51

## 2020-01-01 RX ADMIN — Medication 10 ML: at 03:22

## 2020-01-01 RX ADMIN — ANTI-FUNGAL POWDER MICONAZOLE NITRATE TALC FREE: 1.42 POWDER TOPICAL at 20:33

## 2020-01-01 RX ADMIN — METOPROLOL TARTRATE 5 MG: 1 INJECTION, SOLUTION INTRAVENOUS at 16:40

## 2020-01-01 RX ADMIN — CETIRIZINE HYDROCHLORIDE 5 MG: 10 TABLET ORAL at 08:21

## 2020-01-01 RX ADMIN — SENNOSIDES 8.6 MG: 8.6 TABLET, FILM COATED ORAL at 08:22

## 2020-01-01 RX ADMIN — BUDESONIDE AND FORMOTEROL FUMARATE DIHYDRATE 2 PUFF: 80; 4.5 AEROSOL RESPIRATORY (INHALATION) at 09:08

## 2020-01-01 RX ADMIN — ISOSORBIDE MONONITRATE 60 MG: 60 TABLET, EXTENDED RELEASE ORAL at 09:16

## 2020-01-01 RX ADMIN — METOPROLOL TARTRATE 25 MG: 50 TABLET, FILM COATED ORAL at 12:35

## 2020-01-01 RX ADMIN — Medication 2 PUFF: at 04:03

## 2020-01-01 RX ADMIN — FLUTICASONE PROPIONATE 1 SPRAY: 50 SPRAY, METERED NASAL at 09:43

## 2020-01-01 RX ADMIN — SEVELAMER CARBONATE 2400 MG: 800 TABLET, FILM COATED ORAL at 08:06

## 2020-01-01 RX ADMIN — Medication 10 ML: at 22:37

## 2020-01-01 RX ADMIN — ASPIRIN 81 MG: 81 TABLET, CHEWABLE ORAL at 16:32

## 2020-01-01 RX ADMIN — ONDANSETRON 4 MG: 2 INJECTION INTRAMUSCULAR; INTRAVENOUS at 19:30

## 2020-01-01 RX ADMIN — ANTI-FUNGAL POWDER MICONAZOLE NITRATE TALC FREE: 1.42 POWDER TOPICAL at 09:18

## 2020-01-01 RX ADMIN — FLUTICASONE PROPIONATE 1 SPRAY: 50 SPRAY, METERED NASAL at 09:00

## 2020-01-01 RX ADMIN — HEPARIN SODIUM 4000 UNITS: 5000 INJECTION INTRAVENOUS; SUBCUTANEOUS at 15:35

## 2020-01-01 RX ADMIN — CEFTRIAXONE SODIUM 2 G: 2 INJECTION, POWDER, FOR SOLUTION INTRAMUSCULAR; INTRAVENOUS at 12:24

## 2020-01-01 RX ADMIN — PANTOPRAZOLE SODIUM 40 MG: 40 TABLET, DELAYED RELEASE ORAL at 05:51

## 2020-01-01 RX ADMIN — FINASTERIDE 5 MG: 5 TABLET, FILM COATED ORAL at 12:21

## 2020-01-01 RX ADMIN — FINASTERIDE 5 MG: 5 TABLET, FILM COATED ORAL at 07:48

## 2020-01-01 RX ADMIN — PROMETHAZINE HYDROCHLORIDE 25 MG: 25 INJECTION INTRAMUSCULAR; INTRAVENOUS at 11:21

## 2020-01-01 RX ADMIN — Medication 10 ML: at 08:11

## 2020-01-01 RX ADMIN — VANCOMYCIN HYDROCHLORIDE 2000 MG: 1 INJECTION, POWDER, LYOPHILIZED, FOR SOLUTION INTRAVENOUS at 21:33

## 2020-01-01 RX ADMIN — OXYCODONE HYDROCHLORIDE 10 MG: 10 TABLET ORAL at 15:50

## 2020-01-01 RX ADMIN — HEPARIN SODIUM AND DEXTROSE 26 UNITS/KG/HR: 10000; 5 INJECTION INTRAVENOUS at 22:13

## 2020-01-01 RX ADMIN — MORPHINE SULFATE 1 MG: 2 INJECTION, SOLUTION INTRAMUSCULAR; INTRAVENOUS at 20:12

## 2020-01-01 RX ADMIN — HEPARIN SODIUM 5080 UNITS/ML: 1000 INJECTION, SOLUTION INTRAVENOUS; SUBCUTANEOUS at 03:28

## 2020-01-01 RX ADMIN — ASPIRIN 81 MG 81 MG: 81 TABLET ORAL at 09:26

## 2020-01-01 RX ADMIN — MORPHINE SULFATE 4 MG: 4 INJECTION INTRAVENOUS at 14:22

## 2020-01-01 RX ADMIN — ISOSORBIDE MONONITRATE 60 MG: 60 TABLET, EXTENDED RELEASE ORAL at 09:40

## 2020-01-01 RX ADMIN — HEPARIN SODIUM 2000 UNITS: 5000 INJECTION INTRAVENOUS; SUBCUTANEOUS at 16:29

## 2020-01-01 RX ADMIN — SODIUM CHLORIDE 20 ML: 0.9 INJECTION, SOLUTION INTRAVENOUS at 06:56

## 2020-01-01 RX ADMIN — AMIODARONE HYDROCHLORIDE 0.5 MG/MIN: 50 INJECTION, SOLUTION INTRAVENOUS at 12:20

## 2020-01-01 RX ADMIN — Medication 10 ML: at 21:49

## 2020-01-01 RX ADMIN — FINASTERIDE 5 MG: 5 TABLET, FILM COATED ORAL at 09:47

## 2020-01-01 RX ADMIN — MEROPENEM 1 G: 1 INJECTION, POWDER, FOR SOLUTION INTRAVENOUS at 05:20

## 2020-01-01 RX ADMIN — LACTULOSE 10 G: 20 SOLUTION ORAL at 10:02

## 2020-01-01 RX ADMIN — METOPROLOL TARTRATE 5 MG: 5 INJECTION, SOLUTION INTRAVENOUS at 18:30

## 2020-01-01 RX ADMIN — SENNOSIDES 8.6 MG: 8.6 TABLET, FILM COATED ORAL at 09:38

## 2020-01-01 RX ADMIN — LEVALBUTEROL 1.25 MG: 1.25 SOLUTION, CONCENTRATE RESPIRATORY (INHALATION) at 05:51

## 2020-01-01 RX ADMIN — OXYCODONE HYDROCHLORIDE 5 MG: 5 TABLET ORAL at 04:41

## 2020-01-01 RX ADMIN — APIXABAN 5 MG: 5 TABLET, FILM COATED ORAL at 00:28

## 2020-01-01 RX ADMIN — FLUTICASONE PROPIONATE 1 SPRAY: 50 SPRAY, METERED NASAL at 16:40

## 2020-01-01 RX ADMIN — METOPROLOL TARTRATE 25 MG: 25 TABLET ORAL at 16:00

## 2020-01-01 RX ADMIN — Medication 10 ML: at 19:14

## 2020-01-01 RX ADMIN — DILTIAZEM HYDROCHLORIDE 240 MG: 240 CAPSULE, COATED, EXTENDED RELEASE ORAL at 07:48

## 2020-01-01 RX ADMIN — METOPROLOL TARTRATE 25 MG: 25 TABLET ORAL at 07:47

## 2020-01-01 RX ADMIN — OXYCODONE HYDROCHLORIDE 10 MG: 10 TABLET ORAL at 18:09

## 2020-01-01 RX ADMIN — METOPROLOL TARTRATE 25 MG: 50 TABLET, FILM COATED ORAL at 08:10

## 2020-01-01 RX ADMIN — HEPARIN SODIUM 4000 UNITS: 5000 INJECTION INTRAVENOUS; SUBCUTANEOUS at 04:36

## 2020-01-01 RX ADMIN — ACYCLOVIR SODIUM 500 MG: 50 INJECTION, SOLUTION INTRAVENOUS at 20:42

## 2020-01-01 RX ADMIN — DILTIAZEM HYDROCHLORIDE 240 MG: 240 CAPSULE, COATED, EXTENDED RELEASE ORAL at 08:11

## 2020-01-01 RX ADMIN — MORPHINE SULFATE 2 MG: 2 INJECTION, SOLUTION INTRAMUSCULAR; INTRAVENOUS at 13:14

## 2020-01-01 RX ADMIN — METOPROLOL TARTRATE 2.5 MG: 1 INJECTION, SOLUTION INTRAVENOUS at 05:27

## 2020-01-01 RX ADMIN — SENNOSIDES 8.6 MG: 8.6 TABLET, FILM COATED ORAL at 20:36

## 2020-01-01 RX ADMIN — TRAMADOL HYDROCHLORIDE 50 MG: 50 TABLET, FILM COATED ORAL at 13:17

## 2020-01-01 RX ADMIN — METOPROLOL TARTRATE 5 MG: 5 INJECTION INTRAVENOUS at 01:06

## 2020-01-01 RX ADMIN — METHYLPREDNISOLONE SODIUM SUCCINATE 40 MG: 40 INJECTION, POWDER, FOR SOLUTION INTRAMUSCULAR; INTRAVENOUS at 20:37

## 2020-01-01 RX ADMIN — INSULIN GLARGINE 10 UNITS: 100 INJECTION, SOLUTION SUBCUTANEOUS at 07:34

## 2020-01-01 RX ADMIN — CEFEPIME 2 G: 2 INJECTION, POWDER, FOR SOLUTION INTRAVENOUS at 04:06

## 2020-01-01 RX ADMIN — AMIODARONE HYDROCHLORIDE 1 MG/MIN: 50 INJECTION, SOLUTION INTRAVENOUS at 05:34

## 2020-01-01 RX ADMIN — FLUTICASONE PROPIONATE 1 SPRAY: 50 SPRAY, METERED NASAL at 10:02

## 2020-01-01 RX ADMIN — INSULIN LISPRO 1 UNITS: 100 INJECTION, SOLUTION INTRAVENOUS; SUBCUTANEOUS at 12:49

## 2020-01-01 RX ADMIN — FINASTERIDE 5 MG: 5 TABLET, FILM COATED ORAL at 16:00

## 2020-01-01 RX ADMIN — MORPHINE SULFATE 2 MG: 2 INJECTION, SOLUTION INTRAMUSCULAR; INTRAVENOUS at 21:20

## 2020-01-01 RX ADMIN — IPRATROPIUM BROMIDE AND ALBUTEROL SULFATE 3 ML: .5; 3 SOLUTION RESPIRATORY (INHALATION) at 14:55

## 2020-01-01 RX ADMIN — METHYLPREDNISOLONE SODIUM SUCCINATE 40 MG: 40 INJECTION, POWDER, FOR SOLUTION INTRAMUSCULAR; INTRAVENOUS at 13:40

## 2020-01-01 RX ADMIN — ISOSORBIDE MONONITRATE 60 MG: 60 TABLET, EXTENDED RELEASE ORAL at 08:07

## 2020-01-01 RX ADMIN — MEROPENEM 1 G: 1 INJECTION, POWDER, FOR SOLUTION INTRAVENOUS at 05:26

## 2020-01-01 RX ADMIN — BUDESONIDE AND FORMOTEROL FUMARATE DIHYDRATE 2 PUFF: 80; 4.5 AEROSOL RESPIRATORY (INHALATION) at 20:19

## 2020-01-01 RX ADMIN — FLUTICASONE PROPIONATE 1 SPRAY: 50 SPRAY, METERED NASAL at 16:01

## 2020-01-01 RX ADMIN — HEPARIN SODIUM 7.87 UNITS/KG/HR: 10000 INJECTION, SOLUTION INTRAVENOUS at 04:36

## 2020-01-01 RX ADMIN — BUDESONIDE AND FORMOTEROL FUMARATE DIHYDRATE 2 PUFF: 80; 4.5 AEROSOL RESPIRATORY (INHALATION) at 01:55

## 2020-01-01 RX ADMIN — HEPARIN SODIUM 5080 UNITS: 1000 INJECTION, SOLUTION INTRAVENOUS; SUBCUTANEOUS at 22:09

## 2020-01-01 RX ADMIN — HEPARIN SODIUM 2000 UNITS: 5000 INJECTION INTRAVENOUS; SUBCUTANEOUS at 21:58

## 2020-01-01 RX ADMIN — CETIRIZINE HYDROCHLORIDE 5 MG: 10 TABLET ORAL at 16:33

## 2020-01-01 RX ADMIN — METHYLPREDNISOLONE 4 MG: 4 TABLET ORAL at 17:34

## 2020-01-01 RX ADMIN — METHYLPREDNISOLONE SODIUM SUCCINATE 40 MG: 40 INJECTION, POWDER, FOR SOLUTION INTRAMUSCULAR; INTRAVENOUS at 08:02

## 2020-01-01 RX ADMIN — VANCOMYCIN HYDROCHLORIDE 750 MG: 10 INJECTION, POWDER, LYOPHILIZED, FOR SOLUTION INTRAVENOUS at 21:48

## 2020-01-01 RX ADMIN — DILTIAZEM HYDROCHLORIDE 240 MG: 240 CAPSULE, COATED, EXTENDED RELEASE ORAL at 09:46

## 2020-01-01 RX ADMIN — INSULIN GLARGINE 10 UNITS: 100 INJECTION, SOLUTION SUBCUTANEOUS at 09:07

## 2020-01-01 RX ADMIN — INSULIN LISPRO 1 UNITS: 100 INJECTION, SOLUTION INTRAVENOUS; SUBCUTANEOUS at 20:52

## 2020-01-01 RX ADMIN — ANTI-FUNGAL POWDER MICONAZOLE NITRATE TALC FREE: 1.42 POWDER TOPICAL at 23:09

## 2020-01-01 RX ADMIN — LIDOCAINE: 40 CREAM TOPICAL at 10:00

## 2020-01-01 RX ADMIN — LACTULOSE 10 G: 20 SOLUTION ORAL at 08:12

## 2020-01-01 RX ADMIN — ONDANSETRON 4 MG: 2 INJECTION INTRAMUSCULAR; INTRAVENOUS at 00:17

## 2020-01-01 RX ADMIN — CEFTRIAXONE SODIUM 2 G: 2 INJECTION, POWDER, FOR SOLUTION INTRAMUSCULAR; INTRAVENOUS at 00:30

## 2020-01-01 RX ADMIN — SENNOSIDES 8.6 MG: 8.6 TABLET, FILM COATED ORAL at 22:01

## 2020-01-01 RX ADMIN — ZINC SULFATE 220 MG (50 MG) CAPSULE 50 MG: CAPSULE at 16:42

## 2020-01-01 RX ADMIN — CETIRIZINE HYDROCHLORIDE 5 MG: 10 TABLET, FILM COATED ORAL at 09:46

## 2020-01-01 RX ADMIN — HEPARIN SODIUM AND DEXTROSE 11.35 UNITS/KG/HR: 10000; 5 INJECTION INTRAVENOUS at 04:51

## 2020-01-01 RX ADMIN — ANTI-FUNGAL POWDER MICONAZOLE NITRATE TALC FREE: 1.42 POWDER TOPICAL at 20:11

## 2020-01-01 RX ADMIN — SODIUM CHLORIDE, PRESERVATIVE FREE 10 ML: 5 INJECTION INTRAVENOUS at 21:54

## 2020-01-01 RX ADMIN — ISOSORBIDE MONONITRATE 60 MG: 60 TABLET, EXTENDED RELEASE ORAL at 09:47

## 2020-01-01 RX ADMIN — DILTIAZEM HYDROCHLORIDE 15 MG/HR: 5 INJECTION INTRAVENOUS at 07:57

## 2020-01-01 RX ADMIN — AZITHROMYCIN MONOHYDRATE 500 MG: 500 INJECTION, POWDER, LYOPHILIZED, FOR SOLUTION INTRAVENOUS at 13:24

## 2020-01-01 RX ADMIN — OXYCODONE HYDROCHLORIDE 5 MG: 5 TABLET ORAL at 12:36

## 2020-01-01 RX ADMIN — CALCIUM ACETATE 2001 MG: 667 CAPSULE ORAL at 16:24

## 2020-01-01 RX ADMIN — OXYCODONE HYDROCHLORIDE AND ACETAMINOPHEN 1 TABLET: 5; 325 TABLET ORAL at 12:39

## 2020-01-01 RX ADMIN — INSULIN GLARGINE 10 UNITS: 100 INJECTION, SOLUTION SUBCUTANEOUS at 09:27

## 2020-01-01 RX ADMIN — FLUTICASONE PROPIONATE 1 SPRAY: 50 SPRAY, METERED NASAL at 12:53

## 2020-01-01 RX ADMIN — DOXYCYCLINE 100 MG: 100 CAPSULE ORAL at 09:29

## 2020-01-01 RX ADMIN — AMIODARONE HYDROCHLORIDE 200 MG: 200 TABLET ORAL at 13:44

## 2020-01-01 RX ADMIN — MEROPENEM 1 G: 1 INJECTION, POWDER, FOR SOLUTION INTRAVENOUS at 04:59

## 2020-01-01 RX ADMIN — BUDESONIDE AND FORMOTEROL FUMARATE DIHYDRATE 2 PUFF: 80; 4.5 AEROSOL RESPIRATORY (INHALATION) at 09:42

## 2020-01-01 RX ADMIN — Medication 10 ML: at 23:12

## 2020-01-01 RX ADMIN — ALPRAZOLAM 0.5 MG: 0.5 TABLET ORAL at 08:07

## 2020-01-01 RX ADMIN — ONDANSETRON HYDROCHLORIDE 4 MG: 4 TABLET, FILM COATED ORAL at 18:26

## 2020-01-01 RX ADMIN — MORPHINE SULFATE 1 MG: 2 INJECTION, SOLUTION INTRAMUSCULAR; INTRAVENOUS at 14:27

## 2020-01-01 RX ADMIN — PIPERACILLIN AND TAZOBACTAM 4.5 G: 4; .5 INJECTION, POWDER, LYOPHILIZED, FOR SOLUTION INTRAVENOUS; PARENTERAL at 18:55

## 2020-01-01 RX ADMIN — CALCIUM ACETATE 2001 MG: 667 CAPSULE ORAL at 12:17

## 2020-01-01 RX ADMIN — FINASTERIDE 5 MG: 5 TABLET, FILM COATED ORAL at 08:01

## 2020-01-01 RX ADMIN — ESCITALOPRAM OXALATE 10 MG: 10 TABLET ORAL at 09:26

## 2020-01-01 RX ADMIN — ESCITALOPRAM OXALATE 10 MG: 10 TABLET ORAL at 12:20

## 2020-01-01 RX ADMIN — HEPARIN SODIUM 10000 UNITS: 1000 INJECTION, SOLUTION INTRAVENOUS; SUBCUTANEOUS at 03:11

## 2020-01-01 RX ADMIN — ALPRAZOLAM 0.5 MG: 0.5 TABLET ORAL at 07:33

## 2020-01-01 RX ADMIN — MORPHINE SULFATE 2 MG: 2 INJECTION, SOLUTION INTRAMUSCULAR; INTRAVENOUS at 13:02

## 2020-01-01 RX ADMIN — ONDANSETRON 4 MG: 2 INJECTION INTRAMUSCULAR; INTRAVENOUS at 04:13

## 2020-01-01 RX ADMIN — IOPAMIDOL 75 ML: 755 INJECTION, SOLUTION INTRAVENOUS at 22:42

## 2020-01-01 RX ADMIN — ANTI-FUNGAL POWDER MICONAZOLE NITRATE TALC FREE: 1.42 POWDER TOPICAL at 20:14

## 2020-01-01 RX ADMIN — ESCITALOPRAM OXALATE 10 MG: 10 TABLET ORAL at 16:33

## 2020-01-01 RX ADMIN — BUDESONIDE AND FORMOTEROL FUMARATE DIHYDRATE 2 PUFF: 80; 4.5 AEROSOL RESPIRATORY (INHALATION) at 08:15

## 2020-01-01 RX ADMIN — Medication 2 PUFF: at 22:35

## 2020-01-01 RX ADMIN — ISOSORBIDE MONONITRATE 60 MG: 60 TABLET, EXTENDED RELEASE ORAL at 08:01

## 2020-01-01 RX ADMIN — ISOSORBIDE MONONITRATE 60 MG: 60 TABLET, EXTENDED RELEASE ORAL at 12:21

## 2020-01-01 RX ADMIN — HYDROXYCHLOROQUINE SULFATE 400 MG: 200 TABLET ORAL at 20:00

## 2020-01-01 RX ADMIN — ALPRAZOLAM 0.5 MG: 0.5 TABLET ORAL at 09:16

## 2020-01-01 RX ADMIN — SENNOSIDES 8.6 MG: 8.6 TABLET, FILM COATED ORAL at 00:28

## 2020-01-01 RX ADMIN — MORPHINE SULFATE 1 MG: 2 INJECTION, SOLUTION INTRAMUSCULAR; INTRAVENOUS at 03:50

## 2020-01-01 RX ADMIN — ANTI-FUNGAL POWDER MICONAZOLE NITRATE TALC FREE: 1.42 POWDER TOPICAL at 07:35

## 2020-01-01 RX ADMIN — MORPHINE SULFATE 2 MG: 2 INJECTION, SOLUTION INTRAMUSCULAR; INTRAVENOUS at 18:52

## 2020-01-01 RX ADMIN — MORPHINE SULFATE 4 MG: 4 INJECTION, SOLUTION INTRAMUSCULAR; INTRAVENOUS at 07:26

## 2020-01-01 RX ADMIN — METOPROLOL TARTRATE 2.5 MG: 1 INJECTION, SOLUTION INTRAVENOUS at 09:26

## 2020-01-01 RX ADMIN — SEVELAMER CARBONATE 2400 MG: 800 TABLET, FILM COATED ORAL at 11:31

## 2020-01-01 RX ADMIN — HEPARIN SODIUM AND DEXTROSE 13.35 UNITS/KG/HR: 10000; 5 INJECTION INTRAVENOUS at 02:42

## 2020-01-01 RX ADMIN — Medication 10 ML: at 20:14

## 2020-01-01 RX ADMIN — CETIRIZINE HYDROCHLORIDE 5 MG: 10 TABLET, FILM COATED ORAL at 09:23

## 2020-01-01 RX ADMIN — SEVELAMER CARBONATE 2400 MG: 800 TABLET, FILM COATED ORAL at 08:10

## 2020-01-01 RX ADMIN — CEFTRIAXONE SODIUM 1 G: 1 INJECTION, POWDER, FOR SOLUTION INTRAMUSCULAR; INTRAVENOUS at 04:00

## 2020-01-01 RX ADMIN — METHYLPREDNISOLONE SODIUM SUCCINATE 40 MG: 40 INJECTION, POWDER, FOR SOLUTION INTRAMUSCULAR; INTRAVENOUS at 19:58

## 2020-01-01 RX ADMIN — FINASTERIDE 5 MG: 5 TABLET, FILM COATED ORAL at 08:22

## 2020-01-01 RX ADMIN — BUDESONIDE AND FORMOTEROL FUMARATE DIHYDRATE 2 PUFF: 80; 4.5 AEROSOL RESPIRATORY (INHALATION) at 10:08

## 2020-01-01 RX ADMIN — HYDROXYCHLOROQUINE SULFATE 200 MG: 200 TABLET ORAL at 07:51

## 2020-01-01 RX ADMIN — TAMSULOSIN HYDROCHLORIDE 0.4 MG: 0.4 CAPSULE ORAL at 09:16

## 2020-01-01 RX ADMIN — ALPRAZOLAM 0.5 MG: 0.5 TABLET ORAL at 21:15

## 2020-01-01 RX ADMIN — DILTIAZEM HYDROCHLORIDE 120 MG: 120 CAPSULE, COATED, EXTENDED RELEASE ORAL at 08:07

## 2020-01-01 RX ADMIN — OXYCODONE HYDROCHLORIDE 5 MG: 5 TABLET ORAL at 21:48

## 2020-01-01 RX ADMIN — SEVELAMER CARBONATE 2400 MG: 800 TABLET, FILM COATED ORAL at 18:09

## 2020-01-01 RX ADMIN — INSULIN GLARGINE 10 UNITS: 100 INJECTION, SOLUTION SUBCUTANEOUS at 23:25

## 2020-01-01 RX ADMIN — DIGOXIN 250 MCG: 0.25 INJECTION INTRAMUSCULAR; INTRAVENOUS at 12:41

## 2020-01-01 RX ADMIN — DIGOXIN 250 MCG: 0.25 INJECTION INTRAMUSCULAR; INTRAVENOUS at 16:41

## 2020-01-01 ASSESSMENT — ENCOUNTER SYMPTOMS
ABDOMINAL DISTENTION: 0
ABDOMINAL PAIN: 0
EYES NEGATIVE: 1
RECTAL PAIN: 0
SHORTNESS OF BREATH: 0
NAUSEA: 0
CHEST TIGHTNESS: 0
COLOR CHANGE: 1
WHEEZING: 0
WHEEZING: 0
COUGH: 1
COUGH: 0
COLOR CHANGE: 0
ABDOMINAL DISTENTION: 0
EYES NEGATIVE: 1
COLOR CHANGE: 1
ABDOMINAL PAIN: 0
DIARRHEA: 0
NAUSEA: 0
COLOR CHANGE: 1
RECTAL PAIN: 0
COLOR CHANGE: 0
BACK PAIN: 0
COUGH: 0
RECTAL PAIN: 0
COUGH: 0
EYE ITCHING: 0
WHEEZING: 0
RECTAL PAIN: 0
CONSTIPATION: 0
BACK PAIN: 1
CHEST TIGHTNESS: 0
WHEEZING: 0
RESPIRATORY NEGATIVE: 1
WHEEZING: 0
CONSTIPATION: 0
NAUSEA: 0
CHEST TIGHTNESS: 0
NAUSEA: 1
NAUSEA: 1
SINUS PRESSURE: 0
COLOR CHANGE: 0
SINUS PRESSURE: 0
ABDOMINAL DISTENTION: 0
ABDOMINAL DISTENTION: 0
CONSTIPATION: 0
SHORTNESS OF BREATH: 1
EYES NEGATIVE: 1
ABDOMINAL DISTENTION: 0
COUGH: 1
BACK PAIN: 0
DIARRHEA: 0
SHORTNESS OF BREATH: 1
CHEST TIGHTNESS: 0
SHORTNESS OF BREATH: 1
COLOR CHANGE: 1
COUGH: 1
SORE THROAT: 0
COLOR CHANGE: 0
VOICE CHANGE: 0
DIARRHEA: 0
RHINORRHEA: 0
GASTROINTESTINAL NEGATIVE: 1
NAUSEA: 1
SORE THROAT: 0
COUGH: 0
EYE ITCHING: 0
DIARRHEA: 0
EYE REDNESS: 0
DIARRHEA: 0
RECTAL PAIN: 0
BACK PAIN: 0
SINUS PRESSURE: 0
NAUSEA: 1
ABDOMINAL DISTENTION: 0
SHORTNESS OF BREATH: 1
CHEST TIGHTNESS: 0
COUGH: 0
CHEST TIGHTNESS: 0
COUGH: 0
ABDOMINAL PAIN: 0
SHORTNESS OF BREATH: 0
SHORTNESS OF BREATH: 1
RHINORRHEA: 0
EYES NEGATIVE: 1
BACK PAIN: 0
ALLERGIC/IMMUNOLOGIC NEGATIVE: 1
VOMITING: 0
COLOR CHANGE: 1
WHEEZING: 0
SHORTNESS OF BREATH: 1
CHEST TIGHTNESS: 0
CONSTIPATION: 0
DIARRHEA: 0
VOMITING: 0
DIARRHEA: 0
EYE PAIN: 0
VOMITING: 0
WHEEZING: 0
ABDOMINAL PAIN: 0
CONSTIPATION: 0
CONSTIPATION: 0
CHOKING: 0
VOMITING: 0
WHEEZING: 0
COUGH: 0
CHOKING: 0
ABDOMINAL PAIN: 0
EYES NEGATIVE: 1
DIARRHEA: 0
RHINORRHEA: 0
COUGH: 1
ALLERGIC/IMMUNOLOGIC NEGATIVE: 1
EYES NEGATIVE: 1
VOMITING: 0
CHOKING: 0
SHORTNESS OF BREATH: 0
SHORTNESS OF BREATH: 0
VOMITING: 1
SHORTNESS OF BREATH: 0
RHINORRHEA: 0
SHORTNESS OF BREATH: 0
DIARRHEA: 1
CONSTIPATION: 0
CHEST TIGHTNESS: 0
VOICE CHANGE: 0
EYE DISCHARGE: 0
ABDOMINAL PAIN: 0
DIARRHEA: 0
CHOKING: 0
VOICE CHANGE: 0
NAUSEA: 0
COUGH: 0
ABDOMINAL PAIN: 0
COUGH: 0
NAUSEA: 0
CONSTIPATION: 0
COUGH: 0
ABDOMINAL PAIN: 0
EYE DISCHARGE: 0

## 2020-01-01 ASSESSMENT — PAIN SCALES - GENERAL
PAINLEVEL_OUTOF10: 9
PAINLEVEL_OUTOF10: 3
PAINLEVEL_OUTOF10: 8
PAINLEVEL_OUTOF10: 10
PAINLEVEL_OUTOF10: 0
PAINLEVEL_OUTOF10: 4
PAINLEVEL_OUTOF10: 7
PAINLEVEL_OUTOF10: 9
PAINLEVEL_OUTOF10: 10
PAINLEVEL_OUTOF10: 7
PAINLEVEL_OUTOF10: 8
PAINLEVEL_OUTOF10: 8
PAINLEVEL_OUTOF10: 7
PAINLEVEL_OUTOF10: 7
PAINLEVEL_OUTOF10: 8
PAINLEVEL_OUTOF10: 8
PAINLEVEL_OUTOF10: 10
PAINLEVEL_OUTOF10: 7
PAINLEVEL_OUTOF10: 8
PAINLEVEL_OUTOF10: 7
PAINLEVEL_OUTOF10: 7
PAINLEVEL_OUTOF10: 6
PAINLEVEL_OUTOF10: 5
PAINLEVEL_OUTOF10: 8
PAINLEVEL_OUTOF10: 6
PAINLEVEL_OUTOF10: 0
PAINLEVEL_OUTOF10: 9
PAINLEVEL_OUTOF10: 0
PAINLEVEL_OUTOF10: 6
PAINLEVEL_OUTOF10: 9
PAINLEVEL_OUTOF10: 7
PAINLEVEL_OUTOF10: 9
PAINLEVEL_OUTOF10: 9
PAINLEVEL_OUTOF10: 10
PAINLEVEL_OUTOF10: 4
PAINLEVEL_OUTOF10: 8
PAINLEVEL_OUTOF10: 10
PAINLEVEL_OUTOF10: 8
PAINLEVEL_OUTOF10: 5
PAINLEVEL_OUTOF10: 8
PAINLEVEL_OUTOF10: 7
PAINLEVEL_OUTOF10: 5
PAINLEVEL_OUTOF10: 8
PAINLEVEL_OUTOF10: 10
PAINLEVEL_OUTOF10: 7
PAINLEVEL_OUTOF10: 7
PAINLEVEL_OUTOF10: 0
PAINLEVEL_OUTOF10: 9
PAINLEVEL_OUTOF10: 2
PAINLEVEL_OUTOF10: 7
PAINLEVEL_OUTOF10: 6
PAINLEVEL_OUTOF10: 3
PAINLEVEL_OUTOF10: 6
PAINLEVEL_OUTOF10: 8
PAINLEVEL_OUTOF10: 7
PAINLEVEL_OUTOF10: 10
PAINLEVEL_OUTOF10: 5
PAINLEVEL_OUTOF10: 9
PAINLEVEL_OUTOF10: 8
PAINLEVEL_OUTOF10: 10
PAINLEVEL_OUTOF10: 10
PAINLEVEL_OUTOF10: 7
PAINLEVEL_OUTOF10: 8
PAINLEVEL_OUTOF10: 0
PAINLEVEL_OUTOF10: 8
PAINLEVEL_OUTOF10: 7
PAINLEVEL_OUTOF10: 0
PAINLEVEL_OUTOF10: 0
PAINLEVEL_OUTOF10: 7
PAINLEVEL_OUTOF10: 0
PAINLEVEL_OUTOF10: 8
PAINLEVEL_OUTOF10: 7
PAINLEVEL_OUTOF10: 8
PAINLEVEL_OUTOF10: 8
PAINLEVEL_OUTOF10: 4
PAINLEVEL_OUTOF10: 8
PAINLEVEL_OUTOF10: 8
PAINLEVEL_OUTOF10: 7
PAINLEVEL_OUTOF10: 6
PAINLEVEL_OUTOF10: 5
PAINLEVEL_OUTOF10: 10
PAINLEVEL_OUTOF10: 7
PAINLEVEL_OUTOF10: 6
PAINLEVEL_OUTOF10: 6
PAINLEVEL_OUTOF10: 0
PAINLEVEL_OUTOF10: 9
PAINLEVEL_OUTOF10: 0
PAINLEVEL_OUTOF10: 7
PAINLEVEL_OUTOF10: 4
PAINLEVEL_OUTOF10: 7
PAINLEVEL_OUTOF10: 7
PAINLEVEL_OUTOF10: 8
PAINLEVEL_OUTOF10: 10
PAINLEVEL_OUTOF10: 7
PAINLEVEL_OUTOF10: 3
PAINLEVEL_OUTOF10: 10
PAINLEVEL_OUTOF10: 2
PAINLEVEL_OUTOF10: 6
PAINLEVEL_OUTOF10: 3
PAINLEVEL_OUTOF10: 0
PAINLEVEL_OUTOF10: 5
PAINLEVEL_OUTOF10: 4
PAINLEVEL_OUTOF10: 0
PAINLEVEL_OUTOF10: 7
PAINLEVEL_OUTOF10: 9
PAINLEVEL_OUTOF10: 5
PAINLEVEL_OUTOF10: 0
PAINLEVEL_OUTOF10: 7
PAINLEVEL_OUTOF10: 8
PAINLEVEL_OUTOF10: 8
PAINLEVEL_OUTOF10: 9

## 2020-01-01 ASSESSMENT — PAIN DESCRIPTION - ONSET
ONSET: ON-GOING

## 2020-01-01 ASSESSMENT — PAIN DESCRIPTION - LOCATION
LOCATION: RIB CAGE
LOCATION: BACK;NECK;SHOULDER
LOCATION: BACK
LOCATION: BACK
LOCATION: BACK;RIB CAGE
LOCATION: BACK;BUTTOCKS
LOCATION: BACK;BUTTOCKS
LOCATION: BACK;RIB CAGE
LOCATION: CHEST
LOCATION: GENERALIZED
LOCATION: BACK;RIB CAGE
LOCATION: BACK
LOCATION: RIB CAGE
LOCATION: OTHER (COMMENT)
LOCATION: RIB CAGE;BACK
LOCATION: RIB CAGE
LOCATION: BACK
LOCATION: LEG
LOCATION: RIB CAGE;BACK
LOCATION: BACK;RIB CAGE
LOCATION: BACK
LOCATION: GENERALIZED
LOCATION: OTHER (COMMENT)
LOCATION: BACK;NECK;SHOULDER
LOCATION: BACK;NECK;SHOULDER
LOCATION: BACK;RIB CAGE
LOCATION: CHEST
LOCATION: BACK
LOCATION: BACK
LOCATION: RIB CAGE;BACK
LOCATION: RIB CAGE
LOCATION: RIB CAGE
LOCATION: BACK;NECK;SHOULDER

## 2020-01-01 ASSESSMENT — PAIN DESCRIPTION - FREQUENCY
FREQUENCY: CONTINUOUS
FREQUENCY: OTHER (COMMENT)

## 2020-01-01 ASSESSMENT — PAIN DESCRIPTION - ORIENTATION
ORIENTATION: LOWER
ORIENTATION: LOWER
ORIENTATION: RIGHT;LEFT
ORIENTATION: RIGHT;LEFT
ORIENTATION: RIGHT
ORIENTATION: RIGHT;LEFT
ORIENTATION: LOWER;MID
ORIENTATION: RIGHT
ORIENTATION: LOWER
ORIENTATION: RIGHT;LEFT;POSTERIOR
ORIENTATION: RIGHT
ORIENTATION: RIGHT;LEFT
ORIENTATION: RIGHT
ORIENTATION: RIGHT;LEFT
ORIENTATION: RIGHT;LEFT;POSTERIOR
ORIENTATION: LEFT

## 2020-01-01 ASSESSMENT — PAIN DESCRIPTION - PAIN TYPE
TYPE: ACUTE PAIN
TYPE: ACUTE PAIN;CHRONIC PAIN
TYPE: CHRONIC PAIN
TYPE: ACUTE PAIN
TYPE: CHRONIC PAIN
TYPE: ACUTE PAIN;CHRONIC PAIN
TYPE: CHRONIC PAIN
TYPE: ACUTE PAIN;CHRONIC PAIN
TYPE: CHRONIC PAIN
TYPE: ACUTE PAIN
TYPE: CHRONIC PAIN
TYPE: NEUROPATHIC PAIN
TYPE: CHRONIC PAIN
TYPE: ACUTE PAIN;CHRONIC PAIN
TYPE: ACUTE PAIN;CHRONIC PAIN
TYPE: ACUTE PAIN
TYPE: CHRONIC PAIN
TYPE: CHRONIC PAIN
TYPE: ACUTE PAIN
TYPE: CHRONIC PAIN
TYPE: ACUTE PAIN
TYPE: CHRONIC PAIN
TYPE: ACUTE PAIN
TYPE: CHRONIC PAIN
TYPE: CHRONIC PAIN
TYPE: ACUTE PAIN
TYPE: CHRONIC PAIN
TYPE: ACUTE PAIN
TYPE: ACUTE PAIN
TYPE: ACUTE PAIN;CHRONIC PAIN
TYPE: CHRONIC PAIN

## 2020-01-01 ASSESSMENT — PAIN DESCRIPTION - PROGRESSION
CLINICAL_PROGRESSION: RAPIDLY WORSENING
CLINICAL_PROGRESSION: GRADUALLY IMPROVING
CLINICAL_PROGRESSION: NOT CHANGED
CLINICAL_PROGRESSION: NOT CHANGED
CLINICAL_PROGRESSION: GRADUALLY WORSENING
CLINICAL_PROGRESSION: NOT CHANGED

## 2020-01-01 ASSESSMENT — PAIN DESCRIPTION - DESCRIPTORS
DESCRIPTORS: ACHING
DESCRIPTORS: ACHING
DESCRIPTORS: DISCOMFORT
DESCRIPTORS: ACHING;SHARP
DESCRIPTORS: ACHING
DESCRIPTORS: ACHING;SHARP
DESCRIPTORS: ACHING
DESCRIPTORS: ACHING;SHARP
DESCRIPTORS: ACHING
DESCRIPTORS: SORE
DESCRIPTORS: ACHING;SHARP
DESCRIPTORS: ACHING;SHARP;DULL
DESCRIPTORS: SHARP
DESCRIPTORS: CONSTANT;DISCOMFORT
DESCRIPTORS: DISCOMFORT

## 2020-01-01 ASSESSMENT — PAIN SCALES - WONG BAKER
WONGBAKER_NUMERICALRESPONSE: 8
WONGBAKER_NUMERICALRESPONSE: 0

## 2020-01-01 ASSESSMENT — PAIN - FUNCTIONAL ASSESSMENT: PAIN_FUNCTIONAL_ASSESSMENT: PREVENTS OR INTERFERES WITH ALL ACTIVE AND SOME PASSIVE ACTIVITIES

## 2020-01-02 PROBLEM — E79.0 HYPERURICEMIA: Status: ACTIVE | Noted: 2020-01-01

## 2020-01-02 NOTE — ED PROVIDER NOTES
medical history, past surgical history, allergies, medications, social and family history as documented unless otherwise noted below. Documentation of the HPI, Physical Exam and Medical Decision Making performed by medical students or scribes is based on my personal performance of the HPI, PE and MDM. For Phys Assistant/ Nurse Practitioner cases/documentation I have had a face to face evaluation of this patient and have completed at least one if not all key elements of the E/M (history, physical exam, and MDM). Additional findings are as noted.     Jovanny Sweeney MD  Attending Emergency  Physician               Jovanny Sweeney MD  01/02/20 [id number]

## 2020-01-03 NOTE — PROGRESS NOTES
Pt. Admitted to room 2107, from E.R., oriented to call light and room, no s/s of distress. Pt. Stated I want somethin for pain. Pt. Stated they give me 8 mg.s of morphine.

## 2020-01-03 NOTE — CARE COORDINATION
CASE MANAGEMENT NOTE:    Admission Date:  1/2/2020 Su Chen is a 78 y.o.  male    Admitted for : Hyperuricemia [E79.0]    Met with:  Patient    PCP:  Linh Pierre DO                                 Insurance:  Maico Gore        Current Residence/ Living Arrangements:  in assisted living facility dependent on nursing care [facility]         Current Services PTA:  No    Is patient agreeable to VNS: No    Freedom of choice provided: No    List of [address] provided: No    VNS chosen:  NA    DME:  wheelchair    Home Oxygen: Yes off and on    Nebulizer: No    CPAP/BIPAP: No    Supplier: N/A    Potential Assistance Needed: Yes    [address] needed[address]  [address] [facility]       Is the Patient an [email or web address] with Readmission Risk Score greater than 14%? No  If yes, pt needs a follow up appointment made within 7 days. Does Patient want to use MEDS to BEDS? No    Family Members/Caregivers that pt would like involved in their care:    Yes    If yes, list name here:  Rut Avalos friend     Transportation Provider:  [facility]             Is patient in Isolation/One on One/Altered Mental Status? No  If yes, skip next question. If no, would they like an I-Pad to  use? No  If yes, call [phone].              Discharge Plan:  01/02/2020 Maico Gore DUAL  Lives at [address] has a friend name Rut Avalos who visits him DME Wheelchair Dialysis at [facility] in [facility] point [facility] Plan to return to [facility] //mk                 Electronically signed by: Sandra Cabrera RN on 1/2/2020 at 7:13 PM

## 2020-01-03 NOTE — ED PROVIDER NOTES
[address] [facility]  Emergency Department Encounter  EmergencyMedicine Resident     Pt Name:Leonard Leopold Gurney  MRN: [id number]  [place] 1950  Date of evaluation: 1/2/20  PCP:  Kayla Fisher, [address] [place] [address]       Chief Complaint   Patient presents with    Altered Mental Status       HISTORY OF PRESENT ILLNESS  (Location/Symptom, Timing/Onset, Context/Setting, Quality, Duration, Modifying Factors, Severity.)      [place] [facility] [place] patient having decreased activity missed dialysis on Wednesday he is normally dialyzed Monday Wednesday Friday has end-stage renal disease with a left upper extremity fistula. Patient arrives confused however does awake to voice follow commands his vital signs were stable on arrival.      PAST MEDICAL / SURGICAL / SOCIAL / FAMILY HISTORY     Past medical and surgical history reviewed by nursing    Social history reviewed by nursing    Allergies reviewed by nursing    Home Medications:  Prior to Admission medications    Medication Sig Start Date End Date Taking? Authorizing Provider   difluprednate (DUREZOL) 0.05 % EMUL Place 1 drop into both eyes 2 times daily Until 6/30/2019   Yes Historical Provider, MD   fentaNYL (DURAGESIC) 25 MCG/HR Place 1 patch onto the skin every 72 hours. Yes Historical Provider, MD   GUAIFENESIN PO Take 10 mLs by mouth every 6 hours as needed   Yes Historical Provider, MD   CARBOXYMETHYLCELLULOSE SODIUM OP Apply 1 drop to eye as needed   Yes Historical Provider, MD   ketorolac (ACULAR) 0.5 % ophthalmic solution Place 1 drop into the right eye 4 times daily   Yes Historical Provider, MD   oxyCODONE (ROXICODONE) 5 MG immediate release tablet Take 10 mg by mouth every 4 hours as needed for Pain.    Yes Historical Provider, MD   insulin glargine (LANTUS) 100 UNIT/ML injection vial Inject 10 Units into the skin 2 times daily 3/4/19  Yes Lux Reyes MD lidocaine-prilocaine (EMLA) 2.5-2.5 % cream Apply topically three times a week Apply topically to arm/port three times weekly on Monday, Wednesday, and Friday for dialysis   Yes Historical Provider, MD   glucagon 1 MG injection Infuse 1 kit intravenously as needed (for BG < 60)   Yes Historical Provider, MD   lactulose (CHRONULAC) 10 GM/15ML solution Take 10 g by mouth daily   Yes Historical Provider, MD   ondansetron (ZOFRAN) 4 MG tablet Take 4 mg by mouth every 6 hours as needed for Nausea or Vomiting   Yes Historical Provider, MD   midodrine (PROAMATINE) 5 MG tablet Take 5 mg by mouth three times a week On Monday, Wednesday, and Friday for dialysis   Yes Historical Provider, MD   ramelteon (ROZEREM) 8 MG tablet Take 8 mg by mouth nightly   Yes Historical Provider, MD   diphenhydrAMINE (BENADRYL) 25 MG tablet Take 25 mg by mouth every 8 hours as needed for Itching    Yes Historical Provider, MD   ipratropium-albuterol (DUONEB) 0.5-2.5 (3) MG/3ML SOLN nebulizer solution Inhale 3 mLs into the lungs every 4 hours (while awake) 11/30/16  Yes KELECHI Campos - CNP   aspirin 81 MG chewable tablet Take 81 mg by mouth daily   Yes Historical Provider, MD   sevelamer (RENVELA) 800 MG tablet Take 3 tablets by mouth 3 times daily (with meals)    Yes Historical Provider, MD   senna (SENOKOT) 8.6 MG tablet Take 1 tablet by mouth 2 times daily   Yes Historical Provider, MD   escitalopram (LEXAPRO) 10 MG tablet Take 10 mg by mouth every morning    Yes Historical Provider, MD   isosorbide mononitrate (IMDUR) 60 MG CR tablet Take 60 mg by mouth daily   Yes Historical Provider, MD   fluticasone (FLONASE) 50 MCG/ACT nasal spray 1 spray by Nasal route daily    Yes Historical Provider, MD   finasteride (PROSCAR) 5 MG tablet Take 5 mg by mouth daily.    Yes Historical Provider, MD   omeprazole (PRILOSEC) 20 MG capsule Take 20 mg by mouth every morning    Yes Historical Provider, MD   metoprolol (LOPRESSOR) 50 MG tablet Take 50 mg by mouth daily Hold for SBP less than 110 or HR less than 60   Yes Historical Provider, MD   tamsulosin (FLOMAX) 0.4 MG capsule Take 0.4 mg by mouth daily. Yes Historical Provider, MD       REVIEW OF SYSTEMS    (2-9 systems for level 4, 10 or more for level 5)      Limited due to patient condition        PHYSICAL EXAM   (up to 7 for level 4, 8 or more for level 5)      INITIAL VITALS:   /63   Pulse 62   Temp 98.4 °F (36.9 °C) (Oral)   Resp 18   Ht 6' (1.829 m)   Wt 270 lb (122.5 kg)   SpO2 96%   BMI 36.62 kg/m²     General Appearance: Confused however nontoxic in no acute distress  HEENT: Head: normocephalic/atraumatic eyes: PERRLA, EOMT, conjunctiva not injected, sclerae nonicteric ears: External canals patent nose: Nares patent, no rhinorrhea, throat:mucous membranes moist, oropharynx clear     Neck: Trachea midline, no JVD. Lungs: No evidence of increased work of breathing. CTA B/L, no wheezes/rhonchi     Cardiovascular: RRR, no murmur, 2+ peripheral pulses bilaterally. Cap refill less than 2 seconds. No lower extremity edema noted    Abdomen: Soft, obese but nontender abdomen. No guarding or rebound tenderness. Neurologic: LOWERY  to person only does follow basic commands  No sensation deficits. Moving all extremities    Extremities: Skin warm, dry and intact. ..     Fistula left upper extremity no signs of infection good bruit      DIFFERENTIAL  DIAGNOSIS     PLAN (LABS / IMAGING / EKG):  Orders Placed This Encounter   Procedures    Rapid influenza A/B antigens    Culture Blood #1    Culture Blood #1    Urine Culture    XR CHEST PORTABLE    CT HEAD WO CONTRAST    Troponin    Blood Gas, Venous    TSH w/reflex to FT4    Magnesium    CBC Auto Differential    Comprehensive Metabolic Panel    Protime-INR    APTT    Urinalysis with Microscopic    Troponin    Basic Metabolic Panel    CBC Auto Differential    Magnesium    Phosphorus    Hepatitis B Surface Antigen    Hepatitis B Surface Antibody    Hepatitis B Core Antibody, Total    Diet NPO, After Midnight    Straight cath    Vital signs per unit routine    Notify physician    Notify physician    Up with assistance    Telemetry monitoring    Elevate heels off of bed at all times if patient is not able to move lower extremities    Turn or assist with turn every 2 hours if patient is unable to turn self. Remind patient to turn if necessary.  Inspect skin per unit guidelines    Maintain HOB at the lowest elevation consistent with medical plan of care    Full Code    Inpatient consult to Internal Medicine    Inpatient consult to Nephrology    Contact isolation    POCT Glucose    POC Glucose Fingerstick    EKG 12 Lead    Insert peripheral IV    PATIENT STATUS (FROM ED OR OR/PROCEDURAL) Inpatient       MEDICATIONS ORDERED:  Orders Placed This Encounter   Medications    sodium chloride flush 0.9 % injection 10 mL    sodium chloride flush 0.9 % injection 10 mL    acetaminophen (TYLENOL) tablet 650 mg    DISCONTD: enoxaparin (LOVENOX) injection 40 mg    ondansetron (ZOFRAN) injection 4 mg    heparin (porcine) injection 5,000 Units    morphine (PF) injection 1 mg           DIAGNOSTIC RESULTS / EMERGENCY DEPARTMENT COURSE / MDM     LABS:  Results for orders placed or performed during the hospital encounter of 01/02/20   Rapid influenza A/B antigens   Result Value Ref Range    Specimen Description . NARES     Special Requests NOT REPORTED     Direct Exam       PRESUMPTIVE NEGATIVE for Influenza A + B antigens. PCR testing to confirm this result is available upon request.  Specimen will be saved in the laboratory for 7 days. Please call [phone] if PCR testing is indicated. Culture Blood #1   Result Value Ref Range    Specimen Description . BLOOD 9ML PURPLE, 1ML RED RIGHT WRIST     Special Requests NOT REPORTED     Culture NO GROWTH 1 HOUR    Troponin   Result Value Ref Range Troponin, High Sensitivity 192 (HH) 0 - 22 ng/L    Troponin T NOT REPORTED <0.03 ng/mL    Troponin Interp NOT REPORTED    Blood Gas, Venous   Result Value Ref Range    pH, James 7.310 (L) 7.320 - 7.420    pCO2, James 41.0 39.0 - 55.0    pO2, James 48.0 30.0 - 50.0    HCO3, Venous 21.0 (L) 24.0 - 30.0 mmol/L    Positive Base Excess, James NOT REPORTED 0.0 - 2.0 mmol/L    Negative Base Excess, James 5.3 (H) 0.0 - 2.0 mmol/L    O2 Sat, James 76.8 60.0 - 85.0 %    Total Hb NOT REPORTED 12.0 - 16.0 g/dl    Oxyhemoglobin NOT REPORTED 95.0 - 98.0 %    Carboxyhemoglobin 3.7 0 - 5 %    Methemoglobin 0.6 0.0 - 1.9 %    Pt Temp 37.0     pH, James, Temp Adj NOT REPORTED 7.320 - 7.420    pCO2, James, Temp Adj NOT REPORTED 39.0 - 55.0 mmHg    pO2, James, Temp Adj NOT REPORTED 30.0 - 50.0 mmHg    O2 Device/Flow/% NOT REPORTED     Respiratory Rate NOT REPORTED     Zachary Test NA     Sample Site NA     Pt.  Position NOT REPORTED     Mode NOT REPORTED     Set Rate NOT REPORTED     Total Rate NOT REPORTED     VT NOT REPORTED     FIO2 NOT REPORTED     Peep/Cpap NOT REPORTED     PSV NOT REPORTED     Text for Respiratory NOT REPORTED     NOTIFICATION NOT REPORTED     NOTIFICATION TIME NOT REPORTED    TSH w/reflex to FT4   Result Value Ref Range    TSH 2.40 0.30 - 5.00 mIU/L   Magnesium   Result Value Ref Range    Magnesium 2.0 1.6 - 2.6 mg/dL   CBC Auto Differential   Result Value Ref Range    WBC 6.9 3.5 - 11.0 k/uL    RBC 2.84 (L) 4.5 - 5.9 m/uL    Hemoglobin 8.9 (L) 13.5 - 17.5 g/dL    Hematocrit 27.9 (L) 41 - 53 %    MCV 98.4 80 - 100 fL    MCH 31.3 26 - 34 pg    MCHC 31.8 31 - 37 g/dL    RDW 15.7 (H) 11.5 - 14.9 %    Platelets 945 (L) 133 - 450 k/uL    MPV 6.7 6.0 - 12.0 fL    NRBC Automated NOT REPORTED per 100 WBC    Differential Type NOT REPORTED     Immature Granulocytes NOT REPORTED 0 %    Absolute Immature Granulocyte NOT REPORTED 0.00 - 0.30 k/uL    WBC Morphology NOT REPORTED     RBC Morphology NOT REPORTED     Platelet Estimate NOT REPORTED Seg Neutrophils 53 36 - 66 %    Lymphocytes 4 (L) 24 - 44 %    Monocytes 6 1 - 7 %    Eosinophils % 0 0 - 4 %    Basophils 1 0 - 2 %    Bands 34 (H) 0 - 10 %    Metamyelocytes 1 (H) 0 %    Myelocytes 1 (H) 0 %    Segs Absolute 3.65 1.3 - 9.1 k/uL    Absolute Lymph # 0.28 (L) 1.0 - 4.8 k/uL    Absolute Mono # 0.41 0.1 - 1.3 k/uL    Absolute Eos # 0.00 0.0 - 0.4 k/uL    Basophils Absolute 0.07 0.0 - 0.2 k/uL    Absolute Bands # 2.35 (H) 0.0 - 1.0 k/uL    Metamyelocytes Absolute 0.07 (H) 0 k/uL    Myelocytes Absolute 0.07 (H) 0 k/uL    Morphology Normal    Comprehensive Metabolic Panel   Result Value Ref Range    Glucose 169 (H) 70 - 99 mg/dL    BUN 98 (HH) 8 - 23 mg/dL    CREATININE 7.37 (HH) 0.70 - 1.20 mg/dL    Bun/Cre Ratio NOT REPORTED 9 - 20    Calcium 8.3 (L) 8.6 - 10.4 mg/dL    Sodium 139 135 - 144 mmol/L    Potassium 4.8 3.7 - 5.3 mmol/L    Chloride 103 98 - 107 mmol/L    CO2 20 20 - 31 mmol/L    Anion Gap 16 9 - 17 mmol/L    Alkaline Phosphatase 179 (H) 40 - 129 U/L    ALT 22 5 - 41 U/L    AST 18 <40 U/L    Total Bilirubin 0.89 0.3 - 1.2 mg/dL    Total Protein 5.9 (L) 6.4 - 8.3 g/dL    Alb 3.3 (L) 3.5 - 5.2 g/dL    Albumin/Globulin Ratio NOT REPORTED 1.0 - 2.5    GFR Non-African American 7 (L) >60 mL/min    GFR  9 (L) >60 mL/min    GFR Comment          GFR Staging NOT REPORTED    Lactate, Sepsis   Result Value Ref Range    Lactic Acid, Sepsis 1.1 0.5 - 1.9 mmol/L    Lactic Acid, Sepsis, Whole Blood NOT REPORTED 0.5 - 1.9 mmol/L   Protime-INR   Result Value Ref Range    Protime 14.5 11.8 - 14.6 sec    INR 1.1    APTT   Result Value Ref Range    PTT 32.9 24.0 - 36.0 sec   Urinalysis with Microscopic   Result Value Ref Range    Color, UA YELLOW YELLOW    Turbidity UA CLOUDY (A) CLEAR    Glucose, Ur NEGATIVE NEGATIVE    Bilirubin Urine NEGATIVE NEGATIVE    Ketones, Urine NEGATIVE NEGATIVE    Specific Gravity, UA 1.017 1.000 - 1.030    Urine Hgb NEGATIVE NEGATIVE    pH, UA 5.5 5.0 - 8.0 Protein, UA 2+ (A) NEGATIVE    Urobilinogen, Urine Normal Normal    Nitrite, Urine NEGATIVE NEGATIVE    Leukocyte Esterase, Urine NEGATIVE NEGATIVE    Urinalysis Comments NOT REPORTED     -          WBC, UA 2 TO 5 /HPF    RBC, UA 0 TO 2 /HPF    Casts UA  /LPF    Casts UA  /LPF    Crystals UA NOT REPORTED None /HPF    Epithelial Cells UA 5 TO 10 /HPF    Renal Epithelial, Urine NOT REPORTED 0 /HPF    Bacteria, UA FEW (A) None    Mucus, UA NOT REPORTED None    Trichomonas, UA NOT REPORTED None    Amorphous, UA 1+ (A) None    Other Observations UA NOT REPORTED NOT REQ. Yeast, UA NOT REPORTED None   Troponin   Result Value Ref Range    Troponin, High Sensitivity 192 (HH) 0 - 22 ng/L    Troponin T NOT REPORTED <0.03 ng/mL    Troponin Interp NOT REPORTED    POCT Glucose   Result Value Ref Range    Glucose 154 mg/dL    QC OK? yes    POC Glucose Fingerstick   Result Value Ref Range    POC Glucose 154 (H) 75 - 110 mg/dL           RADIOLOGY:  Ct Head Wo Contrast    Result Date: 1/2/2020  EXAMINATION: CT OF THE HEAD WITHOUT CONTRAST  1/2/2020 4:32 pm TECHNIQUE: CT of the head was performed without the administration of intravenous contrast. Dose modulation, iterative reconstruction, and/or weight based adjustment of the mA/kV was utilized to reduce the radiation dose to as low as reasonably achievable. COMPARISON: 01/28/2015. HISTORY: ORDERING SYSTEM PROVIDED HISTORY: ams TECHNOLOGIST PROVIDED HISTORY: ams Reason for Exam: ams Acuity: Acute Type of Exam: Initial FINDINGS: BRAIN/VENTRICLES: There is no acute intracranial hemorrhage, mass effect or midline shift. No abnormal extra-axial fluid collection. The gray-white differentiation is maintained without evidence of an acute infarct. There is no evidence of hydrocephalus. Mild decreased attenuation of the periventricular and subcortical white matter consistent with small vessel ischemic change. Intracranial atherosclerotic vascular calcification.   Mild dolichoectasia of the vertebrobasilar circulation. ORBITS: The visualized portion of the orbits demonstrate no acute abnormality. SINUSES: Opacification of the right maxillary sinus. Left mastoid effusion is noted. The remainder the visualized paranasal sinuses and right mastoid air cells are clear. SOFT TISSUES/SKULL:  No acute abnormality of the visualized skull or soft tissues. No acute intracranial abnormality. Opacification of the right maxillary sinus and left mastoid effusion. Correlate for underlying sinusitis, mastoiditis. Xr Chest Portable    Result Date: 1/2/2020  EXAMINATION: ONE XRAY VIEW OF THE CHEST 1/2/2020 4:59 pm COMPARISON: 03/02/2019. HISTORY: ORDERING SYSTEM PROVIDED HISTORY: concern pna TECHNOLOGIST PROVIDED HISTORY: concern pna Reason for Exam: cough and shortness of breath Acuity: Acute FINDINGS: Stable elevation of the right hemidiaphragm. The cardiac silhouette is mildly enlarged and stable. Moderate aortic tortuosity. There is prominence of the central pulmonary vascularity. The lungs are clear. No infiltrate, pleural fluid or evidence of overt failure. Postoperative clips in the left cervical region. No acute cardiopulmonary disease. Mild cardiomegaly and central vascular congestion. No overt failure or focal infiltrate. EKG  None    All EKG's are interpreted by the Emergency Department Physician who either signs or Co-signs this chart in the absence of a cardiologist.    EMERGENCY DEPARTMENT COURSE/IMPRESSION:    Patient [facility] for fever patient having decreased activity missed dialysis on Wednesday he is normally dialyzed Monday Wednesday Friday has end-stage renal disease with a left upper extremity fistula.   Patient arrives confused however does awake to voice follow commands his vital signs were stable on arrival.    Patient was alert to self, is alert to location does open eyes to verbal cues follows

## 2020-01-03 NOTE — CONSULTS
Reason for Consult:  End stage renal disease. Requesting Physician:  Enrico Major MD    HISTORY OF PRESENT ILLNESS:    The patient is a 78 y.o. male who normally undergoes dialysis at [facility] dialysis unit on Mondays Wednesdays and Fridays, has an AV fistula, he had hemodialysis treatment on Monday and missed dialysis on Wednesday, usually asks to come off dialysis early. He presented with confusion and mild shortness of breath, to have significant azotemia BUN was 103, chest x-ray showed mild skeletal congestion. According to records the patient was very confused and restless, the time of physical exam the patient was calm and was answering questions appropriately    Review Of Systems:   Constitutional: No fever, chills, lethargy, weakness or wt loss. HEENT:  No headache, nasal discharge or sore throat. Cardiac:  No chest pain, dyspnea, orthopnea or PND. Chest:   No cough, phlegm or wheezing. Abdomen:  No abdominal pain, nausea, vomiting or diarrhea. Neuro:              No gross focal weakness, numbness, abnormal movements or seizure like activity. Skin:   No rashes or itching. :   No hematuria, pyuria, dysuria or flank pain. Extremities:  No swelling or joint pains.   Psych:             Depression or anxiety     Past Medical History:   Diagnosis Date    Acute combined systolic and diastolic congestive heart failure ([address] 11/25/2016    Anemia     BPH (benign prostatic hyperplasia)     Cancer (HCC)     left ear 8/2013    CKD (chronic kidney disease) stage 3, GFR 30-59 ml/min (HCC)     Constipation     COPD (chronic obstructive pulmonary disease) (HCC)     Depression     GERD (gastroesophageal reflux disease)     Hemodialysis patient ([address]     3 times a week    HTN (hypertension)     Hx of blood clots     \" rt.shoulder/neck\"    Hyperparathyroidism ([address]     Hypothyroidism     IDDM (insulin dependent diabetes mellitus) ([address]     Insomnia     Liver disease     patient is unsure what it is    MDRO (multiple drug resistant organisms) resistance     hx. c-dif.  Memory deficit     Neurofibromatosis ([address]     Osteoarthritis     Paraplegia ([address]     chair to bed and chair transfer only    Peptic ulcer     PVD (peripheral vascular disease) ([address]     Secondary hyperparathyroidism ([address]     Sinus disorder     Syncope     Type II or unspecified type diabetes mellitus without mention of complication, not stated as uncontrolled     Venous thrombosis        Past Surgical History:   Procedure Laterality Date    CHOLECYSTECTOMY      COLONOSCOPY  08/22/2011    2 POLYPS REMOBED TUBULAR ADENOMA    DIALYSIS FISTULA CREATION Left 06/13/2016    LEFT WRIST, no longer functional    DIALYSIS FISTULA CREATION Left     antecubital, was revised one time    HIP SURGERY Right     deep laceration was repaired    INTRACAPSULAR CATARACT EXTRACTION Right 5/7/2019    EYE CATARACT EMULSIFICATION IOL IMPLANT performed by Alejandro Peabody, MD at [address] Left 5/28/2019    EYE CATARACT EMULSIFICATION IOL IMPLANT performed by Alejandro Peabody, MD at [address] Left     ear.  SKIN GRAFT      right axilla    THYROID SURGERY      non-cancerous lump removed    TOTAL NEPHRECTOMY Right     as a donor    TUNNELED VENOUS CATHETER PLACEMENT Right 09/03/2016    later removed    TURP         Prior to Admission medications    Medication Sig Start Date End Date Taking? Authorizing Provider   difluprednate (DUREZOL) 0.05 % EMUL Place 1 drop into both eyes 2 times daily Until 6/30/2019   Yes Historical Provider, MD   fentaNYL (DURAGESIC) 25 MCG/HR Place 1 patch onto the skin every 72 hours.    Yes Historical Provider, MD   GUAIFENESIN PO Take 10 mLs by mouth every 6 hours as needed   Yes Historical Provider, MD   CARBOXYMETHYLCELLULOSE SODIUM OP Apply 1 drop to eye as needed   Yes Historical Provider, MD   ketorolac (ACULAR) 0.5 % ophthalmic solution Place 1 drop into the right eye 4 times daily   Yes Historical Provider, MD   oxyCODONE (ROXICODONE) 5 MG immediate release tablet Take 10 mg by mouth every 4 hours as needed for Pain.    Yes Historical Provider, MD   insulin glargine (LANTUS) 100 UNIT/ML injection vial Inject 10 Units into the skin 2 times daily 3/4/19  Yes Ara Kwong MD   lidocaine-prilocaine (EMLA) 2.5-2.5 % cream Apply topically three times a week Apply topically to arm/port three times weekly on Monday, Wednesday, and Friday for dialysis   Yes Historical Provider, MD   glucagon 1 MG injection Infuse 1 kit intravenously as needed (for BG < 60)   Yes Historical Provider, MD   lactulose (CHRONULAC) 10 GM/15ML solution Take 10 g by mouth daily   Yes Historical Provider, MD   ondansetron (ZOFRAN) 4 MG tablet Take 4 mg by mouth every 6 hours as needed for Nausea or Vomiting   Yes Historical Provider, MD   midodrine (PROAMATINE) 5 MG tablet Take 5 mg by mouth three times a week On Monday, Wednesday, and Friday for dialysis   Yes Historical Provider, MD   ramelteon (ROZEREM) 8 MG tablet Take 8 mg by mouth nightly   Yes Historical Provider, MD   diphenhydrAMINE (BENADRYL) 25 MG tablet Take 25 mg by mouth every 8 hours as needed for Itching    Yes Historical Provider, MD   ipratropium-albuterol (DUONEB) 0.5-2.5 (3) MG/3ML SOLN nebulizer solution Inhale 3 mLs into the lungs every 4 hours (while awake) 11/30/16  Yes KELECHI Chery - CNP   aspirin 81 MG chewable tablet Take 81 mg by mouth daily   Yes Historical Provider, MD   sevelamer (RENVELA) 800 MG tablet Take 3 tablets by mouth 3 times daily (with meals)    Yes Historical Provider, MD   senna (SENOKOT) 8.6 MG tablet Take 1 tablet by mouth 2 times daily   Yes Historical Provider, MD   escitalopram (LEXAPRO) 10 MG tablet Take 10 mg by mouth every morning    Yes Historical Provider, MD   isosorbide mononitrate (IMDUR) 60 MG CR tablet Take 60 mg by mouth daily   Yes Historical Provider, MD   fluticasone on file     Attends [employer] service: Not on file     Active member of club or organization: Not on file     Attends meetings of clubs or organizations: Not on file     Relationship status: Not on file    Intimate partner violence:     Fear of current or ex partner: Not on file     Emotionally abused: Not on file     Physically abused: Not on file     Forced sexual activity: Not on file   Other Topics Concern    Not on file   Social History Narrative    Not on file       Family History   Family history unknown: Yes         Physical Exam:  Vitals:    01/03/20 0040 01/03/20 0400 01/03/20 0800 01/03/20 0811   BP:  124/66  124/71   Pulse: 68 74  61   Resp:  20  19   Temp:  98.6 °F (37 °C)  98.4 °F (36.9 °C)   TempSrc:  Oral  Oral   SpO2:    95%   Weight:   270 lb 4 oz (122.6 kg)    Height:         No intake/output data recorded. General:  Awake, alert, not in distress. Appears to be stated age. HEENT: Atraumatic, normocephalic. Anicteric sclera. Pink and moist oral mucosa. Neck supple. No JVD. Chest: Bilateral air entry, clear to auscultation, no wheezing, rhonchi or rales. Cardiovascular: RRR, S1S2, no murmur, rub or gallop. No lower extremity edema. Abdomen: Soft, non tender to palpation. Musculoskeletal: No cyanosis or clubbing. Integumentary: Pink, warm and dry. Free from rash or lesions. CNS: Oriented to person, place and time. Speech clear. Face symmetrical. No tremor.    Psych: Appropriate mood and affect, answering questions appropriately    Data:  CBC:   Lab Results   Component Value Date    WBC 6.4 01/03/2020    HGB 9.4 (L) 01/03/2020    HCT 29.6 (L) 01/03/2020    MCV 97.0 01/03/2020     (L) 01/03/2020     BMP:    Lab Results   Component Value Date     01/03/2020     01/02/2020     03/04/2019    K 5.0 01/03/2020    K 4.8 01/02/2020    K 5.0 06/24/2019     01/03/2020     01/02/2020     03/04/2019    CO2 22 01/03/2020    CO2 20 01/02/2020    CO2 25 03/04/2019     (HH) 01/03/2020    BUN 98 (HH) 01/02/2020    BUN 51 (H) 03/04/2019    CREATININE 7.65 (HH) 01/03/2020    CREATININE 7.37 (HH) 01/02/2020    CREATININE 5.95 (HH) 03/04/2019    GLUCOSE 113 (H) 01/03/2020    GLUCOSE 154 01/02/2020    GLUCOSE 169 (H) 01/02/2020     CMP:   Lab Results   Component Value Date     01/03/2020    K 5.0 01/03/2020     01/03/2020    CO2 22 01/03/2020     01/03/2020    CREATININE 7.65 01/03/2020    GLUCOSE 113 01/03/2020    GLUCOSE 135 06/01/2012    CALCIUM 8.4 01/03/2020    PROT 5.9 01/02/2020    LABALBU 3.3 01/02/2020    LABALBU 4.6 04/24/2012    BILITOT 0.89 01/02/2020    ALKPHOS 179 01/02/2020    AST 18 01/02/2020    ALT 22 01/02/2020      Hepatic:   Lab Results   Component Value Date    AST 18 01/02/2020    AST 21 01/18/2019    AST 29 01/11/2019    ALT 22 01/02/2020    ALT 29 01/18/2019    ALT 29 01/11/2019    BILITOT 0.89 01/02/2020    BILITOT 0.80 01/18/2019    BILITOT 0.69 01/11/2019    ALKPHOS 179 (H) 01/02/2020    ALKPHOS 181 (H) 01/18/2019    ALKPHOS 215 (H) 01/11/2019     BNP:   Lab Results   Component Value Date    BNP 43 03/17/2014     Lipids:   Lab Results   Component Value Date    CHOL 108 06/18/2019    HDL 37 (L) 06/18/2019     INR:   Lab Results   Component Value Date    INR 1.1 01/02/2020    INR 1.0 06/24/2019    INR 1.0 02/09/2017     PTH: No results found for: PTH  Phosphorus:    Lab Results   Component Value Date    PHOS 4.9 01/03/2020     Ionized Calcium: No results found for: IONCA  Magnesium:   Lab Results   Component Value Date    MG 2.2 01/03/2020     Albumin:   Lab Results   Component Value Date    LABALBU 3.3 01/02/2020    LABALBU 4.6 04/24/2012     Last 3 CK, CKMB, Troponin: @LABRCNT(CKTOTAL:3,CKMB:3,TROPONINI:3)       URINE:)No results found for: Candance Brighter    Radiology:   Chest x-ray  No acute cardiopulmonary disease.  Mild cardiomegaly and central vascular   congestion.  No overt failure or focal infiltrate. Assessment:  1. End stage renal disease. 2. Anemia of ESRD. 3.  Azotemia and uremia  4. Volume overload    Plan: We will plan hemodialysis treatment today and tomorrow   Please see orders   Aranesp with dialysis for anemia to maintain hemoglobin between 10-12   Will apply lidocaine cream over the AV fistula area per patient request     Thank you for the consultation. Please do not hesitate to contact us for any further questions/concerns. We will continue to follow along with you.        Electronically signed by Alessandro Stephen MD  on 1/3/2020 at 11:28 AM

## 2020-01-03 NOTE — ED NOTES
Report given to Arsenio Motley RN from u. Report method by phone   The following was reviewed with receiving RN:   Current vital signs:  /68   Pulse 73   Temp 98 °F (36.7 °C) (Oral)   Resp 17   Ht 6' (1.829 m)   Wt 270 lb (122.5 kg)   SpO2 97%   BMI 36.62 kg/m²                MEWS Score: 1     Any medication or safety alerts were reviewed. Any pending diagnostics and notifications were also reviewed, as well as any safety concerns or issues, abnormal labs, abnormal imaging, and abnormal assessment findings. Questions were answered. Relayed that it has been difficult to get an accurate BP on pt d/t pt is moving his arm a lot and has 2 IV's in right arm and you can only use right arm.           Amol Zamorano RN  01/02/20 1551

## 2020-01-03 NOTE — CARE COORDINATION
This patient is a long term care patient at [address] at [place]. The patient is a bed hold under the Medicaid portion of his insurance. He can return to [facility] at anytime with no pre-cert required. Should the patient be ready to DC over the weekend, please complete the followin. Ensure that the [address] is completed and signed. 2.  Ensure that the DC meds have been reconciled and that there are scripts for any controlled meds. 3.  Print the AVS and fax it to [phone]    4. Set up transportation with Melanie Gibbons @ [phone] or Carlin Locke @ [phone]    5. Call [facility] [place] at [phone] to inform of DC/transport time and also to give report. 6. Inform the patient and family of DC transport time. This patient is a return to this facility and therefore does not require a new 6870 form.

## 2020-01-03 NOTE — PROGRESS NOTES
Physical Therapy    Facility/Department: [facility] PROGRESSIVE CARE  Initial Assessment    NAME: Rich Keen  : 1950  MRN: [id number]    Date of Service: 1/3/2020    Discharge Recommendations:  Patient would benefit from continued therapy after discharge   PT Equipment Recommendations  Equipment Needed: No    Assessment   Body structures, Functions, Activity limitations: Decreased functional mobility ; Decreased strength;Decreased endurance;Decreased ROM  Assessment: continue per POC to maxmize potential   Treatment Diagnosis: impaired mobility  Specific instructions for Next Treatment: 1-3-2020  bedside eval completed, pt refused OOB to dangle due to pain level, FALL RISK, nonambulatory since  uses W/C and performs independent  squat pivot slide from bed <> W/C or commode at nursing home  Prognosis: Fair  Decision Making: Medium Complexity  History: admitted due to hyperuricemia  Exam: ROM, MMT  Clinical Presentation: bedside eval completed, pt refused OOB to dangle due to pain level, FALL RISK, nonambulatory since  uses W/C and performs independent  squat pivot slide from bed <> W/C or commode at nursing home  PT Education: Goals;PT Role;Plan of Care  Barriers to Learning: none  REQUIRES PT FOLLOW UP: Yes  Activity Tolerance  Activity Tolerance: Patient limited by endurance; Patient limited by fatigue;Patient limited by pain       Patient Diagnosis(es): The primary encounter diagnosis was Altered mental status, unspecified altered mental status type. A diagnosis of Uremia due to inadequate renal perfusion was also pertinent to this visit.      has a past medical history of Acute combined systolic and diastolic congestive heart failure ([facility] [address] Anemia, BPH (benign prostatic hyperplasia), Cancer ([facility] CKD (chronic kidney disease) stage 3, GFR 30-59 ml/min ([facility] Constipation, COPD (chronic obstructive pulmonary disease) ([facility] Depression, GERD (gastroesophageal reflux disease), Hemodialysis patient ([facility] [address] [facility] No  Additional Comments: resident x 14 years at [facility]  Cognition        Objective     Observation/Palpation  Observation: O2 at 3 L, peripheral IV right wrist and right antecubital, AV fistula left arm, troponins 192 on 1-2-2020, MRSA    PROM RLE (degrees)  RLE PROM: WFL  AROM RLE (degrees)  RLE General AROM: limited AROM due to pain level 8/10- barely able to move legs due to severe pain  PROM LLE (degrees)  LLE PROM: WFL  AROM LLE (degrees)  LLE General AROM: limited AROM due to pain level 8/10 - barely able to move legs due to severe pain  AROM RUE (degrees)  RUE General AROM: see OT for UE assessment  AROM LUE (degrees)  LUE General AROM: see OT for UE assessment  Strength RLE  Strength RLE: Exception  R Hip Flexion: 2-/5  R Hip ABduction: 2/5  R Hip ADduction: 2-/5  R Knee Flexion: 2-/5  R Knee Extension: 2-/5  R Ankle Dorsiflexion: 2-/5  R Ankle Plantar flexion: 2-/5  Strength LLE  Strength LLE: Exception  L Hip Flexion: 1+/5  L Hip ABduction: 1+/5  L Hip ADduction: 1+/5  L Knee Flexion: 1+/5  L Knee Extension: 1+/5  L Ankle Dorsiflexion: 1+/5  L Ankle Plantar Flexion: 1+/5  Strength RUE  Comment: see OT for UE assessment  Strength LUE  Comment: see OT for UE assessment     Sensation  Overall Sensation Status: Impaired(C/O intermittant numbness in fingertips and feet)  Bed mobility  Comment: pt refused OOB due to pain level 8/10; pt was observed earlier today from the hallway by this therapist and he was dangling independently at the EOB  Transfers  Comment: performs squat pivot slide from bed to W/C or commode at [facility]- refused at this time  Ambulation  Ambulation?: No(NONAMBULATORY- uses manual W/C)  Stairs/Curb  Stairs?: No     Balance  Comments: NT today  Exercises  Comments: PROM bilateral LEs     Plan   Plan  Times per week: 3-5 treatments/ week  Times per day: (3-5 treatments/ week)  Specific instructions for Next Treatment: 1-3-2020  bedside eval completed, pt refused OOB to dangle due to pain level, FALL RISK, nonambulatory since 1993- uses W/C and performs independent  squat pivot slide from bed <> W/C or commode at nursing home  Current Treatment Recommendations: Strengthening, Transfer Training, Endurance Training, Positioning, Patient/Caregiver Education & Training, ROM, Wheelchair Mobility Training, Balance Training, Functional Mobility Training, Safety Education & Training  Safety Devices  Type of devices:  All fall risk precautions in place, Patient at risk for falls, Call light within reach, Left in bed, Nurse notified    G-Code       OutComes Score                                                  AM-PAC Score     AM-PAC Inpatient Mobility without Stair Climbing Raw Score : 5 (01/03/20 [facility]5)  AM-PAC Inpatient without Stair Climbing T-Scale Score : 23.59 (01/03/20 1355)  Mobility Inpatient CMS 0-100% Score: 100 (01/03/20 [facility]5)  Mobility Inpatient without Stair CMS G-Code Modifier : CN (01/03/20 1355)       Goals  Short term goals  Time Frame for Short term goals: 3-5 treatments/ week  Short term goal 1: pt to tolerate 1/2 hour of therapuetic exercise and activity  Short term goal 2: pt to demonstrate good technique for UE/ LE strengthening and ROM exercises  Short term goal 3: pt to perform rolling in bed using rail w/ supervision for positioning  Short term goal 4: pt to perform supine <> sit transfers w/ supervision  Short term goal 5: pt to dangle independently at the EOB demonstrating good balance x 20 minutes to engage the core muscles  Short term goal 6: pt to progress to pivot squat slide bed <> W/C w/ min x 2 for safety  Short term goal 7: pt to demonstrate independent W/C 50-80'   Patient Goals   Patient goals : return to [facility] at D/C       Therapy Time   Individual Concurrent Group Co-treatment   Time In 1355         Time Out 1426         Minutes 31         Timed Code Treatment Minutes: [address] Juan Luis Pearlta, PT

## 2020-01-04 NOTE — CARE COORDINATION
Social Work-Patient will be dc today to [facility]. [facility] to transport at 530. Orders faxed to Ravinder Morton and [facility]. Notified Ravinder Morton of dc. Unable to reach family. Patient is agreeable to transfer to [facility]. Nurse to call report to [phone].  Heidi Argueta

## 2020-01-04 NOTE — PLAN OF CARE
Problem: Risk for Impaired Skin Integrity  Goal: Tissue integrity - skin and mucous membranes  Description  Structural intactness and normal physiological function of skin and  mucous membranes.   Outcome: Ongoing     Problem: Pain:  Goal: Pain level will decrease  Description  Pain level will decrease  Outcome: Ongoing  Goal: Control of acute pain  Description  Control of acute pain  Outcome: Ongoing  Goal: Control of chronic pain  Description  Control of chronic pain  Outcome: Ongoing     Problem: Coping:  Goal: Ability to cope will improve  Description  Ability to cope will improve  Outcome: Ongoing     Problem: Fluid Volume:  Goal: Will show no signs or symptoms of fluid imbalance  Description  Will show no signs or symptoms of fluid imbalance  Outcome: Ongoing     Problem: Health Behavior:  Goal: Ability to manage health-related needs will improve  Description  Ability to manage health-related needs will improve  Outcome: Ongoing     Problem: Nutritional:  Goal: Ability to identify appropriate dietary choices will improve  Description  Ability to identify appropriate dietary choices will improve  Outcome: Ongoing     Problem: Musculor/Skeletal Functional Status  Goal: Highest potential functional level  Outcome: Ongoing  Goal: Absence of falls  Outcome: Ongoing

## 2020-01-04 NOTE — H&P
vascular calcification. Mild dolichoectasia of the vertebrobasilar circulation. ORBITS: The visualized portion of the orbits demonstrate no acute abnormality. SINUSES: Opacification of the right maxillary sinus. Left mastoid effusion is noted. The remainder the visualized paranasal sinuses and right mastoid air cells are clear. SOFT TISSUES/SKULL:  No acute abnormality of the visualized skull or soft tissues. No acute intracranial abnormality. Opacification of the right maxillary sinus and left mastoid effusion. Correlate for underlying sinusitis, mastoiditis. Xr Chest Portable    Result Date: 1/2/2020  EXAMINATION: ONE XRAY VIEW OF THE CHEST 1/2/2020 4:59 pm COMPARISON: 03/02/2019. HISTORY: ORDERING SYSTEM PROVIDED HISTORY: concern pna TECHNOLOGIST PROVIDED HISTORY: concern pna Reason for Exam: cough and shortness of breath Acuity: Acute FINDINGS: Stable elevation of the right hemidiaphragm. The cardiac silhouette is mildly enlarged and stable. Moderate aortic tortuosity. There is prominence of the central pulmonary vascularity. The lungs are clear. No infiltrate, pleural fluid or evidence of overt failure. Postoperative clips in the left cervical region. No acute cardiopulmonary disease. Mild cardiomegaly and central vascular congestion. No overt failure or focal infiltrate. Impressions :      1. Active Problems:    Fluid overload    Hyperuricemia  Resolved Problems:    * No resolved hospital problems.  *        2. has a past medical history of Acute combined systolic and diastolic congestive heart failure ([address] (11/25/2016), Anemia, BPH (benign prostatic hyperplasia), Cancer ([address] CKD (chronic kidney disease) stage 3, GFR 30-59 ml/min ([facility]), Constipation, COPD (chronic obstructive pulmonary disease) ([address] Depression, GERD (gastroesophageal reflux disease), Hemodialysis patient ([address] HTN (hypertension), blood clots, Hyperparathyroidism ([address] Hypothyroidism, IDDM (insulin dependent diabetes mellitus) ([facility] Insomnia, Liver disease, MDRO (multiple drug resistant organisms) resistance, Memory deficit, Neurofibromatosis ([facility] Osteoarthritis, Paraplegia ([facility] Peptic ulcer, PVD (peripheral vascular disease) ([facility] Secondary hyperparathyroidism ([facility] Sinus disorder, Syncope, Type II or unspecified type diabetes mellitus without mention of complication, not stated as uncontrolled, and Venous thrombosis. Plans: 1. Will workup and treat     Dramatic and supportive treatment started   See the orders below           Orders Placed This Encounter   Procedures    Rapid influenza A/B antigens    Culture Blood #1    Culture Blood #1    Urine Culture    XR CHEST PORTABLE    CT HEAD WO CONTRAST    Troponin    Blood Gas, Venous    TSH w/reflex to FT4    Magnesium    CBC Auto Differential    Comprehensive Metabolic Panel    Protime-INR    APTT    Urinalysis with Microscopic    Troponin    Basic Metabolic Panel    CBC Auto Differential    Magnesium    Phosphorus    Hepatitis B Surface Antigen    Hepatitis B Surface Antibody    Hepatitis B Core Antibody, Total    DIET RENAL;    Straight cath    Vital signs per unit routine    Notify physician    Notify physician    Up with assistance    Telemetry monitoring    Elevate heels off of bed at all times if patient is not able to move lower extremities    Turn or assist with turn every 2 hours if patient is unable to turn self. Remind patient to turn if necessary.  Inspect skin per unit guidelines    Maintain HOB at the lowest elevation consistent with medical plan of care    Cleanse wound/ulcer with Normal Saline Solution (NSS) with each dressing change    Monitor wound for drainage, odor, edema, and signs/symptoms of infection. Contact wound ostomy nurse if wound deteriorating.     HYPOGLYCEMIA TREATMENT: blood glucose less than 50 mg/dL and patient  ALERT and TOLERATING PO    HYPOGLYCEMIA TREATMENT: blood glucose less than 70 mg/dL and patient ALERT and TOLERATING PO    HYPOGLYCEMIA TREATMENT: blood glucose less than 70 mg/dL and patient NOT ALERT or NPO    Full Code    Inpatient consult to Internal Medicine    Inpatient consult to Nephrology    Inpatient consult to Infectious Diseases    Contact isolation    PT eval and treat    POCT Glucose    POC Glucose Fingerstick    POCT glucose    POCT Glucose    EKG 12 Lead    Wound ostomy eval and treat    Insert peripheral IV    Hemodialysis    PATIENT STATUS (FROM ED OR OR/PROCEDURAL) Inpatient    PATIENT STATUS (FROM ED OR OR/PROCEDURAL) Inpatient          Medications: Allergies: Allergies   Allergen Reactions    Cymbalta [Duloxetine Hcl]      Intolerance.     Tromethamine Other (See Comments)    Toradol [Ketorolac Tromethamine] Other (See Comments)     Headaches        Current Meds:   Scheduled Meds:    darbepoetin [place]-polysorbate  60 mcg Subcutaneous Weekly    miconazole   Topical BID    aspirin  81 mg Oral Daily    difluprednate  1 drop Both Eyes BID    [START ON 1/4/2020] escitalopram  10 mg Oral QAM    fentaNYL  1 patch Transdermal Q72H    finasteride  5 mg Oral Daily    fluticasone  1 spray Nasal Daily    insulin glargine  10 Units Subcutaneous BID    ipratropium-albuterol  3 mL Inhalation Q4H WA    isosorbide mononitrate  60 mg Oral Daily    lactulose  10 g Oral Daily    metoprolol tartrate  50 mg Oral Daily    [START ON 1/6/2020] midodrine  5 mg Oral Once per day on Mon Wed Fri    ramelteon  8 mg Oral Nightly    senna  1 tablet Oral BID    sevelamer  2,400 mg Oral TID WC    tamsulosin  0.4 mg Oral Daily    sodium chloride flush  10 mL Intravenous 2 times per day    heparin (porcine)  5,000 Units Subcutaneous 3 times per day     Continuous Infusions:    dextrose       PRN Meds: lidocaine, diphenhydrAMINE, glucose, dextrose, glucagon (rDNA), dextrose, sodium chloride flush, acetaminophen, ondansetron, morphine

## 2020-01-04 NOTE — CONSULTS
family history, and I haveupdated the database accordingly. Past Medical History:     Past Medical History:   Diagnosis Date    Acute combined systolic and diastolic congestive heart failure ([address] 11/25/2016    Anemia     BPH (benign prostatic hyperplasia)     Cancer (HCC)     left ear 8/2013    CKD (chronic kidney disease) stage 3, GFR 30-59 ml/min (HCC)     Constipation     COPD (chronic obstructive pulmonary disease) (HCC)     Depression     GERD (gastroesophageal reflux disease)     Hemodialysis patient ([address]     3 times a week    HTN (hypertension)     Hx of blood clots     \" rt.shoulder/neck\"    Hyperparathyroidism ([address]     Hypothyroidism     IDDM (insulin dependent diabetes mellitus) ([address]     Insomnia     Liver disease     patient is unsure what it is    MDRO (multiple drug resistant organisms) resistance     hx. c-dif.     Memory deficit     Neurofibromatosis ([address]     Osteoarthritis     Paraplegia ([address]     chair to bed and chair transfer only    Peptic ulcer     PVD (peripheral vascular disease) ([address]     Secondary hyperparathyroidism ([address]     Sinus disorder     Syncope     Type II or unspecified type diabetes mellitus without mention of complication, not stated as uncontrolled     Venous thrombosis        Past Surgical  History:     Past Surgical History:   Procedure Laterality Date    CHOLECYSTECTOMY      COLONOSCOPY  08/22/2011    2 POLYPS REMOBED TUBULAR ADENOMA    DIALYSIS FISTULA CREATION Left 06/13/2016    LEFT WRIST, no longer functional    DIALYSIS FISTULA CREATION Left     antecubital, was revised one time    HIP SURGERY Right     deep laceration was repaired    INTRACAPSULAR CATARACT EXTRACTION Right 5/7/2019    EYE CATARACT EMULSIFICATION IOL IMPLANT performed by Kenneth Molina MD at [address] [place] [address] Left 5/28/2019    EYE CATARACT EMULSIFICATION IOL IMPLANT performed by Kenneth Molina MD at [address] Left     ear.     SKIN GRAFT      right axilla    THYROID SURGERY      non-cancerous lump removed    TOTAL NEPHRECTOMY Right     as a donor    TUNNELED VENOUS CATHETER PLACEMENT Right 2016    later removed    TURP         Medications:      darbepoetin [place]-polysorbate  60 mcg Subcutaneous Weekly    miconazole   Topical BID    aspirin  81 mg Oral Daily    escitalopram  10 mg Oral QAM    fentaNYL  1 patch Transdermal Q72H    finasteride  5 mg Oral Daily    fluticasone  1 spray Nasal Daily    insulin glargine  10 Units Subcutaneous BID    ipratropium-albuterol  3 mL Inhalation Q4H WA    isosorbide mononitrate  60 mg Oral Daily    lactulose  10 g Oral Daily    metoprolol tartrate  50 mg Oral Daily    [START ON 2020] midodrine  5 mg Oral Once per day on     senna  1 tablet Oral BID    sevelamer  2,400 mg Oral TID WC    tamsulosin  0.4 mg Oral Daily    sodium chloride flush  10 mL Intravenous 2 times per day    heparin (porcine)  5,000 Units Subcutaneous 3 times per day       Social History:     Social History     Socioeconomic History    Marital status: Single     Spouse name: Not on file    Number of children: Not on file    Years of education: Not on file    Highest education level: Not on file   Occupational History    Not on file   Social Needs    Financial resource strain: Not on file    Food insecurity:     Worry: Not on file     Inability: Not on file    Transportation needs:     Medical: Not on file     Non-medical: Not on file   Tobacco Use    Smoking status: Former Smoker     Packs/day: 1.00     Types: Cigarettes     Last attempt to quit: 3/1/2019     Years since quittin.8    Smokeless tobacco: Never Used   Substance and Sexual Activity    Alcohol use: No    Drug use: No    Sexual activity: Not Currently   Lifestyle    Physical activity:     Days per week: Not on file     Minutes per session: Not on file    Stress: Not on file   Relationships    Social connections:     Talks on phone: Not on file     Gets together: Not on file     Attends Restoration service: Not on file     Active member of club or organization: Not on file     Attends meetings of clubs or organizations: Not on file     Relationship status: Not on file    Intimate partner violence:     Fear of current or ex partner: Not on file     Emotionally abused: Not on file     Physically abused: Not on file     Forced sexual activity: Not on file   Other Topics Concern    Not on file   Social History Narrative    Not on file       Family History:     Family History   Family history unknown: Yes        Allergies:   Cymbalta [duloxetine hcl]; Tromethamine; and Toradol [ketorolac tromethamine]     Review of Systems:     Review of Systems   Constitutional: Negative for chills and fever. HENT: Negative for congestion and sore throat. Eyes: Negative for discharge and itching. Respiratory: Negative for cough and shortness of breath. Cardiovascular: Negative for chest pain and palpitations. Gastrointestinal: Positive for diarrhea. Negative for abdominal pain, nausea and vomiting. Endocrine: Negative. Genitourinary: Negative for difficulty urinating, dysuria and flank pain. Musculoskeletal: Positive for back pain. Negative for arthralgias. Chronic back pain. Skin: Negative for rash and wound. Allergic/Immunologic: Negative. Neurological: Positive for headaches. Negative for dizziness and light-headedness. Hematological: Negative. Psychiatric/Behavioral: Negative for agitation and confusion. Physical Examination :     Patient Vitals for the past 8 hrs:   BP Temp Temp src Pulse Resp SpO2   01/04/20 1511 -- -- -- -- 16 100 %   01/04/20 0845 120/69 98.7 °F (37.1 °C) Oral 71 18 96 %       Physical Exam  Constitutional:       General: He is not in acute distress. Appearance: Normal appearance. He is obese. HENT:      Head: Normocephalic and atraumatic.       Nose:

## 2020-01-04 NOTE — PROGRESS NOTES
Ordered Treatment time: 3.5 hours    Actual Treatment time: 3:08     UF Goal: 3000 as tolerated  Net UF: 2135 ml    Pre weight: 131.9 kg  Post weight: 129.8kg  EDW:     Access used: Left upper arm AVF  Access function: Good    Medications or blood products given: Pheneran 25mg IM for nausea    Regular outpatient schedule: [facility]     Summary of response to treatment:   Patient requested off treatment approx. 30 min early due to pain. Stated to patient primary nurse notified and encouraged patient to finish treatment. Patient refused. Dr. Enrico Dalton notified       ACES flowsheet faxed to patient unit     * Intra-treatment documented Safety Checks include the followin) Access and face visible at all times. 2) All connections and blood lines are secure with no kinks. 3) NVL alarm engaged. 4) Hemosafe device applied (if applicable). 5) No collapse of Arterial or Venous blood chambers. 6) All blood lines / pump segments in the air detectors.

## 2020-01-04 NOTE — DISCHARGE INSTR - COC
Continuity of Care Form    Patient Name: Anastasiiancchey Gatica   :  1950  MRN:  [id number]    Admit date:  2020  Discharge date:  2020    Code Status Order: Full Code   Advance Directives:   [facility] Documentation     Date/Time Healthcare Directive Type of Healthcare Directive Copy in [facility] [address] Agent's Name Healthcare Agent's Phone Number    20 [phone]  No, patient does not have an advance directive for healthcare treatment -- -- -- -- --    20  No, patient does not have an advance directive for healthcare treatment -- -- -- -- --          Admitting Physician:  Addison Mathur MD  PCP: Stacey Cantu DO    Discharging Nurse: [address] [place] [address] Unit/Room#: 2107/2107-01  Discharging Unit Phone Number: [phone]    Emergency Contact:   Extended Emergency Contact Information  Primary Emergency Contact: Alejandra Jha [address] Phone: [phone]  Relation: Other  Hearing or visual needs: None  Other needs: None  Preferred language: English   needed? No  Secondary Emergency Contact: Renard Mi  Address: Kristina Ngo [address] Phone: [phone]  Work Phone: [phone]  Relation: Child  Hearing or visual needs: None  Other needs: None  Preferred language: English   needed?  No    Past Surgical History:  Past Surgical History:   Procedure Laterality Date    CHOLECYSTECTOMY      COLONOSCOPY  2011    2 POLYPS REMOBED TUBULAR ADENOMA    DIALYSIS FISTULA CREATION Left 2016    LEFT WRIST, no longer functional    DIALYSIS FISTULA CREATION Left     antecubital, was revised one time    HIP SURGERY Right     deep laceration was repaired    INTRACAPSULAR CATARACT EXTRACTION Right 2019    EYE CATARACT EMULSIFICATION IOL IMPLANT performed by Francheska Varela MD at [address] [place] [address] Left 2019    EYE Signs: /69   Pulse 71   Temp 98.7 °F (37.1 °C) (Oral)   Resp 18   Ht 6' (1.829 m)   Wt 270 lb 4 oz (122.6 kg)   SpO2 96%   BMI 36.65 kg/m²     Last documented pain score (0-10 scale): Pain Level: 8  Last Weight:   Wt Readings from Last 1 Encounters:   01/03/20 270 lb 4 oz (122.6 kg)     Mental Status:  oriented and alert    IV Access:  - None    Nursing Mobility/ADLs:  Walking   Assisted  Transfer  Assisted  Bathing  Assisted  Dressing  Assisted  Toileting  Assisted  Feeding  Independent  Med Admin  Assisted  Med Delivery   whole    Wound Care Documentation and Therapy:  Wound 01/03/20 Axilla Anterior;Proximal;Right;Upper redness tear ertythema (Active)   Number of days: 0       Wound 01/03/20 Axilla Anterior;Left;Proximal;Upper redness erythema (Active)   Number of days: 0       Wound 01/03/20 Scrotum redness (Active)   Number of days: 0       Wound 01/03/20 Buttocks (Active)   Number of days: 0        Elimination:  Continence:   · Bowel: No  · Bladder: No  Urinary Catheter: None   Colostomy/Ileostomy/Ileal Conduit: No       Date of Last BM: 1/4/2020    Intake/Output Summary (Last 24 hours) at 1/4/2020 1150  Last data filed at 1/3/2020 1455  Gross per 24 hour   Intake 360 ml   Output 500 ml   Net -140 ml     I/O last 3 completed shifts: In: 360 [P.O.:360]  Out: 500 [Urine:500]    Safety Concerns: At Risk for Falls    Impairments/Disabilities:      None    Nutrition Therapy:  Current Nutrition Therapy:   - Oral Diet:  General    Routes of Feeding: Oral  Liquids: No Restrictions  Daily Fluid Restriction: no  Last Modified Barium Swallow with Video (Video Swallowing Test): not done    Treatments at the Time of Hospital Discharge:   Respiratory Treatments: ipratropium-albuterol (DUONEB) nebulizer solution 3 mL      Oxygen Therapy:  is on oxygen at 3 L/min per nasal cannula.   Ventilator:    - No ventilator support    Rehab Therapies: Physical Therapy and Occupational Therapy  Weight Bearing

## 2020-01-04 NOTE — CARE COORDINATION
ONGOING DISCHARGE PLAN:    Plan remains for LSW to continue to follow for DC to SNF, [facility]. Pt. Is a Bedhold, No Pre-Cert is required. Anticipate DC To SNF after Dialysis today. LSW notified. Will continue to follow for additional discharge needs.     Electronically signed by Anabelle Guerra RN on 1/4/2020 at 11:54 AM

## 2020-01-04 NOTE — PROGRESS NOTES
Patient back to floor from dialysis . Patient c/o being hungry and pain in his neck , back and arm. Telemetry intact will continue to monitor and assess.

## 2020-01-04 NOTE — PROGRESS NOTES
Physical Therapy  DATE: 2020    NAME: Kathy Carrasco  MRN: [id number]   : 1950    Patient not seen this date for Physical Therapy due to:  [] Blood transfusion in progress  [] Hemodialysis  [x]  Patient Declined: Attempt at 7853- pt adamantly refuses, therapist offers to try again later and pt declines. [] Spine Precautions   [] Strict Bedrest  [] Surgery/ Procedure  [] Testing      [] Other        [] PT being discontinued at this time. Patient independent. No further needs. [] PT being discontinued at this time as the patient has been transferred to palliative care. No further needs.     Javier Espinoza, PTA

## 2020-01-05 NOTE — DISCHARGE SUMMARY
[place] [address] Internal Medicine    Discharge Summary     Patient ID: Jerome Mann  :  1950   MRN: [id number]     ACCOUNT:  [de-identified]   Patient's PCP: Chetna Kapadia DO  Admit Date: 2020   Discharge Date: 2020   Length of Stay: 2  Code Status:  Prior  Admitting Physician: Lorin Grady MD  Discharge Physician: Vicente Vo MD     Active Discharge Diagnoses:     Primary Problem  <principal problem not specified>      [place] Problems    Diagnosis Date Noted    Altered mental status [R41.82]     Hyperuricemia [E79.0] 2020    Fluid overload [E87.70] 2019       Admission Condition:  poor     Discharged Condition: fair    Hospital Stay:     Hospital Course:  Jerome Mann is a 78 y.o. male who was admitted for the management of   .     , presented with Altered Mental Status      ,                         <principal problem not specified>;                               Active Problems:    Fluid overload    Hyperuricemia    Altered mental status  Resolved Problems:    * No resolved hospital problems. *       Significant therapeutic interventions: The patient is a 78 y.o. male who normally undergoes dialysis at [facility] dialysis unit on  and , has an AV fistula, he had hemodialysis treatment on Monday and missed dialysis on Wednesday, usually asks to come off dialysis early. He presented with confusion and mild shortness of breath, to have significant azotemia BUN was 103, chest x-ray showed mild skeletal congestion. According to records the patient was very confused and restless, the time of physical exam the patient was calm and was answering questions appropriately       Assessment:  1. End stage renal disease. 2. Anemia of ESRD. 3.  Azotemia and uremia  4. Volume overload     Plan:   We will plan hemodialysis treatment today and tomorrow   Please see orders   Aranesp with dialysis for anemia to maintain hemoglobin between 10-12   Will apply lidocaine cream    Patient received dialysis treatment felt better  And discharged back to skilled nursing facility    Patient had missed dialysis and was hyperkalemic and uremic secondary to noncompliance     Significant Diagnostic Studies:   Labs / Micro:       Results for orders placed or performed during the hospital encounter of 01/02/20   Rapid influenza A/B antigens   Result Value Ref Range    Specimen Description . NARES     Special Requests NOT REPORTED     Direct Exam       PRESUMPTIVE NEGATIVE for Influenza A + B antigens. PCR testing to confirm this result is available upon request.  Specimen will be saved in the laboratory for 7 days. Please call [phone] if PCR testing is indicated. Culture Blood #1   Result Value Ref Range    Specimen Description . BLOOD 9ML PURPLE, 1ML RED RIGHT WRIST     Special Requests NOT REPORTED     Culture NO GROWTH 2 DAYS    Culture Blood #1   Result Value Ref Range    Specimen Description . BLOOD RED 1ML PURPLE 9ML BOTH FROM RIGHT AC. Special Requests NOT REPORTED     Culture NO GROWTH 1 DAY    Urine Culture   Result Value Ref Range    Specimen Description . URINE,STRAIGHT CATHETER     Special Requests NOT REPORTED     Culture NO GROWTH    Troponin   Result Value Ref Range    Troponin, High Sensitivity 192 (HH) 0 - 22 ng/L    Troponin T NOT REPORTED <0.03 ng/mL    Troponin Interp NOT REPORTED    Blood Gas, Venous   Result Value Ref Range    pH, James 7.310 (L) 7.320 - 7.420    pCO2, James 41.0 39.0 - 55.0    pO2, James 48.0 30.0 - 50.0    HCO3, Venous 21.0 (L) 24.0 - 30.0 mmol/L    Positive Base Excess, James NOT REPORTED 0.0 - 2.0 mmol/L    Negative Base Excess, James 5.3 (H) 0.0 - 2.0 mmol/L    O2 Sat, James 76.8 60.0 - 85.0 %    Total Hb NOT REPORTED 12.0 - 16.0 g/dl    Oxyhemoglobin NOT REPORTED 95.0 - 98.0 %    Carboxyhemoglobin 3.7 0 - 5 %    Methemoglobin 0.6 0.0 - 1.9 % Troponin   Result Value Ref Range    Troponin, High Sensitivity 192 (HH) 0 - 22 ng/L    Troponin T NOT REPORTED <0.03 ng/mL    Troponin Interp NOT REPORTED    Basic Metabolic Panel   Result Value Ref Range    Glucose 113 (H) 70 - 99 mg/dL     (HH) 8 - 23 mg/dL    CREATININE 7.65 (HH) 0.70 - 1.20 mg/dL    Bun/Cre Ratio NOT REPORTED 9 - 20    Calcium 8.4 (L) 8.6 - 10.4 mg/dL    Sodium 143 135 - 144 mmol/L    Potassium 5.0 3.7 - 5.3 mmol/L    Chloride 103 98 - 107 mmol/L    CO2 22 20 - 31 mmol/L    Anion Gap 18 (H) 9 - 17 mmol/L    GFR Non-African American 7 (L) >60 mL/min    GFR  9 (L) >60 mL/min    GFR Comment          GFR Staging NOT REPORTED    CBC Auto Differential   Result Value Ref Range    WBC 6.4 3.5 - 11.0 k/uL    RBC 3.05 (L) 4.5 - 5.9 m/uL    Hemoglobin 9.4 (L) 13.5 - 17.5 g/dL    Hematocrit 29.6 (L) 41 - 53 %    MCV 97.0 80 - 100 fL    MCH 30.7 26 - 34 pg    MCHC 31.7 31 - 37 g/dL    RDW 15.7 (H) 11.5 - 14.9 %    Platelets 114 (L) 083 - 450 k/uL    MPV 6.9 6.0 - 12.0 fL    NRBC Automated NOT REPORTED per 100 WBC    Differential Type NOT REPORTED     Seg Neutrophils 84 (H) 36 - 66 %    Lymphocytes 6 (L) 24 - 44 %    Monocytes 9 (H) 1 - 7 %    Eosinophils % 1 0 - 4 %    Basophils 0 0 - 2 %    Immature Granulocytes NOT REPORTED 0 %    Segs Absolute 5.40 1.3 - 9.1 k/uL    Absolute Lymph # 0.40 (L) 1.0 - 4.8 k/uL    Absolute Mono # 0.60 0.1 - 1.3 k/uL    Absolute Eos # 0.00 0.0 - 0.4 k/uL    Basophils Absolute 0.00 0.0 - 0.2 k/uL    Absolute Immature Granulocyte NOT REPORTED 0.00 - 0.30 k/uL    WBC Morphology NOT REPORTED     RBC Morphology NOT REPORTED     Platelet Estimate NOT REPORTED    Magnesium   Result Value Ref Range    Magnesium 2.2 1.6 - 2.6 mg/dL   Phosphorus   Result Value Ref Range    Phosphorus 4.9 (H) 2.5 - 4.5 mg/dL   Hepatitis B Surface Antigen   Result Value Ref Range    Hepatitis B Surface Ag NONREACTIVE NONREACTIVE   Hepatitis B Surface Antibody   Result Value Ref Range    Hep B S Ab 467.30 (H) <10 mIU/mL   Hepatitis B Core Antibody, Total   Result Value Ref Range    Hep B Core Total Ab NONREACTIVE NONREACTIVE   Basic Metabolic Panel   Result Value Ref Range    Glucose 117 (H) 70 - 99 mg/dL    BUN 66 (H) 8 - 23 mg/dL    CREATININE 5.59 (HH) 0.70 - 1.20 mg/dL    Bun/Cre Ratio NOT REPORTED 9 - 20    Calcium 8.9 8.6 - 10.4 mg/dL    Sodium 137 135 - 144 mmol/L    Potassium 4.3 3.7 - 5.3 mmol/L    Chloride 97 (L) 98 - 107 mmol/L    CO2 24 20 - 31 mmol/L    Anion Gap 16 9 - 17 mmol/L    GFR Non-African American 10 (L) >60 mL/min    GFR  12 (L) >60 mL/min    GFR Comment          GFR Staging NOT REPORTED    CBC Auto Differential   Result Value Ref Range    WBC 5.5 3.5 - 11.0 k/uL    RBC 2.88 (L) 4.5 - 5.9 m/uL    Hemoglobin 9.0 (L) 13.5 - 17.5 g/dL    Hematocrit 27.8 (L) 41 - 53 %    MCV 96.5 80 - 100 fL    MCH 31.2 26 - 34 pg    MCHC 32.3 31 - 37 g/dL    RDW 15.3 (H) 11.5 - 14.9 %    Platelets 115 (L) 830 - 450 k/uL    MPV 6.8 6.0 - 12.0 fL    NRBC Automated NOT REPORTED per 100 WBC    Differential Type NOT REPORTED     Seg Neutrophils 81 (H) 36 - 66 %    Lymphocytes 8 (L) 24 - 44 %    Monocytes 9 (H) 1 - 7 %    Eosinophils % 1 0 - 4 %    Basophils 1 0 - 2 %    Immature Granulocytes NOT REPORTED 0 %    Segs Absolute 4.50 1.3 - 9.1 k/uL    Absolute Lymph # 0.40 (L) 1.0 - 4.8 k/uL    Absolute Mono # 0.50 0.1 - 1.3 k/uL    Absolute Eos # 0.10 0.0 - 0.4 k/uL    Basophils Absolute 0.00 0.0 - 0.2 k/uL    Absolute Immature Granulocyte NOT REPORTED 0.00 - 0.30 k/uL    WBC Morphology NOT REPORTED     RBC Morphology NOT REPORTED     Platelet Estimate NOT REPORTED    Magnesium   Result Value Ref Range    Magnesium 2.1 1.6 - 2.6 mg/dL   Phosphorus   Result Value Ref Range    Phosphorus 4.0 2.5 - 4.5 mg/dL   POCT Glucose   Result Value Ref Range    Glucose 154 mg/dL    QC OK?  yes    POC Glucose Fingerstick   Result Value Ref Range    POC Glucose 154 (H) 75 - 110 mg/dL   POC Glucose Fingerstick   Result Value Ref Range    POC Glucose 141 (H) 75 - 110 mg/dL   POC Glucose Fingerstick   Result Value Ref Range    POC Glucose 90 75 - 110 mg/dL   POC Glucose Fingerstick   Result Value Ref Range    POC Glucose 193 (H) 75 - 110 mg/dL   EKG 12 Lead   Result Value Ref Range    Ventricular Rate 68 BPM    Atrial Rate 68 BPM    P-R Interval 168 ms    QRS Duration 142 ms    Q-T Interval 432 ms    QTc Calculation (Bazett) 459 ms    P Axis 85 degrees    R Axis -36 degrees    T Axis 34 degrees        {Radiology:    Ct Head Wo Contrast    Result Date: 1/2/2020  EXAMINATION: CT OF THE HEAD WITHOUT CONTRAST  1/2/2020 4:32 pm TECHNIQUE: CT of the head was performed without the administration of intravenous contrast. Dose modulation, iterative reconstruction, and/or weight based adjustment of the mA/kV was utilized to reduce the radiation dose to as low as reasonably achievable. COMPARISON: 01/28/2015. HISTORY: ORDERING SYSTEM PROVIDED HISTORY: ams TECHNOLOGIST PROVIDED HISTORY: ams Reason for Exam: ams Acuity: Acute Type of Exam: Initial FINDINGS: BRAIN/VENTRICLES: There is no acute intracranial hemorrhage, mass effect or midline shift. No abnormal extra-axial fluid collection. The gray-white differentiation is maintained without evidence of an acute infarct. There is no evidence of hydrocephalus. Mild decreased attenuation of the periventricular and subcortical white matter consistent with small vessel ischemic change. Intracranial atherosclerotic vascular calcification. Mild dolichoectasia of the vertebrobasilar circulation. ORBITS: The visualized portion of the orbits demonstrate no acute abnormality. SINUSES: Opacification of the right maxillary sinus. Left mastoid effusion is noted. The remainder the visualized paranasal sinuses and right mastoid air cells are clear. SOFT TISSUES/SKULL:  No acute abnormality of the visualized skull or soft tissues. No acute intracranial abnormality. BENADRYL     escitalopram 10 MG tablet  Commonly known as:  LEXAPRO     fentaNYL 25 MCG/HR  Commonly known as:  Piilostentie 53 1 patch onto the skin every 72 hours for 3 days. finasteride 5 MG tablet  Commonly known as:  PROSCAR     fluticasone 50 MCG/ACT nasal spray  Commonly known as:  FLONASE     GUAIFENESIN PO     insulin glargine 100 UNIT/ML injection vial  Commonly known as:  LANTUS  Inject 10 Units into the skin 2 times daily     ipratropium-albuterol 0.5-2.5 (3) MG/3ML Soln nebulizer solution  Commonly known as:  DUONEB  Inhale 3 mLs into the lungs every 4 hours (while awake)     isosorbide mononitrate 60 MG extended release tablet  Commonly known as:  IMDUR     ketorolac 0.5 % ophthalmic solution  Commonly known as:  ACULAR     lactulose 10 GM/15ML solution  Commonly known as:  CHRONULAC     lidocaine-prilocaine 2.5-2.5 % cream  Commonly known as:  EMLA     metoprolol tartrate 50 MG tablet  Commonly known as:  LOPRESSOR     midodrine 5 MG tablet  Commonly known as:  PROAMATINE     omeprazole 20 MG delayed release capsule  Commonly known as:  PRILOSEC     ondansetron 4 MG tablet  Commonly known as:  ZOFRAN     ROZEREM 8 MG tablet  Generic drug:  ramelteon     senna 8.6 MG tablet  Commonly known as:  SENOKOT     sevelamer 800 MG tablet  Commonly known as:  RENVELA     tamsulosin 0.4 MG capsule  Commonly known as:  FLOMAX        STOP taking these medications    glucagon 1 MG injection           Where to Get Your Medications      You can get these medications from any pharmacy    Bring a paper prescription for each of these medications  · fentaNYL 25 MCG/HR  · oxyCODONE HCl 10 MG immediate release tablet           Time spent on discharge planning ;          [] less than 30 minutes . [x]   more  than 30 minutes . Ellectronically signed by   Carlos Raya MD      Thank you Dr. Nery Gayle DO for the opportunity to be involved in this patient's care.       Please note that this chart

## 2020-02-27 NOTE — ED NOTES
Report given to Javy Kahn from [facility], [address] [place] [address]. Report method in person   The following was reviewed with receiving RN:   Current vital signs:  /60   Pulse 71   Temp 98.5 °F (36.9 °C) (Oral)   Resp 20   Ht 6' (1.829 m)   Wt 286 lb (129.7 kg)   SpO2 91%   BMI 38.79 kg/m²                MEWS Score: 1     Any medication or safety alerts were reviewed. Any pending diagnostics and notifications were also reviewed, as well as any safety concerns or issues, abnormal labs, abnormal imaging, and abnormal assessment findings. Questions were answered.             Kathryn Barbour RN  02/27/20 9951

## 2020-02-27 NOTE — ED NOTES
Mode of arrival (squad #, walk in, police, etc) : LS 2        Chief complaint(s): Chest Pain        Arrival Note (brief scenario, treatment PTA, etc). : Pt to ED via EMS from [facility] with complaints of pain that radiates around his mid-epigastric area from his back. Per pt he fell backwards while in his wheelchair on Tuesday and the pain started then. Per pt the pain has worsened and is worse with movement and palpation. Pt is alert and oriented. C= \"Have you ever felt that you should Cut down on your drinking? \"  No  A= \"Have people Annoyed you by criticizing your drinking? \"  No  G= \"Have you ever felt bad or Guilty about your drinking? \"  No  E= \"Have you ever had a drink as an Eye-opener first thing in the morning to steady your nerves or to help a hangover? \"  No      Deferred []      Reason for deferring: N/A    *If yes to two or more: probable alcohol abuse. Sandeep Garcia RN  02/27/20 2817

## 2020-02-27 NOTE — ED PROVIDER NOTES
HISTORY       Social History     Tobacco Use    Smoking status: Former Smoker     Packs/day: 1.00     Types: Cigarettes     Last attempt to quit: 3/1/2019     Years since quittin.9    Smokeless tobacco: Never Used   Substance Use Topics    Alcohol use: No    Drug use: No     PHYSICAL EXAM     INITIAL VITALS: /60   Pulse 71   Temp 98.5 °F (36.9 °C) (Oral)   Resp 20   Ht 6' (1.829 m)   Wt 286 lb (129.7 kg)   SpO2 91%   BMI 38.79 kg/m²    Physical Exam  Vitals signs and nursing note reviewed. Constitutional:       Appearance: Normal appearance. HENT:      Head: Normocephalic. Comments: Small occipital contusion. Nose: No congestion. Mouth/Throat:      Mouth: Mucous membranes are moist.   Eyes:      Conjunctiva/sclera: Conjunctivae normal.   Neck:      Musculoskeletal: Normal range of motion and neck supple. Comments: No cervical tenderness. Cardiovascular:      Rate and Rhythm: Normal rate and regular rhythm. Heart sounds: No murmur. Chest:      Chest wall: Tenderness (right chest) present. Abdominal:      Palpations: Abdomen is soft. Tenderness: There is no abdominal tenderness. Musculoskeletal:         General: No signs of injury. Comments: Diffuse exam is negative for evidence of injury. Skin:     General: Skin is warm and dry. Capillary Refill: Capillary refill takes less than 2 seconds. Neurological:      General: No focal deficit present. Mental Status: He is alert and oriented to person, place, and time. MEDICAL DECISION MAKING:     Nondisplaced rib fractures. Otherwise no evidence of emergent traumatic injury. On reeval, pt appears well, no distress, nontoxic. Counseled regarding results and supportive care instructions given rib fx. Otherwise, pt is appropriate for discharge and close follow up. Discussed results and plan with the pt. They expressed appropriate understanding.   Pt given close follow up, supportive care instructions and strict return instructions at the bedside. CRITICAL CARE:       PROCEDURES:    Procedures    DIAGNOSTIC RESULTS   EKG:All EKG's are interpreted by the Emergency Department Physician who either signs or Co-signs this chart in the absence of a cardiologist.    Ekg, 72, nsr, rbbb, nonspecific st seg changes, no acute changes. RADIOLOGY:All plain film, CT, MRI, and formal ultrasound images (except ED bedside ultrasound) are read by the radiologist, see reports below, unless otherwisenoted in MDM or here. CT Cervical Spine WO Contrast   Final Result   No acute abnormality of the cervical spine. CT CHEST WO CONTRAST   Final Result   1. Acute nondisplaced right 4th, 5th, 6th and 7th ribs most apparent in the   6th rib. The others are subtle manifested by cortical buckling. There are   other scattered nonacute rib fractures noted. 2. Bilateral posterior basal infiltrates suggestive of atelectasis/scarring. There is also trace right pleural effusion. CT Head WO Contrast   Final Result   No acute intracranial abnormality. Persistent opacification of the right maxillary sinus and left mastoid air   cells. Correlate for any signs of sinusitis. LABS: All lab results were reviewed by myself, and all abnormals are listed below. Labs Reviewed - No data to display    EMERGENCY DEPARTMENTCOURSE:         Vitals:    Vitals:    02/27/20 1017 02/27/20 1030   BP: 134/64 139/60   Pulse: 97 71   Resp: 19 20   Temp: 98.5 °F (36.9 °C)    TempSrc: Oral    SpO2: 94% 91%   Weight: 286 lb (129.7 kg)    Height: 6' (1.829 m)        The patient was given the following medications while in the emergency department:  Orders Placed This Encounter   Medications    oxyCODONE-acetaminophen (PERCOCET) 5-325 MG per tablet 1 tablet     CONSULTS:  None    FINAL IMPRESSION      1.  Closed fracture of multiple ribs of right side, initial encounter          DISPOSITION/PLAN   DISPOSITION

## 2020-02-27 NOTE — ED NOTES
Bed: 07A  Expected date:   Expected time:   Means of arrival:   Comments:   Lena Daniel Merlos, RN  02/27/20 1016

## 2020-02-27 NOTE — ED NOTES
Report given to Quentin velásquez RN from [address]. Report method by phone   The following was reviewed with receiving RN:   Current vital signs:  /60   Pulse 70   Temp 98.5 °F (36.9 °C) (Oral)   Resp 19   Ht 6' (1.829 m)   Wt 286 lb (129.7 kg)   SpO2 91%   BMI 38.79 kg/m²                MEWS Score: 1     Any medication or safety alerts were reviewed. Any pending diagnostics and notifications were also reviewed, as well as any safety concerns or issues, abnormal labs, abnormal imaging, and abnormal assessment findings. Questions were answered.             Ly Zendejas RN  02/27/20 9451

## 2020-02-27 NOTE — ED NOTES
Pt informed that he would not be getting anymore pain medication. Pt was repositioned for comfort. Tray table moved closer to bed and call light within reach. Pt has no other requests at this time. Pt to be transported back to [facility] by [employer]. Transport ETA 1500.       Yvonne Quick RN  02/27/20 6991

## 2020-03-02 PROBLEM — I48.91 ATRIAL FIBRILLATION (HCC): Status: ACTIVE | Noted: 2020-01-01

## 2020-03-02 NOTE — ED NOTES
Pharmacy called and is adjusting Vanco dose due to pt's kidney function     Sona Poole RN  03/02/20 2018

## 2020-03-02 NOTE — ED NOTES
Bed: 03  Expected date: 3/2/20  Expected time: 3:51 PM  Means of arrival: [address] [place]  Comments:  AMS from dialysis  afib RVR  20mg cardizem IV given     Nydia Henderson RN  03/02/20 5952

## 2020-03-02 NOTE — ED NOTES
Lab called with a critical Troponin of 436.  Dr Paresh Houser notified     Leonora Franklin, RN  03/02/20 4395

## 2020-03-02 NOTE — ED PROVIDER NOTES
[facility]  Emergency Department Encounter  Emergency Medicine Resident     Pt Name: Rosa Eaton  MRN: [id number]  [place] 1950  Date of evaluation: 3/2/20  PCP:  Mauricio Ny, [address] [place] [address]       Chief Complaint   Patient presents with    Shortness of Breath    Tachycardia       HISTORY OF PRESENT ILLNESS  (Location/Symptom, Timing/Onset, Context/Setting, Quality, Duration, Modifying Factors, Severity.)    Rosa Eaton is a 78 y.o. male who presents with chest pain shortness of breath. Patient had sudden onset of chest palpitations with shortness of breath starting approximately 1 hour ago and has been continuous. He was given 20 mg of Cardizem for A. fib RVR by EMS. He was seen in the emergency departments yesterday for a fall and he determined that he had multiple rib fractures atelectasis and trace right pleural effusion on CT scan. Pain is 9 out of 10 severity mid axilla bilaterally achy nonradiating. Has a past medical history for CKD stage III, COPD, hypertension hyperlipidemia hypothyroidism diabetes.           PAST MEDICAL / SURGICAL / SOCIAL / FAMILY HISTORY    has a past medical history of Acute combined systolic and diastolic congestive heart failure ([address] Anemia, BPH (benign prostatic hyperplasia), Cancer ([address] CKD (chronic kidney disease) stage 3, GFR 30-59 ml/min ([facility]), Constipation, COPD (chronic obstructive pulmonary disease) ([address] Depression, GERD (gastroesophageal reflux disease), Hemodialysis patient ([address] HTN (hypertension), Hx of blood clots, Hyperparathyroidism ([address] Hypothyroidism, IDDM (insulin dependent diabetes mellitus) ([address] Insomnia, Liver disease, MDRO (multiple drug resistant organisms) resistance, Memory deficit, Neurofibromatosis ([address] Osteoarthritis, Paraplegia ([address] Peptic ulcer, PVD (peripheral vascular disease) ([address] Secondary hyperparathyroidism ([address] Sinus disorder, Syncope, Type II or unspecified type diabetes Social History Narrative    Not on file       Family History   Family history unknown: Yes       Allergies:    Cymbalta [duloxetine hcl]; Tromethamine; and Toradol [ketorolac tromethamine]    Home Medications:  Prior to Admission medications    Medication Sig Start Date End Date Taking? Authorizing Provider   fluticasone-vilanterol (BREO ELLIPTA) 100-25 MCG/INH AEPB inhaler Inhale 1 puff into the lungs daily   Yes Historical Provider, MD   doxycycline hyclate (VIBRAMYCIN) 100 MG capsule Take 100 mg by mouth 2 times daily For 10 days starting 2/28/20 2/28/20 3/9/20 Yes Historical Provider, MD   fentaNYL (DURAGESIC) 25 MCG/HR Place 1 patch onto the skin every 72 hours. Yes Historical Provider, MD   glucagon 1 MG injection Inject 1 kit into the muscle as needed (hypoglycemia BG <60)   Yes Historical Provider, MD   glucose (GLUTOSE) 40 % GEL Take 15 g by mouth as needed (blood sugar <60)   Yes Historical Provider, MD   loratadine (CLARITIN) 10 MG tablet Take 10 mg by mouth daily   Yes Historical Provider, MD   nitroGLYCERIN (NITROSTAT) 0.4 MG SL tablet Place 0.4 mg under the tongue every 5 minutes as needed for Chest pain up to max of 3 total doses. If no relief after 1 dose, call 911. Yes Historical Provider, MD   oxyCODONE HCl (OXY-IR) 10 MG immediate release tablet Take 10 mg by mouth every 6 hours as needed for Pain. 2/27/20 3/4/20 Yes Historical Provider, MD   Dextromethorphan-guaiFENesin  MG/5ML SYRP Take 5 mLs by mouth every 6 hours as needed for Cough   Yes Historical Provider, MD   ALPRAZolam (XANAX) 0.5 MG tablet Take 0.5 mg by mouth 2 times daily.    Yes Historical Provider, MD   insulin glargine (LANTUS) 100 UNIT/ML injection vial Inject 10 Units into the skin 2 times daily 3/4/19  Yes Krista Benson MD   lidocaine-prilocaine (EMLA) 2.5-2.5 % cream Apply topically three times a week Apply topically to arm/port three times weekly on Monday, Wednesday, and Friday for dialysis   Yes Historical disturbance  Resp: - cough , + shortness of breath   Cardio: + chest pain , - leg swelling , - palpitations  GI: - nausea, - vomiting, - abd pain , - black/bloody BMs, - constipation , - diarrhea   Endocrine: - heat intolerance , - cold intolerance  : - dysuria, - frequency, - hematuria   MSK: - back pain , - neck pain   ALG: - food allergies  Neuro: - dizziness ,  - headache, - weakness, - syncope  Skin: - wound , - rash   Heme: - bruise easily  Psych: - agitation , - confusion      PHYSICAL EXAM   (up to 7 for level 4, 8 or more for level 5)     INITIAL VITALS:  height is 6' (1.829 m) and weight is 280 lb (127 kg). His oral temperature is 98.6 °F (37 °C). His blood pressure is 97/56 (abnormal) and his pulse is 129. His respiration is 22 and oxygen saturation is 97%. Physical Exam  GENERAL: Patient is ill-appearing nontoxic and in acute distress. He has labored breathing and is morbidly obese. He is alert to person place but not time. Tenderness to palpation over bilateral axilla. No distant heart sounds. No Absent lung sounds. HENT: normocephalic , nose normal,   EYES: no occular discharge, no scleral icterus  NECK: no JVD, no tracheal deviation  CV: Normal S1 S2, no MRG  PULM / CHEST: CTA Bilaterally all fields, no WRR  ABDOMEN: SNTND, No peritoneal signs  MSK: no gross deformity, no edema, no TTP  SKIN: discolored BLLE but warm. cap refill < 2 sec, +2 pitting edema bilaterally.          DIFFERENTIAL  DIAGNOSIS/ MDM   PLAN (LABS / IMAGING / EKG):  Orders Placed This Encounter   Procedures    Culture, Urine    Culture, Blood 1    Culture, Blood 2    XR CHEST PORTABLE    CT HEAD WO CONTRAST    CT CHEST WO CONTRAST    CT ABDOMEN PELVIS WO CONTRAST    CBC Auto Differential    Basic Metabolic Panel    Troponin    TSH with Reflex    Magnesium    Brain Natriuretic Peptide    Troponin    Arterial Blood Gases    Lactic Acid    Urinalysis    APTT    Protime-INR    APTT    APTT    Inpatient consult to Cardiology    Inpatient consult to Internal Medicine    Inpatient consult to Trauma Surgery    EKG 12 Lead    PATIENT STATUS (FROM ED OR OR/PROCEDURAL) Inpatient       MEDICATIONS ORDERED:  Orders Placed This Encounter   Medications    DISCONTD: dilTIAZem injection 20 mg    DISCONTD: dilTIAZem 125 mg in dextrose 5 % 125 mL infusion    midodrine (PROAMATINE) tablet 10 mg    DISCONTD: lactated ringers bolus    lactated ringers bolus    metoprolol (LOPRESSOR) injection 5 mg    metoprolol (LOPRESSOR) injection 5 mg    piperacillin-tazobactam (ZOSYN) 4.5 g in dextrose 5 % 100 mL IVPB (mini-bag)    vancomycin (VANCOCIN) 2,000 mg in dextrose 5 % 500 mL IVPB    fentaNYL (SUBLIMAZE) injection 50 mcg    DISCONTD: amiodarone (CORDARONE) injection 300 mg    DISCONTD: amiodarone (CORDARONE) 450 mg in dextrose 5 % 250 mL infusion    amiodarone (NEXTERONE) 150 mg in dextrose 5% 100 ml    amiodarone (NEXTERONE) 360 mg in dextrose 5% 200 ml    DISCONTD: heparin (porcine) injection 7,620 Units    DISCONTD: heparin (porcine) injection 7,620 Units    DISCONTD: heparin (porcine) injection 3,810 Units    DISCONTD: heparin 25,000 units in dextrose 5% 250 mL infusion    heparin (porcine) injection 10,000 Units    heparin (porcine) injection 10,000 Units    heparin (porcine) injection 5,080 Units    heparin 25,000 units in dextrose 5% 250 mL infusion    DISCONTD: amiodarone (CORDARONE) 450 mg in dextrose 5 % 250 mL infusion    amiodarone (NEXTERONE) 150 mg in dextrose 5% 100 ml    fentaNYL (SUBLIMAZE) injection 25 mcg         MDM:    Rocio Waite is a 78 y.o. male who presents with shortness of breath and palpitations. Patient had a A. fib RVR. Lab work, case was discussed with cardiology, will need admission.         EMERGENCY DEPARTMENT COURSE:  ED Course as of Mar 02 2040   Mon Mar 02, 2020   1632  Patient slightly hypotensive, will provide fluid bolus of gentle hydration as patient has multiple vascular risk factors as well as CKD. Will provide Lopressor 5 mg and attempt to break A. fib RVR as has minimal hemodynamic effect compared to others. Will provide Midrin to enhance blood pressure. Will hold off on CT PE at this time as patient is still making urine and having multiple rib fractures did not put them out a large enough suspicion for clinical pulmonary embolus and I feel that the risk at this time of making him an uric versus obtaining the scan is outweighed. Patient is in new onset A. fib RVR, will check labs and discussed with cardiology patient will require admission. [RB]   5201 Additionally as patient is more altered compared to yesterday, will obtain CT head. [RB]   1703 Significant elevation, will discuss with cards   Troponin, High Sensitivity(!!): 450 [RB]   2686 As patient had multiple rib fractures, is tachypneic and tachycardic, temperature 99.3, will begin sepsis work-up except will hold off on fluid bolus as she having current concern for fluid overload in patients by giving 30 cc/kg fluid bolus. As patient is likely cardiogenic shock, will limit fluid overload. [RB]   8956 Arterial line established in right radial artery, arterial line blood pressure systolic in the low 990D with a map of 63. Remains in A. fib RVR with rate of 1 30-1 40, will await cardiology recommendations for starting digoxin versus electrical cardioversion. Still awaiting head CT as well as CT chest.    [RB]   1753 Pro-BNP(!): 3,452 [RB]   1753 Troponin, High Sensitivity(!!): 450 [RB]   1820 Abisai case with Dr. Agatha Kothari, cardiology, has recommended amiodarone 300 mg over 20 minutes and then amio gtt. He is aware patient has prolonged QTC. Will give amio as directed and call back afterwards for status update.     [RB]   1859 CT head is negative, no intracranial hemorrhage per radiology report and on my review. Will begin patient on anticoagulation for possible pulmonary embolism.   Patient is tachycardic hypoxic tachypneic and hypotensive, symptoms are likely secondary to patient's A. fib RVR or a developing pneumonia as he has a temperature of 99.3 Fahrenheit, unable to exclude pulmonary embolism so we will start anticoagulation although I feel that this is less likely based on the presentation. Will anticoagulate until patient can have a VQ scan performed. [RB]   1957 Discussed patient with Dr. Vivian Kolb, cardiology to inform him on patient's condition after amnio drip. Informed that patient is still in A. fib RVR with heart rate between 1 30-1 40 with associated systolic blood pressure in arterial line of 89 and map of 60. At this time he recommends giving another 300 mg of amiodarone over 20 minutes and then to call him back, I do have concern with how much Amiodarone patient will be getting with prolonged Qtc however treatment is deferred to cardiology for management. [RB]   2008 Discussed patient with Dr. Bejarano [facility] trauma surgery he is aware of patient's and will be on as consultant for the rib fractures. Has requested a CT of the abdomen and pelvis. Will obtain.     [RB]      ED Course User Index  [RB] Arely Gonzales,                DIAGNOSTIC RESULTS / EMERGENCYDEPARTMENT COURSE   LABS:  Labs Reviewed   CBC WITH AUTO DIFFERENTIAL - Abnormal; Notable for the following components:       Result Value    RBC 3.61 (*)     Hemoglobin 10.7 (*)     Hematocrit 34.5 (*)     MCHC 30.9 (*)     RDW 16.4 (*)     Platelets 682 (*)     Seg Neutrophils 77 (*)     Lymphocytes 10 (*)     Absolute Lymph # 0.54 (*)     All other components within normal limits   BASIC METABOLIC PANEL - Abnormal; Notable for the following components:    Glucose 160 (*)     BUN 49 (*)     CREATININE 6.63 (*)     Chloride 97 (*)     GFR Non- 8 (*)     GFR  10 (*)     All other components within normal limits   TROPONIN - Abnormal; Notable for the following components:    Troponin, High Sensitivity 2. Atrial fibrillation, unspecified type ([facility]      Sepsis     DISPOSITION / PLAN   DISPOSITION Admitted 03/02/2020 07:04:48 PM          PATIENT REFERRED TO:  DO Alireza [address] [place] [address]  [phone]            DISCHARGE MEDICATIONS:  New Prescriptions    No medications on file       Dr. Jessica Reyes.  [address] [facility]  Emergency Medicine Resident Physician, PGY-2    (Please note that portions of this note were completed with a voice recognition program.  Efforts were made to edit the dictations but occasionally words are mis-transcribed.)         Brion Mcardle, DO  Resident  03/02/20 300 Mannie Merlos DO  Resident  03/02/20 0182

## 2020-03-03 PROBLEM — Z99.2 ESRD ON HEMODIALYSIS (HCC): Status: ACTIVE | Noted: 2020-01-01

## 2020-03-03 PROBLEM — N18.6 ESRD ON HEMODIALYSIS (HCC): Status: ACTIVE | Noted: 2020-01-01

## 2020-03-03 PROBLEM — S22.31XA FRACTURE OF RIB OF RIGHT SIDE: Status: ACTIVE | Noted: 2020-01-01

## 2020-03-03 NOTE — PROGRESS NOTES
Attempted Venous Doppler at 12:15 pm, pt using restroom. Attempted again at 12:45 pm, pt was eating and asked to come back later.

## 2020-03-03 NOTE — PROGRESS NOTES
Trough Level: 15-20 mcg/mL    Assessment/Plan:  Patient received vancomycin 2000 mg x 1 dose in ER at 2133 on 03/02/2020, further dosing per dialysis dosing . Timing of trough level will be determined based on culture results, renal function, and clinical response. Thank you for the consult. Will continue to follow.     Dre Austin, [address]       3/3/2020 at 2:15 AM

## 2020-03-03 NOTE — CONSULTS
[facility]  In Patient Cardiology Consult             Date:   3/3/2020  Patient name: Anyi Smith  Date of admission:  3/2/2020  3:59 PM  MRN:   [id number]  YOB: 1950    Reason for Admission: SOB, tachycardia at dialysis    CHIEF COMPLAINT:  SOB, Tachycardia at dialysis    History Obtained From: Chart and patient    HISTORY OF PRESENT ILLNESS:    This is a 76-year-old male with history as below who presents from dialysis. Apparently a few days back he had fallen. He hit his back. He was diagnosed with rib fractures. He was at dialysis. There he had a sudden onset of palpitations and shortness of breath. EMS diagnosed him with A. fib with RVR. He was given Cardizem. Overnight he was started on amnio boluses multiple times and then drips. He did not respond. He still currently in A. fib with RVR. He is on a heparin drip. He was seen today in the intensive care unit. Denies any shortness of breath, orthopnea, PND, lower extremity edema. Complains of rib pain.     Past Medical History:   has a past medical history of Acute combined systolic and diastolic congestive heart failure ([address] Anemia, BPH (benign prostatic hyperplasia), Cancer ([address] CKD (chronic kidney disease) stage 3, GFR 30-59 ml/min ([facility]), Constipation, COPD (chronic obstructive pulmonary disease) ([address] Depression, GERD (gastroesophageal reflux disease), Hemodialysis patient ([address] HTN (hypertension), Hx of blood clots, Hyperparathyroidism ([address] Hypothyroidism, IDDM (insulin dependent diabetes mellitus) ([address] Insomnia, Liver disease, MDRO (multiple drug resistant organisms) resistance, Memory deficit, Neurofibromatosis ([address] Osteoarthritis, Paraplegia ([address] Peptic ulcer, PVD (peripheral vascular disease) ([address] Secondary hyperparathyroidism ([address] Sinus disorder, Syncope, Type II or unspecified type diabetes mellitus without mention of complication, not stated as uncontrolled, and Venous 10 Units into the skin 2 times daily 3/4/19   Noe Zamora MD   lidocaine-prilocaine (EMLA) 2.5-2.5 % cream Apply topically three times a week Apply topically to arm/port three times weekly on Monday, Wednesday, and Friday for dialysis    Historical Provider, MD   lactulose (CHRONULAC) 10 GM/15ML solution Take 10 g by mouth daily    Historical Provider, MD   ondansetron (ZOFRAN) 4 MG tablet Take 4 mg by mouth every 6 hours as needed for Nausea or Vomiting    Historical Provider, MD   midodrine (PROAMATINE) 5 MG tablet Take 5 mg by mouth three times a week On Monday, Wednesday, and Friday for dialysis    Historical Provider, MD   ramelteon (ROZEREM) 8 MG tablet Take 8 mg by mouth nightly    Historical Provider, MD   diphenhydrAMINE (BENADRYL) 25 MG tablet Take 25 mg by mouth every 8 hours as needed for Itching     Historical Provider, MD   aspirin 81 MG chewable tablet Take 81 mg by mouth daily    Historical Provider, MD   sevelamer (RENVELA) 800 MG tablet Take 3 tablets by mouth 3 times daily (with meals)     Historical Provider, MD   senna (SENOKOT) 8.6 MG tablet Take 1 tablet by mouth 2 times daily    Historical Provider, MD   escitalopram (LEXAPRO) 10 MG tablet Take 10 mg by mouth every morning     Historical Provider, MD   isosorbide mononitrate (IMDUR) 60 MG CR tablet Take 60 mg by mouth daily    Historical Provider, MD   fluticasone (FLONASE) 50 MCG/ACT nasal spray 1 spray by Nasal route daily     Historical Provider, MD   finasteride (PROSCAR) 5 MG tablet Take 5 mg by mouth daily. Historical Provider, MD   omeprazole (PRILOSEC) 20 MG capsule Take 20 mg by mouth every morning     Historical Provider, MD   metoprolol tartrate (LOPRESSOR) 25 MG tablet Take 25 mg by mouth daily Indications: Hold for SBP less than 100 or HR less than 60     Historical Provider, MD   tamsulosin (FLOMAX) 0.4 MG capsule Take 0.4 mg by mouth daily.       Historical Provider, MD       Allergies:  Cymbalta [duloxetine hcl]; Tromethamine; and Toradol [ketorolac tromethamine]    Social History:   reports that he quit smoking about a year ago. His smoking use included cigarettes. He smoked 1.00 pack per day. He has never used smokeless tobacco. He reports that he does not drink alcohol or use drugs. Family History:   Negative for early CAD    REVIEW OF SYSTEMS:    · Constitutional: there has been no unanticipated weight loss. There's been No change in energy level, No change in activity level. · Eyes: No visual changes or diplopia. No scleral icterus. · ENT: No Headaches, hearing loss or vertigo. No mouth sores or sore throat. · Cardiovascular: As HPI  · Respiratory: As HPI  · Gastrointestinal: No abdominal pain, appetite loss, blood in stools. No change in bowel or bladder habits. · Genitourinary: No dysuria, trouble voiding, or hematuria. · Musculoskeletal:  No gait disturbance, No weakness or joint complaints. · Integumentary: No rash or pruritis. · Neurological: No headache, diplopia, change in muscle strength, numbness or tingling. No change in gait, balance, coordination, mood, affect, memory, mentation, behavior. · Psychiatric: No anxiety, or depression. · Endocrine: No temperature intolerance. No excessive thirst, fluid intake, or urination. No tremor. · Hematologic/Lymphatic: No abnormal bruising or bleeding, blood clots or swollen lymph nodes. · Allergic/Immunologic: No nasal congestion or hives. PHYSICAL EXAM:    Physical Examination:    BP (!) 141/103   Pulse 104   Temp 97.8 °F (36.6 °C) (Oral)   Resp 19   Ht 6' (1.829 m)   Wt 280 lb (127 kg)   SpO2 96%   BMI 37.97 kg/m²    Constitutional and General Appearance: alert, cooperative, no distress and appears stated age  HEENT: PERRL, no cervical lymphadenopathy. No masses palpable. Normal oral mucosa  Respiratory:  · Normal excursion and expansion without use of accessory muscles  · Resp Auscultation: Good respiratory effort.  No for increased work of function. Mild to moderate mitral  regurgitation. Mild tricuspid regurgitation. Estimated right ventricular systolic pressure  is 33 mmHg. IVC Increased diameter, but still has inspiratory variation suggesting upper  normal or mildly elevated RA filling pressure (i.e. CVP) . No significant pericardial effusion is seen. Labs:     CBC:   Recent Labs     03/02/20  1610 03/03/20  0451   WBC 5.4 3.9   HGB 10.7* 9.8*   HCT 34.5* 31.1*   * 105*     BMP:   Recent Labs     03/02/20  1610 03/03/20  0451    136   K 4.2 4.5   CO2 25 23   BUN 49* 58*   CREATININE 6.63* 7.31*   LABGLOM 8* 7*   GLUCOSE 160* 134*     BNP: No results for input(s): BNP in the last 72 hours. PT/INR:   Recent Labs     03/02/20  1801   PROTIME 13.6   INR 1.1     APTT:  Recent Labs     03/02/20  1801 03/03/20  0154   APTT 37.9* 83.5*     CARDIAC ENZYMES:  Recent Labs     03/02/20  1610 03/02/20  1815   TROPHS 450* 436*     FASTING LIPID PANEL:  Lab Results   Component Value Date    HDL 37 06/18/2019    LDLDIRECT 55 03/23/2012    TRIG 71 06/18/2019     LIVER PROFILE:No results for input(s): AST, ALT, LABALBU in the last 72 hours.       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

## 2020-03-03 NOTE — CONSULTS
never used smokeless tobacco. He reports that he does not drink alcohol or use drugs. Review of Systems:  General negative  Denies any fever or chills  HEENT negative  Denies any diplopia, tinnitus or vertigo  Resp negative  Denies any shortness of breath, cough or wheezing  Cardiac positive for  dyspnea  Denies any chest pain, palpitations, claudication or edema  GI Normal  Denies any melena, hematochezia, hematemesis or pyrosis   positive for hesitancy  Denies any frequency, urgency, hesitancy or incontinence  Heme negative  Denies bruising or bleeding easily  Endocrine positive for diabetic symptoms including none and blurry vision, Denies any history of diabetes or thyroid disease  Neuro negative  Denies any focal motor or sensory deficits    OBJECTIVE:   VITALS:  height is 6' (1.829 m) and weight is 280 lb (127 kg). His oral temperature is 97.8 °F (36.6 °C). His blood pressure is 141/103 (abnormal) and his pulse is 104. His respiration is 19 and oxygen saturation is 96%. CONSTITUTIONAL: awake, alert, cooperative, no apparent distress, and appears stated age and Alert and oriented times 3, no acute distress and cooperative to examination with proper mood and affect. SKIN: Skin color, texture, turgor normal. No rashes or lesions. , negatives: no evidence of bleeding or bruising  LYMPH: no cervical nodes, no supraclavicular nodes, no inguinal nodes  HEENT: Normal, Head is normocephalic, atraumatic. EOMI, PERRLA  NECK: supple, symmetrical, trachea midline  CHEST/LUNGS: chest symmetric with normal A/P diameter, normal respiratory rate and rhythm, lungs auscultation does reveal decreased breath sounds on the right base and also has wheezing bilaterally on auscultation no crepitations are heard. No accessory muscle use. She does have tenderness confined to the back ribs on the right side and also has some tenderness on the left side.   There is no evidence of any crepitus that could be felt at the present time.  CARDIOVASCULAR: Heart sounds are normal.  Regular rate and rhythm without murmur, gallop or rub. Heart regular rate and rhythm Normal S1 and S2.  Regular rhythm. No murmurs, gallops, or rubs. and Normal S1 and S2. . Carotid and femoral pulses 2+/4 and equal bilaterally  ABDOMEN: obese and Normal shape. Princess Pih epigastric, No and Laparoscopic scar(s) present. Normal bowel sounds. No bruits. Abnormal bowel sounds: diminished  soft, nontender, nondistended, no masses or organomegaly  Soft, nondistended, no masses or organomegaly. diastasis of rectus, no evidence of hernia. Percussion: Normal without hepatosplenomegally. Abnormal percussion: tympanitic Tenderness: absent  RECTAL: deferred, not clinically indicated, declined by patient  NEUROLOGIC: Awake, alert, oriented to name, place and time. Cranial nerves II-XII are grossly intact. Motor is 5 out of 5 bilaterally. Cerebellar finger to nose, heel to shin intact. Sensory is intact. Babinski down going, Romberg negative, and gait is normal.  There are no focalizing motor or sensory deficits. CN II-XII are grossly intact.   EXTREMITIES: no cyanosis, no clubbing, no edema and foot exam- normal color and temperature, no large calluses, ulcers or wounds    LABS:   CBC with Differential:    Lab Results   Component Value Date    WBC 3.9 03/03/2020    RBC 3.31 03/03/2020    RBC 4.53 04/24/2012    HGB 9.8 03/03/2020    HCT 31.1 03/03/2020     03/03/2020     04/24/2012    MCV 94.0 03/03/2020    MCH 29.5 03/03/2020    MCHC 31.4 03/03/2020    RDW 16.5 03/03/2020    METASPCT 1 01/02/2020    LYMPHOPCT 10 03/02/2020    MONOPCT 7 03/02/2020    MYELOPCT 1 01/02/2020    BASOPCT 2 03/02/2020    MONOSABS 0.38 03/02/2020    LYMPHSABS 0.54 03/02/2020    EOSABS 0.05 03/02/2020    BASOSABS 0.11 03/02/2020    DIFFTYPE NOT REPORTED 03/02/2020     BMP:    Lab Results   Component Value Date     03/03/2020    K 4.5 03/03/2020    CL 98 03/03/2020    CO2 23 03/03/2020 r/o bleed Acuity: Unknown Type of Exam: Unknown FINDINGS: Lower Chest: See separate report for CT chest. Organs: Liver is normal.  Enlarged spleen. Respiratory motion. High density lesion in the left kidney has enlarged from prior exam and now measures 6.8 x 6.2 cm and previously measured 5.5 x 6.1 cm. Absent right kidney. GI/Bowel: No dilated bowel. No associated inflammatory changes. Stool throughout the colon. Pelvis: Bladder is collapsed. Borderline prostatic enlargement. Peritoneum/Retroperitoneum: No enlarged lymph nodes. Atherosclerotic changes of the aorta. Normal caliber aorta. Bones/Soft Tissues: Left inguinal lymphadenopathy, slightly increased from prior exam, the largest lymph node measures 2.0 x 1.8 cm. Body wall edema. Paraspinal muscle atrophy and gluteal muscle atrophy. Acute right 6th and 7th rib fractures as well as old fractures described previously. Minimally displaced right 6th and 7th rib fractures. Old rib fractures elsewhere as before. Continued enlargement of hyperdense left renal mass most likely neoplasm. Increasing left inguinal lymphadenopathy of uncertain significance. Ct Head Wo Contrast    Result Date: 3/2/2020  EXAMINATION: CT OF THE HEAD WITHOUT CONTRAST  3/2/2020 6:35 pm TECHNIQUE: CT of the head was performed without the administration of intravenous contrast. Dose modulation, iterative reconstruction, and/or weight based adjustment of the mA/kV was utilized to reduce the radiation dose to as low as reasonably achievable. COMPARISON: February 27, 2020 HISTORY: ORDERING SYSTEM PROVIDED HISTORY: [facility] TECHNOLOGIST PROVIDED HISTORY: [facility] Reason for Exam: pt became altered mental status at Dialysis today FINDINGS: BRAIN/VENTRICLES: There is no acute intracranial hemorrhage, mass effect or midline shift. No abnormal extra-axial fluid collection. The gray-white differentiation is maintained without evidence of an acute infarct. There is no evidence of hydrocephalus.

## 2020-03-03 NOTE — CONSULTS
baseline short of breath, not much cough or wheezing. GASTROINTESTINAL:  No nausea or vomiting. Denies dysphagia. No  diarrhea. GENITOURINARY:  He is on hemodialysis, still makes little urine. MUSCULOSKELETAL:  Noted some right-sided chest and back pain after the  fall with broken ribs. PSYCHIATRIC:  Noted some anxiety and depression. SKIN:  Noted excoriation and redness per staff. PAST MEDICAL HISTORY:  Significant for diabetes, had hypertension,  peripheral vascular disease, had hypothyroidism, had history of  hypertension, had COPD with chronic bronchitis, end-stage renal disease  on hemodialysis, had chronic anemia and thrombocytopenia, combined  systolic and diastolic heart failure. PAST SURGICAL HISTORY:  Had TURP in the past and had total right  nephrectomy as a donor, and he had thyroid surgery, and skin cancer  excision, and had bilateral cataracts, right hip surgery, had fistula in  the left upper extremity, colonoscopy and cholecystectomy. FAMILY HISTORY:  None reported. SOCIAL HISTORY:  He smoked a pack a day for 50 years, quit six months  ago. Denies any current alcohol or drug abuse. ALLERGIES:  CYMBALTA and TORADOL. MEDICATIONS:  Before admission, he was on Symbicort 80 and he said he  uses oxygen as needed. The rest of his medication and current  medication noted. PHYSICAL EXAMINATION:  VITAL SIGNS:  As noted with the hypotension and last blood pressure is  141/103, pulse 104, respiratory rate 19, temperature is 97.8, saturation  96% on 4 liter O2. HEENT:  No icterus noted. NECK:  No JVD or lymphadenopathy appreciated. HEART:  S1, S2 irregular. No gallop. LUNGS:  Coarse, decreased sounds right base. No wheezing. Mild  distress. ABDOMEN:  Soft. No guarding. Bowel sounds present. EXTREMITIES:  No edema. Had stiffness left calf. SKIN:  Had stasis and redness both legs, probably chronic. NEUROLOGIC:  He is awake, alert, following commands.     IMAGING DATA:  His chest x-ray showed cardiomegaly, prominent  interstitial markings, elevated right diaphragm. His CT chest reviewed  that showed small pleural effusion, had a 3.3 cm pleural-based density  at the lingular level, had multiple rib fractures and had possible  atelectasis in the right base, doubt pneumonia. His CT of the abdomen  showed old rib fracture and minimally displaced right 6th and 7th rib  fracture, had a left renal mass suspicious for neoplasm. LABORATORY DATA:  His lab work reviewed, significant for BUN of 58,  creatinine 7.31. His proBNP was 3452. White count was 3.9, hemoglobin  9.8 and platelets 500. His ABGs showed pH 7.36, pCO2 of 47, and pO2 of  67. ASSESSMENT:  1. Acute-on-chronic respiratory failure with hypoxia, was on p.r.n. O2  at the [facility]. 2.  Hypotension, mostly with rapid AFib, better now. 3.  Bilateral pleural effusions and hypervolemia. 4.  _____ atelectasis right base. 5.  Lingular pleural-based density a little bit over 3-cm and needs  follow up. 6.  COPD with chronic bronchitis. 7.  OHS and possible LISANDRO, had never done a sleep study even though it  was suggested to him many times in the past.  8.  History of tobacco abuse 1 pack per day for 50 years, quit 6 months  ago. 9.  Multiple rib fractures with recurrent falls. 10.  Rapid AFib, history of hypertension and combined CHF. 11.  End-stage renal disease, on hemodialysis. A left renal mass was  found on CT, Urology being consulted. 12.  Chronic anemia and thrombocytopenia. 13.  Diabetes/hypothyroidism. PLAN OF TREATMENT:  We are going to continue with O2. We will add BiPAP  to use p.r.n. during sleep. Followup chest x-ray and he is on heparin  drip and amiodarone as per Cardiology and he needs followup on his  pleural-based density, probably repeat CT in six months.   I will add  Xopenex as needed, continue with the Symbicort and we will add BiPAP as  noted and analgesia for his right-sided chest pain and we will follow up  on the echo report. Hemodialysis as per renal and followup lab. Discussed in length with the staff. CC time 35 minutes. We do appreciate the consultation and we will follow.         Tang Sanders    D: 03/03/2020 9:46:26       T: 03/03/2020 9:52:21     [email or web address]  Job#: [id number]     Doc#: [id number]    CC:

## 2020-03-03 NOTE — CONSULTS
Reason for Consult:  End stage renal disease. Requesting Physician:  Edy Jenkins MD    HISTORY OF PRESENT ILLNESS:    The patient is a 78 y.o. male who normally undergoes dialysis at [facility] dialysis on MWF under our care admitted with rib pain after falling at home a week ago. Upon admission he was noted to have a potassium level of 4.2. He had a partial hemodialysis treatment on Monday. Review Of Systems:   Constitutional: No fever, chills, lethargy, weakness or wt loss. HEENT:  No headache, nasal discharge or sore throat. Cardiac:  No chest pain, dyspnea, orthopnea or PND. Chest:   No cough, phlegm or wheezing. Abdomen:  No abdominal pain, nausea, vomiting or diarrhea. Neuro:   No gross focal weakness, numbness, abnormal movements or seizure like activity. Skin:   No rashes or itching. :   No hematuria, pyuria, dysuria or flank pain. Extremities:  No swelling or joint pains. Endocrine: No polyuria, polydypsia, or thyroid problems. Hematology:    No bleeding disorders or bruising. All other ROS is negative. Past Medical History:   Diagnosis Date    Acute combined systolic and diastolic congestive heart failure ([address] 11/25/2016    Anemia     BPH (benign prostatic hyperplasia)     Cancer (HCC)     left ear 8/2013    CKD (chronic kidney disease) stage 3, GFR 30-59 ml/min (HCC)     Constipation     COPD (chronic obstructive pulmonary disease) (HCC)     Depression     GERD (gastroesophageal reflux disease)     Hemodialysis patient ([address]     3 times a week    HTN (hypertension)     Hx of blood clots     \" rt.shoulder/neck\"    Hyperparathyroidism ([address]     Hypothyroidism     IDDM (insulin dependent diabetes mellitus) ([address]     Insomnia     Liver disease     patient is unsure what it is    MDRO (multiple drug resistant organisms) resistance     hx. c-dif.     Memory deficit     Neurofibromatosis ([address]     Osteoarthritis     Paraplegia ([address]     chair to bed and chair transfer only    Peptic ulcer     [facility]per[facility]     Secondary hyperparathyroidism [facility]     Sinus disorder     Syncope     Type II or unspecified type diabetes mellitus without mention of complication, not stated as uncontrolled     Venous thrombosis        Past Surgical History:   Procedure Laterality Date    CHOLECYSTECTOMY      COLONOSCOPY  08/22/2011    2 POLYPS REMOBED TUBULAR ADENOMA    DIALYSIS FISTULA CREATION Left 06/13/2016    LEFT WRIST, no longer functional    DIALYSIS FISTULA CREATION Left     antecubital, was revised one time    HIP SURGERY Right     deep laceration was repaired    INTRACAPSULAR CATARACT EXTRACTION Right 5/7/2019    EYE CATARACT EMULSIFICATION IOL IMPLANT performed by Varinder Mckeon MD at [address] [place] [address] Left 5/28/2019    EYE CATARACT EMULSIFICATION IOL IMPLANT performed by Varinder Mckeon MD at [address] [place] [address]. Left     ear.  SKIN GRAFT      right axilla    THYROID SURGERY      non-cancerous lump removed    TOTAL NEPHRECTOMY Right     as a donor    TUNNELED VENOUS CATHETER PLACEMENT Right 09/03/2016    later removed    TURP         Prior to Admission medications    Medication Sig Start Date End Date Taking? Authorizing Provider   fluticasone-vilanterol (BREO ELLIPTA) 100-25 MCG/INH AEPB inhaler Inhale 1 puff into the lungs daily    Historical Provider, MD   doxycycline hyclate (VIBRAMYCIN) 100 MG capsule Take 100 mg by mouth 2 times daily For 10 days starting 2/28/20 2/28/20 3/9/20  Historical Provider, MD   fentaNYL (DURAGESIC) 25 MCG/HR Place 1 patch onto the skin every 72 hours.     Historical Provider, MD   glucagon 1 MG injection Inject 1 kit into the muscle as needed (hypoglycemia BG <60)    Historical Provider, MD   glucose (GLUTOSE) 40 % GEL Take 15 g by mouth as needed (blood sugar <60)    Historical Provider, MD   loratadine (CLARITIN) 10 MG tablet Take 10 mg by mouth daily Historical Provider, MD   nitroGLYCERIN (NITROSTAT) 0.4 MG SL tablet Place 0.4 mg under the tongue every 5 minutes as needed for Chest pain up to max of 3 total doses. If no relief after 1 dose, call 911. Historical Provider, MD   oxyCODONE HCl (OXY-IR) 10 MG immediate release tablet Take 10 mg by mouth every 6 hours as needed for Pain. 2/27/20 3/4/20  Historical Provider, MD   Dextromethorphan-guaiFENesin  MG/5ML SYRP Take 5 mLs by mouth every 6 hours as needed for Cough    Historical Provider, MD   ALPRAZolam (XANAX) 0.5 MG tablet Take 0.5 mg by mouth 2 times daily.     Historical Provider, MD   insulin glargine (LANTUS) 100 UNIT/ML injection vial Inject 10 Units into the skin 2 times daily 3/4/19   Jerald Rivers MD   lidocaine-prilocaine (EMLA) 2.5-2.5 % cream Apply topically three times a week Apply topically to arm/port three times weekly on Monday, Wednesday, and Friday for dialysis    Historical Provider, MD   lactulose (CHRONULAC) 10 GM/15ML solution Take 10 g by mouth daily    Historical Provider, MD   ondansetron (ZOFRAN) 4 MG tablet Take 4 mg by mouth every 6 hours as needed for Nausea or Vomiting    Historical Provider, MD   midodrine (PROAMATINE) 5 MG tablet Take 5 mg by mouth three times a week On Monday, Wednesday, and Friday for dialysis    Historical Provider, MD   ramelteon (ROZEREM) 8 MG tablet Take 8 mg by mouth nightly    Historical Provider, MD   diphenhydrAMINE (BENADRYL) 25 MG tablet Take 25 mg by mouth every 8 hours as needed for Itching     Historical Provider, MD   aspirin 81 MG chewable tablet Take 81 mg by mouth daily    Historical Provider, MD   sevelamer (RENVELA) 800 MG tablet Take 3 tablets by mouth 3 times daily (with meals)     Historical Provider, MD   senna (SENOKOT) 8.6 MG tablet Take 1 tablet by mouth 2 times daily    Historical Provider, MD   escitalopram (LEXAPRO) 10 MG tablet Take 10 mg by mouth every morning     Historical Provider, MD   isosorbide mononitrate (IMDUR) 60 MG CR tablet Take 60 mg by mouth daily    Historical Provider, MD   fluticasone (FLONASE) 50 MCG/ACT nasal spray 1 spray by Nasal route daily     Historical Provider, MD   finasteride (PROSCAR) 5 MG tablet Take 5 mg by mouth daily. Historical Provider, MD   omeprazole (PRILOSEC) 20 MG capsule Take 20 mg by mouth every morning     Historical Provider, MD   metoprolol tartrate (LOPRESSOR) 25 MG tablet Take 25 mg by mouth daily Indications: Hold for SBP less than 100 or HR less than 60     Historical Provider, MD   tamsulosin (FLOMAX) 0.4 MG capsule Take 0.4 mg by mouth daily.       Historical Provider, MD       Scheduled Meds:   miconazole   Topical BID    ALPRAZolam  0.5 mg Oral BID    escitalopram  10 mg Oral QAM    fluticasone  1 spray Nasal Daily    budesonide-formoterol  2 puff Inhalation BID    finasteride  5 mg Oral Daily    [START ON 3/5/2020] fentaNYL  1 patch Transdermal Q72H    aspirin  81 mg Oral Daily    insulin glargine  10 Units Subcutaneous BID    insulin lispro  0-12 Units Subcutaneous TID WC    insulin lispro  0-6 Units Subcutaneous Nightly    isosorbide mononitrate  60 mg Oral Daily    lactulose  10 g Oral Daily    cetirizine  5 mg Oral Daily    midodrine  5 mg Oral Once per day on Mon Wed Fri    tamsulosin  0.4 mg Oral Daily    sevelamer  2,400 mg Oral TID WC    senna  1 tablet Oral BID    pantoprazole  40 mg Oral QAM AC    dilTIAZem  10 mg Intravenous Once    lidocaine  1 patch Transdermal Daily    fentanNYL  50 mcg Intravenous Once    sodium chloride flush  10 mL Intravenous 2 times per day    Amiodarone HCl in Dextrose  300 mg Intravenous Once     Continuous Infusions:   dextrose      dilTIAZem (CARDIZEM) 125 mg in dextrose 5% 125 mL infusion      heparin (porcine) Stopped (03/03/20 4475)     PRN Meds:diphenhydrAMINE, glucose, dextrose, glucagon (rDNA), dextrose, lidocaine-prilocaine, oxyCODONE HCl, nitroGLYCERIN, ondansetron, levalbuterol, metoprolol, Electronically signed by Brandi Edge MD  on 3/3/2020 at 1:19 PM  [facility].   Ph: [phone]

## 2020-03-03 NOTE — PROGRESS NOTES
Patient seen and examined in ICU. The patient is a 78year old  male, with a history of combined CHF, ESRD on hemodialysis, neurofibromatosis, squamous cell carcinoma, COPD, and DM type 2, who presents from dialysis with shortness of breath and tachycardia. According to  Patient, he fell on 2/25/2020, while transferring from bed to wheelchair, landing flat on his back and hitting his head. Reports that he developed a headache and right sided chest/rib pain immediately after falling. States that within a few hours of falling, pain spread across upper abdomen to left chest wall/rib area. Patient was evaluated in the ED 2 days later and diagnosed with nondisplaced fractures of right 4th, 5th, 6th and 7th rib; discharged back to [facility] from ED. Patient reports that he continues to have pain across lower chest.  States that earlier today, dialysis staff ended his treatment early and sent him to the ED per squad. ED eval  revealed A. Fib with RVR, hypoxia, and code sepsis. At this time, home medications reconciled and additional admission orders entered.

## 2020-03-03 NOTE — CONSULTS
History Narrative    Not on file       Family History:    Family History   Family history unknown: Yes     Previous Urologic Family history: none    REVIEW OF SYSTEMS:  Constitutional: negative  Eyes: negative  Respiratory: negative  Cardiovascular: negative  Gastrointestinal: negative  Genitourinary: see HPI  Musculoskeletal: negative  Skin: negative   Neurological: negative  Hematological/Lymphatic: negative  Psychological: negative    Physical Exam:    This a 78 y.o. male   Patient Vitals for the past 24 hrs:   BP Temp Temp src Pulse Resp SpO2   03/03/20 1715 137/67 -- -- 122 17 95 %   03/03/20 1700 (!) 113/51 -- -- 121 16 91 %   03/03/20 1645 123/88 -- -- 121 17 95 %   03/03/20 1630 (!) 100/57 -- -- 121 17 93 %   03/03/20 1615 110/68 -- -- 122 16 93 %   03/03/20 1606 -- -- -- -- 18 96 %   03/03/20 1600 (!) 150/51 99.3 °F (37.4 °C) Oral 119 18 93 %   03/03/20 1545 (!) 129/93 -- -- 121 17 96 %   03/03/20 1530 123/61 -- -- 121 19 96 %   03/03/20 1515 (!) 142/71 -- -- 121 21 90 %   03/03/20 1505 -- -- -- 120 20 95 %   03/03/20 1500 135/60 -- -- 120 18 96 %   03/03/20 1455 -- -- -- 121 19 93 %   03/03/20 1445 (!) 141/118 -- -- 119 21 98 %   03/03/20 1430 133/70 -- -- 119 17 --   03/03/20 1410 -- -- -- 117 23 95 %   03/03/20 1407 (!) 159/92 -- -- 117 21 95 %   03/03/20 1400 (!) 138/94 -- -- 117 22 96 %   03/03/20 1356 130/82 -- -- 119 16 96 %   03/03/20 1300 (!) 128/48 -- -- 117 18 96 %   03/03/20 1245 109/71 -- -- 117 18 90 %   03/03/20 1230 (!) 146/92 -- -- 117 17 92 %   03/03/20 1215 108/70 -- -- 117 15 95 %   03/03/20 1200 (!) 143/63 98.3 °F (36.8 °C) Oral 116 21 (!) 88 %   03/03/20 1152 -- -- -- -- 18 96 %   03/03/20 1145 120/61 -- -- 105 15 97 %   03/03/20 1130 (!) 132/96 -- -- 104 20 90 %   03/03/20 1115 106/65 -- -- 107 19 96 %   03/03/20 1100 116/66 -- -- 108 15 95 %   03/03/20 1045 (!) 95/54 -- -- 101 15 95 %   03/03/20 1030 (!) 110/59 -- -- 106 16 96 %   03/03/20 1015 (!) 129/54 -- -- 103 18 95 % 03/03/20 1000 124/69 -- -- 105 15 93 %   03/03/20 0945 (!) 145/72 -- -- 108 15 97 %   03/03/20 0930 (!) 148/75 -- -- 105 16 98 %   03/03/20 0915 137/64 -- -- 102 15 99 %   03/03/20 0900 (!) 153/94 -- -- 103 15 90 %   03/03/20 0845 (!) 144/67 -- -- 100 12 90 %   03/03/20 0830 (!) 166/124 -- -- 104 16 (!) 83 %   03/03/20 0815 -- -- -- 103 18 (!) 85 %   03/03/20 0800 (!) 141/103 97.8 °F (36.6 °C) Oral 104 19 96 %   03/03/20 0745 119/76 -- -- 103 16 95 %   03/03/20 0730 120/81 -- -- 105 15 97 %   03/03/20 0715 122/67 -- -- 100 18 95 %   03/03/20 0700 (!) 98/58 -- -- 103 18 95 %   03/03/20 0645 (!) 99/59 -- -- 100 16 96 %   03/03/20 0630 (!) 105/56 -- -- 104 13 97 %   03/03/20 0530 (!) 111/59 -- -- 104 15 98 %   03/03/20 0500 121/61 -- -- 109 14 98 %   03/03/20 0430 (!) 112/59 -- -- 104 15 98 %   03/03/20 0415 (!) 119/51 -- -- 105 16 98 %   03/03/20 0345 105/71 -- -- 109 15 100 %   03/03/20 0330 (!) 89/50 -- -- 105 16 100 %   03/03/20 0315 (!) 101/55 -- -- 106 14 100 %   03/03/20 0300 (!) 127/92 97 °F (36.1 °C) -- 102 16 100 %   03/03/20 0245 100/61 -- -- 111 14 100 %   03/03/20 0230 (!) 106/55 -- -- 106 23 100 %   03/03/20 0215 (!) 97/48 -- -- 101 16 100 %   03/03/20 0130 (!) 96/50 -- -- 107 17 100 %   03/03/20 0115 90/65 -- -- 108 17 100 %   03/03/20 0100 (!) 115/59 -- -- 110 17 100 %   03/03/20 0045 (!) 155/134 -- -- 110 17 100 %   03/03/20 0030 105/64 -- -- 110 15 100 %   03/03/20 0015 (!) 133/57 98 °F (36.7 °C) Oral 113 17 100 %   03/03/20 0000 113/64 -- -- 110 18 100 %   03/02/20 2345 (!) 97/58 -- -- 109 16 100 %   03/02/20 2315 (!) 92/49 -- -- 108 17 100 %   03/02/20 2300 (!) 109/55 -- -- 114 17 100 %   03/02/20 2245 115/67 -- -- 113 22 100 %   03/02/20 2230 (!) 90/49 -- -- 114 17 100 %   03/02/20 2215 105/68 -- -- 120 18 100 %   03/02/20 2200 119/62 -- -- 119 18 100 %   03/02/20 2145 131/87 -- -- 123 23 100 %   03/02/20 2130 (!) 116/96 97.8 °F (36.6 °C) Oral 118 20 99 %   03/02/20 2030 -- -- -- 128 22 -- -----------------------------------------------------------------  Imaging Results:    CT images reviewed. No right kidney noted, left kidney shows likely complex cyst.     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    Cellulitis of scrotum    Anemia, chronic renal failure    Thrombocytopenia (HCC)    Chest pain    Left flank pain    ESRD (end stage renal disease) ([address]    Venous stasis dermatitis of both lower extremities    Dizziness    Hypocalcemia    Acute combined systolic and diastolic congestive heart failure (HCC)    Respiratory distress    Acute on chronic respiratory failure with hypoxia (HCC)    Fluid overload    Rib pain on left side    Hypoglycemia    CRF (chronic renal failure), stage 5 (HCC)    Chronic renal failure, stage 3 (moderate) (HCC)    Hyperuricemia    Altered mental status    Atrial fibrillation ([address]    ESRD on hemodialysis ([address]    Fracture of rib of right side    Hypoxia       Plan:     78year-old male with ESRD admitted after a fall. CT shows a large left renal cyst, which was seen on ultrasound in 2016. It was deemed simple at that time. He cannot tolerate an MRI.   Will obtain a renal ultrasound to better evaluate the lesion, which is likely a cyst.         Cecil Roper  5:31 PM 3/3/2020

## 2020-03-03 NOTE — DISCHARGE INSTR - COC
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 C44.229    Cellulitis of scrotum L03.90    Anemia, chronic renal failure N18.9, D63.1    Thrombocytopenia ([facility]) D69.6    Chest pain R07.9    Left flank pain R10.9    ESRD (end stage renal disease) ([facility]) N18.6    Venous stasis dermatitis of both lower extremities I87.2    Dizziness R42    Hypocalcemia E83.51    Acute combined systolic and diastolic congestive heart failure ([facility]) I50.41    Respiratory distress R06.03    Acute on chronic respiratory failure with hypoxia ([facility]) J96.21    Fluid overload E87.70    Rib pain on left side R07.81    Hypoglycemia E16.2    CRF (chronic renal failure), stage 5 ([facility]) N18.5    Chronic renal failure, stage 3 (moderate) ([facility]) N18.3    Hyperuricemia E79.0    Altered mental status R41.82    Atrial fibrillation ([facility]) I48.91       Isolation/Infection:   Isolation          No Isolation        Patient Infection Status     Infection Onset Added Last Indicated Last Indicated By Review Planned Expiration Resolved Resolved By    [facility]  06/10/16 06/10/16 Alfredo Callaway RN        [facility]          Nurse Assessment:  Last Vital Signs: BP (!) 97/56   Pulse 137   Temp 98.6 °F (37 °C) (Oral)   Resp 21   Ht 6' (1.829 m)   Wt 280 lb (127 kg)   SpO2 96%   BMI 37.97 kg/m²     Last documented pain score (0-10 scale):    Last Weight:   Wt Readings from Last 1 Encounters:   03/02/20 280 lb (127 kg)     Mental Status:  oriented, alert and coherent    IV Access:  - None    Nursing Mobility/ADLs:  Walking Dependent  Transfer  Dependent  Bathing  Assisted  Dressing  Assisted  Toileting  Dependent  Feeding  Independent  Med Admin  Independent  Med Delivery   whole    Wound Care Documentation and Therapy:  Wound 01/03/20 Axilla Anterior;Proximal;Right;Upper redness tear ertythema (Active)   Number of days: 59       Wound 01/03/20 Axilla Anterior;Left;Proximal;Upper redness erythema (Active)   Number of days: 59       Wound 01/03/20 Scrotum redness (Active)   Number of days: 59       Wound 01/03/20 Buttocks (Active)   Number of days: 59        Elimination:  Continence:   · Bowel: Yes  · Bladder: Yes  Urinary Catheter: None   Colostomy/Ileostomy/Ileal Conduit: No       Date of Last BM: 3/6/2020  No intake or output data in the 24 hours ending 03/02/20 2004  No intake/output data recorded. Safety Concerns:     History of Falls (last 30 days) and At Risk for Falls    Impairments/Disabilities:      Vision, Hearing and immobility    Nutrition Therapy:  Current Nutrition Therapy:   - Oral Diet:  Renal    Routes of Feeding: Oral  Liquids: No Restrictions  Daily Fluid Restriction: yes - amount 1000 mL  Last Modified Barium Swallow with Video (Video Swallowing Test): not done    Treatments at the Time of Hospital Discharge:   Respiratory Treatments: See [facility]  Oxygen Therapy:  is on oxygen at 4 L/min per nasal cannula.   Ventilator:    Bi-PAP    Rehab Therapies: Physical Therapy and Occupational Therapy  Weight Bearing Status/Restrictions: Non-weight bearing on right leg  Other Medical Equipment (for information only, NOT a DME order):  wheelchair  Other Treatments: skilled nursing assessment and monitoring  Medication education    Patient's personal belongings (please select all that are sent with patient):  None    RN SIGNATURE:  Electronically signed by Prema Salazar RN on 3/7/20 at 2:38 PM EST    CASE MANAGEMENT/SOCIAL WORK SECTION    Inpatient Status Date: 3/02/20    Readmission Risk Assessment Score:  Readmission Risk Risk of Unplanned Readmission:        0           Discharging [facility] Facility/ Agency   [facility] [address]   Phone [phone]  · Fax [phone]   · Address:  · Phone:  · Fax:    Dialysis Facility (if applicable)   · Name:  · Address:  · Dialysis Schedule:  · Phone:  · Fax:    / signature: Electronically signed by Erika Browning RN on 3/2/20 at 8:04 PM    PHYSICIAN SECTION    Prognosis: Fair    Condition at Discharge: Stable    Rehab Potential (if transferring to Rehab): Fair    Recommended Labs or Other Treatments After Discharge: ***    Physician Certification: I certify the above information and transfer of Anastasiia Cervantes  is necessary for the continuing treatment of the diagnosis listed and that he requires [facility] for greater 30 days.      Update Admission H&P: No change in H&P    PHYSICIAN SIGNATURE:  Electronically signed by Gilberto Serna MD on 3/6/20 at 6:09 PM

## 2020-03-03 NOTE — PLAN OF CARE
Problem: Falls - Risk of:  Goal: Will remain free from falls  Description  Will remain free from falls  Outcome: Met This Shift     Problem: Tissue Perfusion - Cardiopulmonary, Altered:  Goal: Hemodynamic stability will improve  Description  Hemodynamic stability will improve  Outcome: Met This Shift     Problem: Risk for Impaired Skin Integrity  Goal: Tissue integrity - skin and mucous membranes  Description  Structural intactness and normal physiological function of skin and  mucous membranes.   Outcome: Ongoing  Note:   Pt's lower legs with purplish discoloration which pt states is not new for him, perirectal area very reddened, pt very reluctant to reposition in bed     Problem: Fluid Volume - Imbalance:  Goal: Absence of imbalanced fluid volume signs and symptoms  Description  Absence of imbalanced fluid volume signs and symptoms  Outcome: Ongoing  Note:   Pt received dialysis yesterday before admission, pt does not void     Problem: Gas Exchange - Impaired:  Goal: Levels of oxygenation will improve  Description  Levels of oxygenation will improve  Outcome: Ongoing  Note:   CXR ordered for this AM, continuous pulse ox monitored and stable, CT chest results noted     Problem: Pain:  Goal: Pain level will decrease  Description  Pain level will decrease  Outcome: Ongoing  Note:   Pt received Fentanyl once this shift, c/o pain with position changes late in shift     Problem: Tissue Perfusion - Cardiopulmonary, Altered:  Goal: Absence of angina  Description  Absence of angina  Outcome: Ongoing  Note:   Heparin gtt continues per protocol, med adjusted per protocol

## 2020-03-03 NOTE — H&P
denies any chest pain, shortness of breath. He denies any sputum production, fever chills. His main complaint is pain in his right side over his ribs. He denies bowel problems. He states that he currently receives dialysis on Monday, Wednesday, Thursday, Friday for 3 hours each day. He states that he does not produce urine. Past Medical History:     Past Medical History:   Diagnosis Date    Acute combined systolic and diastolic congestive heart failure ([address] 11/25/2016    Anemia     BPH (benign prostatic hyperplasia)     Cancer (HCC)     left ear 8/2013    CKD (chronic kidney disease) stage 3, GFR 30-59 ml/min (HCC)     Constipation     COPD (chronic obstructive pulmonary disease) (HCC)     Depression     GERD (gastroesophageal reflux disease)     Hemodialysis patient ([facility] [address]     3 times a week    HTN (hypertension)     Hx of blood clots     \" rt.shoulder/neck\"    Hyperparathyroidism ([address]     Hypothyroidism     IDDM (insulin dependent diabetes mellitus) ([address]     Insomnia     Liver disease     patient is unsure what it is    MDRO (multiple drug resistant organisms) resistance     hx. c-dif.     Memory deficit     Neurofibromatosis ([address]     Osteoarthritis     Paraplegia ([address]     chair to bed and chair transfer only    Peptic ulcer     PVD (peripheral vascular disease) ([address]     Secondary hyperparathyroidism ([address]     Sinus disorder     Syncope     Type II or unspecified type diabetes mellitus without mention of complication, not stated as uncontrolled     Venous thrombosis         Past SurgicalHistory:     Past Surgical History:   Procedure Laterality Date    CHOLECYSTECTOMY      COLONOSCOPY  08/22/2011    2 POLYPS REMOBED TUBULAR ADENOMA    DIALYSIS FISTULA CREATION Left 06/13/2016    LEFT WRIST, no longer functional    DIALYSIS FISTULA CREATION Left     antecubital, was revised one time    HIP SURGERY Right     deep laceration was repaired    INTRACAPSULAR CATARACT Allergies:     Cymbalta [duloxetine hcl]; Tromethamine; and Toradol [ketorolac tromethamine]    Social History:     Tobacco:    reports that he quit smoking about a year ago. His smoking use included cigarettes. He smoked 1.00 pack per day. He has never used smokeless tobacco.  Alcohol:      reports no history of alcohol use. Drug Use:  reports no history of drug use. Family History:     Family History   Family history unknown: Yes       Review of Systems:     Positive and Negative as described in HPI. Review of Systems   Constitutional: Negative for chills, fatigue and fever. HENT: Negative. Eyes: Negative. Respiratory: Negative for cough, chest tightness, shortness of breath and wheezing. Cardiovascular: Negative for chest pain and palpitations. Gastrointestinal: Negative for abdominal distention, abdominal pain, constipation, diarrhea and rectal pain. Genitourinary: Negative. Skin: Positive for color change (BL lower extremity chronic skin changes). Neurological: Negative. Psychiatric/Behavioral: Negative. Physical Exam:   BP (!) 141/103   Pulse 104   Temp 97.8 °F (36.6 °C) (Oral)   Resp 19   Ht 6' (1.829 m)   Wt 280 lb (127 kg)   SpO2 96%   BMI 37.97 kg/m²   Temp (24hrs), Av.1 °F (36.7 °C), Min:97 °F (36.1 °C), Max:99.3 °F (37.4 °C)    Recent Labs     20  2151   POCGLU 127*       Intake/Output Summary (Last 24 hours) at 3/3/2020 0917  Last data filed at 3/3/2020 0400  Gross per 24 hour   Intake 1069.32 ml   Output --   Net 1069.32 ml       Physical Exam  Constitutional:       General: He is not in acute distress. Appearance: He is obese. HENT:      Mouth/Throat:      Mouth: Mucous membranes are moist.      Pharynx: Oropharynx is clear. Eyes:      Extraocular Movements: Extraocular movements intact. Pupils: Pupils are equal, round, and reactive to light. Neck:      Musculoskeletal: Normal range of motion.  No neck rigidity or muscular tenderness. Cardiovascular:      Rate and Rhythm: Tachycardia present. Rhythm irregular. Pulses: Normal pulses. Pulmonary:      Effort: Pulmonary effort is normal. No respiratory distress. Breath sounds: No wheezing. Abdominal:      General: Abdomen is flat. Bowel sounds are normal. There is no distension. Tenderness: There is no abdominal tenderness. Musculoskeletal:         General: Signs of injury (R side rib fractures, non displaced) present. Skin:     General: Skin is warm and dry. Findings: No erythema. Neurological:      General: No focal deficit present. Mental Status: He is alert.          Investigations:     Laboratory Testing:  Recent Results (from the past 24 hour(s))   EKG 12 Lead    Collection Time: 03/02/20  4:01 PM   Result Value Ref Range    Ventricular Rate 139 BPM    Atrial Rate 208 BPM    QRS Duration 142 ms    Q-T Interval 350 ms    QTc Calculation (Bazett) 532 ms    R Axis -86 degrees    T Axis 48 degrees   CBC Auto Differential    Collection Time: 03/02/20  4:10 PM   Result Value Ref Range    WBC 5.4 3.5 - 11.0 k/uL    RBC 3.61 (L) 4.5 - 5.9 m/uL    Hemoglobin 10.7 (L) 13.5 - 17.5 g/dL    Hematocrit 34.5 (L) 41 - 53 %    MCV 95.5 80 - 100 fL    MCH 29.5 26 - 34 pg    MCHC 30.9 (L) 31 - 37 g/dL    RDW 16.4 (H) 11.5 - 14.9 %    Platelets 706 (L) 997 - 450 k/uL    MPV 7.9 6.0 - 12.0 fL    NRBC Automated NOT REPORTED per 100 WBC    Differential Type NOT REPORTED     Immature Granulocytes NOT REPORTED 0 %    Absolute Immature Granulocyte NOT REPORTED 0.00 - 0.30 k/uL    WBC Morphology NOT REPORTED     RBC Morphology NOT REPORTED     Platelet Estimate NOT REPORTED     Seg Neutrophils 77 (H) 36 - 66 %    Lymphocytes 10 (L) 24 - 44 %    Monocytes 7 1 - 7 %    Eosinophils % 1 0 - 4 %    Basophils 2 0 - 2 %    Bands 3 0 - 10 %    Segs Absolute 4.16 1.3 - 9.1 k/uL    Absolute Lymph # 0.54 (L) 1.0 - 4.8 k/uL    Absolute Mono # 0.38 0.1 - 1.3 k/uL    Absolute Eos # 0.05 0.0 - 0.4 k/uL    Basophils Absolute 0.11 0.0 - 0.2 k/uL    Absolute Bands # 0.16 0.0 - 1.0 k/uL    Morphology ANISOCYTOSIS PRESENT    Basic Metabolic Panel    Collection Time: 03/02/20  4:10 PM   Result Value Ref Range    Glucose 160 (H) 70 - 99 mg/dL    BUN 49 (H) 8 - 23 mg/dL    CREATININE 6.63 (HH) 0.70 - 1.20 mg/dL    Bun/Cre Ratio NOT REPORTED 9 - 20    Calcium 8.8 8.6 - 10.4 mg/dL    Sodium 137 135 - 144 mmol/L    Potassium 4.2 3.7 - 5.3 mmol/L    Chloride 97 (L) 98 - 107 mmol/L    CO2 25 20 - 31 mmol/L    Anion Gap 15 9 - 17 mmol/L    GFR Non-African American 8 (L) >60 mL/min    GFR  10 (L) >60 mL/min    GFR Comment          GFR Staging NOT REPORTED    TSH with Reflex    Collection Time: 03/02/20  4:10 PM   Result Value Ref Range    TSH 2.55 0.30 - 5.00 mIU/L   Magnesium    Collection Time: 03/02/20  4:10 PM   Result Value Ref Range    Magnesium 1.9 1.6 - 2.6 mg/dL   Brain Natriuretic Peptide    Collection Time: 03/02/20  4:10 PM   Result Value Ref Range    Pro-BNP 3,452 (H) <300 pg/mL    BNP Interpretation Pro-BNP Reference Range:    Troponin    Collection Time: 03/02/20  4:10 PM   Result Value Ref Range    Troponin, High Sensitivity 450 (HH) 0 - 22 ng/L    Troponin T NOT REPORTED <0.03 ng/mL    Troponin Interp NOT REPORTED    Lactic Acid    Collection Time: 03/02/20  4:10 PM   Result Value Ref Range    Lactic Acid 1.5 0.5 - 2.2 mmol/L   Culture, Blood 1    Collection Time: 03/02/20  4:10 PM   Result Value Ref Range    Specimen Description . BLOOD RED 1ML PURPLE 9ML BOTH FROM RT WRIST.      Special Requests NOT REPORTED     Culture NO GROWTH 8 HOURS    Arterial Blood Gases    Collection Time: 03/02/20  5:33 PM   Result Value Ref Range    pH, Arterial 7.368 7.350 - 7.450    pCO2, Arterial 47.1 (H) 35.0 - 45.0 mmHg    pO2, Arterial 67.5 (L) 80.0 - 100.0 mmHg    HCO3, Arterial 27.1 (H) 22.0 - 26.0 mmol/L    Positive Base Excess, Art 1.8 0.0 - 2.0 mmol/L    Negative Base Excess, Art NOT REPORTED NEGATIVE NEGATIVE    Urinalysis Comments NOT REPORTED    Microscopic Urinalysis    Collection Time: 03/02/20  7:45 PM   Result Value Ref Range    -          WBC, UA 0 TO 2 /HPF    RBC, UA 2 TO 5 /HPF    Casts UA NOT REPORTED /LPF    Crystals, UA NOT REPORTED None /HPF    Epithelial Cells UA 5 TO 10 /HPF    Renal Epithelial, UA NOT REPORTED 0 /HPF    Bacteria, UA MANY (A) None    Mucus, UA 1+ (A) None    Trichomonas, UA NOT REPORTED None    Amorphous, UA 2+ (A) None    Other Observations UA NOT REPORTED NOT REQ. Yeast, UA NOT REPORTED None   POC Glucose Fingerstick    Collection Time: 03/02/20  9:51 PM   Result Value Ref Range    POC Glucose 127 (H) 75 - 110 mg/dL   SPECIMEN REJECTION    Collection Time: 03/03/20 12:24 AM   Result Value Ref Range    Specimen Source . BLOOD     Ordered Test PTT     Reason for Rejection       Unable to perform testing: Results suspect due to history of previous lab results.    - NOT REPORTED    APTT    Collection Time: 03/03/20  1:54 AM   Result Value Ref Range    PTT 83.5 (H) 24.0 - 36.0 sec   Anti-Xa Unfract Heparin    Collection Time: 03/03/20  1:54 AM   Result Value Ref Range    Anti-XA Unfrac Heparin 0.11 (L) 0.30 - 0.70 IU/L   Basic Metabolic Panel w/ Reflex to MG    Collection Time: 03/03/20  4:51 AM   Result Value Ref Range    Glucose 134 (H) 70 - 99 mg/dL    BUN 58 (H) 8 - 23 mg/dL    CREATININE 7.31 (HH) 0.70 - 1.20 mg/dL    Bun/Cre Ratio NOT REPORTED 9 - 20    Calcium 8.2 (L) 8.6 - 10.4 mg/dL    Sodium 136 135 - 144 mmol/L    Potassium 4.5 3.7 - 5.3 mmol/L    Chloride 98 98 - 107 mmol/L    CO2 23 20 - 31 mmol/L    Anion Gap 15 9 - 17 mmol/L    GFR Non-African American 7 (L) >60 mL/min    GFR  9 (L) >60 mL/min    GFR Comment          GFR Staging NOT REPORTED    CBC    Collection Time: 03/03/20  4:51 AM   Result Value Ref Range    WBC 3.9 3.5 - 11.0 k/uL    RBC 3.31 (L) 4.5 - 5.9 m/uL    Hemoglobin 9.8 (L) 13.5 - 17.5 g/dL    Hematocrit 31.1 (L) 41 - 53 % MCV 94.0 80 - 100 fL    MCH 29.5 26 - 34 pg    MCHC 31.4 31 - 37 g/dL    RDW 16.5 (H) 11.5 - 14.9 %    Platelets 650 (L) 231 - 450 k/uL    MPV 7.5 6.0 - 12.0 fL    NRBC Automated NOT REPORTED per 100 WBC   Anti-Xa Unfract Heparin    Collection Time: 03/03/20  8:14 AM   Result Value Ref Range    Anti-XA Unfrac Heparin 0.42 0.30 - 0.70 IU/L   Phosphorus    Collection Time: 03/03/20  8:14 AM   Result Value Ref Range    Phosphorus 6.0 (H) 2.5 - 4.5 mg/dL   Magnesium    Collection Time: 03/03/20  8:14 AM   Result Value Ref Range    Magnesium 1.9 1.6 - 2.6 mg/dL       Imaging/Diagnostics:  Ct Abdomen Pelvis Wo Contrast    Result Date: 3/2/2020  EXAMINATION: CT OF THE ABDOMEN AND PELVIS WITHOUT CONTRAST 3/2/2020 8:58 pm TECHNIQUE: CT of the abdomen and pelvis was performed without the administration of intravenous contrast. Multiplanar reformatted images are provided for review. Dose modulation, iterative reconstruction, and/or weight based adjustment of the mA/kV was utilized to reduce the radiation dose to as low as reasonably achievable. COMPARISON: 08/19/2018 HISTORY: ORDERING SYSTEM PROVIDED HISTORY: fall multiple rib fx r/o bleed TECHNOLOGIST PROVIDED HISTORY: fall multiple rib fx r/o bleed Reason for Exam: fall multiple rib fx r/o bleed Acuity: Unknown Type of Exam: Unknown FINDINGS: Lower Chest: See separate report for CT chest. Organs: Liver is normal.  Enlarged spleen. Respiratory motion. High density lesion in the left kidney has enlarged from prior exam and now measures 6.8 x 6.2 cm and previously measured 5.5 x 6.1 cm. Absent right kidney. GI/Bowel: No dilated bowel. No associated inflammatory changes. Stool throughout the colon. Pelvis: Bladder is collapsed. Borderline prostatic enlargement. Peritoneum/Retroperitoneum: No enlarged lymph nodes. Atherosclerotic changes of the aorta. Normal caliber aorta.  Bones/Soft Tissues: Left inguinal lymphadenopathy, slightly increased from prior exam, the largest evident. .     1. Acute nondisplaced right 4th, 5th, 6th and 7th ribs most apparent in the 6th rib. The others are subtle manifested by cortical buckling. There are other scattered nonacute rib fractures noted. 2. Bilateral posterior basal infiltrates suggestive of atelectasis/scarring. There is also trace right pleural effusion. Ct Cervical Spine Wo Contrast    Result Date: 2/27/2020  EXAMINATION: CT OF THE CERVICAL SPINE WITHOUT CONTRAST 2/27/2020 10:42 am TECHNIQUE: CT of the cervical spine was performed without the administration of intravenous contrast. Multiplanar reformatted images are provided for review. Dose modulation, iterative reconstruction, and/or weight based adjustment of the mA/kV was utilized to reduce the radiation dose to as low as reasonably achievable. COMPARISON: 10/20/2006 HISTORY: ORDERING SYSTEM PROVIDED HISTORY: trauma TECHNOLOGIST PROVIDED HISTORY: trauma Reason for Exam: trauma Acuity: Acute Type of Exam: Initial Mechanism of Injury: PT STATES HE FELL ON TUESDAY FINDINGS: BONES/ALIGNMENT: There is no acute fracture or traumatic malalignment. DEGENERATIVE CHANGES: Severe disc degenerative change at C5-6 and C7-T1. SOFT TISSUES: There is no prevertebral soft tissue swelling. No acute abnormality of the cervical spine. Xr Chest Portable    Result Date: 3/3/2020  EXAMINATION: ONE XRAY VIEW OF THE CHEST 3/3/2020 6:01 am COMPARISON: Chest radiograph performed 03/02/2020. HISTORY: ORDERING SYSTEM PROVIDED HISTORY: follow up rib fractures on right TECHNOLOGIST PROVIDED HISTORY: follow up rib fractures on right Reason for Exam: follow up right side rib fractures. Acuity: Acute Type of Exam: Subsequent/Follow-up FINDINGS: The lungs are without consolidation or effusion. There is no pneumothorax. The heart is enlarged. The upper abdomen is unremarkable. The extrathoracic soft tissues are unremarkable. Cardiomegaly without acute pulmonary process.      Xr Chest Portable    Result

## 2020-03-03 NOTE — CARE COORDINATION
CASE MANAGEMENT NOTE:    Admission Date:  3/2/2020 Chencho Hernandez is a 78 y.o.  male    Admitted for : Atrial fibrillation ([facility] [address] [I48.91]    Met with:  Patient     PCP:  Lion Jeffery DO                                  Insurance:  Debora Monday  Dual          Current Residence/ Living Arrangements:  in assisted living facility dependent on nursing care [address]  DME:  wheelchair     Home Oxygen: Yes  3lnc prn     Nebulizer: yes        SNF needed: Yes Currently at [facility] [place] [facility]:  Per [facility]        Is the Patient an Visualase with Readmission Risk Score greater than 14%? No  If yes, pt needs a follow up appointment made within 7 days.     Does Patient want to use MEDS to BEDS? No     Family Members/Caregivers that pt would like involved in their care:                 Is patient in Isolation/One on One/Altered Mental Status? No  If yes, skip next question. If no, would they like an I-Pad to  use?    No  If yes, call [phone].              Discharge Plan:  03/02/2020 Verdis Monday DUAL  Lives at [facility]  DME Wheelchair Dialysis at [facility] in [facility] point at [place] [id number] Plan to return to [facility] cardiology  Consult trauma surgery for multiple rib fx   NPO   Will follow for needs  SONG needs completed and signed //rm                           Electronically signed by: Vilma Brittle, RN on 3/2/2020 at 7:50 PM

## 2020-03-03 NOTE — PROGRESS NOTES
Medication History completed:    New medications: oxycodone, nitroglycerin, loratadine, glucagon, glucose gel, Breo, doxycycline, dextromethorphan-guaifenesin, alprazolam    Medications discontinued: ketorolac ophthalmic, Duoneb, difluprednate, carboxymethylcellulose    Changes to dosing:   Metoprolol tartrate changed to 25 mg daily with hold parameters for HR <60 or SBP <100    Stated allergies: As listed    Other pertinent information: Medications confirmed with facility list.     Thank you,  Alexandria Briggs, PharmD, BCPS  [phone]

## 2020-03-03 NOTE — CARE COORDINATION
ONGOING DISCHARGE PLAN:    Spoke with patient regarding discharge plan and patient confirms that plan is still to have LSW continue to follow for return to [facility]. Per LSW notes, Pt. Does not need a Pre-Cert for return. PT/OT on board. Pt. Remains on Heparin/Cardizem GTT's. Per Notes, follow for Eliquis on DC. Echo. Surgery follow for Rib FX from previous fall. Urology consult for Left renal mass. Pt. Is a Current Dialysis pt. At [facility]. Nephro on board. Awaiting Venous Dopplers. Will continue to follow for additional discharge needs.     Electronically signed by Tyrell Garcia RN on 3/3/2020 at 2:02 PM

## 2020-03-03 NOTE — PROGRESS NOTES
Pt admitted to ICU bed 2006. Oriented to room and surroundings. Bed in lowest position. Bed wheels locked. Side rails up x2. Call light and bedside table within reach.

## 2020-03-03 NOTE — ED NOTES
Holding fentanyl at this time due to pts BP      Rodriguez Diaz, [place]  03/03/20 9683

## 2020-03-03 NOTE — ED PROVIDER NOTES
[facility] ICU  eMERGENCY dEPARTMENT eNCOUnter   Attending Attestation     Pt Name: Rosa Eaton  MRN: [id number]  [place] 1950  Date of evaluation: 3/2/20    History, EXAM, MDM:    Rosa Eaton is a 78 y.o. male who presents with Shortness of Breath and Tachycardia      ESRD on HD MWF presents with c/o cp, sob. Jerilyn Gang about a week ago. Seen on 2/27/20 and CT scan showed rb fx 4-7 non-displaced. Pain worsening since. Becoming confused. EMS gave cardizem prior to arrival.  On presentation remains in afib with rvr. Also temp of 99.3 and hypoxia. He is hypotensive. No nuchal rigidity. Mental status fluctuating. No respiratory distress. EKG shows no sign of acute coronary syndrome. Initial afib with rvr treated with metoprolol given prolonged QTc on ekg. Additional fluid bolus (but not 30cc/kg given his esrd) with concern for sepsis secondary to possible pneumonia from rib fractures. Antibiotics ordered to cover for pneumonia. Repeat sepsis exam performed at 7:30pm. Arterial line placed map around 63. Consulted with cardiology, Dr. Ryan Brannon, recommends Cardizem. The patient was treated with a bolus and a drip was initiated, but he remained with tachycardia. Troponin 450, repeat 436, suspect secondary to afib with rvr, chf and esrd. Dr. Ryan Brannon asked that a second bolus of amiodarone be given. Pt admitted to ICU under medical service. Given the rib fx, general surgery consulted for trauma management, Dr. Mavis Davis in agreement that pt should primarily be admitted to medical service. He asks for CT abdomen and pelvis which did not reveal any additional traumatic injuries, but did show signs of a renal mass concerning for malignancy and some lymphadenopathy. Pt admitted to the ICU. EKG shows a rhythm of atrial fibrillation with rvr. HR is 139, , , no HARRIS, No STD, TWI in precordial leads, Bifasciular block, axis is leftwards.     Critical Care  Performed by: Jarad Osborn have had a face to face evaluation of this patient and have completed at least one if not all key elements of the E/M (history, physical exam, and MDM). Additional findings are as noted.     Jayson Delgado MD  Attending Emergency  Physician                Jayson Delgado MD  03/02/20 [id number]

## 2020-03-03 NOTE — PROGRESS NOTES
While turning patient for bed pan use, patient picked at sore/boil on side of hip. Patient squeezed to the point that thick blood matter squirted from puncture point. Stated that he had this for weeks and at one point white material came out when squeezing. Denies dressing at this point in time.

## 2020-03-04 NOTE — PROGRESS NOTES
Nutrition Assessment    Type and Reason for Visit: Positive Nutrition Screen(wounds)    Nutrition Recommendations: Continue diet as ordered, allow additional protein selections. Nutrition Assessment: Patient adequately nourished on admit as nurse indicating intake has been good. RN report skin issues with consult to wound care nurse for fungal excoriation. At risk for compromise as pt with thigh wound, stage 2 rectal area and with known losses from dialysis. Malnutrition Assessment:  · Malnutrition Status: No malnutrition  · Context: Acute illness or injury  1. Weight Loss-No significant weight loss,    2. Fat Loss-No significant subcutaneous fat loss,    3. Muscle Loss-No significant muscle mass loss,    4. Fluid Accumulation-Mild fluid accumulation, Extremities  5.  Strength-Not measured    Nutrition Risk Level:  Moderate    Nutrient Needs:  · Estimated Daily Total Kcal: 2069 kcal based on [place]-St. Jeor equation using adm wt 127.1 kg  · Estimated Daily Protein (g): 124 gm protein based on 1.4 gm/kg IBW    Nutrition Diagnosis:   · Problem: Increased nutrient needs  · Etiology: related to Increased demand for energy/nutrients(healing)     Signs and symptoms:  as evidenced by Presence of wounds, Known losses from dialysis    Objective Information:  · Nutrition-Focused Physical Findings: Edema: +1 BLE's  · Wound Type: Stage II, Wound Consult Pending(fungal excoriation)  · Current Nutrition Therapies:  · Oral Diet Orders: Renal, Fluid Restriction   · Oral Diet intake: 51-75%  · Anthropometric Measures:  · Ht: 6' (182.9 cm)   · Current Body Wt: 280 lb (127 kg)  · Admission Body Wt: 280 lb (127 kg)  · Ideal Body Wt: 196 lb (88.9 kg)(used high end of range-large frame), % Ideal Body 143%  · BMI Classification: BMI 35.0 - 39.9 Obese Class II    Nutrition Interventions:   Continue current diet  Continued Inpatient Monitoring, Education Not Indicated    Nutrition Evaluation:   · Evaluation: Goals set · Goals: Meet 75% of nutrient needs with PO intake. · Monitoring: Meal Intake, Diet Tolerance, Skin Integrity, Wound Healing, I&O, Weight, Pertinent Labs, Monitor Hemodynamic Status, Monitor Bowel Function      SOUMYA Gutierrez R.D..   Clinical Dietitian  Office: [phone]

## 2020-03-04 NOTE — FLOWSHEET NOTE
03/04/20 1750   Encounter Summary   Services provided to: Patient   Referral/Consult From: Rounding   Complexity of Encounter Low   Length of Encounter 15 minutes   Spiritual/Orthodox   Type Spiritual support   Assessment Sleeping   Intervention Prayer   Outcome Did not respond

## 2020-03-04 NOTE — PLAN OF CARE
Nutrition Problem: Increased nutrient needs  Intervention: Food and/or Nutrient Delivery: Continue current diet  Nutritional Goals: Meet 75% of nutrient needs with PO intake.

## 2020-03-04 NOTE — PROGRESS NOTES
Value Date    PHART 7.368 03/02/2020    PO2ART 67.5 03/02/2020    PWH8MXY 47.1 03/02/2020       Lab Results   Component Value Date    MODE NOT REPORTED 03/02/2020         Medications   IV   dextrose      dilTIAZem (CARDIZEM) 125 mg in dextrose 5% 125 mL infusion 15 mg/hr (03/04/20 0757)    heparin (porcine) 26 Units/kg/hr (03/04/20 0946)      digoxin  250 mcg Intravenous Q4H    miconazole   Topical BID    ALPRAZolam  0.5 mg Oral BID    escitalopram  10 mg Oral QAM    fluticasone  1 spray Nasal Daily    budesonide-formoterol  2 puff Inhalation BID    finasteride  5 mg Oral Daily    [START ON 3/5/2020] fentaNYL  1 patch Transdermal Q72H    aspirin  81 mg Oral Daily    insulin glargine  10 Units Subcutaneous BID    insulin lispro  0-12 Units Subcutaneous TID WC    insulin lispro  0-6 Units Subcutaneous Nightly    isosorbide mononitrate  60 mg Oral Daily    lactulose  10 g Oral Daily    cetirizine  5 mg Oral Daily    midodrine  5 mg Oral Once per day on Mon Wed Fri    tamsulosin  0.4 mg Oral Daily    sevelamer  2,400 mg Oral TID WC    senna  1 tablet Oral BID    pantoprazole  40 mg Oral QAM AC    lidocaine  1 patch Transdermal Daily    fentanNYL  50 mcg Intravenous Once    sodium chloride flush  10 mL Intravenous 2 times per day       Diet/Nutrition   DIET RENAL; Daily Fluid Restriction: 1000 ml    Exam   VITALS    height is 6' (1.829 m) and weight is 280 lb 4 oz (127.1 kg). His oral temperature is 98.5 °F (36.9 °C). His blood pressure is 98/78 and his pulse is 128. His respiration is 17 and oxygen saturation is 97%. Constitutional -awake and alert  General Appearance  well developed, well nourished  HEENT - ,normocephalic, atraumatic. PERRLA  Lungs - Chest expands equally, no wheezes, rales, coarse, decreased sounds  Cardiovascular - Heart sounds are normal.  Tachycardic and rhythm irregular, no murmur, gallop or rub.   Abdomen - soft, nontender, nondistended, no guarding  Neurologic -awake Hemodynamics       Gastrointestinal/Nutrition       Renal       Infectious Disease       Hematology/Oncology       Endocrine       Social/Spiritual/DNR/Disposition/Other   ASSESSMENT:  1. Acute-on-chronic respiratory failure with hypoxia, was on p.r.n. O2 at the [facility]. 2.  Hypotension, mostly with rapid AFib, improved  3. Bilateral pleural effusions and hypervolemia. 4.   atelectasis right base. 5.  Lingular pleural-based density a little bit over 3-cm, needs  follow up. 6.  COPD with chronic bronchitis. 7.  OHS and possible LISANDRO, had never done a sleep study   8. History of tobacco abuse 1 pack per day for 50 years, quit 6 months ago. 9.  Multiple rib fractures with recurrent falls. 10.  Rapid AFib, history of hypertension and CHF, echocardiogram with EF 55%, severe LVH,. Biatrial dilatation  11. End-stage renal disease, on hemodialysis. A left renal mass was found on CT, ultrasound with a 6.8 cm left renal cyst  12. Chronic anemia and thrombocytopenia. 13.  Diabetes/hypothyroidism.     PLAN OF TREATMENT:    O2.   BiPAP   on heparin and Cardizem drips  as per Cardiology   needs followup on his pleural-based density, probably repeat CT in six months. Xopenex as needed, continue with  Symbicort   Hemodialysis as per renal and followup lab. Discussed  with the staff.       Critical Care Time    min    Electronically signed by Bridgette King MD on 3/4/2020 at 10:30 AM

## 2020-03-04 NOTE — PLAN OF CARE
Problem: Falls - Risk of:  Goal: Will remain free from falls  Description  Will remain free from falls  Outcome: Ongoing  Note:   Bed remains in lowest position, call light within reach. Patient remains free of falls at this time. RN will continue to monitor. Goal: Absence of physical injury  Description  Absence of physical injury  Outcome: Ongoing     Problem: Risk for Impaired Skin Integrity  Goal: Tissue integrity - skin and mucous membranes  Description  Structural intactness and normal physiological function of skin and  mucous membranes. Outcome: Ongoing  Note:   Patient turned and repositioned every 2 hours and as needed for comfort. Skin remains dry and intact. No new skin breakdown noted. Problem: Fluid Volume - Imbalance:  Goal: Absence of imbalanced fluid volume signs and symptoms  Description  Absence of imbalanced fluid volume signs and symptoms  Outcome: Ongoing     Problem: Gas Exchange - Impaired:  Goal: Levels of oxygenation will improve  Description  Levels of oxygenation will improve  Outcome: Ongoing     Problem: Pain:  Goal: Pain level will decrease  Description  Pain level will decrease  Outcome: Ongoing  Note:   Pain assessed at regular intervals. Medications administered as requested for comfort. Pain remains at a tolerable level.    Goal: Recognizes and communicates pain  Description  Recognizes and communicates pain  Outcome: Ongoing  Goal: Control of acute pain  Description  Control of acute pain  Outcome: Ongoing  Goal: Control of chronic pain  Description  Control of chronic pain  Outcome: Ongoing     Problem: Tissue Perfusion - Cardiopulmonary, Altered:  Goal: Absence of angina  Description  Absence of angina  Outcome: Ongoing  Goal: Hemodynamic stability will improve  Description  Hemodynamic stability will improve  Outcome: Ongoing     Problem: Pain:  Goal: Pain level will decrease  Description  Pain level will decrease  Outcome: Ongoing  Note:   Pain assessed at regular intervals. Medications administered as requested for comfort. Pain remains at a tolerable level.    Goal: Control of acute pain  Description  Control of acute pain  Outcome: Ongoing  Goal: Control of chronic pain  Description  Control of chronic pain  Outcome: Ongoing     Problem: Musculor/Skeletal Functional Status  Goal: Highest potential functional level  Outcome: Ongoing  Goal: Absence of falls  Outcome: Ongoing

## 2020-03-04 NOTE — CARE COORDINATION
ONGOING DISCHARGE PLAN:    Plan remains for LSW to continue to follow for return to [facility], [facility]. Does not need a Pre-cert for return. PT/OT on board. Pt remains on Heparin/Cardizem GTT'S. Pt will have Dialysis today, Nephro following. Urology following large left renal cyst, will get renal ultrasound to evaluate. Will continue to follow for additional discharge needs.     Electronically signed by Montserrat Lo RN on 3/4/2020 at 3:31 PM

## 2020-03-04 NOTE — PROGRESS NOTES
skin 2 times daily 3/4/19   Lux Reyes MD   lidocaine-prilocaine (EMLA) 2.5-2.5 % cream Apply topically three times a week Apply topically to arm/port three times weekly on Monday, Wednesday, and Friday for dialysis    Historical Provider, MD   lactulose (CHRONULAC) 10 GM/15ML solution Take 10 g by mouth daily    Historical Provider, MD   ondansetron (ZOFRAN) 4 MG tablet Take 4 mg by mouth every 6 hours as needed for Nausea or Vomiting    Historical Provider, MD   midodrine (PROAMATINE) 5 MG tablet Take 5 mg by mouth three times a week On Monday, Wednesday, and Friday for dialysis    Historical Provider, MD   ramelteon (ROZEREM) 8 MG tablet Take 8 mg by mouth nightly    Historical Provider, MD   diphenhydrAMINE (BENADRYL) 25 MG tablet Take 25 mg by mouth every 8 hours as needed for Itching     Historical Provider, MD   aspirin 81 MG chewable tablet Take 81 mg by mouth daily    Historical Provider, MD   sevelamer (RENVELA) 800 MG tablet Take 3 tablets by mouth 3 times daily (with meals)     Historical Provider, MD   senna (SENOKOT) 8.6 MG tablet Take 1 tablet by mouth 2 times daily    Historical Provider, MD   escitalopram (LEXAPRO) 10 MG tablet Take 10 mg by mouth every morning     Historical Provider, MD   isosorbide mononitrate (IMDUR) 60 MG CR tablet Take 60 mg by mouth daily    Historical Provider, MD   fluticasone (FLONASE) 50 MCG/ACT nasal spray 1 spray by Nasal route daily     Historical Provider, MD   finasteride (PROSCAR) 5 MG tablet Take 5 mg by mouth daily. Historical Provider, MD   omeprazole (PRILOSEC) 20 MG capsule Take 20 mg by mouth every morning     Historical Provider, MD   metoprolol tartrate (LOPRESSOR) 25 MG tablet Take 25 mg by mouth daily Indications: Hold for SBP less than 100 or HR less than 60     Historical Provider, MD   tamsulosin (FLOMAX) 0.4 MG capsule Take 0.4 mg by mouth daily.       Historical Provider, MD       Scheduled Meds:   digoxin  250 mcg Intravenous Q4H    miconazole   Topical BID    ALPRAZolam  0.5 mg Oral BID    escitalopram  10 mg Oral QAM    fluticasone  1 spray Nasal Daily    budesonide-formoterol  2 puff Inhalation BID    finasteride  5 mg Oral Daily    [START ON 3/5/2020] fentaNYL  1 patch Transdermal Q72H    aspirin  81 mg Oral Daily    insulin glargine  10 Units Subcutaneous BID    insulin lispro  0-12 Units Subcutaneous TID WC    insulin lispro  0-6 Units Subcutaneous Nightly    isosorbide mononitrate  60 mg Oral Daily    lactulose  10 g Oral Daily    cetirizine  5 mg Oral Daily    midodrine  5 mg Oral Once per day on Mon Wed Fri    tamsulosin  0.4 mg Oral Daily    sevelamer  2,400 mg Oral TID WC    senna  1 tablet Oral BID    pantoprazole  40 mg Oral QAM AC    lidocaine  1 patch Transdermal Daily    fentanNYL  50 mcg Intravenous Once    sodium chloride flush  10 mL Intravenous 2 times per day     Continuous Infusions:   dextrose      dilTIAZem (CARDIZEM) 125 mg in dextrose 5% 125 mL infusion 10 mg/hr (03/04/20 1117)    heparin (porcine) 26 Units/kg/hr (03/04/20 0946)     PRN Meds:diphenhydrAMINE, glucose, dextrose, glucagon (rDNA), dextrose, lidocaine-prilocaine, oxyCODONE HCl, nitroGLYCERIN, ondansetron, levalbuterol, metoprolol, metoprolol, sodium chloride flush, acetaminophen, heparin (porcine), heparin (porcine), morphine **OR** morphine     Physical Exam:  Vitals:    03/04/20 1035 03/04/20 1045 03/04/20 1100 03/04/20 1115   BP:  (!) 142/99 (!) 92/59    Pulse:  129 129 129   Resp:  19 17 17   Temp:       TempSrc:       SpO2: 100% 95% 99% 96%   Weight:       Height:         I/O last 3 completed shifts: In: 310 [P.O.:200; I.V.:110]  Out: -     General:  Awake, alert, not in distress. Appears to be stated age. HEENT: Atraumatic, normocephalic. Anicteric sclera. Pink and moist oral mucosa. No carotid bruit. No JVD. Chest: Bilateral air entry, clear to auscultation, no wheezing, rhonchi or rales.   Cardiovascular: RRR, S1S2, no CHOL 108 06/18/2019    HDL 37 (L) 06/18/2019     INR:   Lab Results   Component Value Date    INR 1.1 03/02/2020    INR 1.1 01/02/2020    INR 1.0 06/24/2019     PTH: No results found for: PTH  Phosphorus:    Lab Results   Component Value Date    PHOS 6.5 03/04/2020     Ionized Calcium: No results found for: IONCA  Magnesium:   Lab Results   Component Value Date    MG 1.9 03/03/2020     Albumin:   Lab Results   Component Value Date    LABALBU 3.3 01/02/2020    LABALBU 4.6 04/24/2012     Last 3 CK, CKMB, Troponin: @LABRCNT(CKTOTAL:3,CKMB:3,TROPONINI:3)       URINE:)No results found for: Yonas Flores    Radiology:   Reviewed. Assessment:  1. End stage renal disease. 2. Anemia of ESRD. 3. Hypertension. 4. Left renal mass. 5. Hyperphosphatemia. 6. Right 6-7 rib fractures. Plan:  HD today, see orders  We will follow phosphorus level, Continue Renvela for now  Hg at goal  Maintain on renal diet with fluid restriction of 1000 ml/24 hours. Renal US showed a renal cyst  Avoid Lovenox and Fleets enema. We will continue dialysis on MWF schedule. Please do not hesitate to contact us for any further questions/concerns. We will continue to follow along with you. Electronically signed by Ramón hCahal MD  on 3/4/2020 at 12:13 PM  [facility].   Ph: [phone]

## 2020-03-04 NOTE — PROGRESS NOTES
Patient HR sustaining 130's, BP in the 90's. Doctor Reece Gorman contacted. Received orders for 250 mcg digoxin now and to repeat in 4 hours. Stated to stop Cardizem drip if BP <90.

## 2020-03-04 NOTE — PROGRESS NOTES
[address] [facility].    PROGRESS NOTE             3/4/2020    7:18 AM    Name:   Dorcas Pacheco  MRN:     [id number]     Acct:      [de-identified]   Room:   2006/2006-01  IP Day:  2  Admit Date:  3/2/2020  3:59 PM    PCP:  Javier Navarro DO  Code Status:  Full Code    Subjective:     C/C:   Chief Complaint   Patient presents with    Shortness of Breath    Tachycardia     Interval History Status: not changed    Patient seen and examined at bedside. He appears fatigued after having a bowel movement. He states he feels short of breath as well. His heart rate remains poorly controlled in the 120s and irregularly maxed out on cardizem. In addition, the patient complains of a lesion on his right buttocks that has been there for about 1 month. He states that it occasionally drains blood/pus. The patient is scheduled to continue HD on a MWF schedule. Will discuss the case with cardiology and nephrology for better control of afib and HD schedule. W    Brief History:     See H&P    Review of Systems:     Review of Systems   Constitutional: Positive for fatigue. Negative for chills and fever. HENT: Negative. Eyes: Negative. Respiratory: Positive for shortness of breath. Negative for cough, chest tightness and wheezing. Cardiovascular: Negative for chest pain and palpitations. Gastrointestinal: Negative for abdominal distention, abdominal pain, constipation, diarrhea and rectal pain. Genitourinary: Negative. Musculoskeletal: Positive for arthralgias and myalgias (rib pain). Skin: Positive for color change (BL lower extremity chronic skin changes). Indurated lesion on right buttock     Neurological: Negative. Psychiatric/Behavioral: Negative. Medications: Allergies: Allergies   Allergen Reactions    Cymbalta [Duloxetine Hcl]      Intolerance.     Tromethamine Other (See Comments)    Toradol [Ketorolac Hyperparathyroidism ([facility] [address] Hypothyroidism, IDDM (insulin dependent diabetes mellitus) ([facility] [address] Insomnia, Liver disease, MDRO (multiple drug resistant organisms) resistance, Memory deficit, [facility]fibromatosis [facility] Osteoarthritis, Paraplegia ([facility] Peptic ulcer, PVD (peripheral vascular disease) ([facility] [address] Secondary hyperparathyroidism ([facility] Sinus disorder, Syncope, Type II or unspecified type diabetes mellitus without mention of complication, not stated as uncontrolled, and Venous thrombosis. Social History:   reports that he quit smoking about a year ago. His smoking use included cigarettes. He smoked 1.00 pack per day. He has never used smokeless tobacco. He reports that he does not drink alcohol or use drugs. Family History:   Family History   Family history unknown: Yes       Vitals:  BP (!) 99/56   Pulse 118   Temp 98.6 °F (37 °C)   Resp 17   Ht 6' (1.829 m)   Wt 280 lb 4 oz (127.1 kg)   SpO2 94%   BMI 38.01 kg/m²   Temp (24hrs), Av.5 °F (36.9 °C), Min:97.8 °F (36.6 °C), Max:99.3 °F (37.4 °C)    Recent Labs     20  0913 20  1131 20  1634 20   POCGLU 160* 127* 159* 164*       I/O(24Hr): Intake/Output Summary (Last 24 hours) at 3/4/2020 0718  Last data filed at 3/4/2020 0600  Gross per 24 hour   Intake 310 ml   Output --   Net 310 ml       Labs:  [unfilled]    Lab Results   Component Value Date/Time    SPECIAL NOT REPORTED 2020 07:45 PM     Lab Results   Component Value Date/Time    CULTURE NO SIGNIFICANT GROWTH 2020 07:45 PM       [unfilled]    Radiology:    Ct Abdomen Pelvis Wo Contrast    Result Date: 3/3/2020  EXAMINATION: CT OF THE ABDOMEN AND PELVIS WITHOUT CONTRAST 3/2/2020 8:58 pm TECHNIQUE: CT of the abdomen and pelvis was performed without the administration of intravenous contrast. Multiplanar reformatted images are provided for review.  Dose modulation, iterative reconstruction, and/or weight based adjustment of the mA/kV was utilized to reduce the radiation dose to as low as reasonably achievable. COMPARISON: 08/19/2018 HISTORY: ORDERING SYSTEM PROVIDED HISTORY: fall multiple rib fx r/o bleed TECHNOLOGIST PROVIDED HISTORY: fall multiple rib fx r/o bleed Reason for Exam: fall multiple rib fx r/o bleed Acuity: Unknown Type of Exam: Unknown FINDINGS: Lower Chest: See separate report for CT chest. Organs: Liver is normal.  Enlarged spleen. Respiratory motion. High density lesion in the left kidney has enlarged from prior exam and now measures 6.8 x 6.2 cm and previously measured 5.5 x 6.1 cm. Absent right kidney. GI/Bowel: No dilated bowel. No associated inflammatory changes. Stool throughout the colon. Pelvis: Bladder is collapsed. Borderline prostatic enlargement. Peritoneum/Retroperitoneum: No enlarged lymph nodes. Atherosclerotic changes of the aorta. Normal caliber aorta. Bones/Soft Tissues: Left inguinal lymphadenopathy, slightly increased from prior exam, the largest lymph node measures 2.0 x 1.8 cm. Body wall edema. Paraspinal muscle atrophy and gluteal muscle atrophy. Acute right 6th and 7th rib fractures as well as old fractures described previously. Minimally displaced right 6th and 7th rib fractures. Old rib fractures elsewhere as before. Continued enlargement of hyperdense left renal mass most likely neoplasm. Increasing left inguinal lymphadenopathy of uncertain significance. Ct Head Wo Contrast    Result Date: 3/2/2020  EXAMINATION: CT OF THE HEAD WITHOUT CONTRAST  3/2/2020 6:35 pm TECHNIQUE: CT of the head was performed without the administration of intravenous contrast. Dose modulation, iterative reconstruction, and/or weight based adjustment of the mA/kV was utilized to reduce the radiation dose to as low as reasonably achievable.  COMPARISON: February 27, 2020 HISTORY: ORDERING SYSTEM PROVIDED HISTORY: [facility] TECHNOLOGIST PROVIDED HISTORY: [facility] Reason for Exam: pt became altered mental status at Dialysis today FINDINGS: BRAIN/VENTRICLES: There is no acute intracranial hemorrhage, mass effect or midline shift. No abnormal extra-axial fluid collection. The gray-white differentiation is maintained without evidence of an acute infarct. There is no evidence of hydrocephalus. ORBITS: The visualized portion of the orbits demonstrate no acute abnormality. SINUSES: Opacification of the right maxillary sinus. SOFT TISSUES/SKULL:  No acute abnormality of the visualized skull or soft tissues. No acute intracranial abnormality. Right maxillary sinusitis. Ct Head Wo Contrast    Result Date: 2/27/2020  EXAMINATION: CT OF THE HEAD WITHOUT CONTRAST  2/27/2020 10:42 am TECHNIQUE: CT of the head was performed without the administration of intravenous contrast. Dose modulation, iterative reconstruction, and/or weight based adjustment of the mA/kV was utilized to reduce the radiation dose to as low as reasonably achievable. COMPARISON: 01/02/2020 HISTORY: ORDERING SYSTEM PROVIDED HISTORY: trauma TECHNOLOGIST PROVIDED HISTORY: trauma Reason for Exam: trauma Acuity: Acute Type of Exam: Initial Mechanism of Injury: PT STATES HE FELL TUESDAY, STATES HIS BUTT HURTS FINDINGS: BRAIN/VENTRICLES: There is no acute intracranial hemorrhage, mass effect or midline shift. No abnormal extra-axial fluid collection. The gray-white differentiation is maintained without evidence of an acute infarct. There is no evidence of hydrocephalus. Mild generalized brain parenchymal atrophy. ORBITS: The visualized portion of the orbits demonstrate no acute abnormality. SINUSES: Complete opacification of the right maxillary sinus and left mastoid air cells. Remaining paranasal sinuses are clear. SOFT TISSUES/SKULL:  No acute abnormality of the visualized skull or soft tissues. No acute intracranial abnormality. Persistent opacification of the right maxillary sinus and left mastoid air cells. Correlate for any signs of sinusitis.      Ct Chest Wo using fluoroscopic guidance with tip at the cavoatrial junction after fascial tract dilation. The catheter lumens flushed easily and there was a good blood return. The catheter was sutured to the skin and covered with a CHG dressing. The patient tolerated the procedure well and there were no immediate complications. FINDINGS: Fluoroscopic image demonstrates the tip of the catheter near the cavoatrial junction. Successful ultrasound and fluoroscopy guided triple-lumen central catheter placement. Central line is ready for use at this time. CONTRAST: None used     Xr Chest Portable    Result Date: 3/3/2020  EXAMINATION: ONE XRAY VIEW OF THE CHEST 3/3/2020 6:01 am COMPARISON: Chest radiograph performed 03/02/2020. HISTORY: ORDERING SYSTEM PROVIDED HISTORY: follow up rib fractures on right TECHNOLOGIST PROVIDED HISTORY: follow up rib fractures on right Reason for Exam: follow up right side rib fractures. Acuity: Acute Type of Exam: Subsequent/Follow-up FINDINGS: The lungs are without consolidation or effusion. There is no pneumothorax. The heart is enlarged. The upper abdomen is unremarkable. The extrathoracic soft tissues are unremarkable. Cardiomegaly without acute pulmonary process. Xr Chest Portable    Result Date: 3/2/2020  EXAMINATION: ONE XRAY VIEW OF THE CHEST 3/2/2020 1:34 pm COMPARISON: 01/02/2020 HISTORY: ORDERING SYSTEM PROVIDED HISTORY: chest pain, r/o PTX TECHNOLOGIST PROVIDED HISTORY: chest pain, r/o PTX Reason for Exam: Chest pain. R/o PTX Acuity: Acute Type of Exam: Initial Additional signs and symptoms: Chest pain. R/o PTX FINDINGS: Examination is limited by patient rotation and the patient's body habitus. There is elevation of the right hemidiaphragm. Mild bibasilar airspace disease is noted. Cardial pericardial silhouette is enlarged but stable. No pneumothorax is found. No free air. No acute bony abnormality.      Mild bibasilar airspace disease, atelectasis versus pneumonia

## 2020-03-04 NOTE — PROGRESS NOTES
Physical Therapy    Facility/Department: [facility] ICU  Initial Assessment    NAME: Denis Mcallister  : 1950  MRN: [id number]    Date of Service: 3/4/2020    Discharge Recommendations:  Patient would benefit from continued therapy after discharge   PT Equipment Recommendations  Equipment Needed: No    Assessment   Body structures, Functions, Activity limitations: Decreased functional mobility ; Decreased ADL status; Decreased ROM; Decreased strength;Decreased endurance;Decreased sensation; Increased pain  Assessment: Pt requiring 2 assist for rolling, elevated HR limiting mobility this date. Pt would benefit from continued therapy at d/c to improve endurance. Treatment Diagnosis: Impaired functional mobility 2* decreased activity tolerance and strength. Specific instructions for Next Treatment: stretching, strengthening, ROM, progress to sitting up, monitor HR  Prognosis: Guarded  Decision Making: Medium Complexity  History: This is a 65-year-old male with past medical history of combined systolic and diastolic heart failure, ESRD on HD, COPD, hypertension, neurofibromatosis. About 1 week ago the patient had fallen while transferring to his wheelchair and hit his head and back. He progressively had increased pain in his right side and went to the emergency department 2 days after the fall where he was diagnosed with rib fractures. He was discharged home with some oral pain medications. Yesterday, while at hemodialysis, the patient states that he started develop severe rib pain. In addition he developed tachycardia and shortness of breath. He was sent to the emergency department from dialysis. EMS diagnosed the patient with atrial fibrillation with rapid ventricular response and treated him with a dose of Cardizem and on route to the emergency department. In the emergency department, ED evaluation did show A. fib with RVR as well as hypoxia and a code sepsis was called for possible pneumonia.   Exam: ROM, MMT, Precautions(no BP/Blood draws L arm; 1000 ml fluid restriction)  Required Braces or Orthoses?: No  Position Activity Restriction  Other position/activity restrictions: up with assistance, HR <110  Vision/Hearing  Vision: Impaired  Vision Exceptions: Wears glasses for reading  Hearing: Exceptions to [facility]  Hearing Exceptions: Hard of hearing/hearing concerns; No hearing aid(Decreased hearing L ear)     Subjective  General  Chart Reviewed: Yes  Patient assessed for rehabilitation services?: Yes  Additional Pertinent Hx: T2DM, dialysis, COPD, -DVT BLE, rib fx  Family / Caregiver Present: No  Referring Practitioner: Marc Peres MD  Referral Date : 03/03/20  Diagnosis: Atrial fibrillation  Follows Commands: Within Functional Limits  Subjective  Subjective: Pt in bed, agreeable to PT - finishing using urinal. PENELOPE López PT assessment. Pain Screening  Patient Currently in Pain: Yes  Pain Assessment  Pain Assessment: 0-10  Pain Level: 7  Pain Type: Acute pain  Pain Location: Rib cage  Pain Orientation: Right  Pain Descriptors: Aching; Sharp  Pain Frequency: Continuous  Pain Onset: On-going  Clinical Progression: Not changed  Non-Pharmaceutical Pain Intervention(s): Ambulation/Increased Activity; Distraction;Rest;Repositioned  Vital Signs  Patient Currently in Pain: Yes  Oxygen Therapy  SpO2: 100 %  O2 Device: Nasal cannula  O2 Flow Rate (L/min): 4 L/min  Patient Observation  Observations: Hemosiderin staining of B LE (below knee), fistula L arm       Orientation  Orientation  Overall Orientation Status: Within Functional Limits  Social/Functional History  Social/Functional History  Type of Home: Facility([facility] for 14 years)  Home Layout: One level  Home Access: Level entry  Home Equipment: [address] Help From: (Staff members)  ADL Assistance: Needs assistance(uses toilet, assist with bathing/dressing)  Homemaking Assistance: Needs assistance  Homemaking Responsibilities: No  Ambulation Assistance: (NON AMBULATORY - pivots to w/c)  Transfer Assistance: Needs assistance(1 assist for transfers)  Active : No  Occupation: Retired  Type of occupation:   Additional Comments: Pt states he usually transfers to w/c on his own or with  1assist but is not sure he is able to now as he fell recently. Pt does not report receiving therapy prior to admit at [facility].    Cognition        Objective          PROM RLE (degrees)  RLE PROM: Exceptions  RLE General PROM: Limited due to weakness/tightness of muscles  R Hip Flexion 0-125: 0-40  R Hip ABduction 0-45: 0-20  R Knee Flexion 0-145: 0-60  R Ankle Dorsiflexion 0-20: 5  R Ankle Plantar Flexion 0-45: 5-25  PROM LLE (degrees)  LLE PROM: Exceptions  LLE General PROM: Limited due to weakness/tightness of muscles  L Hip Flexion 0-125: 0-30  L Hip ABduction 0-45: 0-20  L Knee Flexion 0-145: 0-60  L Knee Extension 0: 0  L Ankle Dorsiflexion 0-20: 5  L Ankle Plantar Flexion 0-45: 5-25  AROM RUE (degrees)  RUE AROM : WFL  AROM LUE (degrees)  LUE AROM : WFL  Strength RLE  Strength RLE: Exception  Comment: supine - requiring additional cueing for active participation to engage muscles  R Hip Flexion: 0/5  R Hip ABduction: 1+/5  R Knee Flexion: 0/5  R Knee Extension: 1+/5  R Ankle Dorsiflexion: 0/5  R Ankle Plantar flexion: 0/5  Strength LLE  Strength LLE: Exception  Comment: supine - requiring additional cueing for active participation to engage muscles  L Hip Flexion: 1+/5  L Hip ABduction: 2-/5  L Knee Flexion: 1/5  L Knee Extension: 1+/5  L Ankle Dorsiflexion: 0/5  L Ankle Plantar Flexion: 0/5  Strength RUE  Strength RUE: WFL  Strength LUE  Strength LUE: WFL     Sensation  Overall Sensation Status: Impaired(Decreased sensation B LE)  Bed mobility  Rolling to Left: Moderate assistance;Minimal assistance;2 Person assistance  Rolling to Right: Moderate assistance;Minimal assistance;2 Person assistance  Comment: Deferred sitting up per RN Mylene Huddle due to HR in 130s with rolling/pericare. Use of bed rail initiated. Will progress pt as able. Transfers  Sit to Stand: Unable to assess  Stand to sit: Unable to assess  Comment: Pt does not stand or ambulate - baseline pivots to w/c  Ambulation  Ambulation?: No  Stairs/Curb  Stairs?: No     Balance  Comments: Unable to formally assess posture - pt remains in bed with bedside eval due to elevated HR. Other exercises  Other exercises?: Yes  Other exercises 1: PROM x5 B LE     Plan   Plan  Times per week: 4-5x/week  Specific instructions for Next Treatment: stretching, strengthening, ROM, progress to sitting up, monitor HR  Current Treatment Recommendations: Strengthening, ROM, Balance Training, Functional Mobility Training, Endurance Training, Positioning, Equipment Evaluation, Education, & procurement, Patient/Caregiver Education & Training, Safety Education & Training  Safety Devices  Type of devices: All fall risk precautions in place, Call light within reach, Patient at risk for falls, Left in bed, Nurse notified(PENELOPE Reyes)    G-Code       OutComes Score                                                  AM-PAC Score     AM-PAC Inpatient Mobility without Stair Climbing Raw Score : 5 (03/04/20 1035)  AM-PAC Inpatient without Stair Climbing T-Scale Score : 23.59 (03/04/20 1035)  Mobility Inpatient CMS 0-100% Score: 100 (03/04/20 1035)  Mobility Inpatient without Stair CMS G-Code Modifier : CN (03/04/20 1035)       Goals  Short term goals  Time Frame for Short term goals: 3-4 treatments  Short term goal 1: Pt to progress from PROM to AAROM to AROM exercise of B UE/LE x10 reps  Short term goal 2: Pt progress to rolling min to mod x1 L/R. Short term goal 3: Pt to progress to supine<->sit min to mod x2. Short term goal 4: Pt to dangle EOB for at least 5 minutes with active participation in Fair+ sitting balance. Short term goal 5: Pt to increase B LE strength to at least 2+/5.   Patient Goals   Patient goals : Pt did not state Therapy Time   Individual Concurrent Group Co-treatment   Time In 7457         Time Out 1055         Minutes [place], [address]

## 2020-03-05 NOTE — PROGRESS NOTES
This RN speaks with Dr. Rupert Le via phone regarding ENT consult for lesion on L ear.  Physician states this is an outpatient matter, advise patient to make appointment with ENT following discharge from hospital.

## 2020-03-05 NOTE — PROGRESS NOTES
Physical Therapy  Facility/Department: [facility] ICU  Daily Treatment Note  NAME: Ayde Bullard  : 1950  MRN: [id number]    Date of Service: 3/5/2020    Discharge Recommendations:  Patient would benefit from continued therapy after discharge   PT Equipment Recommendations  Equipment Needed: No    Assessment   Body structures, Functions, Activity limitations: Decreased functional mobility ; Decreased ADL status; Decreased ROM; Decreased strength;Decreased endurance;Decreased sensation; Increased pain  Treatment Diagnosis: Impaired functional mobility 2* decreased activity tolerance and strength. Specific instructions for Next Treatment: stretching, strengthening, ROM, progress to sitting up, monitor HR  Prognosis: Guarded  Decision Making: Medium Complexity  REQUIRES PT FOLLOW UP: Yes  Activity Tolerance  Activity Tolerance: Treatment limited secondary to medical complications (free text); Patient limited by endurance; Patient limited by pain; Patient limited by fatigue     Patient Diagnosis(es): The primary encounter diagnosis was Hypoxia. A diagnosis of Atrial fibrillation, unspecified type [facility]) was also pertinent to this visit.      has a past medical history of Acute combined systolic and diastolic congestive heart failure ([address] Anemia, BPH (benign prostatic hyperplasia), Cancer ([address] CKD (chronic kidney disease) stage 3, GFR 30-59 ml/min ([facility]), Constipation, COPD (chronic obstructive pulmonary disease) ([address] Depression, GERD (gastroesophageal reflux disease), Hemodialysis patient ([address] HTN (hypertension), Hx of blood clots, Hyperparathyroidism ([address] Hypothyroidism, IDDM (insulin dependent diabetes mellitus) ([address] Insomnia, Liver disease, MDRO (multiple drug resistant organisms) resistance, Memory deficit, Neurofibromatosis ([address] Osteoarthritis, Paraplegia ([address] Peptic ulcer, PVD (peripheral vascular disease) ([address] Secondary hyperparathyroidism ([address] Sinus disorder, Syncope, Type II or unspecified type No  Stairs/Curb  Stairs?: No        Other exercises  Other exercises?: Yes  Other exercises 1: Supine gastroc stretch 3x20 sec  Other exercises 2: Supine PROM (B) LEs x5  Other exercises 3: Seated EOB PROM LAQ x5, Marches x5  Other exercises 4: Seated EOB ~10 mins static and dynamic balance         Goals  Short term goals  Time Frame for Short term goals: 3-4 treatments  Short term goal 1: Pt to progress from PROM to AAROM to AROM exercise of B UE/LE x10 reps  Short term goal 2: Pt progress to rolling min to mod x1 L/R. Short term goal 3: Pt to progress to supine<->sit min to mod x2. Short term goal 4: Pt to dangle EOB for at least 5 minutes with active participation in Fair+ sitting balance. Short term goal 5: Pt to increase B LE strength to at least 2+/5. Patient Goals   Patient goals : Pt did not state    Plan    Plan  Times per week: 4-5x/week  Specific instructions for Next Treatment: stretching, strengthening, ROM, progress to sitting up, monitor HR  Current Treatment Recommendations: Strengthening, ROM, Balance Training, Functional Mobility Training, Endurance Training, Positioning, Equipment Evaluation, Education, & procurement, Patient/Caregiver Education & Training, Safety Education & Training  Safety Devices  Type of devices:  All fall risk precautions in place, Call light within reach, Patient at risk for falls, Left in bed, Nurse notified(PENELOPE Howell)     Therapy Time   Individual Concurrent Group Co-treatment   Time In [facility]         Minutes [facility]

## 2020-03-05 NOTE — PROGRESS NOTES
Dr. Brigette Renteria at beside, transition patient to eliquis, miracle heparin and cardizem drips, start lopressor as indicated in [address]. Patient will need stress test, resting portion to be done today. Okay for transfer to stepdown from cardiology perspective.

## 2020-03-05 NOTE — PROGRESS NOTES
Dr. Winston Fairbanks at bedside. Physician would like ENT consult with Dr. Beto Peres for lesion on L ear, pt has hx of malignancy.

## 2020-03-05 NOTE — FLOWSHEET NOTE
03/05/20 1100   Encounter Summary   Services provided to: Patient   Referral/Consult From: Rounding   Complexity of Encounter Low   Length of Encounter 15 minutes   Spiritual/Scientology   Type Spiritual support   Assessment Sleeping   Intervention Prayer   Outcome Did not respond

## 2020-03-05 NOTE — PROGRESS NOTES
ICU Progress Note    Pulmonary and Critical Care Specialists    Patient - Rich Keen,  Age - 78 y.o.    - 1950      Room Number -    MRN -  [id number]   Acct # - [de-identified]  Date of Admission -  3/2/2020  3:59 PM     Follow-up: Acute respiratory failure    Events of Past 24 Hours   Feeling better, no fever or chills  4 L O2, been refusing BiPAP   Some chest pain, not much short of breath, little cough, no wheezing  No nausea vomiting or diarrhea  Discussed with staff  On heparin drip    Vitals    height is 6' (1.829 m) and weight is 280 lb 4 oz (127.1 kg). His oral temperature is 97.8 °F (36.6 °C). His blood pressure is 114/86 and his pulse is 120. His respiration is 18 and oxygen saturation is 90%. Temperature Range: Temp: 97.8 °F (36.6 °C) Temp  Av.8 °F (36.6 °C)  Min: 97.5 °F (36.4 °C)  Max: 98.4 °F (36.9 °C)  BP Range:  Systolic (46WHS), MJ , Min:92 , DPA:779     Diastolic (53FNU), ZXC:63, Min:45, Max:116    Pulse Range: Pulse  Av.3  Min: 90  Max: 135  Respiration Range: Resp  Av.2  Min: 13  Max: 29  Current Pulse Ox[de-identified]  SpO2: 90 %  24HR Pulse Ox Range:  SpO2  Av.7 %  Min: 66 %  Max: 100 %  Oxygen Amount and Delivery: O2 Flow Rate (L/min): 3 L/min      Wt Readings from Last 3 Encounters:   20 280 lb 4 oz (127.1 kg)   20 286 lb (129.7 kg)   20 286 lb 2.5 oz (129.8 kg)     I/O       Intake/Output Summary (Last 24 hours) at 3/5/2020 0944  Last data filed at 3/5/2020 0811  Gross per 24 hour   Intake 2070 ml   Output --   Net 2070 ml     I/O last 3 completed shifts:   In:  [P.O.:480; I.V.:1580]  Out: -      DRAIN/TUBE OUTPUT:     Invasive Lines   ETT Day -     Lines -      ICP PRESSURE RANGE:  No data recorded  CVP PRESSURE RANGE:  No data recorded       Additional Respiratory  Assessments  Pulse: 120  Resp: 18  SpO2: 90 %  End Tidal CO2: 35 (%)  Oral Care: Mouthwash       ABGs:   Lab Results   Component Value Date -scattered bruising, no bleeding,   Extremities - no cyanosis, clubbing or edema, + calf stiffness    Lab Results   CBC     Lab Results   Component Value Date    WBC 4.1 03/05/2020    RBC 3.52 03/05/2020    RBC 4.53 04/24/2012    HGB 10.4 03/05/2020    HCT 33.0 03/05/2020     03/05/2020     04/24/2012    MCV 93.8 03/05/2020    MCH 29.6 03/05/2020    MCHC 31.6 03/05/2020    RDW 16.5 03/05/2020    METASPCT 1 01/02/2020    LYMPHOPCT 10 03/02/2020    MONOPCT 7 03/02/2020    MYELOPCT 1 01/02/2020    BASOPCT 2 03/02/2020    MONOSABS 0.38 03/02/2020    LYMPHSABS 0.54 03/02/2020    EOSABS 0.05 03/02/2020    BASOSABS 0.11 03/02/2020    DIFFTYPE NOT REPORTED 03/02/2020       BMP   Lab Results   Component Value Date     03/05/2020    K 4.3 03/05/2020    CL 97 03/05/2020    CO2 25 03/05/2020    BUN 39 03/05/2020    CREATININE 5.89 03/05/2020    GLUCOSE 115 03/05/2020    GLUCOSE 135 06/01/2012    CALCIUM 8.6 03/05/2020       LFTS  Lab Results   Component Value Date    ALKPHOS 179 01/02/2020    ALT 22 01/02/2020    AST 18 01/02/2020    PROT 5.9 01/02/2020    BILITOT 0.89 01/02/2020    BILIDIR 0.24 08/19/2018    IBILI 0.19 08/19/2018    LABALBU 3.3 01/02/2020    LABALBU 4.6 04/24/2012       INR  Recent Labs     03/02/20  1801   PROTIME 13.6   INR 1.1       APTT  Recent Labs     03/03/20  0154 03/03/20  2119 03/04/20  0245   APTT 83.5* 92.0* 122.1*       Lactic Acid  Lab Results   Component Value Date    LACTA 1.5 03/02/2020    LACTA 1.2 06/18/2018    LACTA 0.5 09/01/2016        BNP   No results for input(s): BNP in the last 72 hours. Cultures   Blood cultures x2, negative x3 days  Urine culture: No growth    Radiology     Plain Films: Prominent interstitial markings, elevated right diaphragm and atelectasis right base         CT Scans    See actual reports for details    SYSTEM ASSESSMENT      Neuro       Respiratory   Wean oxygen as tolerated.  Keep O2 sat > 88%       Hemodynamics

## 2020-03-05 NOTE — PROGRESS NOTES
[address] [facility].    PROGRESS NOTE             3/5/2020    9:14 AM    Name:   Cyn Johnston  MRN:     [id number]     Acct:      [de-identified]   Room:   2006/2006-01  IP Day:  3  Admit Date:  3/2/2020  3:59 PM    PCP:  Sánchez Smith DO  Code Status:  Full Code    Subjective:     C/C:   Chief Complaint   Patient presents with    Shortness of Breath    Tachycardia     Interval History Status: improved. Patient was seen and examined at bedside. He is feeling slightly better today although he still complains of some pain in his ribs. He is on morphine, oxycodone, and a fentanyl patch for pain as well as a lidocaine patch. His heart rate was better controlled this morning in the 90s. He denies any chest pain, shortness of breath, abdominal pain, bowel problems. He does complain of some pain in his right leg near an area of erythema and induration which could represent underlying phlegmon. We will do US and await results. Brief History:     See H&P    Review of Systems:     Review of Systems   Constitutional: Negative for chills, fatigue and fever. HENT: Negative. Eyes: Negative. Respiratory: Negative for cough, chest tightness, shortness of breath and wheezing. Cardiovascular: Negative for chest pain and palpitations. Gastrointestinal: Negative for abdominal distention, abdominal pain, constipation, diarrhea and rectal pain. Genitourinary: Negative. Musculoskeletal: Positive for arthralgias and myalgias (rib pain). Skin: Positive for color change (BL lower extremity chronic skin changes). Indurated lesion on right buttock     Neurological: Negative. Psychiatric/Behavioral: Negative. Medications: Allergies: Allergies   Allergen Reactions    Cymbalta [Duloxetine Hcl]      Intolerance.     Tromethamine Other (See Comments)    Toradol [Ketorolac Tromethamine] Other (See Comments)     Headaches Current Meds:   Scheduled Meds:    dilTIAZem  120 mg Oral Daily    miconazole   Topical BID    ALPRAZolam  0.5 mg Oral BID    escitalopram  10 mg Oral QAM    fluticasone  1 spray Nasal Daily    budesonide-formoterol  2 puff Inhalation BID    finasteride  5 mg Oral Daily    fentaNYL  1 patch Transdermal Q72H    aspirin  81 mg Oral Daily    insulin glargine  10 Units Subcutaneous BID    insulin lispro  0-12 Units Subcutaneous TID WC    insulin lispro  0-6 Units Subcutaneous Nightly    isosorbide mononitrate  60 mg Oral Daily    lactulose  10 g Oral Daily    cetirizine  5 mg Oral Daily    midodrine  5 mg Oral Once per day on Mon Wed Fri    tamsulosin  0.4 mg Oral Daily    sevelamer  2,400 mg Oral TID WC    senna  1 tablet Oral BID    pantoprazole  40 mg Oral QAM AC    lidocaine  1 patch Transdermal Daily    fentanNYL  50 mcg Intravenous Once    sodium chloride flush  10 mL Intravenous 2 times per day     Continuous Infusions:    dextrose      dilTIAZem (CARDIZEM) 125 mg in dextrose 5% 125 mL infusion Stopped (03/05/20 0820)    heparin (porcine) 26 Units/kg/hr (03/04/20 2213)     PRN Meds: diphenhydrAMINE, glucose, dextrose, glucagon (rDNA), dextrose, lidocaine-prilocaine, oxyCODONE HCl, nitroGLYCERIN, ondansetron, levalbuterol, metoprolol, metoprolol, sodium chloride flush, acetaminophen, heparin (porcine), heparin (porcine), morphine **OR** morphine    Data:     Past Medical History:   has a past medical history of Acute combined systolic and diastolic congestive heart failure (HCC), Anemia, BPH (benign prostatic hyperplasia), Cancer ([facility] CKD (chronic kidney disease) stage 3, GFR 30-59 ml/min ([facility]), Constipation, COPD (chronic obstructive pulmonary disease) ([facility]), Depression, GERD (gastroesophageal reflux disease), Hemodialysis patient ([facility] HTN (hypertension), Hx of blood clots, Hyperparathyroidism ([facility] Hypothyroidism, IDDM (insulin dependent diabetes mellitus) ([facility] Insomnia, Liver disease, MDRO (multiple drug resistant organisms) resistance, Memory deficit, Neurofibromatosis ([facility] Osteoarthritis, Paraplegia ([facility] Peptic ulcer, PVD (peripheral vascular disease) ([facility] Secondary hyperparathyroidism ([facility] Sinus disorder, Syncope, Type II or unspecified type diabetes mellitus without mention of complication, not stated as uncontrolled, and Venous thrombosis. Social History:   reports that he quit smoking about a year ago. His smoking use included cigarettes. He smoked 1.00 pack per day. He has never used smokeless tobacco. He reports that he does not drink alcohol or use drugs. Family History:   Family History   Family history unknown: Yes       Vitals:  /86   Pulse 120   Temp 97.8 °F (36.6 °C) (Oral)   Resp 18   Ht 6' (1.829 m)   Wt 280 lb 4 oz (127.1 kg)   SpO2 90%   BMI 38.01 kg/m²   Temp (24hrs), Av.8 °F (36.6 °C), Min:97.5 °F (36.4 °C), Max:98.4 °F (36.9 °C)    Recent Labs     20  1207 20  1629 20  2144 20  0816   POCGLU 195* 128* 172* 110       I/O(24Hr): Intake/Output Summary (Last 24 hours) at 3/5/2020 0914  Last data filed at 3/5/2020 5457  Gross per 24 hour   Intake 2070 ml   Output --   Net 2070 ml       Labs:  [unfilled]    Lab Results   Component Value Date/Time    SPECIAL NOT REPORTED 2020 07:45 PM     Lab Results   Component Value Date/Time    CULTURE NO SIGNIFICANT GROWTH 2020 07:45 PM       [unfilled]    Radiology:    Ct Abdomen Pelvis Wo Contrast    Result Date: 3/3/2020  EXAMINATION: CT OF THE ABDOMEN AND PELVIS WITHOUT CONTRAST 3/2/2020 8:58 pm TECHNIQUE: CT of the abdomen and pelvis was performed without the administration of intravenous contrast. Multiplanar reformatted images are provided for review. Dose modulation, iterative reconstruction, and/or weight based adjustment of the mA/kV was utilized to reduce the radiation dose to as low as reasonably achievable.  COMPARISON: 2018 HISTORY: abnormal extra-axial fluid collection. The gray-white differentiation is maintained without evidence of an acute infarct. There is no evidence of hydrocephalus. ORBITS: The visualized portion of the orbits demonstrate no acute abnormality. SINUSES: Opacification of the right maxillary sinus. SOFT TISSUES/SKULL:  No acute abnormality of the visualized skull or soft tissues. No acute intracranial abnormality. Right maxillary sinusitis. Ct Head Wo Contrast    Result Date: 2/27/2020  EXAMINATION: CT OF THE HEAD WITHOUT CONTRAST  2/27/2020 10:42 am TECHNIQUE: CT of the head was performed without the administration of intravenous contrast. Dose modulation, iterative reconstruction, and/or weight based adjustment of the mA/kV was utilized to reduce the radiation dose to as low as reasonably achievable. COMPARISON: 01/02/2020 HISTORY: ORDERING SYSTEM PROVIDED HISTORY: trauma TECHNOLOGIST PROVIDED HISTORY: trauma Reason for Exam: trauma Acuity: Acute Type of Exam: Initial Mechanism of Injury: PT STATES HE FELL TUESDAY, STATES HIS BUTT HURTS FINDINGS: BRAIN/VENTRICLES: There is no acute intracranial hemorrhage, mass effect or midline shift. No abnormal extra-axial fluid collection. The gray-white differentiation is maintained without evidence of an acute infarct. There is no evidence of hydrocephalus. Mild generalized brain parenchymal atrophy. ORBITS: The visualized portion of the orbits demonstrate no acute abnormality. SINUSES: Complete opacification of the right maxillary sinus and left mastoid air cells. Remaining paranasal sinuses are clear. SOFT TISSUES/SKULL:  No acute abnormality of the visualized skull or soft tissues. No acute intracranial abnormality. Persistent opacification of the right maxillary sinus and left mastoid air cells. Correlate for any signs of sinusitis.      Ct Chest Wo Contrast    Result Date: 3/2/2020  EXAMINATION: CT OF THE CHEST WITHOUT CONTRAST 3/2/2020 6:38 pm TECHNIQUE: CT of the chest was performed without the administration of intravenous contrast. Multiplanar reformatted images are provided for review. Dose modulation, iterative reconstruction, and/or weight based adjustment of the mA/kV was utilized to reduce the radiation dose to as low as reasonably achievable. COMPARISON: January 28, 2015 February 27, 2020 HISTORY: ORDERING SYSTEM PROVIDED HISTORY: r/o PTX TECHNOLOGIST PROVIDED HISTORY: r/o PTX Reason for Exam: pt became altered mental status at Dialysis today FINDINGS: Evaluation is limited by motion. Chest wall: No axillary adenopathy. Upper Abdomen: Cholecystectomy. No adrenal nodule. Mediastinum: Cardiomegaly. No pericardial effusion. Coronary artery calcifications. No adenopathy. Atherosclerotic calcification of the thoracic aorta. Lungs: No pneumothorax. Partial clearing at the right lung base. Pleural based lesion. Underlying rib fractures within the lingula but new since 2015 measuring at least 3.3 x 1.2 cm. . Bones: Multiple rib fractures are again seen. Motion limited study. Partial clearing at the right lung base. Pleural based lesion within the lingula is nonspecific and could represent sequela from remote rib fractures versus a pleural based lesion. Recommend follow-up versus further evaluation. Ct Chest Wo Contrast    Result Date: 2/27/2020  EXAMINATION: CT OF THE CHEST WITHOUT CONTRAST 2/27/2020 10:43 am TECHNIQUE: CT of the chest was performed without the administration of intravenous contrast. Multiplanar reformatted images are provided for review. Dose modulation, iterative reconstruction, and/or weight based adjustment of the mA/kV was utilized to reduce the radiation dose to as low as reasonably achievable.  COMPARISON: 11/25/2016 HISTORY: ORDERING SYSTEM PROVIDED HISTORY: trauma TECHNOLOGIST PROVIDED HISTORY: trauma Reason for Exam: trauma Acuity: Acute Type of Exam: Initial Mechanism of Injury: PT STATES HE FELL ON TUESDAY, C/O RIGHT SIDE PAIN FINDINGS: Mediastinum: Cardiomegaly. Mild coronary artery calcifications. Normal caliber aorta. No significant mediastinal, hilar or axillary lymphadenopathy. Thyroid gland grossly normal as is esophagus. There are some left lower neck surgical clips. Lungs/pleura: Patchy bilateral posterior lung base consolidation right greater than left showing progression from prior. Findings probably combination of atelectasis scarring and scarring. Trace right pleural effusion. There is no pneumothorax. Central airways unremarkable. Upper Abdomen: No suspicious masses. Soft Tissues/Bones: Nondisplaced acute right 4th rib fracture along the lateral convexity manifested by some cortical buckling. Similar but slightly more pronounced findings noted in the right 5th, 6th and 7th ribs. Additional scattered multiple bilateral nonacute rib fractures are evident. .     1. Acute nondisplaced right 4th, 5th, 6th and 7th ribs most apparent in the 6th rib. The others are subtle manifested by cortical buckling. There are other scattered nonacute rib fractures noted. 2. Bilateral posterior basal infiltrates suggestive of atelectasis/scarring. There is also trace right pleural effusion. Ct Cervical Spine Wo Contrast    Result Date: 2/27/2020  EXAMINATION: CT OF THE CERVICAL SPINE WITHOUT CONTRAST 2/27/2020 10:42 am TECHNIQUE: CT of the cervical spine was performed without the administration of intravenous contrast. Multiplanar reformatted images are provided for review. Dose modulation, iterative reconstruction, and/or weight based adjustment of the mA/kV was utilized to reduce the radiation dose to as low as reasonably achievable.  COMPARISON: 10/20/2006 HISTORY: ORDERING SYSTEM PROVIDED HISTORY: trauma TECHNOLOGIST PROVIDED HISTORY: trauma Reason for Exam: trauma Acuity: Acute Type of Exam: Initial Mechanism of Injury: PT STATES HE FELL ON TUESDAY FINDINGS: BONES/ALIGNMENT: There is no acute fracture or traumatic malalignment. DEGENERATIVE CHANGES: Severe disc degenerative change at C5-6 and C7-T1. SOFT TISSUES: There is no prevertebral soft tissue swelling. No acute abnormality of the cervical spine. Us Renal Complete    Result Date: 3/3/2020  EXAMINATION: RETROPERITONEAL ULTRASOUND OF THE KIDNEYS 3/3/2020 COMPARISON: None HISTORY: ORDERING SYSTEM PROVIDED HISTORY: renal mass TECHNOLOGIST PROVIDED HISTORY: renal mass FINDINGS: Kidneys: Status post right nephrectomy. Unremarkable appearing right renal fossa. 6.8 cm cyst laterally left kidney. No solid masses. No hydronephrosis. .     1. Status post right nephrectomy 2. 6.8 cm cyst laterally left kidney 3. Left kidney otherwise appears unremarkable     Ir Elisabeth Erm Cva Device Plmt/replace/removal    Result Date: 3/3/2020  PROCEDURE: ULTRASOUND GUIDED VASCULAR ACCESS. FLUOROSCOPY GUIDED PLACEMENT OF A NON-TUNNELED CENTRAL CATHETER. 3/3/2020. HISTORY: ORDERING SYSTEM PROVIDED HISTORY: CVC TECHNOLOGIST PROVIDED HISTORY: CVC; acute on chronic respiratory failure and congestive heart failure. Lumen?->Triple Lumen SEDATION: Local anesthesia FLUOROSCOPY DOSE AND TYPE OR TIME AND EXPOSURES: Fluoroscopy time-12 seconds D AP-110 cGy cm squared TECHNIQUE: Informed consent was obtained after a detailed explanation of the procedure including risks, benefits, and alternatives. Universal protocol was observed. The right neck and chest were prepped and draped in sterile fashion using maximum sterile barrier technique. Local anesthesia was achieved with lidocaine. A micropuncture needle was used to access the right internal jugular vein using ultrasound guidance. An ultrasound image demonstrating patency of the vein with needle tip located within it. An image was obtained and stored in PACs. A 0.035 guidewire was used to place a 7 [place] x 16 cm triple-lumen central catheter using fluoroscopic guidance with tip at the cavoatrial junction after fascial tract dilation.  The catheter lumens flushed easily and there was a good blood return. The catheter was sutured to the skin and covered with a CHG dressing. The patient tolerated the procedure well and there were no immediate complications. FINDINGS: Fluoroscopic image demonstrates the tip of the catheter near the cavoatrial junction. Successful ultrasound and fluoroscopy guided triple-lumen central catheter placement. Central line is ready for use at this time. CONTRAST: None used     Xr Chest Portable    Result Date: 3/4/2020  EXAMINATION: ONE XRAY VIEW OF THE CHEST 3/4/2020 6:55 am COMPARISON: March 3, 2020, March 2, 2020 HISTORY: ORDERING SYSTEM PROVIDED HISTORY: Acute respiratory failure TECHNOLOGIST PROVIDED HISTORY: Acute respiratory failure Reason for Exam: Acute respiratory failure Acuity: Acute Type of Exam: Subsequent/Follow-up Additional signs and symptoms: Acute respiratory failure FINDINGS: The cardiac and mediastinal contours appear unchanged. Right internal jugular line terminates at the distal SVC. Basilar linear opacities are noted. No new airspace disease identified in the interval.  No evidence for pneumothorax. Surgical clips project over the left neck. Basilar linear opacities favoring subsegmental atelectasis. Xr Chest Portable    Result Date: 3/3/2020  EXAMINATION: ONE XRAY VIEW OF THE CHEST 3/3/2020 6:01 am COMPARISON: Chest radiograph performed 03/02/2020. HISTORY: ORDERING SYSTEM PROVIDED HISTORY: follow up rib fractures on right TECHNOLOGIST PROVIDED HISTORY: follow up rib fractures on right Reason for Exam: follow up right side rib fractures. Acuity: Acute Type of Exam: Subsequent/Follow-up FINDINGS: The lungs are without consolidation or effusion. There is no pneumothorax. The heart is enlarged. The upper abdomen is unremarkable. The extrathoracic soft tissues are unremarkable. Cardiomegaly without acute pulmonary process.      Xr Chest Portable    Result Date: 3/2/2020  EXAMINATION: ONE XRAY VIEW OF THE CHEST 3/2/2020 1:34 pm COMPARISON: 01/02/2020 HISTORY: ORDERING SYSTEM PROVIDED HISTORY: chest pain, r/o PTX TECHNOLOGIST PROVIDED HISTORY: chest pain, r/o PTX Reason for Exam: Chest pain. R/o PTX Acuity: Acute Type of Exam: Initial Additional signs and symptoms: Chest pain. R/o PTX FINDINGS: Examination is limited by patient rotation and the patient's body habitus. There is elevation of the right hemidiaphragm. Mild bibasilar airspace disease is noted. Cardial pericardial silhouette is enlarged but stable. No pneumothorax is found. No free air. No acute bony abnormality. Mild bibasilar airspace disease, atelectasis versus pneumonia versus edema. No pneumothorax is identified. Vl Dup Lower Extremity Venous Bilateral    Result Date: 3/3/2020    [facility]  Vascular Lower Extremities DVT Study Procedure   Patient Name  Debra Franks     Date of Study           03/03/2020                Petrona Massed   Date of Birth 1950  Gender                  Male   Age           78 year(s)  Race                       Room Number   2006        Height:                 71.65 inch, 182 cm   Corporate ID  [id number]    Weight:                 280 pounds, 127 kg  #   Patient Acct  [de-identified]   BSA:        2.45 m^2    BMI:       38.34 kg/m^2  #   MR #          [id number]      Sonographer             Kathy Rausch RVT   Accession #   [id number]   Interpreting Physician  Hao Vargas   Referring                 Referring Physician     Bell Walsh MD  Nurse  Practitioner  Procedure Type of Study:   Veins: Lower Extremities DVT Study, Venous Scan Lower Bilateral.  Indications for Study:Respiratory Failure. Patient Status: In Patient. Technical Quality:Limited visualization. Conclusions   Summary   Technically limited visualization. No evidence of superficial or deep venous thrombosis in both lower  extremities.    Signature ----------------------------------------------------------------  Electronically signed by Gregoria Matthew RVT(Sonographer) on  03/03/2020 04:05 PM  ----------------------------------------------------------------   ----------------------------------------------------------------  Electronically signed by Krishna Pereyra(Interpreting physician)  on 03/03/2020 09:43 PM  ----------------------------------------------------------------  Findings:   Right Impression:                    Left Impression:  The common femoral, femoral,         The common femoral, femoral,  popliteal and tibial veins           popliteal and tibial veins  demonstrate normal compressibility   demonstrate normal compressibility  and augmentation. and augmentation. Limited visualization of the calf    Normal compressibility of the great  veins. saphenous vein. Normal compressibility of the great  Normal compressibility of the small  saphenous vein. saphenous vein. Normal compressibility of the small  saphenous vein. Velocities are measured in cm/s ; Diameters are measured in cm Right Lower Extremities DVT Study Measurements Right 2D Measurements +------------------------------------+----------+---------------+----------+ ! Location                            ! Visualized! Compressibility! Thrombosis! +------------------------------------+----------+---------------+----------+ ! Common Femoral                      !Yes       ! Yes            ! None      ! +------------------------------------+----------+---------------+----------+ ! Prox Femoral                        !Yes       ! Yes            ! None      ! +------------------------------------+----------+---------------+----------+ ! Mid Femoral                         !Yes       ! Yes            ! None      ! +------------------------------------+----------+---------------+----------+ ! Dist Femoral                        !Yes !Common Femoral                      !Yes       ! Yes            ! None      ! +------------------------------------+----------+---------------+----------+ ! Prox Femoral                        !Yes       ! Yes            ! None      ! +------------------------------------+----------+---------------+----------+ ! Mid Femoral                         !Yes       ! Yes            ! None      ! +------------------------------------+----------+---------------+----------+ ! Dist Femoral                        !Yes       ! Yes            ! None      ! +------------------------------------+----------+---------------+----------+ ! Deep Femoral                        !Yes       ! Yes            ! None      ! +------------------------------------+----------+---------------+----------+ ! Popliteal                           !Yes       ! Yes            ! None      ! +------------------------------------+----------+---------------+----------+ ! Sapheno Femoral Junction            ! Yes       ! Yes            ! None      ! +------------------------------------+----------+---------------+----------+ ! PTV                                 ! Yes       ! Yes            ! None      ! +------------------------------------+----------+---------------+----------+ ! Peroneal                            !Yes       ! Yes            ! None      ! +------------------------------------+----------+---------------+----------+ ! Gastroc                             ! Yes       ! Yes            ! None      ! +------------------------------------+----------+---------------+----------+ ! GSV Thigh                           ! Yes       ! Yes            ! None      ! +------------------------------------+----------+---------------+----------+ ! GSV Knee                            ! Yes       ! Yes            ! None      ! +------------------------------------+----------+---------------+----------+ ! GSV Ankle                           ! Yes       ! Yes            ! None      ! +------------------------------------+----------+---------------+----------+ ! SSV                                 ! Yes       ! Yes            ! None      ! +------------------------------------+----------+---------------+----------+        Physical Examination:        Physical Exam  Constitutional:       General: He is not in acute distress. Appearance: He is obese. He is not ill-appearing. HENT:      Mouth/Throat:      Mouth: Mucous membranes are moist.      Pharynx: Oropharynx is clear. Eyes:      Extraocular Movements: Extraocular movements intact. Pupils: Pupils are equal, round, and reactive to light. Neck:      Musculoskeletal: Normal range of motion. No neck rigidity or muscular tenderness. Cardiovascular:      Rate and Rhythm: Tachycardia present. Rhythm irregular. Pulses: Normal pulses. Pulmonary:      Effort: Pulmonary effort is normal. No respiratory distress. Breath sounds: No wheezing or rhonchi. Abdominal:      General: Abdomen is flat. Bowel sounds are normal. There is no distension. Tenderness: There is no abdominal tenderness. Musculoskeletal:         General: Signs of injury (R side rib fractures, non displaced) present. Skin:     General: Skin is warm and dry. Findings: Erythema (BL lower extremity chronic changes) and lesion (right buttock, indurated lesion) present. Neurological:      General: No focal deficit present. Mental Status: He is alert.          Assessment:        Primary Problem  Atrial fibrillation [facility])    Active Hospital Problems    Diagnosis Date Noted    ESRD on hemodialysis ([facility] [N18.6, Z99.2] 03/03/2020    Fracture of rib of right side [S22.31XA] 03/03/2020    Hypoxia [R09.02]     Atrial fibrillation ([facility] [I48.91] 03/02/2020    Acute on chronic respiratory failure with hypoxia (HCC) [J96.21] 12/01/2016    Acute combined systolic and diastolic congestive heart failure ([facility] [I50.41] 11/25/2016    ESRD (end stage renal restriction     DVT prophylaxis  -Heparin drip  -Eliquis on DC     GI Prophylaxis  -Protonix 40 mg daily     PT/OT     Dispo  -Social work for [address], MD  3/5/2020  9:14 AM

## 2020-03-05 NOTE — PROGRESS NOTES
[facility]   Progress Note                   Date:   3/5/2020  Patient name: Chencho Hernandez  Date of admission:  3/2/2020  3:59 PM  MRN:   [id number]  YOB: 1950  PCP: Zhao Rosario DO    Reason for Admission:     Subjective:       Clinical Changes / Abnormalities: Patient feels better A. fib with better rate control breathing is much improved  Echocardiogram was done with normal ejection fraction. Medications:   Scheduled Meds:   dilTIAZem  120 mg Oral Daily    apixaban  5 mg Oral BID    metoprolol  2.5 mg Intravenous Q6H    miconazole   Topical BID    ALPRAZolam  0.5 mg Oral BID    escitalopram  10 mg Oral QAM    fluticasone  1 spray Nasal Daily    budesonide-formoterol  2 puff Inhalation BID    finasteride  5 mg Oral Daily    fentaNYL  1 patch Transdermal Q72H    aspirin  81 mg Oral Daily    insulin glargine  10 Units Subcutaneous BID    insulin lispro  0-12 Units Subcutaneous TID WC    insulin lispro  0-6 Units Subcutaneous Nightly    isosorbide mononitrate  60 mg Oral Daily    lactulose  10 g Oral Daily    cetirizine  5 mg Oral Daily    midodrine  5 mg Oral Once per day on Mon Wed Fri    tamsulosin  0.4 mg Oral Daily    sevelamer  2,400 mg Oral TID WC    senna  1 tablet Oral BID    pantoprazole  40 mg Oral QAM AC    lidocaine  1 patch Transdermal Daily    fentanNYL  50 mcg Intravenous Once    sodium chloride flush  10 mL Intravenous 2 times per day     Continuous Infusions:   dextrose       CBC:   Recent Labs     03/03/20  0451 03/04/20  0245 03/05/20  0416   WBC 3.9 4.5 4.1   HGB 9.8* 10.1* 10.4*   * 119* 123*     BMP:    Recent Labs     03/03/20 0451 03/04/20  0245 03/05/20  0416    134* 136   K 4.5 4.6 4.3   CL 98 92* 97*   CO2 23 24 25   BUN 58* 71* 39*   CREATININE 7.31* 8.29* 5.89*   GLUCOSE 134* 131* 115*     Hepatic: No results for input(s): AST, ALT, ALB, BILITOT, ALKPHOS in the last 72 hours.   Troponin: No results for input(s): TROPONINI in the last 72 hours. BNP: No results for input(s): BNP in the last 72 hours. Lipids: No results for input(s): CHOL, HDL in the last 72 hours. Invalid input(s): LDLCALCU  INR:   Recent Labs     03/02/20  1801   INR 1.1       Objective:   Vitals: /74   Pulse 101   Temp 97.8 °F (36.6 °C) (Oral)   Resp 17   Ht 6' (1.829 m)   Wt 280 lb 4 oz (127.1 kg)   SpO2 90%   BMI 38.01 kg/m²   I/O last 3 completed shifts:   In: 2060 [P.O.:480; I.V.:1580]  Out: -   I/O this shift:  In: 10 [I.V.:10]  Out: -   Scheduled Meds:   dilTIAZem  120 mg Oral Daily    apixaban  5 mg Oral BID    metoprolol  2.5 mg Intravenous Q6H    miconazole   Topical BID    ALPRAZolam  0.5 mg Oral BID    escitalopram  10 mg Oral QAM    fluticasone  1 spray Nasal Daily    budesonide-formoterol  2 puff Inhalation BID    finasteride  5 mg Oral Daily    fentaNYL  1 patch Transdermal Q72H    aspirin  81 mg Oral Daily    insulin glargine  10 Units Subcutaneous BID    insulin lispro  0-12 Units Subcutaneous TID WC    insulin lispro  0-6 Units Subcutaneous Nightly    isosorbide mononitrate  60 mg Oral Daily    lactulose  10 g Oral Daily    cetirizine  5 mg Oral Daily    midodrine  5 mg Oral Once per day on Mon Wed Fri    tamsulosin  0.4 mg Oral Daily    sevelamer  2,400 mg Oral TID WC    senna  1 tablet Oral BID    pantoprazole  40 mg Oral QAM AC    lidocaine  1 patch Transdermal Daily    fentanNYL  50 mcg Intravenous Once    sodium chloride flush  10 mL Intravenous 2 times per day     Continuous Infusions:   dextrose       PRN Meds:.regadenoson, technetium sestamibi, sodium chloride flush, diphenhydrAMINE, glucose, dextrose, glucagon (rDNA), dextrose, lidocaine-prilocaine, oxyCODONE HCl, nitroGLYCERIN, ondansetron, levalbuterol, sodium chloride flush, acetaminophen, morphine **OR** morphine    CONSTITUTIONAL: AOx4, no apparent distress, appears stated age   HEAD: normocephalic, atraumatic   EYES: Treatment:   A. fib with RVR now with better control, patient Eliquis was changed to heparin as patient is improved  Eliquis can be restarted  Echocardiogram with normal ejection fraction, LVH, given the elevated tropes possible type II plan is to do Lexiscan stress test tomorrow morning  Blood pressure is fairly controlled  Patient can be go out to telemetry  Further plan will be after the stress test    Ravinder Bill [facility] Cardiology  [phone]

## 2020-03-05 NOTE — PROGRESS NOTES
HEMODIALYSIS POST TREATMENT NOTE    Treatment time ordered: 3    Actual treatment time: 3    UltraFiltration Goal: 2500  UltraFiltration Removed:       Pre Treatment weight: 127.1   Post Treatment weight: 125.1  Estimated Dry Weight: na    Access used:     Central Venous Catheter: na     Internal Access: yes       Access Flow: good     Sign and symptoms of infection: no       If YES: na    Medications or blood products given: na    Regular outpatient schedule: mwf  Summary of response to treatment: tolerated tx well    Explain if orders NOT met, was physician notified:loulou      ACES flowsheet faxed to patient unit/ placed in patient chart: yes    Post assessment completed: yes    Report given to: Duane/Meghna      * Intra-treatment documented Safety Checks include the followin) Access and face visible at all times. 2) All connections and blood lines are secure with no kinks. 3) NVL alarm engaged. 4) Hemosafe device applied (if applicable). 5) No collapse of Arterial or Venous blood chambers. 6) All blood lines / pump segments in the air detectors.

## 2020-03-05 NOTE — PROGRESS NOTES
BID    escitalopram  10 mg Oral QAM    fluticasone  1 spray Nasal Daily    budesonide-formoterol  2 puff Inhalation BID    finasteride  5 mg Oral Daily    fentaNYL  1 patch Transdermal Q72H    aspirin  81 mg Oral Daily    insulin glargine  10 Units Subcutaneous BID    insulin lispro  0-12 Units Subcutaneous TID     insulin lispro  0-6 Units Subcutaneous Nightly    isosorbide mononitrate  60 mg Oral Daily    lactulose  10 g Oral Daily    cetirizine  5 mg Oral Daily    midodrine  5 mg Oral Once per day on Mon Wed Fri    tamsulosin  0.4 mg Oral Daily    sevelamer  2,400 mg Oral TID     senna  1 tablet Oral BID    pantoprazole  40 mg Oral QAM AC    lidocaine  1 patch Transdermal Daily    fentanNYL  50 mcg Intravenous Once    sodium chloride flush  10 mL Intravenous 2 times per day     Continuous Infusions:   dextrose       PRN Meds:regadenoson, sodium chloride flush, diphenhydrAMINE, glucose, dextrose, glucagon (rDNA), dextrose, lidocaine-prilocaine, oxyCODONE HCl, nitroGLYCERIN, ondansetron, levalbuterol, sodium chloride flush, acetaminophen, morphine **OR** morphine     Physical Exam:  Vitals:    03/05/20 1145 03/05/20 1200 03/05/20 1315 03/05/20 1451   BP: 124/62 139/66 100/82 105/62   Pulse: 96 99 105 100   Resp: 30 20 18 18   Temp:    97.8 °F (36.6 °C)   TempSrc:    Oral   SpO2:    97%   Weight:       Height:         I/O last 3 completed shifts: In: 2060 [P.O.:480; I.V.:1580]  Out: -     General:  Awake, alert, not in distress. Appears to be stated age. HEENT: Atraumatic, normocephalic. Anicteric sclera. Pink and moist oral mucosa. No carotid bruit. No JVD. Chest: Bilateral air entry, clear to auscultation, no wheezing, rhonchi or rales. Cardiovascular: RRR, S1S2, no murmur, rub or gallop. No lower extremity edema. Abdomen: Soft, non tender to palpation. Musculoskeletal:  No cyanosis or clubbing. Integumentary: Pink, warm and dry. Free from rash or lesions.  Skin turgor normal. PTH  Phosphorus:    Lab Results   Component Value Date    PHOS 6.1 03/05/2020     Ionized Calcium: No results found for: IONCA  Magnesium:   Lab Results   Component Value Date    MG 1.9 03/05/2020     Albumin:   Lab Results   Component Value Date    LABALBU 3.3 01/02/2020    LABALBU 4.6 04/24/2012     Last 3 CK, CKMB, Troponin: @LABRCNT(CKTOTAL:3,CKMB:3,TROPONINI:3)       URINE:)No results found for: Candance Brighter    Radiology:   Reviewed. Assessment:  1. End stage renal disease. 2. Anemia of ESRD. 3. Hypertension. 4. Left renal cyst.  5. Hyperphosphatemia. 6. Right 6-7 rib fractures. Plan:  HD tomorrow. Continue MWF schedule. We will follow phosphorus level, Continue Renvela. Hgb at goal  Maintain on renal diet with fluid restriction of 1000 ml/24 hours. Renal US showed a renal cyst  Avoid Lovenox and Fleets enema. Please do not hesitate to contact us for any further questions/concerns. We will continue to follow along with you. Pt seen in collaboration with Dr. Hellen Mercado. Electronically signed by KELECHI Lambert CNP  on 3/5/2020 at 2:56 PM  [facility]. Ph: [phone]    Physician Addendum  I have seen and examined pt at bed side.    I reviewed and agree with CNP's note. I performed all key and critical portions of this evaluation.  I agree with the plan of care as noted above.     Electronically signed by Delilah Marcus MD on 03/05/20 6:29 PM

## 2020-03-05 NOTE — PLAN OF CARE
Problem: Falls - Risk of:  Goal: Will remain free from falls  Description  Will remain free from falls  Outcome: Ongoing  Goal: Absence of physical injury  Description  Absence of physical injury  Outcome: Ongoing     Problem: Risk for Impaired Skin Integrity  Goal: Tissue integrity - skin and mucous membranes  Description  Structural intactness and normal physiological function of skin and  mucous membranes.   Outcome: Ongoing     Problem: Fluid Volume - Imbalance:  Goal: Absence of imbalanced fluid volume signs and symptoms  Description  Absence of imbalanced fluid volume signs and symptoms  Outcome: Ongoing     Problem: Gas Exchange - Impaired:  Goal: Levels of oxygenation will improve  Description  Levels of oxygenation will improve  Outcome: Ongoing     Problem: Pain:  Goal: Pain level will decrease  Description  Pain level will decrease  Outcome: Ongoing  Goal: Recognizes and communicates pain  Description  Recognizes and communicates pain  Outcome: Ongoing  Goal: Control of acute pain  Description  Control of acute pain  Outcome: Ongoing  Goal: Control of chronic pain  Description  Control of chronic pain  Outcome: Ongoing     Problem: Tissue Perfusion - Cardiopulmonary, Altered:  Goal: Absence of angina  Description  Absence of angina  Outcome: Ongoing  Goal: Hemodynamic stability will improve  Description  Hemodynamic stability will improve  Outcome: Ongoing     Problem: Pain:  Goal: Pain level will decrease  Description  Pain level will decrease  Outcome: Ongoing  Goal: Control of acute pain  Description  Control of acute pain  Outcome: Ongoing  Goal: Control of chronic pain  Description  Control of chronic pain  Outcome: Ongoing     Problem: Musculor/Skeletal Functional Status  Goal: Highest potential functional level  Outcome: Ongoing  Goal: Absence of falls  Outcome: Ongoing     Problem: Nutrition  Goal: Optimal nutrition therapy  Outcome: Ongoing

## 2020-03-05 NOTE — PROGRESS NOTES
General Surgery Progress Note  POD# 0    Subjective:     Patient able he is afebrile today denies any abdominal pain although he does have minimal pain on the right side of the chest on palpation there is no evidence of any crepitus    Scheduled Meds:   dilTIAZem  120 mg Oral Daily    apixaban  5 mg Oral BID    metoprolol  2.5 mg Intravenous Q6H    miconazole   Topical BID    ALPRAZolam  0.5 mg Oral BID    escitalopram  10 mg Oral QAM    fluticasone  1 spray Nasal Daily    budesonide-formoterol  2 puff Inhalation BID    finasteride  5 mg Oral Daily    fentaNYL  1 patch Transdermal Q72H    aspirin  81 mg Oral Daily    insulin glargine  10 Units Subcutaneous BID    insulin lispro  0-12 Units Subcutaneous TID WC    insulin lispro  0-6 Units Subcutaneous Nightly    isosorbide mononitrate  60 mg Oral Daily    lactulose  10 g Oral Daily    cetirizine  5 mg Oral Daily    midodrine  5 mg Oral Once per day on Mon Wed Fri    tamsulosin  0.4 mg Oral Daily    sevelamer  2,400 mg Oral TID WC    senna  1 tablet Oral BID    pantoprazole  40 mg Oral QAM AC    lidocaine  1 patch Transdermal Daily    fentanNYL  50 mcg Intravenous Once    sodium chloride flush  10 mL Intravenous 2 times per day     Continuous Infusions:   dextrose       PRN Meds:metoprolol, diphenhydrAMINE, glucose, dextrose, glucagon (rDNA), dextrose, lidocaine-prilocaine, oxyCODONE HCl, nitroGLYCERIN, ondansetron, levalbuterol, sodium chloride flush, acetaminophen, heparin (porcine), heparin (porcine), morphine **OR** morphine    Objective:   BP (!) 125/47   Pulse 111   Temp 97.8 °F (36.6 °C) (Oral)   Resp 19   Ht 6' (1.829 m)   Wt 280 lb 4 oz (127.1 kg)   SpO2 90%   BMI 38.01 kg/m²     I/O last 3 completed shifts:   In: 2060 [P.O.:480; I.V.:1580]  Out: -     Recent Results (from the past 24 hour(s))   POC Glucose Fingerstick    Collection Time: 03/04/20 12:07 PM   Result Value Ref Range    POC Glucose 195 (H) 75 - 110 mg/dL   Anti-Xa

## 2020-03-05 NOTE — PROGRESS NOTES
Writer spoke with PENELOPE Francois regarding Pt's stress test. RN and Writer aware Multiple attempts made for imaging but  due to pt habitus/condition and camera equipment we are unable to com,plete stress test. With any questions please call [phone].  Electronically signed by Rip Schmidt on 3/5/2020 at 1:01 PM

## 2020-03-05 NOTE — PROGRESS NOTES
Pt seen this afternoon  Today , he is stable , denies any nausia or vomiting, denies any abdomenal pain  abdomenal exam is ne g for any pain and tenderness ,good bs present,c/r findings are noted . PTT , ON Heprin.had dialysis today, urologist note appriciated.

## 2020-03-06 PROBLEM — L02.91 PHLEGMON: Status: ACTIVE | Noted: 2020-01-01

## 2020-03-06 NOTE — FLOWSHEET NOTE
03/05/20 1943   Encounter Summary   Services provided to: Patient   Referral/Consult From: Rounding   Complexity of Encounter Low   Length of Encounter 15 minutes   Spiritual/Sikh   Type Spiritual support   Assessment Sleeping   Intervention Prayer   Outcome Did not respond

## 2020-03-06 NOTE — PROGRESS NOTES
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    Cellulitis of scrotum    Anemia, chronic renal failure    Thrombocytopenia (HCC)    Chest pain    Left flank pain    ESRD (end stage renal disease) ([address]    Venous stasis dermatitis of both lower extremities    Dizziness    Hypocalcemia    Acute combined systolic and diastolic congestive heart failure (HCC)    Respiratory distress    Acute on chronic respiratory failure with hypoxia (HCC)    Fluid overload    Rib pain on left side    Hypoglycemia    CRF (chronic renal failure), stage 5 (HCC)    Chronic renal failure, stage 3 (moderate) (HCC)    Hyperuricemia    Altered mental status    Atrial fibrillation ([address]    ESRD on hemodialysis ([address]    Fracture of rib of right side    Hypoxia    Phlegmon on Right Buttock       Plan:     78year-old male with ESRD. Has left renal mass, which is a cyst on ultrasound. Has multiple medical issues, and no intervention is needed at this time. Thank you.      Cecil Roper  4:12 PM 3/6/2020

## 2020-03-06 NOTE — PROGRESS NOTES
[facility]   OCCUPATIONAL THERAPY MISSED TREATMENT NOTE   INPATIENT   Date: 3/6/20  Patient Name: Darshan Grande       Room:   MRN: [id number]   Account #: [de-identified]    : 1950  (78 y.o.)  Gender: male     REASON FOR MISSED TREATMENT:  Patient at testing and/or off the floor   -   Hemodialysis [facility]

## 2020-03-06 NOTE — CARE COORDINATION
ONGOING DISCHARGE PLAN:    Reviewed patients chart regarding discharge plan and chart still confirms the plan is to discharge to [address] for discharge   Stress outpt   Pre cert started        Will review plan with patient and or family      Will continue to follow for additional discharge needs.      Electronically signed by Crescencio Carrillo RN on 3/6/2020 at 11:38 AM

## 2020-03-06 NOTE — PROGRESS NOTES
Physical Therapy  DATE: 3/6/2020    NAME: Geovanna Ulrich  MRN: [id number]   : 1950    Patient not seen this date for Physical Therapy due to:  [] Blood transfusion in progress  [] Cancel by RN  [x] Hemodialysis: Patient being transferred to dialysis by RN upon arrival. Will attempt later as time allows. []  Refusal by Patient   [] Spine Precautions   [] Strict Bedrest  [] Surgery  [] Testing      [] Other        [] PT being discontinued at this time. Patient independent. No further needs. [] PT being discontinued at this time as the patient has been transferred to hospice care. No further needs.     Raegan Pandey, PTA

## 2020-03-06 NOTE — PROGRESS NOTES
HEMODIALYSIS POST TREATMENT NOTE    Treatment time ordered: 3.5 hours    Actual treatment time: 3 hours, orders received    UltraFiltration Goal: 0  UltraFiltration Removed: 0      Pre Treatment weight: 136.2kg  Post Treatment weight: 136.2 kg  Estimated Dry Weight:     Access used:     Central Venous Catheter:      Internal Access: Left upper arm AVF       Access Flow: Brisk blood return noted from both arterial and venous sites. Multiple high arterial alarms during treatment. Post treatment bleeding < 10 min each site     Sign and symptoms of infection: no       If YES:     Medications or blood products given: None    Regular outpatient schedule: [facility] Almar[facility]    Summary of response to treatment: Patient tolerated treatment poorly. Multiple high arterial pressure alarms. Patient very restless during treatment    Explain if orders NOT met, was physician notified:Dr. Teresita Marte notified of patient's status      ACES flowsheet faxed to patient unit/ placed in patient chart: yes    Post assessment completed: yes    Report given to: Deena Barton documented Safety Checks include the followin) Access and face visible at all times. 2) All connections and blood lines are secure with no kinks. 3) NVL alarm engaged. 4) Hemosafe device applied (if applicable). 5) No collapse of Arterial or Venous blood chambers. 6) All blood lines / pump segments in the air detectors.

## 2020-03-06 NOTE — PROGRESS NOTES
stiffness    Lab Results   CBC     Lab Results   Component Value Date    WBC 4.0 03/06/2020    RBC 3.36 03/06/2020    RBC 4.53 04/24/2012    HGB 9.7 03/06/2020    HCT 31.7 03/06/2020     03/06/2020     04/24/2012    MCV 94.4 03/06/2020    MCH 28.8 03/06/2020    MCHC 30.5 03/06/2020    RDW 16.6 03/06/2020    METASPCT 1 01/02/2020    LYMPHOPCT 10 03/02/2020    MONOPCT 7 03/02/2020    MYELOPCT 1 01/02/2020    BASOPCT 2 03/02/2020    MONOSABS 0.38 03/02/2020    LYMPHSABS 0.54 03/02/2020    EOSABS 0.05 03/02/2020    BASOSABS 0.11 03/02/2020    DIFFTYPE NOT REPORTED 03/02/2020       BMP   Lab Results   Component Value Date     03/06/2020    K 4.7 03/06/2020    CL 95 03/06/2020    CO2 24 03/06/2020    BUN 56 03/06/2020    CREATININE 7.29 03/06/2020    GLUCOSE 104 03/06/2020    GLUCOSE 135 06/01/2012    CALCIUM 8.5 03/06/2020       LFTS  Lab Results   Component Value Date    ALKPHOS 179 01/02/2020    ALT 22 01/02/2020    AST 18 01/02/2020    PROT 5.9 01/02/2020    BILITOT 0.89 01/02/2020    BILIDIR 0.24 08/19/2018    IBILI 0.19 08/19/2018    LABALBU 3.3 01/02/2020    LABALBU 4.6 04/24/2012       INR  No results for input(s): PROTIME, INR in the last 72 hours. APTT  Recent Labs     03/03/20  2119 03/04/20  0245   APTT 92.0* 122.1*       Lactic Acid  Lab Results   Component Value Date    LACTA 1.5 03/02/2020    LACTA 1.2 06/18/2018    LACTA 0.5 09/01/2016        BNP   No results for input(s): BNP in the last 72 hours. Cultures   Blood cultures x2, negative x4 days  Urine culture: No growth    Radiology     Plain Films:          CT Scans    See actual reports for details    SYSTEM ASSESSMENT      Neuro       Respiratory   Wean oxygen as tolerated. Keep O2 sat > 88%       Hemodynamics       Gastrointestinal/Nutrition       Renal       Infectious Disease       Hematology/Oncology       Endocrine       Social/Spiritual/DNR/Disposition/Other   ASSESSMENT:  1.   Acute-on-chronic respiratory failure with hypoxia, was on p.r.n. O2 at the [facility]. 2.  Hypotension, mostly with rapid AFib, improved  3. Bilateral pleural effusions and hypervolemia. 4.   atelectasis right base. 5.  Lingular pleural-based density a little bit over 3-cm, needs  follow up. 6.  COPD with chronic bronchitis. 7.  OHS and possible LISANDRO, had never done a sleep study   8. History of tobacco abuse 1 pack per day for 50 years, quit 6 months ago. 9.  Multiple rib fractures with recurrent falls. 10.   AFib, history of hypertension and CHF, echocardiogram with EF 55%, severe LVH,. Biatrial dilatation  11. End-stage renal disease, on hemodialysis. A left renal mass was found on CT, ultrasound with a 6.8 cm left renal cyst  12. Chronic anemia and thrombocytopenia. 13.  Diabetes/hypothyroidism.     PLAN OF TREATMENT:    O2.   BiPAP as needed  needs followup on his pleural-based density, probably repeat CT in six months.     Xopenex as needed, continue with  Symbicort   Hemodialysis   On [facility]      Electronically signed by Isha Mosley MD on 3/6/2020 at 12:38 PM

## 2020-03-06 NOTE — PROGRESS NOTES
HEMODIALYSIS PRE-TREATMENT NOTE    Patient Identifiers prior to treatment: name and     Isolation Required: No                      Isolation Type:     Hepatitis status:                           Date Drawn                             Result  Hepatitis B Surface Antigen 2020     Negative                     Hepatitis B Surface Antibody 2020 467.3        Hepatitis B Core Antibody 2020 Negative          Hepatitis Status verified by: EMR     Copy provided to facility for patient's chart: n/a    Hemodialysis orders verified: yes    Access Within normal limits ( I.e. s/s of infection,...): yes     Pre-Assessment completed: yes    Pre-dialysis report received from: Lilliana Shaw                      Time: 0900

## 2020-03-06 NOTE — PROGRESS NOTES
hours. Lipids: No results for input(s): CHOL, HDL in the last 72 hours. Invalid input(s): LDLCALCU  INR:   No results for input(s): INR in the last 72 hours. Objective:   Vitals: /74   Pulse 102   Temp 97.7 °F (36.5 °C) (Oral)   Resp 18   Ht 6' (1.829 m)   Wt (!) 300 lb 4.3 oz (136.2 kg)   SpO2 100%   BMI 40.72 kg/m²   I/O last 3 completed shifts: In: 10 [I.V.:10]  Out: 600 [Urine:600]  No intake/output data recorded.   Scheduled Meds:   dilTIAZem  240 mg Oral Daily    apixaban  5 mg Oral BID    metoprolol  2.5 mg Intravenous Q6H    miconazole   Topical BID    ALPRAZolam  0.5 mg Oral BID    escitalopram  10 mg Oral QAM    fluticasone  1 spray Nasal Daily    budesonide-formoterol  2 puff Inhalation BID    finasteride  5 mg Oral Daily    fentaNYL  1 patch Transdermal Q72H    aspirin  81 mg Oral Daily    insulin glargine  10 Units Subcutaneous BID    insulin lispro  0-12 Units Subcutaneous TID WC    insulin lispro  0-6 Units Subcutaneous Nightly    isosorbide mononitrate  60 mg Oral Daily    lactulose  10 g Oral Daily    cetirizine  5 mg Oral Daily    midodrine  5 mg Oral Once per day on Mon Wed Fri    tamsulosin  0.4 mg Oral Daily    sevelamer  2,400 mg Oral TID WC    senna  1 tablet Oral BID    pantoprazole  40 mg Oral QAM AC    lidocaine  1 patch Transdermal Daily    fentanNYL  50 mcg Intravenous Once    sodium chloride flush  10 mL Intravenous 2 times per day     Continuous Infusions:   dextrose       PRN Meds:.regadenoson, sodium chloride flush, diphenhydrAMINE, glucose, dextrose, glucagon (rDNA), dextrose, lidocaine-prilocaine, oxyCODONE HCl, nitroGLYCERIN, ondansetron, levalbuterol, sodium chloride flush, acetaminophen, morphine **OR** morphine    CONSTITUTIONAL: AOx4, no apparent distress, appears stated age   HEAD: normocephalic, atraumatic   EYES: PERRLA, EOMI   ENT: moist mucous membranes, uvula midline   NECK: symmetric, no midline tenderness to palpation LUNGS: clear to auscultation bilaterally   CARDIOVASCULAR: irregular rate and rhythm, no murmurs, rubs or gallops   ABDOMEN: Soft, non-tender, non-distended with normal active bowel sounds   SKIN: no rash       EKG:AFIB  ECHO:Summary  Echo contrast utilized on this technically difficult study. Normal LV size and function. Estimated LV EF 55 %. Severe left ventricular hypertrophy. Left atrial dilatation. Right atrial dilatation. No significant valvular regurgitation or stenosis seen. IVC Increased diameter, but still has inspiratory variation. No significant pericardial effusion is seen.    3/3/20  .      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     Cellulitis of scrotum     Anemia, chronic renal failure     Thrombocytopenia (HCC)     Chest pain     Left flank pain     ESRD (end stage renal disease) (HCC)     Venous stasis dermatitis of both lower extremities     Dizziness     Hypocalcemia     Acute combined systolic and diastolic congestive heart failure (HCC)     Respiratory distress     Acute on chronic respiratory failure with hypoxia (HCC)     Fluid overload     Rib pain on left side     Hypoglycemia     CRF (chronic renal failure), stage 5 (HCC)     Chronic renal failure, stage 3 (moderate) (HCC)     Hyperuricemia     Altered mental status     Atrial fibrillation (HCC)     ESRD on hemodialysis ([facility] [address]     Fracture of rib of right side     Hypoxia      Plan of Treatment:   # A. fib with RVR now with better control   - increase cardizem 240 mg qd   - continue eliquis    #NSTEMI type 2-    - Echocardiogram with normal ejection fraction, LVH,    - unable to do [employer] stress test despite multiple attempts due to patients habitus    - will re eval need as outpatient     Okay for d/c follow up in 2 weeks. D/w nurse and patient     Thank you for allowing me to participate in the care of this patient, please do not hesitate to call if you have any questions. Alexus Louis DO, [facility], [address] [facility]  [facility] RPI ([employer])  [phone]

## 2020-03-06 NOTE — PROGRESS NOTES
([facility] [address] Osteoarthritis, Paraplegia ([facility] [address] Peptic ulcer, PVD (peripheral vascular disease) ([facility] [address] Secondary hyperparathyroidism ([facility] [address] Sinus disorder, Syncope, Type II or unspecified type diabetes mellitus without mention of complication, not stated as uncontrolled, and Venous thrombosis. Social History:   reports that he quit smoking about a year ago. His smoking use included cigarettes. He smoked 1.00 pack per day. He has never used smokeless tobacco. He reports that he does not drink alcohol or use drugs. Family History:   Family History   Family history unknown: Yes       Vitals:  BP (!) 145/74   Pulse 107   Temp 97.8 °F (36.6 °C)   Resp 18   Ht 6' (1.829 m)   Wt (!) 300 lb 4.3 oz (136.2 kg)   SpO2 100%   BMI 40.72 kg/m²   Temp (24hrs), Av.8 °F (36.6 °C), Min:97.7 °F (36.5 °C), Max:98 °F (36.7 °C)    Recent Labs     20  1153 20  1646 20  2045 20  0731   POCGLU 145* 131* 136* 87       I/O(24Hr): Intake/Output Summary (Last 24 hours) at 3/6/2020 1356  Last data filed at 3/5/2020 1925  Gross per 24 hour   Intake --   Output 600 ml   Net -600 ml       Labs:  [unfilled]    Lab Results   Component Value Date/Time    SPECIAL NOT REPORTED 2020 07:45 PM     Lab Results   Component Value Date/Time    CULTURE NO SIGNIFICANT GROWTH 2020 07:45 PM       [unfilled]    Radiology:    Ct Abdomen Pelvis Wo Contrast    Result Date: 3/3/2020  EXAMINATION: CT OF THE ABDOMEN AND PELVIS WITHOUT CONTRAST 3/2/2020 8:58 pm TECHNIQUE: CT of the abdomen and pelvis was performed without the administration of intravenous contrast. Multiplanar reformatted images are provided for review. Dose modulation, iterative reconstruction, and/or weight based adjustment of the mA/kV was utilized to reduce the radiation dose to as low as reasonably achievable.  COMPARISON: 2018 HISTORY: ORDERING SYSTEM PROVIDED HISTORY: fall multiple rib fx r/o bleed TECHNOLOGIST PROVIDED HISTORY: fall multiple rib fx r/o bleed Reason for Exam: fall multiple rib fx r/o bleed Acuity: Unknown Type of Exam: Unknown FINDINGS: Lower Chest: See separate report for CT chest. Organs: Liver is normal.  Enlarged spleen. Respiratory motion. High density lesion in the left kidney has enlarged from prior exam and now measures 6.8 x 6.2 cm and previously measured 5.5 x 6.1 cm. Absent right kidney. GI/Bowel: No dilated bowel. No associated inflammatory changes. Stool throughout the colon. Pelvis: Bladder is collapsed. Borderline prostatic enlargement. Peritoneum/Retroperitoneum: No enlarged lymph nodes. Atherosclerotic changes of the aorta. Normal caliber aorta. Bones/Soft Tissues: Left inguinal lymphadenopathy, slightly increased from prior exam, the largest lymph node measures 2.0 x 1.8 cm. Body wall edema. Paraspinal muscle atrophy and gluteal muscle atrophy. Acute right 6th and 7th rib fractures as well as old fractures described previously. Minimally displaced right 6th and 7th rib fractures. Old rib fractures elsewhere as before. Continued enlargement of hyperdense left renal mass most likely neoplasm. Increasing left inguinal lymphadenopathy of uncertain significance. Ct Head Wo Contrast    Result Date: 3/2/2020  EXAMINATION: CT OF THE HEAD WITHOUT CONTRAST  3/2/2020 6:35 pm TECHNIQUE: CT of the head was performed without the administration of intravenous contrast. Dose modulation, iterative reconstruction, and/or weight based adjustment of the mA/kV was utilized to reduce the radiation dose to as low as reasonably achievable. COMPARISON: February 27, 2020 HISTORY: ORDERING SYSTEM PROVIDED HISTORY: AMS TECHNOLOGIST PROVIDED HISTORY: AMS Reason for Exam: pt became altered mental status at Dialysis today FINDINGS: BRAIN/VENTRICLES: There is no acute intracranial hemorrhage, mass effect or midline shift. No abnormal extra-axial fluid collection.   The gray-white differentiation is maintained without evidence of an images are provided for review. Dose modulation, iterative reconstruction, and/or weight based adjustment of the mA/kV was utilized to reduce the radiation dose to as low as reasonably achievable. COMPARISON: January 28, 2015 February 27, 2020 HISTORY: ORDERING SYSTEM PROVIDED HISTORY: r/o PTX TECHNOLOGIST PROVIDED HISTORY: r/o PTX Reason for Exam: pt became altered mental status at Dialysis today FINDINGS: Evaluation is limited by motion. Chest wall: No axillary adenopathy. Upper Abdomen: Cholecystectomy. No adrenal nodule. Mediastinum: Cardiomegaly. No pericardial effusion. Coronary artery calcifications. No adenopathy. Atherosclerotic calcification of the thoracic aorta. Lungs: No pneumothorax. Partial clearing at the right lung base. Pleural based lesion. Underlying rib fractures within the lingula but new since 2015 measuring at least 3.3 x 1.2 cm. . Bones: Multiple rib fractures are again seen. Motion limited study. Partial clearing at the right lung base. Pleural based lesion within the lingula is nonspecific and could represent sequela from remote rib fractures versus a pleural based lesion. Recommend follow-up versus further evaluation. Ct Chest Wo Contrast    Result Date: 2/27/2020  EXAMINATION: CT OF THE CHEST WITHOUT CONTRAST 2/27/2020 10:43 am TECHNIQUE: CT of the chest was performed without the administration of intravenous contrast. Multiplanar reformatted images are provided for review. Dose modulation, iterative reconstruction, and/or weight based adjustment of the mA/kV was utilized to reduce the radiation dose to as low as reasonably achievable. COMPARISON: 11/25/2016 HISTORY: ORDERING SYSTEM PROVIDED HISTORY: trauma TECHNOLOGIST PROVIDED HISTORY: trauma Reason for Exam: trauma Acuity: Acute Type of Exam: Initial Mechanism of Injury: PT STATES HE FELL ON TUESDAY, C/O RIGHT SIDE PAIN FINDINGS: Mediastinum: Cardiomegaly. Mild coronary artery calcifications. Normal caliber aorta. No significant mediastinal, hilar or axillary lymphadenopathy. Thyroid gland grossly normal as is esophagus. There are some left lower neck surgical clips. Lungs/pleura: Patchy bilateral posterior lung base consolidation right greater than left showing progression from prior. Findings probably combination of atelectasis scarring and scarring. Trace right pleural effusion. There is no pneumothorax. Central airways unremarkable. Upper Abdomen: No suspicious masses. Soft Tissues/Bones: Nondisplaced acute right 4th rib fracture along the lateral convexity manifested by some cortical buckling. Similar but slightly more pronounced findings noted in the right 5th, 6th and 7th ribs. Additional scattered multiple bilateral nonacute rib fractures are evident. .     1. Acute nondisplaced right 4th, 5th, 6th and 7th ribs most apparent in the 6th rib. The others are subtle manifested by cortical buckling. There are other scattered nonacute rib fractures noted. 2. Bilateral posterior basal infiltrates suggestive of atelectasis/scarring. There is also trace right pleural effusion. Ct Cervical Spine Wo Contrast    Result Date: 2/27/2020  EXAMINATION: CT OF THE CERVICAL SPINE WITHOUT CONTRAST 2/27/2020 10:42 am TECHNIQUE: CT of the cervical spine was performed without the administration of intravenous contrast. Multiplanar reformatted images are provided for review. Dose modulation, iterative reconstruction, and/or weight based adjustment of the mA/kV was utilized to reduce the radiation dose to as low as reasonably achievable. COMPARISON: 10/20/2006 HISTORY: ORDERING SYSTEM PROVIDED HISTORY: trauma TECHNOLOGIST PROVIDED HISTORY: trauma Reason for Exam: trauma Acuity: Acute Type of Exam: Initial Mechanism of Injury: PT STATES HE FELL ON TUESDAY FINDINGS: BONES/ALIGNMENT: There is no acute fracture or traumatic malalignment. DEGENERATIVE CHANGES: Severe disc degenerative change at C5-6 and C7-T1.  SOFT TISSUES: There is no chest pain, r/o PTX TECHNOLOGIST PROVIDED HISTORY: chest pain, r/o PTX Reason for Exam: Chest pain. R/o PTX Acuity: Acute Type of Exam: Initial Additional signs and symptoms: Chest pain. R/o PTX FINDINGS: Examination is limited by patient rotation and the patient's body habitus. There is elevation of the right hemidiaphragm. Mild bibasilar airspace disease is noted. Cardial pericardial silhouette is enlarged but stable. No pneumothorax is found. No free air. No acute bony abnormality. Mild bibasilar airspace disease, atelectasis versus pneumonia versus edema. No pneumothorax is identified. Vl Dup Lower Extremity Venous Bilateral    Result Date: 3/3/2020    [facility]  Vascular Lower Extremities DVT Study Procedure   Patient Name  Floridalma Cohn     Date of Study           03/03/2020                Tanner Valero   Date of Birth 1950  Gender                  Male   Age           78 year(s)  Race                       Room Number   2006        Height:                 71.65 inch, 182 cm   Corporate ID  [id number]    Weight:                 280 pounds, 127 kg  #   Patient Acct  [de-identified]   BSA:        2.45 m^2    BMI:       38.34 kg/m^2  #   MR #          [id number]      Sonographer             Gregoria Matthew RVT   Accession #   [id number]   Interpreting Physician  Felix Mauro   Referring                 Referring Physician     Brina Riley MD  Nurse  Practitioner  Procedure Type of Study:   Veins: Lower Extremities DVT Study, Venous Scan Lower Bilateral.  Indications for Study:Respiratory Failure. Patient Status: In Patient. Technical Quality:Limited visualization. Conclusions   Summary   Technically limited visualization. No evidence of superficial or deep venous thrombosis in both lower  extremities.    Signature   ----------------------------------------------------------------  Electronically signed by Gregoria Matthew RVT(Sonographer) on  03/03/2020 04:05 PM +------------------------------------+----------+---------------+----------+    Us Extremity Right Non Vasc Limited    Result Date: 3/6/2020  EXAMINATION: NONVASCULAR ULTRASOUND OF THE RIGHT EXTREMITY 3/6/2020 9:35 am COMPARISON: None. HISTORY: ORDERING SYSTEM PROVIDED HISTORY: Evaluate for abscess on right thigh buttocks TECHNOLOGIST PROVIDED HISTORY: Evaluate for abscess on right thigh buttocks FINDINGS: Direct scanning in the right posterior upper thigh was performed. There is regional superficial soft tissue edema. A heterogeneous hypoechoic area in the superficial soft tissues measuring approximately 6.9 x 26.5 x 22.5 mm may represent an area of inflammation/phlegmon/cellulitis. It does not appear typical for fluid collection/abscess. Mild hyperemia in the region. Small heterogeneous hypoechoic area in the superficial soft tissues, not typical for abscess; this may represent an area of inflammation/phlegmon. Physical Examination:        Physical Exam  Constitutional:       General: He is not in acute distress. Appearance: He is obese. He is not ill-appearing. HENT:      Mouth/Throat:      Mouth: Mucous membranes are moist.      Pharynx: Oropharynx is clear. Eyes:      Extraocular Movements: Extraocular movements intact. Pupils: Pupils are equal, round, and reactive to light. Neck:      Musculoskeletal: Normal range of motion. No neck rigidity or muscular tenderness. Cardiovascular:      Rate and Rhythm: Tachycardia present. Rhythm irregular. Pulses: Normal pulses. Pulmonary:      Effort: Pulmonary effort is normal. No respiratory distress. Breath sounds: No wheezing or rhonchi. Abdominal:      General: Abdomen is flat. Bowel sounds are normal. There is no distension. Tenderness: There is no abdominal tenderness. Musculoskeletal:         General: Signs of injury (R side rib fractures, non displaced) present. Skin:     General: Skin is warm and dry. Findings: Erythema (BL lower extremity chronic changes) and lesion (right buttock, indurated lesion) present. Neurological:      General: No focal deficit present. Mental Status: He is alert. Assessment:        Primary Problem  Atrial fibrillation [facility])    Active Hospital Problems    Diagnosis Date Noted    [facility] ([facility] [N18.6, Z99.2] 03/03/2020    Fracture of rib of right side [S22.31XA] 03/03/2020    Hypoxia [R09.02]     Atrial fibrillation ([facility] [I48.91] 03/02/2020    Acute on chronic respiratory failure with hypoxia (HCC) [J96.21] 12/01/2016    Acute combined systolic and diastolic congestive heart failure ([facility] [I50.41] 11/25/2016    ESRD (end stage renal disease) ([facility] [N18.6] 09/01/2016    Anemia, chronic renal failure [N18.9, D63.1] 02/28/2016    HTN (hypertension) [I10]     Type 2 diabetes mellitus with renal complication (HCC) [I94.79]     Pulmonary emphysema ([facility] [J43.9]        Plan:      Atrial Fibrillation w/ RVR, difficult to control rate  -Rate today in the 90s  -Cardiology consult  -Cardizem 240 mg daily  -Metoprolol 5 mg q4hr  -Eliquis 5 BID  -2D ECHO: LVEF 55%, severe left ventricular hypertrophy     Acute on Chronic Hypoxic Respiratory Failure 2/2 COPD, OHS, LISANDRO   -CXR: Cardiomegaly w/ no acute process  -CT chest: Pleural lesion w/in lingula, needs f/u.  Bilateral pleural effusions and hypervolemia  -on 4L NC  -BiPAP PRN and during sleep  -Hx COPD w/ chronic bronchitis, hx of tobacco use  -Pulmonology consult; appreciate recs  -Xopenex q6hr prn  -Will need sleepy study as outpatient  -Repeat CT chest in 6 months    Multiple Rib Fractures with Recurrent falls  -Trauma consult, appreciate recs  -Supportive care  -Morphine and oxycodone for pain control  -D/C on norco for pain control     Type II NSTEMI 2/2 ESRD, Afib  -Troponin 450-->436  -Cardiology consult, appreciate recs  -Continue treatment as above     ESRD on HD M,W,R,F  -Continue

## 2020-03-06 NOTE — PROGRESS NOTES
Transport set for 2030 via [facility]. SONG is not signed and not completed. When done please fax to [phone].  Call report to [phone]

## 2020-03-06 NOTE — PROGRESS NOTES
Topical BID    ALPRAZolam  0.5 mg Oral BID    escitalopram  10 mg Oral QAM    fluticasone  1 spray Nasal Daily    budesonide-formoterol  2 puff Inhalation BID    finasteride  5 mg Oral Daily    fentaNYL  1 patch Transdermal Q72H    aspirin  81 mg Oral Daily    insulin glargine  10 Units Subcutaneous BID    insulin lispro  0-12 Units Subcutaneous TID WC    insulin lispro  0-6 Units Subcutaneous Nightly    isosorbide mononitrate  60 mg Oral Daily    lactulose  10 g Oral Daily    cetirizine  5 mg Oral Daily    midodrine  5 mg Oral Once per day on Mon Wed Fri    tamsulosin  0.4 mg Oral Daily    sevelamer  2,400 mg Oral TID WC    senna  1 tablet Oral BID    pantoprazole  40 mg Oral QAM AC    lidocaine  1 patch Transdermal Daily    fentanNYL  50 mcg Intravenous Once    sodium chloride flush  10 mL Intravenous 2 times per day     Continuous Infusions:   dextrose       PRN Meds:regadenoson, sodium chloride flush, diphenhydrAMINE, glucose, dextrose, glucagon (rDNA), dextrose, lidocaine-prilocaine, oxyCODONE HCl, nitroGLYCERIN, ondansetron, levalbuterol, sodium chloride flush, acetaminophen, morphine **OR** morphine     Physical Exam:  Vitals:    03/06/20 1300 03/06/20 1330 03/06/20 1400 03/06/20 1405   BP: 120/68 (!) 145/74 (!) 116/90 (!) 178/59   Pulse: 88 107 102 84   Resp:   18 18   Temp:    97.6 °F (36.4 °C)   TempSrc:       SpO2:       Weight:    (!) 300 lb 4.3 oz (136.2 kg)   Height:         I/O last 3 completed shifts:  In: -   Out: 1052 [Urine:600]    General:  Awake, alert, not in distress. Appears to be stated age. HEENT: Atraumatic, normocephalic. Anicteric sclera. Pink and moist oral mucosa. No carotid bruit. No JVD. Chest: Bilateral air entry, clear to auscultation, no wheezing, rhonchi or rales. Cardiovascular: RRR, S1S2, no murmur, rub or gallop. No lower extremity edema. Abdomen: Soft, non tender to palpation. Musculoskeletal:  No cyanosis or clubbing.   Integumentary: Pink, warm

## 2020-03-06 NOTE — FLOWSHEET NOTE
03/06/20 1624   Encounter Summary   Services provided to: Patient not available  (patient receiving medical care)

## 2020-03-07 PROBLEM — R91.8 MASS OF LINGULA OF LUNG: Status: ACTIVE | Noted: 2020-01-01

## 2020-03-07 NOTE — PROGRESS NOTES
chest was performed without the administration of intravenous contrast. Multiplanar reformatted images are provided for review. Dose modulation, iterative reconstruction, and/or weight based adjustment of the mA/kV was utilized to reduce the radiation dose to as low as reasonably achievable. COMPARISON: January 28, 2015 February 27, 2020 HISTORY: ORDERING SYSTEM PROVIDED HISTORY: r/o PTX TECHNOLOGIST PROVIDED HISTORY: r/o PTX Reason for Exam: pt became altered mental status at Dialysis today FINDINGS: Evaluation is limited by motion. Chest wall: No axillary adenopathy. Upper Abdomen: Cholecystectomy. No adrenal nodule. Mediastinum: Cardiomegaly. No pericardial effusion. Coronary artery calcifications. No adenopathy. Atherosclerotic calcification of the thoracic aorta. Lungs: No pneumothorax. Partial clearing at the right lung base. Pleural based lesion. Underlying rib fractures within the lingula but new since 2015 measuring at least 3.3 x 1.2 cm. . Bones: Multiple rib fractures are again seen. Motion limited study. Partial clearing at the right lung base. Pleural based lesion within the lingula is nonspecific and could represent sequela from remote rib fractures versus a pleural based lesion. Recommend follow-up versus further evaluation. Ct Chest Wo Contrast    Result Date: 2/27/2020  EXAMINATION: CT OF THE CHEST WITHOUT CONTRAST 2/27/2020 10:43 am TECHNIQUE: CT of the chest was performed without the administration of intravenous contrast. Multiplanar reformatted images are provided for review. Dose modulation, iterative reconstruction, and/or weight based adjustment of the mA/kV was utilized to reduce the radiation dose to as low as reasonably achievable.  COMPARISON: 11/25/2016 HISTORY: ORDERING SYSTEM PROVIDED HISTORY: trauma TECHNOLOGIST PROVIDED HISTORY: trauma Reason for Exam: trauma Acuity: Acute Type of Exam: Initial Mechanism of Injury: PT STATES HE FELL ON TUESDAY, C/O RIGHT SIDE PAIN DEGENERATIVE CHANGES: Severe disc degenerative change at C5-6 and C7-T1. SOFT TISSUES: There is no prevertebral soft tissue swelling. No acute abnormality of the cervical spine. Us Renal Complete    Result Date: 3/3/2020  EXAMINATION: RETROPERITONEAL ULTRASOUND OF THE KIDNEYS 3/3/2020 COMPARISON: None HISTORY: ORDERING SYSTEM PROVIDED HISTORY: renal mass TECHNOLOGIST PROVIDED HISTORY: renal mass FINDINGS: Kidneys: Status post right nephrectomy. Unremarkable appearing right renal fossa. 6.8 cm cyst laterally left kidney. No solid masses. No hydronephrosis. .     1. Status post right nephrectomy 2. 6.8 cm cyst laterally left kidney 3. Left kidney otherwise appears unremarkable     Ir Rubi Weaver Cva Device Plmt/replace/removal    Result Date: 3/3/2020  PROCEDURE: ULTRASOUND GUIDED VASCULAR ACCESS. FLUOROSCOPY GUIDED PLACEMENT OF A NON-TUNNELED CENTRAL CATHETER. 3/3/2020. HISTORY: ORDERING SYSTEM PROVIDED HISTORY: CVC TECHNOLOGIST PROVIDED HISTORY: CVC; acute on chronic respiratory failure and congestive heart failure. Lumen?->Triple Lumen SEDATION: Local anesthesia FLUOROSCOPY DOSE AND TYPE OR TIME AND EXPOSURES: Fluoroscopy time-12 seconds D AP-110 cGy cm squared TECHNIQUE: Informed consent was obtained after a detailed explanation of the procedure including risks, benefits, and alternatives. Universal protocol was observed. The right neck and chest were prepped and draped in sterile fashion using maximum sterile barrier technique. Local anesthesia was achieved with lidocaine. A micropuncture needle was used to access the right internal jugular vein using ultrasound guidance. An ultrasound image demonstrating patency of the vein with needle tip located within it. An image was obtained and stored in PACs. A 0.035 guidewire was used to place a 7 [place] x 16 cm triple-lumen central catheter using fluoroscopic guidance with tip at the cavoatrial junction after fascial tract dilation.  The catheter lumens Electronically signed by Gregoria Matthew RVT(Sonographer) on  03/03/2020 04:05 PM  ----------------------------------------------------------------   ----------------------------------------------------------------  Electronically signed by Krishna Pereyra(Interpreting physician)  on 03/03/2020 09:43 PM  ----------------------------------------------------------------  Findings:   Right Impression:                    Left Impression:  The common femoral, femoral,         The common femoral, femoral,  popliteal and tibial veins           popliteal and tibial veins  demonstrate normal compressibility   demonstrate normal compressibility  and augmentation. and augmentation. Limited visualization of the calf    Normal compressibility of the great  veins. saphenous vein. Normal compressibility of the great  Normal compressibility of the small  saphenous vein. saphenous vein. Normal compressibility of the small  saphenous vein. Velocities are measured in cm/s ; Diameters are measured in cm Right Lower Extremities DVT Study Measurements Right 2D Measurements +------------------------------------+----------+---------------+----------+ ! Location                            ! Visualized! Compressibility! Thrombosis! +------------------------------------+----------+---------------+----------+ ! Common Femoral                      !Yes       ! Yes            ! None      ! +------------------------------------+----------+---------------+----------+ ! Prox Femoral                        !Yes       ! Yes            ! None      ! +------------------------------------+----------+---------------+----------+ ! Mid Femoral                         !Yes       ! Yes            ! None      ! +------------------------------------+----------+---------------+----------+ ! Dist Femoral                        !Yes       ! Yes            ! None      ! tenderness. Musculoskeletal:         General: Signs of injury (R side rib fractures, non displaced) present. Skin:     General: Skin is warm and dry. Findings: Erythema (BL lower extremity chronic changes) and lesion (right buttock, indurated lesion) present. Neurological:      General: No focal deficit present. Mental Status: He is alert. Assessment:        Primary Problem  Atrial fibrillation [facility])    Active Hospital Problems    Diagnosis Date Noted    Phlegmon on Right Buttock [L02.91] 03/06/2020    ESRD on hemodialysis ([facility] [N18.6, Z99.2] 03/03/2020    Fracture of rib of right side [S22.31XA] 03/03/2020    Hypoxia [R09.02]     Atrial fibrillation (HCC) [I48.91] 03/02/2020    Acute on chronic respiratory failure with hypoxia (HCC) [J96.21] 12/01/2016    Acute combined systolic and diastolic congestive heart failure ([facility] [I50.41] 11/25/2016    ESRD [facility]end stage renal disease[facility] [N18.6] 09/01/2016    Anemia, chronic renal failure [N18.9, D63.1] 02/28/2016    HTN (hypertension) [I10]     Type 2 diabetes mellitus with renal complication (HCC) [N14.75]     Pulmonary emphysema ([facility] [J43.9]        Plan:      Atrial Fibrillation w/ RVR, difficult to control rate  -Rate today in the 90s  -Cardiology consult  -Cardizem 240 mg daily  -Metoprolol 5 mg q4hr  -Eliquis 5 BID  -2D ECHO: LVEF 55%, severe left ventricular hypertrophy     Acute on Chronic Hypoxic Respiratory Failure 2/2 COPD, OHS, LISANDRO   -CXR: Cardiomegaly w/ no acute process  -CT chest: Pleural lesion w/in lingula, needs f/u.  Bilateral pleural effusions and hypervolemia  -on 4L NC  -BiPAP PRN and during sleep  -Hx COPD w/ chronic bronchitis, hx of tobacco use  -Pulmonology consult; appreciate recs  -Xopenex q6hr prn  -Will need sleepy study as outpatient  -Repeat CT chest in 6 months     Multiple Rib Fractures with Recurrent falls  -Trauma consult, appreciate recs  -Supportive care  -Morphine and oxycodone for pain control  -D/C on norco for pain control     Type II NSTEMI 2/2 ESRD, Afib  -Troponin 450-->436  -Cardiology consult, appreciate recs  -Continue treatment as above     ESRD on HD M,W,R,F  -Continue schedule  -Nephrology consult  -Renvela 2400 mg  -Phosphorus: 7.1  -Renal diet w/ 1000 ml/24 hour fluid restriction     Left Renal Mass, likely cyst  -CT abdomen: 6.8 x 6.2 cm high density lesion in left kidney concerning for malignancy.  Absent right kidney  -Urology consult: likely cyst  -Renal US to further evaluate     Right Sided Buttocks Phlegmon  -US demonstrated no abscess formation  -DC on Doxycycline 100 mg BID for 5 days     Hypertension  -Continue home meds  -Imdur 60 mg      Type II Diabetes Mellitus  -POCT glucose  -MDISS  -Hypoglycemia protocol  -Lantus 10 units BID     Pulmonary emphysema  -Continue home meds  -Symbicort BID  -Xopenex 1.25 mg q6hr prn     Diet  -Renal Diet, 1000 ml fluid restriction     DVT prophylaxis  -Eliquis 5 mg BID     GI Prophylaxis  -Protonix 40 mg daily     PT/OT     Dispo  -Social work for [address], MD  3/7/2020  10:56 AM

## 2020-03-07 NOTE — CARE COORDINATION
Social work: spoke to [employer]. Admissions person mery has left but rN said Ok to send pt today. Set up at 4:30 pm today to snf  Pt is a return so no new hens needed. Faxed sabine to Fax: [phone] (admissions office) and phone for report: [phone].  4 liters of 02 orders with stretcher transport per Rn. Attempted to call family but all phone numbers on the face sheet are non working or a gas station. Snf notified of time.   Aleksandr rashid

## 2020-03-07 NOTE — PROGRESS NOTES
sounds are normal.  Regular rate and rhythm   Abdomen - Soft, nontender, nondistended, no masses or organomegaly  Neurologic - There are no focal motor or sensory deficits  Skin - No bruising or bleeding  Extremities - No clubbing, cyanosis, edema    MEDS      dilTIAZem  240 mg Oral Daily    doxycycline monohydrate  100 mg Oral 2 times per day    apixaban  5 mg Oral BID    metoprolol  2.5 mg Intravenous Q6H    miconazole   Topical BID    ALPRAZolam  0.5 mg Oral BID    escitalopram  10 mg Oral QAM    fluticasone  1 spray Nasal Daily    budesonide-formoterol  2 puff Inhalation BID    finasteride  5 mg Oral Daily    fentaNYL  1 patch Transdermal Q72H    aspirin  81 mg Oral Daily    insulin glargine  10 Units Subcutaneous BID    insulin lispro  0-12 Units Subcutaneous TID WC    insulin lispro  0-6 Units Subcutaneous Nightly    isosorbide mononitrate  60 mg Oral Daily    lactulose  10 g Oral Daily    cetirizine  5 mg Oral Daily    midodrine  5 mg Oral Once per day on Mon Wed Fri    tamsulosin  0.4 mg Oral Daily    sevelamer  2,400 mg Oral TID WC    senna  1 tablet Oral BID    pantoprazole  40 mg Oral QAM AC    lidocaine  1 patch Transdermal Daily    fentanNYL  50 mcg Intravenous Once    sodium chloride flush  10 mL Intravenous 2 times per day      dextrose       regadenoson, sodium chloride flush, diphenhydrAMINE, glucose, dextrose, glucagon (rDNA), dextrose, lidocaine-prilocaine, oxyCODONE HCl, nitroGLYCERIN, ondansetron, levalbuterol, sodium chloride flush, acetaminophen, morphine **OR** morphine    LABS   CBC   Recent Labs     03/07/20  0428   WBC 4.1   HGB 10.1*   HCT 32.6*   MCV 94.6   *     BMP:   Lab Results   Component Value Date     03/07/2020    K 5.0 03/07/2020    CL 98 03/07/2020    CO2 24 03/07/2020    BUN 38 03/07/2020    LABALBU 3.3 01/02/2020    LABALBU 4.6 04/24/2012    CREATININE 6.28 03/07/2020    CALCIUM 8.7 03/07/2020    GFRAA 11 03/07/2020    LABGLOM 9

## 2020-03-07 NOTE — DISCHARGE SUMMARY
Hospital Discharge Summary      Patient ID: [facility]      Patient's PCP: Alphonse Wadsworth DO    Admit Date: 3/2/2020     Discharge Date:       Admitting Physician: Edy Jenkins MD    Discharge Physician: Nik Cullen MD     Discharge Diagnoses: Active Hospital Problems    Diagnosis Date Noted    Mass of lingula of lung [R91.8] 03/07/2020    Phlegmon on Right Buttock [L02.91] 03/06/2020    ESRD on hemodialysis ([facility] [N18.6, Z99.2] 03/03/2020    Fracture of rib of right side [S22.31XA] 03/03/2020    Hypoxia [R09.02]     Atrial fibrillation ([facility] [I48.91] 03/02/2020    Acute on chronic respiratory failure with hypoxia (HCC) [J96.21] 12/01/2016    Acute combined systolic and diastolic congestive heart failure ([facility] [I50.41] 11/25/2016    ESRD (end stage renal disease) ([facility] [N18.6] 09/01/2016    Anemia, chronic renal failure [N18.9, D63.1] 02/28/2016    HTN (hypertension) [I10]     Type 2 diabetes mellitus with renal complication (HCC) [L65.18]     COPD (chronic obstructive pulmonary disease) ([facility] [J44.9]        The patient was seen and examined on day of discharge and this discharge summary is in conjunction with any daily progress note from day of discharge. Hospital Course: satisfactory   The primary encounter diagnosis was Hypoxia. Diagnoses of Atrial fibrillation, unspecified type ([address] and Rib pain on left side were also pertinent to this visit. Consults: as noted in the chart    Treatments: as above    Disposition: home    Discharged Condition: Stable    Follow Up:  No follow-ups on file. Discharge Medications:    Carmina Lewisig   Home Medication Instructions [facility]:[id number]    Printed on:03/07/20 8206   Medication Information                      ALPRAZolam (XANAX) 0.5 MG tablet  Take 0.5 mg by mouth 2 times daily.              apixaban (ELIQUIS) 5 MG TABS tablet  Take 1 tablet by mouth 2 times daily             aspirin 81 MG chewable tablet  Take 81 mg by mouth daily             Dextromethorphan-guaiFENesin  MG/5ML SYRP  Take 5 mLs by mouth every 6 hours as needed for Cough             dilTIAZem (CARDIZEM CD) 240 MG extended release capsule  Take 1 capsule by mouth daily             diphenhydrAMINE (BENADRYL) 25 MG tablet  Take 25 mg by mouth every 8 hours as needed for Itching              doxycycline hyclate (VIBRAMYCIN) 100 MG capsule  Take 100 mg by mouth 2 times daily For 10 days starting 2/28/20             doxycycline monohydrate (MONODOX) 100 MG capsule  Take 1 capsule by mouth every 12 hours for 5 days             escitalopram (LEXAPRO) 10 MG tablet  Take 10 mg by mouth every morning              fentaNYL (DURAGESIC) 25 MCG/HR  Place 1 patch onto the skin every 72 hours. finasteride (PROSCAR) 5 MG tablet  Take 5 mg by mouth daily.              fluticasone (FLONASE) 50 MCG/ACT nasal spray  1 spray by Nasal route daily              fluticasone-vilanterol (BREO ELLIPTA) 100-25 MCG/INH AEPB inhaler  Inhale 1 puff into the lungs daily             glucagon 1 MG injection  Inject 1 kit into the muscle as needed (hypoglycemia BG <60)             glucose (GLUTOSE) 40 % GEL  Take 15 g by mouth as needed (blood sugar <60)             insulin glargine (LANTUS) 100 UNIT/ML injection vial  Inject 10 Units into the skin 2 times daily             isosorbide mononitrate (IMDUR) 60 MG CR tablet  Take 60 mg by mouth daily             lactulose (CHRONULAC) 10 GM/15ML solution  Take 10 g by mouth daily             lidocaine 4 % external patch  Place 1 patch onto the skin daily             lidocaine-prilocaine (EMLA) 2.5-2.5 % cream  Apply topically three times a week Apply topically to arm/port three times weekly on Monday, Wednesday, and Friday for dialysis             loratadine (CLARITIN) 10 MG tablet  Take 10 mg by mouth daily             metoprolol tartrate (LOPRESSOR) 25 MG tablet  Take 25 mg by mouth daily Indications: Hold for SBP less than 100 or HR

## 2020-03-07 NOTE — PROGRESS NOTES
Orders received to straight cath patient if patient is having discomfort. Straight cath performed, documented as appropriate and will continue to monitor.

## 2020-03-07 NOTE — PROGRESS NOTES
Called report to Raul Mann at [facility]. Notified her of paper scripts for Oxycodone, Xanax, and fentanyl patches to come over with patient. Pt central line removed without complications, catheter intact and dressing applied. Changed pt gown and removed telemetry. Pt alert and oriented, no distress noted at this time. Will continue to monitor.

## 2020-03-09 PROBLEM — H70.002 ACUTE MASTOIDITIS OF LEFT SIDE: Status: ACTIVE | Noted: 2020-01-01

## 2020-03-10 NOTE — ED NOTES
Pt arrives to facility from [place] or [place] who states pt has been not at his baseline since he returned from the hospital 2 days ago. Pt has had AMS and seems more lethargic according to facility. Previous to first hospital pt was said to be alert and oriented x4. Pt had a fall at the facility and staff stated he fell on his buttocks. Nurse states that pt then went unresponsive after fall. Per EMS pt is alert with confusion and does answer questions but dozes off. Pt able to state name. Pt unable to do screening questions at this time.      Wendy Crouch, PENELOPE  03/10/20 7568

## 2020-03-10 NOTE — ED NOTES
Report given to Benedict Vu RN from [facility]. Report method by phone. The following was reviewed with receiving RN:   Current vital signs:  BP (!) 108/52   Pulse 68   Temp 98.5 °F (36.9 °C) (Oral)   Resp 15   Wt 300 lb (136.1 kg)   SpO2 95%   BMI 40.69 kg/m²                MEWS Score: 1     Any medication or safety alerts were reviewed. Any pending diagnostics and notifications were also reviewed, as well as any safety concerns or issues, abnormal labs, abnormal imaging, and abnormal assessment findings. Questions were answered.             Sreekanth Tsai RN  03/10/20 1981

## 2020-03-10 NOTE — PROGRESS NOTES
HEMODIALYSIS PRE-TREATMENT NOTE     Patient Identifiers prior to treatment: yes     Isolation Required: yes                      Isolation Type: Contact     Hepatitis status:                                                                     Date Drawn                             Result  Hepatitis B Surface Antigen 01/02/2020           neg                        Hepatitis B Surface Antibody 01/02/2020 pos           Hepatitis B Core Antibody 01/02/2020  neg              Hepatitis Status verified by: [facility] RN    Copy provided to facility for patient's chart: n/a     Hemodialysis orders verified: yes     Access Within normal limits ( I.e. s/s of infection,...): yes      Pre-Assessment completed: yes     Pre-dialysis report received from:  Shelli Del Cid RN                     Time: 5376

## 2020-03-10 NOTE — CONSULTS
HCl (OXY-IR) 10 MG immediate release tablet Take 1 tablet by mouth every 6 hours as needed for Pain for up to 5 days. 3/7/20 3/12/20 Yes Maura Garcia MD   ALPRAZolam Sylvester Macho) 0.5 MG tablet Take 1 tablet by mouth 2 times daily for 5 days. 3/7/20 3/12/20 Yes Maura Garcia MD   apixaban (ELIQUIS) 5 MG TABS tablet Take 1 tablet by mouth 2 times daily 3/6/20  Yes Maura Garcia MD   dilTIAZem (CARDIZEM CD) 240 MG extended release capsule Take 1 capsule by mouth daily 3/6/20  Yes Maura Garcia MD   lidocaine 4 % external patch Place 1 patch onto the skin daily 3/6/20  Yes Maura Garcia MD   doxycycline monohydrate (MONODOX) 100 MG capsule Take 1 capsule by mouth every 12 hours for 5 days 3/6/20 3/11/20 Yes Maura Garcia MD   fluticasone-vilanterol (BREO ELLIPTA) 100-25 MCG/INH AEPB inhaler Inhale 1 puff into the lungs daily   Yes Historical Provider, MD   doxycycline hyclate (VIBRAMYCIN) 100 MG capsule Take 100 mg by mouth 2 times daily For 10 days starting 2/28/20 2/28/20 3/10/20 Yes Historical Provider, MD   glucagon 1 MG injection Inject 1 kit into the muscle as needed (hypoglycemia BG <60)   Yes Historical Provider, MD   glucose (GLUTOSE) 40 % GEL Take 15 g by mouth as needed (blood sugar <60)   Yes Historical Provider, MD   loratadine (CLARITIN) 10 MG tablet Take 10 mg by mouth daily   Yes Historical Provider, MD   nitroGLYCERIN (NITROSTAT) 0.4 MG SL tablet Place 0.4 mg under the tongue every 5 minutes as needed for Chest pain up to max of 3 total doses. If no relief after 1 dose, call 911.    Yes Historical Provider, MD   Dextromethorphan-guaiFENesin  MG/5ML SYRP Take 5 mLs by mouth every 6 hours as needed for Cough   Yes Historical Provider, MD   insulin glargine (LANTUS) 100 UNIT/ML injection vial Inject 10 Units into the skin 2 times daily 3/4/19  Yes Louis Ozuna MD   lidocaine-prilocaine (EMLA) 2.5-2.5 % cream Apply topically three times a week quit smoking about a year ago. His smoking use included cigarettes. He smoked 1.00 pack per day. He has never used smokeless tobacco.  Alcohol:      reports no history of alcohol use. Drug Use:  reports no history of drug use. Family History:     Family History   Family history unknown: Yes       Review of Systems:     Unable to obtain secondary to altered mentation    Physical Exam:   BP (!) 121/54   Pulse 71   Temp 98.5 °F (36.9 °C) (Oral)   Resp 19   Wt 300 lb (136.1 kg)   SpO2 95%   BMI 40.69 kg/m²   Temp (24hrs), Av.6 °F (37 °C), Min:98.5 °F (36.9 °C), Max:98.6 °F (37 °C)        General examination:      General Appearance: Awake, disheveled appearing, and in no acute distress  HEENT: Normocephalic, atraumatic, purulent drainage left ear.   Moist mucus membranes  Neck: supple, no carotid bruits, (-) nuchal rigidity  Lungs:  Respirations unlabored, chest wall no deformity, BS normal  Cardiovascular: normal rate, regular rhythm  Abdomen: Soft, nontender, nondistended, normal bowel sounds  Skin: No gross lesions, rashes, bruising or bleeding on exposed skin area  Extremities:  peripheral pulses palpable, no cyanosis, clubbing or edema      Neurological examination:    Mental status   Alert and oriented to self, place, year but very slow to answer questions mainly answering with 1-2 word phrases; following basic commands;     Cranial nerves   II - visual fields intact to confrontation; pupils reactive  III, IV, VI - extraocular muscles intact; no CARLA; no nystagmus; no ptosis   V - normal facial sensation                                                               VII - normal facial symmetry                                                             VIII - intact hearing                                                                             IX, X - symmetrical palate elevation                                               XI - symmetrical shoulder shrug per ENT, ID   MRI brain without contrast   Will follow      Thank you for this very interesting consultation.       Electronically signed by Damian Smalls DO on 3/10/2020 at 9:23 AM      Damian Smalls, [address] [place] [address]

## 2020-03-10 NOTE — ED PROVIDER NOTES
[facility] ED  Emergency Department Encounter  Emergency Medicine Resident     Pt Name: Chenhco Hernandez  MRN: [id number]  [place] 1950  Date of evaluation: 3/9/20  PCP:  Zhao Rosario, [address] [place] [address]       Chief Complaint   Patient presents with    Altered Mental Status       [facility]  (Location/Symptom, Timing/Onset, Context/Setting, Quality, Duration, Modifying Factors,Severity.)      Chencho Hernandez is a 78 male who presents with altered mental status. Patient brought in by EMS for altered mental status. Patient has a history of COPD, dialysis, extensive past medical history. Recent admission for atrial fibrillation with hypotension a few days ago. Patient unable to provide a history at this time as he is still altered. Nursing home reports that he had a fall from standing height backwards onto his buttocks but did not hit his head. Patient is on Eliquis for anticoagulation for atrial fibrillation. Per nursing staff patient was hypoxic with SPO2 in the 80s when patient arrived but was then placed on 4 L nasal cannula oxygen, unknown if patient is on oxygen at home.       PAST MEDICAL / SURGICAL / SOCIAL / FAMILY HISTORY      has a past medical history of Acute combined systolic and diastolic congestive heart failure ([address] Anemia, BPH (benign prostatic hyperplasia), Cancer ([address] CKD (chronic kidney disease) stage 3, GFR 30-59 ml/min ([facility]), Constipation, COPD (chronic obstructive pulmonary disease) ([address] Depression, GERD (gastroesophageal reflux disease), Hemodialysis patient ([address] HTN (hypertension), Hx of blood clots, Hyperparathyroidism ([address] Hypothyroidism, IDDM (insulin dependent diabetes mellitus) ([address] Insomnia, Liver disease, MDRO (multiple drug resistant organisms) resistance, Memory deficit, Neurofibromatosis ([address] Osteoarthritis, Paraplegia ([address] Peptic ulcer, PVD (peripheral vascular disease) ([address] Secondary hyperparathyroidism ([address] Sinus disorder, Syncope, Type II or unspecified type diabetes mellitus without mention of complication, not stated as uncontrolled, and Venous thrombosis. has a past surgical history that includes total nephrectomy (Right); Cholecystectomy; Skin graft; Colonoscopy (2011); Skin cancer excision (Left); TURP; Dialysis fistula creation (Left, 2016); Tunneled venous catheter placement (Right, 2016); Intracapsular cataract extraction (Right, 2019); Intracapsular cataract extraction (Left, 2019); Dialysis fistula creation (Left); Thyroid surgery; and hip surgery (Right).      Social History     Socioeconomic History    Marital status: Single     Spouse name: Not on file    Number of children: Not on file    Years of education: Not on file    Highest education level: Not on file   Occupational History    Not on file   Social Needs    Financial resource strain: Not on file    Food insecurity     Worry: Not on file     Inability: Not on file    Transportation needs     Medical: Not on file     Non-medical: Not on file   Tobacco Use    Smoking status: Former Smoker     Packs/day: 1.00     Types: Cigarettes     Last attempt to quit: 3/1/2019     Years since quittin.0    Smokeless tobacco: Never Used   Substance and Sexual Activity    Alcohol use: No    Drug use: No    Sexual activity: Not Currently   Lifestyle    Physical activity     Days per week: Not on file     Minutes per session: Not on file    Stress: Not on file   Relationships    Social connections     Talks on phone: Not on file     Gets together: Not on file     Attends Yazdanism service: Not on file     Active member of club or organization: Not on file     Attends meetings of clubs or organizations: Not on file     Relationship status: Not on file    Intimate partner violence     Fear of current or ex partner: Not on file     Emotionally abused: Not on file     Physically abused: Not on file     Forced sexual activity: Not on file   Other Topics Concern    Not on file   Social History Narrative    Not on file       Family History   Family history unknown: Yes        Allergies:  Cymbalta [duloxetine hcl]; Tromethamine; and Toradol [ketorolac tromethamine]    Home Medications:  Prior to Admission medications    Medication Sig Start Date End Date Taking? Authorizing Provider   fentaNYL (DURAGESIC) 25 MCG/HR Place 1 patch onto the skin every 72 hours for 6 days. 3/7/20 3/13/20  Joan Washington MD   oxyCODONE HCl (OXY-IR) 10 MG immediate release tablet Take 1 tablet by mouth every 6 hours as needed for Pain for up to 5 days. 3/7/20 3/12/20  Joan Washington MD   ALPRAZolam Janандрей Harry) 0.5 MG tablet Take 1 tablet by mouth 2 times daily for 5 days. 3/7/20 3/12/20  Joan Washington MD   apixaban (ELIQUIS) 5 MG TABS tablet Take 1 tablet by mouth 2 times daily 3/6/20   Joan Washington MD   dilTIAZem (CARDIZEM CD) 240 MG extended release capsule Take 1 capsule by mouth daily 3/6/20   Joan Washington MD   lidocaine 4 % external patch Place 1 patch onto the skin daily 3/6/20   Joan Washington MD   doxycycline monohydrate (MONODOX) 100 MG capsule Take 1 capsule by mouth every 12 hours for 5 days 3/6/20 3/11/20  Joan Washington MD   fluticasone-vilanterol (BREO ELLIPTA) 100-25 MCG/INH AEPB inhaler Inhale 1 puff into the lungs daily    Historical Provider, MD   glucagon 1 MG injection Inject 1 kit into the muscle as needed (hypoglycemia BG <60)    Historical Provider, MD   glucose (GLUTOSE) 40 % GEL Take 15 g by mouth as needed (blood sugar <60)    Historical Provider, MD   loratadine (CLARITIN) 10 MG tablet Take 10 mg by mouth daily    Historical Provider, MD   nitroGLYCERIN (NITROSTAT) 0.4 MG SL tablet Place 0.4 mg under the tongue every 5 minutes as needed for Chest pain up to max of 3 total doses. If no relief after 1 dose, call 911.     Historical Provider, MD or HR less than 60     Historical Provider, MD   tamsulosin (FLOMAX) 0.4 MG capsule Take 0.4 mg by mouth daily. Historical Provider, MD       REVIEW OFSYSTEMS    (2-9 systems for level 4, 10 or more for level 5)      Review of Systems   Unable to perform ROS: Mental status change       PHYSICAL EXAM   (up to 7 for level 4, 8 or more forlevel 5)      INITIAL VITALS:   ED Triage Vitals [03/09/20 2117]   BP Temp Temp Source Pulse Resp SpO2 Height Weight   (!) 112/54 98.6 °F (37 °C) Oral 73 22 (!) 80 % -- 300 lb (136.1 kg)       Physical Exam  Vitals signs and nursing note reviewed. Constitutional:       Comments: Chronically ill-appearing obese male who is altered. Oriented only to name. HENT:      Head: Normocephalic and atraumatic. Ears:      Comments: Right ear normal.  Left external auditory canal extremely swollen with purulent drainage. Eyes:      Extraocular Movements: Extraocular movements intact. Conjunctiva/sclera: Conjunctivae normal.      Pupils: Pupils are equal, round, and reactive to light. Neck:      Musculoskeletal: Normal range of motion and neck supple. Cardiovascular:      Rate and Rhythm: Normal rate and regular rhythm. Heart sounds: No murmur. No gallop. Pulmonary:      Effort: Pulmonary effort is normal. No respiratory distress. Breath sounds: No wheezing or rales. Abdominal:      Palpations: Abdomen is soft. Comments: Obese abdomen without any tenderness or peritoneal signs. Musculoskeletal:      Right lower leg: No edema. Left lower leg: No edema. Skin:     General: Skin is warm and dry. Neurological:      General: No focal deficit present. Cranial Nerves: No cranial nerve deficit. Comments: Patient only oriented to name, not alert.          DIFFERENTIAL  DIAGNOSIS     PLAN (LABS / IMAGING / EKG):  Orders Placed This Encounter   Procedures    CT Head WO Contrast    XR CHEST PORTABLE    CT Chest Pulmonary Embolism W Contrast    PROTIME-INR   TSH WITH REFLEX   LACTIC ACID         RADIOLOGY:  Ct Head Wo Contrast    Result Date: 3/9/2020  EXAMINATION: CT OF THE HEAD WITHOUT CONTRAST  3/9/2020 10:04 pm TECHNIQUE: CT of the head was performed without the administration of intravenous contrast. Dose modulation, iterative reconstruction, and/or weight based adjustment of the mA/kV was utilized to reduce the radiation dose to as low as reasonably achievable. COMPARISON: CT head scan without contrast March 2, 2020 at [facility] 1978 hours HISTORY: ORDERING SYSTEM PROVIDED HISTORY: fall on eliquis TECHNOLOGIST PROVIDED HISTORY: fall on eliquis Reason for Exam: fall on eliquis; ams Acuity: Unknown Type of Exam: Unknown FINDINGS: BRAIN/VENTRICLES: There is no acute intracranial hemorrhage, mass effect or midline shift. No abnormal extra-axial fluid collection. The gray-white differentiation is maintained without evidence of an acute infarct. There is no evidence of hydrocephalus. Basilar artery dolichoectasia is present. Mild ill-defined hypoattenuation is present within cerebral white matter consistent with microvascular ischemic change. Mild diffuse decrease in cerebral volume is noted with corresponding prominence of the sulci and ventricles. ORBITS: The visualized portion of the orbits demonstrate no acute abnormality. SINUSES: Large retention cyst is noted within the right maxillary sinus. Fluid opacification of left-sided mastoid air cells and middle ear cavity is present. SOFT TISSUES/SKULL:  No acute abnormality of the visualized skull or soft tissues. 1. No evidence of acute intracranial trauma. 2. Mild cerebral white matter chronic microvascular ischemic disease. 3. Mild diffuse cerebral atrophy. 4. Left-sided acute otomastoiditis. 5. Right maxillary sinus large retention cyst. 6. Basilar artery dolichoectasia.      Xr Chest Portable    Result Date: 3/9/2020  EXAMINATION: ONE XRAY VIEW OF THE CHEST 3/9/2020 10:39 pm COMPARISON: Prior studies the thoracic aorta. Lungs/pleura: Lung volumes are low with scattered bilateral subsegmental atelectasis visualized. .  No focal consolidation or pulmonary edema. No evidence of pleural effusion or pneumothorax. Upper Abdomen: Limited images of the upper abdomen are unremarkable. Soft Tissues/Bones: No acute bone or soft tissue abnormality. 1. Note: Study significantly limited by patient breathing motion related artifact and suboptimal volume of intravenous iodinated contrast within the pulmonary arterial system. 2. No definitive evidence of acute pulmonary embolism. 3. Moderate cardiomegaly. 4. Low lung volumes. 5. Bilateral coronary artery scattered atherosclerotic calcification. 6. Note: Study also limited by marked hypoattenuation associated with patient body habitus. EKG  Sinus rhythm with normal axis and right bundle branch block. No ST elevations or depressions. No Q waves. There are premature atrial complexes. No acute change. All EKG's are interpreted by the [facility] Physician who either signs or Co-signs this chart in the absence of a cardiologist.      PROCEDURES:  None    CONSULTS:  IP CONSULT TO OTOLARYNGOLOGY  IP CONSULT TO INTERNAL MEDICINE    CRITICAL CARE:  Please see attending note    FINAL IMPRESSION      1. Mastoiditis of left side    2. Altered mental status, unspecified altered mental status type          DISPOSITION / PLAN     DISPOSITION Admitted 03/09/2020 11:49:19 PM      PATIENT REFERRED TO:  Radha Hickman DO  [facility].   [address] [address] [phone]            DISCHARGE MEDICATIONS:  New Prescriptions    No medications on file       Allen Saavedra DO  Emergency Medicine Resident    (Please note that portions of this note were completed with a voice recognition program.Efforts were made to edit the dictations but occasionally words are mis-transcribed.)        Vel Guzman DO  Resident  03/10/20 [id number]

## 2020-03-10 NOTE — CONSULTS
deficit     Neurofibromatosis ([facility]     Osteoarthritis     Paraplegia ([address]     chair to bed and chair transfer only    Peptic ulcer     PVD (peripheral vascular disease) ([address]     Secondary hyperparathyroidism ([address]     Sinus disorder     Syncope     Type II or unspecified type diabetes mellitus without mention of complication, not stated as uncontrolled     Venous thrombosis        Past Surgical History:   Procedure Laterality Date    CHOLECYSTECTOMY      COLONOSCOPY  08/22/2011    2 POLYPS REMOBED TUBULAR ADENOMA    DIALYSIS FISTULA CREATION Left 06/13/2016    LEFT WRIST, no longer functional    DIALYSIS FISTULA CREATION Left     antecubital, was revised one time    HIP SURGERY Right     deep laceration was repaired    INTRACAPSULAR CATARACT EXTRACTION Right 5/7/2019    EYE CATARACT EMULSIFICATION IOL IMPLANT performed by Monika Juarez MD at [address] [facility] Left 5/28/2019    EYE CATARACT EMULSIFICATION IOL IMPLANT performed by Monika Juarez MD at [address] [facility]. Left     ear.  SKIN GRAFT      right axilla    THYROID SURGERY      non-cancerous lump removed    TOTAL NEPHRECTOMY Right     as a donor    TUNNELED VENOUS CATHETER PLACEMENT Right 09/03/2016    later removed    TURP         Prior to Admission medications    Medication Sig Start Date End Date Taking? Authorizing Provider   fentaNYL (DURAGESIC) 25 MCG/HR Place 1 patch onto the skin every 72 hours for 6 days. 3/7/20 3/13/20 Yes Aniceto Aburto MD   oxyCODONE HCl (OXY-IR) 10 MG immediate release tablet Take 1 tablet by mouth every 6 hours as needed for Pain for up to 5 days. 3/7/20 3/12/20 Yes Aniceto Aburto MD   ALPRAZolam Benigno Bouse) 0.5 MG tablet Take 1 tablet by mouth 2 times daily for 5 days.  3/7/20 3/12/20 Yes Aniceto Aburto MD   apixaban (ELIQUIS) 5 MG TABS tablet Take 1 tablet by mouth 2 times daily 3/6/20  Yes Aniceto Aburto MD   dilTIAZem Oral Daily    senna  1 tablet Oral BID    sevelamer  2,400 mg Oral TID WC    tamsulosin  0.4 mg Oral Daily    insulin lispro  0-12 Units Subcutaneous TID WC    acyclovir  1,000 mg Intravenous Q8H     Continuous Infusions:  PRN Meds:    Allergies   Allergen Reactions    Cymbalta [Duloxetine Hcl]      Intolerance.     Tromethamine Other (See Comments)    Toradol [Ketorolac Tromethamine] Other (See Comments)     Headaches        Social History     Socioeconomic History    Marital status: Single     Spouse name: Not on file    Number of children: Not on file    Years of education: Not on file    Highest education level: Not on file   Occupational History    Not on file   Social Needs    Financial resource strain: Not on file    Food insecurity     Worry: Not on file     Inability: Not on file    Transportation needs     Medical: Not on file     Non-medical: Not on file   Tobacco Use    Smoking status: Former Smoker     Packs/day: 1.00     Types: Cigarettes     Last attempt to quit: 3/1/2019     Years since quittin.0    Smokeless tobacco: Never Used   Substance and Sexual Activity    Alcohol use: No    Drug use: No    Sexual activity: Not Currently   Lifestyle    Physical activity     Days per week: Not on file     Minutes per session: Not on file    Stress: Not on file   Relationships    Social connections     Talks on phone: Not on file     Gets together: Not on file     Attends Zoroastrian service: Not on file     Active member of club or organization: Not on file     Attends meetings of clubs or organizations: Not on file     Relationship status: Not on file    Intimate partner violence     Fear of current or ex partner: Not on file     Emotionally abused: Not on file     Physically abused: Not on file     Forced sexual activity: Not on file   Other Topics Concern    Not on file   Social History Narrative    Not on file       Family History   Family history unknown: Yes         Physical 03/09/2020    AST 29 03/09/2020    ALT 39 03/09/2020      Hepatic:   Lab Results   Component Value Date    AST 29 03/09/2020    AST 18 01/02/2020    AST 21 01/18/2019    ALT 39 03/09/2020    ALT 22 01/02/2020    ALT 29 01/18/2019    BILITOT 0.63 03/09/2020    BILITOT 0.89 01/02/2020    BILITOT 0.80 01/18/2019    ALKPHOS 244 (H) 03/09/2020    ALKPHOS 179 (H) 01/02/2020    ALKPHOS 181 (H) 01/18/2019     BNP:   Lab Results   Component Value Date    BNP 43 03/17/2014     Lipids:   Lab Results   Component Value Date    CHOL 108 06/18/2019    HDL 37 (L) 06/18/2019     INR:   Lab Results   Component Value Date    INR 1.0 03/10/2020    INR 1.0 03/09/2020    INR 1.1 03/02/2020     PTH: No results found for: PTH  Phosphorus:    Lab Results   Component Value Date    PHOS 5.6 03/07/2020     Ionized Calcium: No results found for: IONCA  Magnesium:   Lab Results   Component Value Date    MG 1.9 03/05/2020     Albumin:   Lab Results   Component Value Date    LABALBU 3.4 03/09/2020    LABALBU 4.6 04/24/2012     Last 3 CK, CKMB, Troponin: @LABRCNT(CKTOTAL:3,CKMB:3,TROPONINI:3)       URINE:)No results found for: Enrike Alison    Radiology:   Reviewed. Assessment:  1. ESRD  2. Hyperkalemia  3. AMS/Encephalopathy/Mastoiditis  4. Afib  5. Anemia      Plan:  1. HD today then resume MWF schedule. 2. MERON with HD. Target hgb 10-11.5.  3. Neuro/Otolaryngology plans noted. 4. Renal diet with 1200mL fluid restriction  5. Acyclovir dosing per pharmacy. 6. BP stable. 7. Avoid nephrotoxic drugs, fleet's enemas, and IV contrast exposure. 8. Pain management per primary. 9. Follow up labs. Thank you for the consultation. Please do not hesitate to contact us for any further questions/concerns. We will continue to follow along with you. Pt seen in collaboration with Dr. Breann Mccarthy. Electronically signed by Claudetta Marble, APRN - CNP  on 3/10/2020 at 12:31 PM     Physician Addendum    Pt seen and examined during HD. See HD Orders.   UF adjusted for hypotension. I performed all key and critical portions of this evaluation. I have discussed and modified plan of care as noted above.     Electronically signed by Jonas Pacheco MD on 3/10/2020 at 4:26 PM

## 2020-03-10 NOTE — CONSULTS
Smokeless tobacco: Never Used   Substance and Sexual Activity    Alcohol use: No    Drug use: No    Sexual activity: Not Currently   Lifestyle    Physical activity     Days per week: Not on file     Minutes per session: Not on file    Stress: Not on file   Relationships    Social connections     Talks on phone: Not on file     Gets together: Not on file     Attends Islam service: Not on file     Active member of club or organization: Not on file     Attends meetings of clubs or organizations: Not on file     Relationship status: Not on file    Intimate partner violence     Fear of current or ex partner: Not on file     Emotionally abused: Not on file     Physically abused: Not on file     Forced sexual activity: Not on file   Other Topics Concern    Not on file   Social History Narrative    Not on file       Family History:        Family history unknown: Yes       REVIEW OF SYSTEMS:  As above and:  CONSTITUTIONAL:  negative  EYES:  negative   HEENT:  negative   RESPIRATORY:  negative   CARDIOVASCULAR:  negative    GASTROINTESTINAL:  negative   GENITOURINARY:  negative   INTEGUMENT/BREAST:  negative   HEMATOLOGIC/LYMPHATIC:  negative   ALLERGIC/IMMUNOLOGIC:  negative   ENDOCRINE:  negative   MUSCULOSKELETAL:  negative   NEUROLOGICAL:  negative   BEHAVIOR/PSYCH:  negative     PHYSICAL EXAM:    VITALS:  BP (!) 122/50   Pulse 71   Temp 98.6 °F (37 °C) (Oral)   Resp 17   Wt 300 lb (136.1 kg)   SpO2 97%   BMI 40.69 kg/m²       CONSTITUTIONAL: Sleepy and slow to respond no apparent distress, and appears stated age  EYES:  Lids and lashes normal, pupils equal, round and reactive to light, extra ocular muscles intact, sclera clear, conjunctiva normal  ENT:  Normocephalic, without obvious abnormality, atraumatic, sinuses nontender on palpation, external ears right is normal but the left ear is partially resected in the external auditory canal is swollen shut oral pharynx with moist mucus membranes, f scar tissue on the left side of his face  NECK:  Supple, symmetrical, trachea midline, no adenopathy, thyroid symmetric, not enlarged and no tenderness, skin normal  MUSCULOSKELETAL: Morbid obesity  NEUROLOGIC: Arousable oriented to name  Cranial nerves III-XII are grossly intact but cannot assess cranial nerve VIII adequately    DATA:    Labs and Radiology reports/films reviewed

## 2020-03-10 NOTE — CARE COORDINATION
Doc Lutz notified of pt being up on floor ,now in dialysis Notified. Dr Hardik Dickerson concern of pt being off eliquis questionable need for heparin drip. Physician and Doc Lutz to see pt.

## 2020-03-10 NOTE — ED PROVIDER NOTES
(136.1 kg)           I personally evaluated and examined the patient in conjunction with the resident and agree with the assessment, treatment plan, and disposition of the patient as recorded by the resident. I performed a history and physical examination of the patient and discussed management with the resident. I reviewed the residents note and agree with the documented findings and plan of care. Any areas of disagreement are noted on the chart. I was personally present for the key portions of any procedures. I have documented in the chart those procedures where I was not present during the key portions. I have personally reviewed all images and agree with the resident's interpretation. I have reviewed the emergency nurses triage note. I agree with the chief complaint, past medical history, past surgical history, allergies, medications, social and family history as documented unless otherwise noted.     Loretta Hinson MD  Attending Emergency Physician            Loretta Hinson MD  03/09/20 [id number]       Loretta Hinson MD  03/10/20 [id number]

## 2020-03-10 NOTE — PROGRESS NOTES
Medication History completed:    New medications: none    Medications discontinued: duplicate doxycycline    Changes to dosing: none    Stated allergies: As listed    Other pertinent information: Medications confirmed with facility list.     Thank you,  Maday Bustamante PharmD, BCPS  [phone]

## 2020-03-10 NOTE — H&P
Yes Bayron Fuentes MD   lidocaine 4 % external patch Place 1 patch onto the skin daily 3/6/20  Yes Bayron Fuentes MD   doxycycline monohydrate (MONODOX) 100 MG capsule Take 1 capsule by mouth every 12 hours for 5 days 3/6/20 3/11/20 Yes Bayron Fuentes MD   fluticasone-vilanterol (BREO ELLIPTA) 100-25 MCG/INH AEPB inhaler Inhale 1 puff into the lungs daily   Yes Historical Provider, MD   glucagon 1 MG injection Inject 1 kit into the muscle as needed (hypoglycemia BG <60)   Yes Historical Provider, MD   glucose (GLUTOSE) 40 % GEL Take 15 g by mouth as needed (blood sugar <60)   Yes Historical Provider, MD   loratadine (CLARITIN) 5 MG chewable tablet Take 5 mg by mouth daily    Yes Historical Provider, MD   nitroGLYCERIN (NITROSTAT) 0.4 MG SL tablet Place 0.4 mg under the tongue every 5 minutes as needed for Chest pain up to max of 3 total doses. If no relief after 1 dose, call 911.    Yes Historical Provider, MD   Dextromethorphan-guaiFENesin  MG/5ML SYRP Take 5 mLs by mouth every 6 hours as needed for Cough   Yes Historical Provider, MD   insulin glargine (LANTUS) 100 UNIT/ML injection vial Inject 10 Units into the skin 2 times daily 3/4/19  Yes Malinda Kingston MD   lidocaine-prilocaine (EMLA) 2.5-2.5 % cream Apply topically three times a week Apply topically to arm/port three times weekly on Monday, Wednesday, and Friday for dialysis   Yes Historical Provider, MD   lactulose (CHRONULAC) 10 GM/15ML solution Take 10 g by mouth daily   Yes Historical Provider, MD   ondansetron (ZOFRAN) 4 MG tablet Take 4 mg by mouth every 6 hours as needed for Nausea or Vomiting   Yes Historical Provider, MD   midodrine (PROAMATINE) 5 MG tablet Take 5 mg by mouth three times a week On Monday, Wednesday, and Friday for dialysis   Yes Historical Provider, MD   ramelteon (ROZEREM) 8 MG tablet Take 8 mg by mouth nightly   Yes Historical Provider, MD   diphenhydrAMINE (BENADRYL) 25 MG tablet Take 25 mg by mouth every 8 hours as needed for Itching    Yes Historical Provider, MD   aspirin 81 MG chewable tablet Take 81 mg by mouth daily   Yes Historical Provider, MD   sevelamer (RENVELA) 800 MG tablet Take 3 tablets by mouth 3 times daily (with meals)    Yes Historical Provider, MD   senna (SENOKOT) 8.6 MG tablet Take 1 tablet by mouth 2 times daily   Yes Historical Provider, MD   escitalopram (LEXAPRO) 10 MG tablet Take 10 mg by mouth every morning    Yes Historical Provider, MD   isosorbide mononitrate (IMDUR) 60 MG CR tablet Take 60 mg by mouth daily   Yes Historical Provider, MD   fluticasone (FLONASE) 50 MCG/ACT nasal spray 1 spray by Nasal route daily    Yes Historical Provider, MD   finasteride (PROSCAR) 5 MG tablet Take 5 mg by mouth daily. Yes Historical Provider, MD   omeprazole (PRILOSEC) 20 MG capsule Take 20 mg by mouth every morning    Yes Historical Provider, MD   metoprolol tartrate (LOPRESSOR) 25 MG tablet Take 25 mg by mouth daily Indications: Hold for SBP less than 100 or HR less than 60    Yes Historical Provider, MD   tamsulosin (FLOMAX) 0.4 MG capsule Take 0.4 mg by mouth daily. Yes Historical Provider, MD        Allergies:     Cymbalta [duloxetine hcl]; Tromethamine; and Toradol [ketorolac tromethamine]    Social History:     Tobacco:    reports that he quit smoking about a year ago. His smoking use included cigarettes. He smoked 1.00 pack per day. He has never used smokeless tobacco.  Alcohol:      reports no history of alcohol use. Drug Use:  reports no history of drug use.     Family History:     Family History   Family history unknown: Yes       Review of Systems:     Patient is delirium unable to get review of system    Physical Exam:   BP (!) 78/43   Pulse 57   Temp 96.8 °F (36 °C)   Resp 10   Ht 6' (1.829 m)   Wt (!) 300 lb 4.3 oz (136.2 kg)   SpO2 95%   BMI 40.72 kg/m²   Recent Labs     03/10/20  0615   POCGLU 87       General Appearance: Patient is drowsy  Mental status: Morphology NOT REPORTED     Platelet Estimate NOT REPORTED     Seg Neutrophils 79 (H) 36 - 66 %    Lymphocytes 5 (L) 24 - 44 %    Monocytes 12 (H) 1 - 7 %    Eosinophils % 4 0 - 4 %    Basophils 0 0 - 2 %    Segs Absolute 3.86 1.3 - 9.1 k/uL    Absolute Lymph # 0.25 (L) 1.0 - 4.8 k/uL    Absolute Mono # 0.59 0.1 - 1.3 k/uL    Absolute Eos # 0.20 0.0 - 0.4 k/uL    Basophils Absolute 0.00 0.0 - 0.2 k/uL    Morphology ANISOCYTOSIS PRESENT     Morphology 1+ POLYCHROMASIA    Comprehensive Metabolic Panel    Collection Time: 03/09/20  9:35 PM   Result Value Ref Range    Glucose 119 (H) 70 - 99 mg/dL    BUN 37 (H) 8 - 23 mg/dL    CREATININE 6.05 (HH) 0.70 - 1.20 mg/dL    Bun/Cre Ratio NOT REPORTED 9 - 20    Calcium 8.6 8.6 - 10.4 mg/dL    Sodium 136 135 - 144 mmol/L    Potassium 5.0 3.7 - 5.3 mmol/L    Chloride 97 (L) 98 - 107 mmol/L    CO2 25 20 - 31 mmol/L    Anion Gap 14 9 - 17 mmol/L    Alkaline Phosphatase 244 (H) 40 - 129 U/L    ALT 39 5 - 41 U/L    AST 29 <40 U/L    Total Bilirubin 0.63 0.3 - 1.2 mg/dL    Total Protein 6.1 (L) 6.4 - 8.3 g/dL    Alb 3.4 (L) 3.5 - 5.2 g/dL    Albumin/Globulin Ratio NOT REPORTED 1.0 - 2.5    GFR Non-African American 9 (L) >60 mL/min    GFR  11 (L) >60 mL/min    GFR Comment          GFR Staging NOT REPORTED    TOX SCR, BLD, ED    Collection Time: 03/09/20  9:35 PM   Result Value Ref Range    Ethanol <10 <10 mg/dL    Ethanol percent <8.006 %    Salicylate Lvl <1 (L) 3 - 10 mg/dL    Acetaminophen Level <5 (L) 10 - 30 ug/mL    Toxic Tricyclic Sc,Blood WRONG TEST ORDERED NEGATIVE   Protime-INR    Collection Time: 03/09/20  9:35 PM   Result Value Ref Range    Protime 13.1 11.8 - 14.6 sec    INR 1.0    APTT    Collection Time: 03/09/20  9:35 PM   Result Value Ref Range    PTT 38.5 (H) 24.0 - 36.0 sec   TSH w/reflex to FT4    Collection Time: 03/09/20  9:35 PM   Result Value Ref Range    TSH 3.08 0.30 - 5.00 mIU/L   Brain Natriuretic Peptide    Collection Time: 03/09/20  9:35 PM   Result Value Ref Range    Pro-BNP 7,221 (H) <300 pg/mL    BNP Interpretation Pro-BNP Reference Range:    Lactic Acid    Collection Time: 03/09/20  9:35 PM   Result Value Ref Range    Lactic Acid 0.6 0.5 - 2.2 mmol/L   Troponin    Collection Time: 03/09/20  9:35 PM   Result Value Ref Range    Troponin, High Sensitivity 123 (HH) 0 - 22 ng/L    Troponin T NOT REPORTED <0.03 ng/mL    Troponin Interp NOT REPORTED    POC Glucose Fingerstick    Collection Time: 03/10/20  6:15 AM   Result Value Ref Range    POC Glucose 87 75 - 110 mg/dL   POCT Glucose    Collection Time: 03/10/20  6:20 AM   Result Value Ref Range    Glucose 87 mg/dL    QC OK? ok    Basic Metabolic Panel w/ Reflex to MG    Collection Time: 03/10/20  6:52 AM   Result Value Ref Range    Glucose 97 70 - 99 mg/dL    BUN 41 (H) 8 - 23 mg/dL    CREATININE 6.90 (HH) 0.70 - 1.20 mg/dL    Bun/Cre Ratio NOT REPORTED 9 - 20    Calcium 9.0 8.6 - 10.4 mg/dL    Sodium 135 135 - 144 mmol/L    Potassium 5.1 3.7 - 5.3 mmol/L    Chloride 96 (L) 98 - 107 mmol/L    CO2 25 20 - 31 mmol/L    Anion Gap 14 9 - 17 mmol/L    GFR Non-African American 8 (L) >60 mL/min    GFR  10 (L) >60 mL/min    GFR Comment          GFR Staging NOT REPORTED    CBC    Collection Time: 03/10/20  6:52 AM   Result Value Ref Range    WBC 4.3 3.5 - 11.0 k/uL    RBC 3.25 (L) 4.5 - 5.9 m/uL    Hemoglobin 9.8 (L) 13.5 - 17.5 g/dL    Hematocrit 30.8 (L) 41 - 53 %    MCV 94.8 80 - 100 fL    MCH 30.3 26 - 34 pg    MCHC 31.9 31 - 37 g/dL    RDW 16.2 (H) 11.5 - 14.9 %    Platelets 056 (L) 688 - 450 k/uL    MPV 6.4 6.0 - 12.0 fL    NRBC Automated NOT REPORTED per 100 WBC   Protime-INR    Collection Time: 03/10/20  6:52 AM   Result Value Ref Range    Protime 12.9 11.8 - 14.6 sec    INR 1.0        Recent Labs     03/10/20  0652  03/09/20  8115   HGB 9.8*  --  9.4*   HCT 30.8*  --  29.8*   WBC 4.3  --  4.9   MCV 94.8  --  95.1     --  136   K 5.1  --  5.0   CL 96*  --  97*   CO2 25  --  25 BUN 41*  --  37*   CREATININE 6.90*  --  6.05*   GLUCOSE 97   < > 119*   INR 1.0  --  1.0   PROTIME 12.9  --  13.1   APTT  --   --  38.5*   AST  --   --  29   ALT  --   --  39   LABALBU  --   --  3.4*    < > = values in this interval not displayed. Hematology:  Recent Labs     03/09/20  2135 03/10/20  0652   WBC 4.9 4.3   RBC 3.13* 3.25*   HGB 9.4* 9.8*   HCT 29.8* 30.8*   MCV 95.1 94.8   MCH 30.0 30.3   MCHC 31.6 31.9   RDW 16.6* 16.2*    144*   MPV 7.1 6.4   INR 1.0 1.0     Chemistry:  Recent Labs     03/07/20  1509 03/09/20  2135 03/10/20  0620 03/10/20  0652   NA  --  136  --  135   K  --  5.0  --  5.1   CL  --  97*  --  96*   CO2  --  25  --  25   GLUCOSE  --  119* 87 97   BUN  --  37*  --  41*   CREATININE  --  6.05*  --  6.90*   ANIONGAP  --  14  --  14   LABGLOM  --  9*  --  8*   GFRAA  --  11*  --  10*   CALCIUM  --  8.6  --  9.0   PROBNP  --  7,221*  --   --    TROPONINT NOT REPORTED NOT REPORTED  --   --      Recent Labs     03/09/20  2135 03/10/20  0615   PROT 6.1*  --    LABALBU 3.4*  --    TSH 3.08  --    AST 29  --    ALT 39  --    ALKPHOS 244*  --    BILITOT 0.63  --    AMMONIA 45  --    POCGLU  --  87       Imaging/Diagnostics:       Ct Abdomen Pelvis Wo Contrast    Result Date: 3/3/2020  EXAMINATION: CT OF THE ABDOMEN AND PELVIS WITHOUT CONTRAST 3/2/2020 8:58 pm TECHNIQUE: CT of the abdomen and pelvis was performed without the administration of intravenous contrast. Multiplanar reformatted images are provided for review. Dose modulation, iterative reconstruction, and/or weight based adjustment of the mA/kV was utilized to reduce the radiation dose to as low as reasonably achievable.  COMPARISON: 08/19/2018 HISTORY: ORDERING SYSTEM PROVIDED HISTORY: fall multiple rib fx r/o bleed TECHNOLOGIST PROVIDED HISTORY: fall multiple rib fx r/o bleed Reason for Exam: fall multiple rib fx r/o bleed Acuity: Unknown Type of Exam: Unknown FINDINGS: Lower Chest: See separate report for CT chest. Organs: Liver is normal.  Enlarged spleen. Respiratory motion. High density lesion in the left kidney has enlarged from prior exam and now measures 6.8 x 6.2 cm and previously measured 5.5 x 6.1 cm. Absent right kidney. GI/Bowel: No dilated bowel. No associated inflammatory changes. Stool throughout the colon. Pelvis: Bladder is collapsed. Borderline prostatic enlargement. Peritoneum/Retroperitoneum: No enlarged lymph nodes. Atherosclerotic changes of the aorta. Normal caliber aorta. Bones/Soft Tissues: Left inguinal lymphadenopathy, slightly increased from prior exam, the largest lymph node measures 2.0 x 1.8 cm. Body wall edema. Paraspinal muscle atrophy and gluteal muscle atrophy. Acute right 6th and 7th rib fractures as well as old fractures described previously. Minimally displaced right 6th and 7th rib fractures. Old rib fractures elsewhere as before. Continued enlargement of hyperdense left renal mass most likely neoplasm. Increasing left inguinal lymphadenopathy of uncertain significance. Ct Head Wo Contrast    Result Date: 3/9/2020  EXAMINATION: CT OF THE HEAD WITHOUT CONTRAST  3/9/2020 10:04 pm TECHNIQUE: CT of the head was performed without the administration of intravenous contrast. Dose modulation, iterative reconstruction, and/or weight based adjustment of the mA/kV was utilized to reduce the radiation dose to as low as reasonably achievable. COMPARISON: CT head scan without contrast March 2, 2020 at [facility] 1978 hours HISTORY: ORDERING SYSTEM PROVIDED HISTORY: fall on eliquis TECHNOLOGIST PROVIDED HISTORY: fall on eliquis Reason for Exam: fall on eliquis; ams Acuity: Unknown Type of Exam: Unknown FINDINGS: BRAIN/VENTRICLES: There is no acute intracranial hemorrhage, mass effect or midline shift. No abnormal extra-axial fluid collection. The gray-white differentiation is maintained without evidence of an acute infarct. There is no evidence of hydrocephalus.  Basilar artery dolichoectasia is present. Mild ill-defined hypoattenuation is present within cerebral white matter consistent with microvascular ischemic change. Mild diffuse decrease in cerebral volume is noted with corresponding prominence of the sulci and ventricles. ORBITS: The visualized portion of the orbits demonstrate no acute abnormality. SINUSES: Large retention cyst is noted within the right maxillary sinus. Fluid opacification of left-sided mastoid air cells and middle ear cavity is present. SOFT TISSUES/SKULL:  No acute abnormality of the visualized skull or soft tissues. 1. No evidence of acute intracranial trauma. 2. Mild cerebral white matter chronic microvascular ischemic disease. 3. Mild diffuse cerebral atrophy. 4. Left-sided acute otomastoiditis. 5. Right maxillary sinus large retention cyst. 6. Basilar artery dolichoectasia. Ct Head Wo Contrast    Result Date: 3/2/2020  EXAMINATION: CT OF THE HEAD WITHOUT CONTRAST  3/2/2020 6:35 pm TECHNIQUE: CT of the head was performed without the administration of intravenous contrast. Dose modulation, iterative reconstruction, and/or weight based adjustment of the mA/kV was utilized to reduce the radiation dose to as low as reasonably achievable. COMPARISON: February 27, 2020 HISTORY: ORDERING SYSTEM PROVIDED HISTORY: [facility] TECHNOLOGIST PROVIDED HISTORY: [facility] Reason for Exam: pt became altered mental status at Dialysis today FINDINGS: BRAIN/VENTRICLES: There is no acute intracranial hemorrhage, mass effect or midline shift. No abnormal extra-axial fluid collection. The gray-white differentiation is maintained without evidence of an acute infarct. There is no evidence of hydrocephalus. ORBITS: The visualized portion of the orbits demonstrate no acute abnormality. SINUSES: Opacification of the right maxillary sinus. SOFT TISSUES/SKULL:  No acute abnormality of the visualized skull or soft tissues. No acute intracranial abnormality. Right maxillary sinusitis.      Ct Head Wo Contrast    Result Date: 2/27/2020  EXAMINATION: CT OF THE HEAD WITHOUT CONTRAST  2/27/2020 10:42 am TECHNIQUE: CT of the head was performed without the administration of intravenous contrast. Dose modulation, iterative reconstruction, and/or weight based adjustment of the mA/kV was utilized to reduce the radiation dose to as low as reasonably achievable. COMPARISON: 01/02/2020 HISTORY: ORDERING SYSTEM PROVIDED HISTORY: trauma TECHNOLOGIST PROVIDED HISTORY: trauma Reason for Exam: trauma Acuity: Acute Type of Exam: Initial Mechanism of Injury: PT STATES HE FELL TUESDAY, STATES HIS BUTT HURTS FINDINGS: BRAIN/VENTRICLES: There is no acute intracranial hemorrhage, mass effect or midline shift. No abnormal extra-axial fluid collection. The gray-white differentiation is maintained without evidence of an acute infarct. There is no evidence of hydrocephalus. Mild generalized brain parenchymal atrophy. ORBITS: The visualized portion of the orbits demonstrate no acute abnormality. SINUSES: Complete opacification of the right maxillary sinus and left mastoid air cells. Remaining paranasal sinuses are clear. SOFT TISSUES/SKULL:  No acute abnormality of the visualized skull or soft tissues. No acute intracranial abnormality. Persistent opacification of the right maxillary sinus and left mastoid air cells. Correlate for any signs of sinusitis. Ct Iac Posterior Fossa Wo Contrast    Result Date: 3/10/2020  EXAMINATION: CT OF THE INTERNAL AUDITORY CANAL WITHOUT CONTRAST 3/10/2020 7:10 am TECHNIQUE: CT of the internal auditory canal was performed without contrast was performed without the administration of intravenous contrast. Multiplanar reformatted images are provided for review. Dose modulation, iterative reconstruction, and/or weight based adjustment of the mA/kV was utilized to reduce the radiation dose to as low as reasonably achievable.  COMPARISON: Temporal bone CT 09/19/2014 HISTORY: ORDERING SYSTEM PROVIDED HISTORY: Mastoiditis TECHNOLOGIST PROVIDED HISTORY: Mastoiditis Reason for Exam: Mastoiditis; Patient with mastoiditis history of ear cancer and diabetic possible malignant otitis externa Acuity: Acute Type of Exam: Unknown FINDINGS: RIGHT TEMPORAL BONE:  The external auditory canal is clear without osseous erosion. The scutum is intact. The middle ear cavity is clear. The ossicular chain is intact. The mastoid air cells are clear. The inner ear structures appear unremarkable. Normal mineralization of the otic capsule. The internal auditory canal and vestibular aqueduct appear unremarkable. The carotid canal is normal in appearance. The jugular bulb is unremarkable. LEFT TEMPORAL BONE: Soft tissue thickening of the external auditory canal. No osseous erosion. The scutum is intact. Near complete opacification of the middle ear cavity. The ossicular chain is intact. Complete opacification of the mastoid air cells. The inner ear structures appear unremarkable. Normal mineralization of the otic capsule. The internal auditory canal and vestibular aqueduct appear unremarkable. The carotid canal is normal in appearance. The jugular bulb is unremarkable. BRAIN: The visualized portion of the intracranial contents appear unremarkable. ORBITS: The visualized portion of the orbits demonstrate no acute abnormality. SINUSES: Right maxillary sinus polyp or mucosal retention cyst.     1. Left external auditory canal soft tissue thickening and left tympanomastoid cavity opacification. Findings are compatible with otitis externa and otomastoiditis. 2. Unremarkable right temporal bone CT. Ct Chest Wo Contrast    Result Date: 3/2/2020  EXAMINATION: CT OF THE CHEST WITHOUT CONTRAST 3/2/2020 6:38 pm TECHNIQUE: CT of the chest was performed without the administration of intravenous contrast. Multiplanar reformatted images are provided for review.  Dose modulation, iterative reconstruction, and/or weight based adjustment of the mA/kV was utilized to reduce the radiation dose to as low as reasonably achievable. COMPARISON: January 28, 2015 February 27, 2020 HISTORY: ORDERING SYSTEM PROVIDED HISTORY: r/o PTX TECHNOLOGIST PROVIDED HISTORY: r/o PTX Reason for Exam: pt became altered mental status at Dialysis today FINDINGS: Evaluation is limited by motion. Chest wall: No axillary adenopathy. Upper Abdomen: Cholecystectomy. No adrenal nodule. Mediastinum: Cardiomegaly. No pericardial effusion. Coronary artery calcifications. No adenopathy. Atherosclerotic calcification of the thoracic aorta. Lungs: No pneumothorax. Partial clearing at the right lung base. Pleural based lesion. Underlying rib fractures within the lingula but new since 2015 measuring at least 3.3 x 1.2 cm. . Bones: Multiple rib fractures are again seen. Motion limited study. Partial clearing at the right lung base. Pleural based lesion within the lingula is nonspecific and could represent sequela from remote rib fractures versus a pleural based lesion. Recommend follow-up versus further evaluation. Ct Chest Wo Contrast    Result Date: 2/27/2020  EXAMINATION: CT OF THE CHEST WITHOUT CONTRAST 2/27/2020 10:43 am TECHNIQUE: CT of the chest was performed without the administration of intravenous contrast. Multiplanar reformatted images are provided for review. Dose modulation, iterative reconstruction, and/or weight based adjustment of the mA/kV was utilized to reduce the radiation dose to as low as reasonably achievable. COMPARISON: 11/25/2016 HISTORY: ORDERING SYSTEM PROVIDED HISTORY: trauma TECHNOLOGIST PROVIDED HISTORY: trauma Reason for Exam: trauma Acuity: Acute Type of Exam: Initial Mechanism of Injury: PT STATES HE FELL ON TUESDAY, C/O RIGHT SIDE PAIN FINDINGS: Mediastinum: Cardiomegaly. Mild coronary artery calcifications. Normal caliber aorta. No significant mediastinal, hilar or axillary lymphadenopathy.   Thyroid gland grossly normal as Complete    Result Date: 3/3/2020  EXAMINATION: RETROPERITONEAL ULTRASOUND OF THE KIDNEYS 3/3/2020 COMPARISON: None HISTORY: ORDERING SYSTEM PROVIDED HISTORY: renal mass TECHNOLOGIST PROVIDED HISTORY: renal mass FINDINGS: Kidneys: Status post right nephrectomy. Unremarkable appearing right renal fossa. 6.8 cm cyst laterally left kidney. No solid masses. No hydronephrosis. .     1. Status post right nephrectomy 2. 6.8 cm cyst laterally left kidney 3. Left kidney otherwise appears unremarkable     Ct Abdomen Pelvis W Iv Contrast Additional Contrast? None    Result Date: 3/10/2020  EXAMINATION: CT OF THE ABDOMEN AND PELVIS WITH CONTRAST 3/10/2020 1:30 am TECHNIQUE: CT of the abdomen and pelvis was performed with the administration of intravenous contrast. Multiplanar reformatted images are provided for review. Dose modulation, iterative reconstruction, and/or weight based adjustment of the mA/kV was utilized to reduce the radiation dose to as low as reasonably achievable. COMPARISON: 03/02/2020 HISTORY: ORDERING SYSTEM PROVIDED HISTORY: scrotal drainage concern for Buzz's TECHNOLOGIST PROVIDED HISTORY: scrotal drainage concern for Buzz's Reason for Exam: scrotal drainage concern for Buzz's Acuity: Acute Type of Exam: Unknown FINDINGS: Lower Chest: Study is motion degraded. Mild bibasilar pleural and parenchymal disease has not changed significantly. There are several bilateral acute, subacute and old rib fractures. Organs: Study was degraded by artifact from the arm down position. The liver, spleen, pancreas, and adrenal glands are normal.  The liver is borderline enlarged and unchanged. Absent right kidney. Hemorrhagic cyst versus solid mass in the lower pole of the left kidney measuring up to 6.9 cm, previously 6.8 cm. There is a lobulated partially calcified nodule medial to this mass and there is a similar appearing hemorrhagic cyst versus solid mass projecting left laterally from this mass. Beto Mcculloughem lidocaine. A micropuncture needle was used to access the right internal jugular vein using ultrasound guidance. An ultrasound image demonstrating patency of the vein with needle tip located within it. An image was obtained and stored in PACs. A 0.035 guidewire was used to place a 7 [place] x 16 cm triple-lumen central catheter using fluoroscopic guidance with tip at the cavoatrial junction after fascial tract dilation. The catheter lumens flushed easily and there was a good blood return. The catheter was sutured to the skin and covered with a CHG dressing. The patient tolerated the procedure well and there were no immediate complications. FINDINGS: Fluoroscopic image demonstrates the tip of the catheter near the cavoatrial junction. Successful ultrasound and fluoroscopy guided triple-lumen central catheter placement. Central line is ready for use at this time. CONTRAST: None used     Xr Chest Portable    Result Date: 3/9/2020  EXAMINATION: ONE XRAY VIEW OF THE CHEST 3/9/2020 10:39 pm COMPARISON: Prior studies including 03/04/2020 HISTORY: ORDERING SYSTEM PROVIDED HISTORY: AMS TECHNOLOGIST PROVIDED HISTORY: AMS Reason for Exam: AMS Acuity: Acute Type of Exam: Initial FINDINGS: Portable study is limited by body habitus. Unchanged elevation of the right hemidiaphragm. Stable cardiomegaly. There are a few bands of stable bibasilar chronic atelectasis. No acute infiltrate, pneumothorax or pleural effusion. No acute bone finding. Stable portable chest x-ray. No acute disease.      Xr Chest Portable    Result Date: 3/4/2020  EXAMINATION: ONE XRAY VIEW OF THE CHEST 3/4/2020 6:55 am COMPARISON: March 3, 2020, March 2, 2020 HISTORY: ORDERING SYSTEM PROVIDED HISTORY: Acute respiratory failure TECHNOLOGIST PROVIDED HISTORY: Acute respiratory failure Reason for Exam: Acute respiratory failure Acuity: Acute Type of Exam: Subsequent/Follow-up Additional signs and symptoms: Acute respiratory failure FINDINGS: The atherosclerotic calcification. 6. Note: Study also limited by marked hypoattenuation associated with patient body habitus. Vl Dup Lower Extremity Venous Bilateral    Result Date: 3/3/2020    [facility]  Vascular Lower Extremities DVT Study Procedure   Patient Name  Toby Arndt     Date of Study           03/03/2020                Donte Lara   Date of Birth 1950  Gender                  Male   Age           78 year(s)  Race                       Room Number   2006        Height:                 71.65 inch, 182 cm   Corporate ID  [id number]    Weight:                 280 pounds, 127 kg  #   Patient Acct  [de-identified]   BSA:        2.45 m^2    BMI:       38.34 kg/m^2  #   MR #          [id number]      Sonographer             Ac Gibbons RVT   Accession #   [id number]   Interpreting Physician  Kavitha Powell   Referring                 Referring Physician     Viviana Alpers, MD  Nurse  Practitioner  Procedure Type of Study:   Veins: Lower Extremities DVT Study, Venous Scan Lower Bilateral.  Indications for Study:Respiratory Failure. Patient Status: In Patient. Technical Quality:Limited visualization. Conclusions   Summary   Technically limited visualization. No evidence of superficial or deep venous thrombosis in both lower  extremities.    Signature   ----------------------------------------------------------------  Electronically signed by Ac Gibbons RVT(Sonographer) on  03/03/2020 04:05 PM  ----------------------------------------------------------------   ----------------------------------------------------------------  Electronically signed by Krishna Pereyar(Interpreting physician)  on 03/03/2020 09:43 PM  ----------------------------------------------------------------  Findings:   Right Impression:                    Left Impression:  The common femoral, femoral,         The common femoral, femoral,  popliteal and tibial veins           popliteal and tibial veins  demonstrate normal compressibility   demonstrate normal compressibility  and augmentation. and augmentation. Limited visualization of the calf    Normal compressibility of the great  veins. saphenous vein. Normal compressibility of the great  Normal compressibility of the small  saphenous vein. saphenous vein. Normal compressibility of the small  saphenous vein. Velocities are measured in cm/s ; Diameters are measured in cm Right Lower Extremities DVT Study Measurements Right 2D Measurements +------------------------------------+----------+---------------+----------+ ! Location                            ! Visualized! Compressibility! Thrombosis! +------------------------------------+----------+---------------+----------+ ! Common Femoral                      !Yes       ! Yes            ! None      ! +------------------------------------+----------+---------------+----------+ ! Prox Femoral                        !Yes       ! Yes            ! None      ! +------------------------------------+----------+---------------+----------+ ! Mid Femoral                         !Yes       ! Yes            ! None      ! +------------------------------------+----------+---------------+----------+ ! Dist Femoral                        !Yes       ! Yes            ! None      ! +------------------------------------+----------+---------------+----------+ ! Deep Femoral                        !Yes       ! Yes            ! None      ! +------------------------------------+----------+---------------+----------+ ! Popliteal                           !Yes       ! Yes            ! None      ! +------------------------------------+----------+---------------+----------+ ! Sapheno Femoral Junction            ! Yes       ! Yes            ! None      ! +------------------------------------+----------+---------------+----------+ ! PTV                                 ! Yes       ! Yes            ! None      ! !Yes       !Yes            ! None      ! +------------------------------------+----------+---------------+----------+ ! Popliteal                           !Yes       ! Yes            ! None      ! +------------------------------------+----------+---------------+----------+ ! Sapheno Femoral Junction            ! Yes       ! Yes            ! None      ! +------------------------------------+----------+---------------+----------+ ! PTV                                 ! Yes       ! Yes            ! None      ! +------------------------------------+----------+---------------+----------+ ! Peroneal                            !Yes       ! Yes            ! None      ! +------------------------------------+----------+---------------+----------+ ! Gastroc                             ! Yes       ! Yes            ! None      ! +------------------------------------+----------+---------------+----------+ ! GSV Thigh                           ! Yes       ! Yes            ! None      ! +------------------------------------+----------+---------------+----------+ ! GSV Knee                            ! Yes       ! Yes            ! None      ! +------------------------------------+----------+---------------+----------+ ! GSV Ankle                           ! Yes       ! Yes            ! None      ! +------------------------------------+----------+---------------+----------+ ! SSV                                 ! Yes       ! Yes            ! None      ! +------------------------------------+----------+---------------+----------+    Us Extremity Right Non Vasc Limited    Result Date: 3/6/2020  EXAMINATION: NONVASCULAR ULTRASOUND OF THE RIGHT EXTREMITY 3/6/2020 9:35 am COMPARISON: None. HISTORY: ORDERING SYSTEM PROVIDED HISTORY: Evaluate for abscess on right thigh buttocks TECHNOLOGIST PROVIDED HISTORY: Evaluate for abscess on right thigh buttocks FINDINGS: Direct scanning in the right posterior upper thigh was performed.  There is regional superficial soft tissue edema.  A heterogeneous hypoechoic area in the superficial soft tissues measuring approximately 6.9 x 26.5 x 22.5 mm may represent an area of inflammation/phlegmon/cellulitis. It does not appear typical for fluid collection/abscess. Mild hyperemia in the region. Small heterogeneous hypoechoic area in the superficial soft tissues, not typical for abscess; this may represent an area of inflammation/phlegmon. Impressions :      1. Active Problems:    Acute mastoiditis of left side  Resolved Problems:    * No resolved hospital problems. *        2.  has a past medical history of Acute combined systolic and diastolic congestive heart failure ([address] (11/25/2016), Anemia, BPH (benign prostatic hyperplasia), Cancer ([address] CKD (chronic kidney disease) stage 3, GFR 30-59 ml/min ([facility]), Constipation, COPD (chronic obstructive pulmonary disease) ([address] Depression, GERD (gastroesophageal reflux disease), Hemodialysis patient ([facility] HTN (hypertension), blood clots, Hyperparathyroidism ([address] Hypothyroidism, IDDM (insulin dependent diabetes mellitus) ([facility] [address] Insomnia, Liver disease, MDRO (multiple drug resistant organisms) resistance, Memory deficit, Neurofibromatosis ([address] Osteoarthritis, Paraplegia ([address] Peptic ulcer, PVD (peripheral vascular disease) ([address] Secondary hyperparathyroidism ([address] Sinus disorder, Syncope, Type II or unspecified type diabetes mellitus without mention of complication, not stated as uncontrolled, and Venous thrombosis.      Plans:     Left ear mastoiditis possible intracranial infection rule out meningitis IV antibiotics ENT consult noted neurology input noted ID consulted  Patient is DNR CC a  Poor candidate for surgery  End-stage kidney disease stage V on hemodialysis  Eliquis is held for possible lumbar puncture  Acute toxic metabolic encephalopathy secondary to infection    Current Facility-Administered Medications   Medication Dose Route Frequency Provider Last Rate Last Dose    cefTRIAXone (ROCEPHIN) 1 g IVPB in 50 mL D5W minibag  1 g Intravenous Q24H Hulon Ebbing, APRN - CNP        ALPRAZolam Jettie Jean) tablet 0.5 mg  0.5 mg Oral BID Hulon Ebbing, APRN - CNP   0.5 mg at 03/10/20 7025    [Held by provider] apixaban (ELIQUIS) tablet 5 mg  5 mg Oral BID Hulon Ebbing, APRN - CNP        aspirin chewable tablet 81 mg  81 mg Oral Daily Hulon Ebbing, APRN - CNP   81 mg at 03/10/20 0926    guaiFENesin-dextromethorphan (ROBITUSSIN DM) 100-10 MG/5ML syrup 5 mL  5 mL Oral Q6H PRN Hulon Ebbing, APRN - CNP        dilTIAZem (CARDIZEM CD) extended release capsule 240 mg  240 mg Oral Daily Hulon Ebbing, APRN - CNP   240 mg at 03/10/20 3914    diphenhydrAMINE (BENADRYL) tablet 25 mg  25 mg Oral Q8H PRN Hulon Ebbing, APRN - CNP        escitalopram (LEXAPRO) tablet 10 mg  10 mg Oral QAM Hulon Ebbing, APRN - CNP   10 mg at 03/10/20 8950    finasteride (PROSCAR) tablet 5 mg  5 mg Oral Daily Hulon Ebbing, APRN - CNP   5 mg at 03/10/20 0939    fluticasone (FLONASE) 50 MCG/ACT nasal spray 1 spray  1 spray Nasal Daily Hulon Ebbing, APRN - CNP   1 spray at 03/10/20 0943    [START ON 3/11/2020] fentaNYL (DURAGESIC) 25 MCG/HR 1 patch  1 patch Transdermal Q72H Hulon Ebbing, APRN - CNP        budesonide-formoterol (SYMBICORT) 80-4.5 MCG/ACT inhaler 2 puff  2 puff Inhalation BID Hulon Ebbing, APRN - CNP   2 puff at 03/10/20 8555    insulin glargine (LANTUS) injection vial 10 Units  10 Units Subcutaneous BID Hulon Ebbing, APRN - CNP   10 Units at 03/10/20 5135    isosorbide mononitrate (IMDUR) extended release tablet 60 mg  60 mg Oral Daily Hulon Ebbing, APRN - CNP   60 mg at 03/10/20 0940    lactulose (CHRONULAC) 10 GM/15ML solution 10 g  10 g Oral Daily Hulon Ebbing, APRN - CNP        lidocaine 4 % external patch 1 patch  1 patch Transdermal Daily Hulon Ebbing, APRN - CNP        cetirizine (ZYRTEC) tablet 5 mg  5 mg Oral Daily Hulon Ebbing, APRN - CNP   5 mg at 03/10/20 0923    [START ON 3/11/2020] midodrine (PROAMATINE) tablet 5 mg  5 mg Oral Once per day on Mon Wed Fri Lemmie Rhyme, APRN - CNP        metoprolol tartrate (LOPRESSOR) tablet 25 mg  25 mg Oral Daily Lemmie Rhyme, APRN - CNP   25 mg at 03/10/20 5640    [START ON 3/11/2020] lidocaine-prilocaine (EMLA) cream   Topical Once per day on Mon Wed Fri Lemmie Rhyme, APRN - CNP        nitroGLYCERIN (NITROSTAT) SL tablet 0.4 mg  0.4 mg Sublingual Q5 Min PRN Lemmie Rhyme, APRN - CNP        [START ON 3/11/2020] pantoprazole (PROTONIX) tablet 40 mg  40 mg Oral QAM AC Lemmie Rhyme, APRN - CNP        senna (SENOKOT) tablet 8.6 mg  1 tablet Oral BID Lemmie Rhyme, APRN - CNP   8.6 mg at 03/10/20 8269    sevelamer (RENVELA) tablet 2,400 mg  2,400 mg Oral TID  Lemmie Rhyme, APRN - CNP        tamsulosin [facility]) capsule 0.4 mg  0.4 mg Oral Daily Lemmie Rhyme, APRN - CNP   0.4 mg at 03/10/20 7080    insulin lispro (HUMALOG) injection vial 0-12 Units  0-12 Units Subcutaneous TID WC Lemmie Rhyme, APRN - CNP        insulin lispro (HUMALOG) injection vial 0-6 Units  0-6 Units Subcutaneous Nightly Lemmie Rhyme, APRN - CNP        glucose (GLUTOSE) 40 % oral gel 15 g  15 g Oral PRN Lemmie Rhyme, APRN - CNP        dextrose 50 % IV solution  12.5 g Intravenous PRN Lemmie Rhyme, APRN - CNP        glucagon (rDNA) injection 1 mg  1 mg Intramuscular PRN Lemmie Rhyme, APRN - CNP        dextrose 5 % solution  100 mL/hr Intravenous PRN Lemmie Rhyme, APRN - CNP        sodium chloride flush 0.9 % injection 10 mL  10 mL Intravenous 2 times per day Lemmie Rhyme, APRN - CNP        sodium chloride flush 0.9 % injection 10 mL  10 mL Intravenous PRN Lemmie Rhyme, APRN - CNP        potassium chloride (KLOR-CON M) extended release tablet 40 mEq  40 mEq Oral PRN Lemmie Rhyme, APRN - CNP        Or    potassium bicarb-citric acid (EFFER-K) effervescent tablet 40 mEq  40 mEq Oral PRN Lemmie Rhyme, APRN - CNP        Or    potassium chloride 10 mEq/100 mL IVPB (Peripheral Line)

## 2020-03-10 NOTE — ED NOTES
Pt was suppose be on O2 @ 4L when he came from facility but was not when he arrived to this ED.      Ellyn Marino RN  03/10/20 3811

## 2020-03-10 NOTE — PROGRESS NOTES
Pharmacy Note  Vancomycin Consult    Anna Alcantar is a 78 y.o. male started on Vancomycin for possible mastoiditis; consult received from Jade Gay under Dr Joaquim Adhikari to manage therapy. Also receiving the following antibiotics: ceftriaxone, acyclovir. 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    Cellulitis of scrotum    Anemia, chronic renal failure    Thrombocytopenia (HCC)    Chest pain    Left flank pain    ESRD (end stage renal disease) ([address]    Venous stasis dermatitis of both lower extremities    Dizziness    Hypocalcemia    Acute combined systolic and diastolic congestive heart failure ([facility])    Respiratory distress    Acute on chronic respiratory failure with hypoxia ([facility])    Fluid overload    Rib pain on left side    Hypoglycemia    CRF (chronic renal failure), stage 5 (HCC)    Chronic renal failure, stage 3 (moderate) ([facility])    Hyperuricemia    Altered mental status    Atrial fibrillation ([address]    ESRD on hemodialysis ([address]    Fracture of rib of right side    Hypoxia    Phlegmon on Right Buttock    Mass of lingula of lung    Acute mastoiditis of left side       Allergies:  Cymbalta [duloxetine hcl];  Tromethamine; and Toradol [ketorolac tromethamine]     Temp max: 98.6F    Recent Labs     03/09/20  2135 03/10/20  0652   BUN 37* 41*       Recent Labs     03/09/20  2135 03/10/20  0652   CREATININE 6.05* 6.90*       Recent Labs     03/09/20  2135 03/10/20  0652   WBC 4.9 4.3       No intake or output data in the 24 hours ending 03/10/20 1412    Culture Date      Source                       Results  pending    Ht Readings from Last 1 Encounters:   03/02/20 6' (1.829 m) Wt Readings from Last 1 Encounters:   03/09/20 300 lb (136.1 kg)         Body mass index is 40.69 kg/m². CrCl cannot be calculated (Unknown ideal weight.). Goal Trough Level: 15 mcg/mL    Assessment/Plan:  77 y/o male, recently discharged from hospital to [facility]. Presents now with altered mental status, Noted to have purulent drainage from his left ear so CT of the internal auditory canals was performed showing evidence of mastoiditis. Patient is hemodialysis patient, did receive loading dose of vancomycin in ER today. Will get random level in 24 hrs to determine if needs to redose. Thank you for the consult. Will continue to follow. Gianna Boswell, Pharm. [facility]

## 2020-03-10 NOTE — PROGRESS NOTES
Patient seen and examined in ED. Patient reportedly sent from [facility] for evaluation of altered mental status. At time of exam, patient is disoriented and offers very little input into HPI. Patient being admitted for acute mastoiditis. Dr. Desiree Flowers, ENT, consulted in ED and in to see patient this a.m. Acyclovir, vancomycin, and ceftriaxone initiated in the ED. Continue on admission. Home medications reconciled and additional admission orders entered. CODE STATUS needs verified. Patient was reportedly DNR CC in the early months of 2019. However, patient has been admitted as full code since that time. Patient unable to have conversation regarding CODE STATUS at this time. Please address with patient if he becomes more oriented or contact family members to ensure proper CODE STATUS is ordered.

## 2020-03-10 NOTE — PLAN OF CARE
Spoke with Dr. Lynn Alcantar on the phone and he confirmed that patient will be receiving dialysis after getting IV contrast today, so he wants us to proceed with CT Chest for PE with Contrast scan this evening. Dr. Lynn Alcantar also said it was ok to scan patient prior to receiving today's lab results because patient's GFR will be consistently low due to his kidney failure, but is on dialysis. Patient was very uncooperative and did not following any of the breathing instructions during this scan. Patient kept moving and yelling for the duration of this PE scan and his CT Head scan. Best imaging sent through.

## 2020-03-10 NOTE — ED NOTES
Report given to Connie Bianchi RN from [facility]. Report method in person   The following was reviewed with receiving RN:   Current vital signs:  BP (!) 122/50   Pulse 71   Temp 98.6 °F (37 °C) (Oral)   Resp 17   Wt 300 lb (136.1 kg)   SpO2 97%   BMI 40.69 kg/m²                MEWS Score: 3     Any medication or safety alerts were reviewed. Any pending diagnostics and notifications were also reviewed, as well as any safety concerns or issues, abnormal labs, abnormal imaging, and abnormal assessment findings. Questions were answered.           Gracy Sandhoff, RN  03/10/20 2573

## 2020-03-11 NOTE — PROGRESS NOTES
[facility] Neurology   IN-PATIENT SERVICE      NEUROLOGY PROGRESS  NOTE            Date:   3/11/2020  Patient name:  Aniya Obando  Date of admission:  3/9/2020  YOB: 1950      Interval History:     Patient slightly more awake and responsive today but still altered. He was unable to fill out his consent form for LP. Nurse unable to reach family. Eliquis is currently being held. He is on antibiotics. He had hemodialysis yesterday. No fevers or white count. He denies headache. Patient is DNR CC. History of Present Illness: The patient is a 78 y.o. male who presents with Altered Mental Status  . Barbara Olivares The patient was seen and examined and the chart was reviewed. Patient recently hospitalized at [facility], discharged 2 days ago with diagnosis of hypoxia, pleural effusions, rib fractures, A. fib. Brought in from SNF with altered mental status, noted to be hypoxic at 80% and placed on 4 L nasal cannula oxygen. Noted to have purulent drainage from his left ear so CT of the internal auditory canals was performed showing evidence of mastoiditis. Otolaryngology was consulted. Patient has been placed on antibiotics. Neurology consulted for possible encephalitis/meningitis from mastoiditis source. Patient is confused at this time and not able to give any pertinent history. He does deny headache or neck pain or photophobia. There is no overt nuchal rigidity on examination. There is no fever or white count. He does have elevated creatinine at baseline with end-stage renal disease on hemodialysis. Lumbar puncture cannot be performed in ED secondary to being on Eliquis. Patient to be admitted for further work-up.     Past Medical History:     Past Medical History:   Diagnosis Date    Acute combined systolic and diastolic congestive heart failure ([address] 11/25/2016    Anemia     BPH (benign prostatic hyperplasia)     Cancer (HCC)     left ear 8/2013    CKD (chronic kidney disease) stage 3, GFR 30-59 ml/min (HCC)     Constipation     COPD (chronic obstructive pulmonary disease) (HCC)     Depression     GERD (gastroesophageal reflux disease)     Hemod[facility] [address]     3 times a week    HTN (hypertension)     Hx of blood clots     \" rt.shoulder/neck\"    Hyperparathyroidism ([address]     Hypothyroidism     IDDM (insulin dependent diabetes mellitus) ([address]     Insomnia     Liver disease     patient is unsure what it is    MDRO (multiple drug resistant organisms) resistance     hx. c-dif.  Memory deficit     Neurofibromatosis ([address]     Osteoarthritis     Paraplegia ([address]     chair to bed and chair transfer only    Peptic ulcer     PVD (peripheral vascular disease) ([facility]     Secondary hyperparathyroidism ([facility]     Sinus disorder     Syncope     Type II or unspecified type diabetes mellitus without mention of complication, not stated as uncontrolled     Venous thrombosis         Past Surgical History:     Past Surgical History:   Procedure Laterality Date    CHOLECYSTECTOMY      COLONOSCOPY  08/22/2011    2 POLYPS REMOBED TUBULAR ADENOMA    DIALYSIS FISTULA CREATION Left 06/13/2016    LEFT WRIST, no longer functional    DIALYSIS FISTULA CREATION Left     antecubital, was revised one time    HIP SURGERY Right     deep laceration was repaired    INTRACAPSULAR CATARACT EXTRACTION Right 5/7/2019    EYE CATARACT EMULSIFICATION IOL IMPLANT performed by Keegan Amaya MD at [address] Left 5/28/2019    EYE CATARACT EMULSIFICATION IOL IMPLANT performed by Keegan Amaya MD at [facility]. Left     ear.     SKIN GRAFT      right axilla    THYROID SURGERY      non-cancerous lump removed    TOTAL NEPHRECTOMY Right     as a donor    TUNNELED VENOUS CATHETER PLACEMENT Right 09/03/2016    later removed    TURP          Medications during admission:      vancomycin  1,000 mg Intravenous Once    cefTRIAXone (ROCEPHIN) IV  1 g Intravenous Q24H    ALPRAZolam  0.5 mg Oral BID    [Held by provider] apixaban  5 mg Oral BID    aspirin  81 mg Oral Daily    dilTIAZem  240 mg Oral Daily    escitalopram  10 mg Oral QAM    finasteride  5 mg Oral Daily    fluticasone  1 spray Nasal Daily    fentaNYL  1 patch Transdermal Q72H    budesonide-formoterol  2 puff Inhalation BID    insulin glargine  10 Units Subcutaneous BID    isosorbide mononitrate  60 mg Oral Daily    lactulose  10 g Oral Daily    lidocaine  1 patch Transdermal Daily    cetirizine  5 mg Oral Daily    midodrine  5 mg Oral Once per day on     metoprolol tartrate  25 mg Oral Daily    lidocaine-prilocaine   Topical Once per day on     pantoprazole  40 mg Oral QAM AC    senna  1 tablet Oral BID    sevelamer  2,400 mg Oral TID WC    tamsulosin  0.4 mg Oral Daily    insulin lispro  0-12 Units Subcutaneous TID WC    insulin lispro  0-6 Units Subcutaneous Nightly    sodium chloride flush  10 mL Intravenous 2 times per day    darbepoetin [place]-polysorbate  60 mcg Subcutaneous Weekly    acyclovir  500 mg Intravenous Q24H    vancomycin (VANCOCIN) intermittent dosing (placeholder)   Other RX Placeholder    influenza virus vaccine  0.5 mL Intramuscular Once         Physical Exam:   BP (!) 160/69   Pulse 66   Temp 98.8 °F (37.1 °C) (Oral)   Resp 18   Ht 6' (1.829 m)   Wt (!) 300 lb 7.8 oz (136.3 kg)   SpO2 97%   BMI 40.75 kg/m²   Temp (24hrs), Av.5 °F (36.4 °C), Min:96.8 °F (36 °C), Max:99.6 °F (37.6 °C)          Neurological examination:    Mental status   Alert and oriented to self, place, year; following all commands; speech is slow.      Cranial nerves   II - visual fields intact to confrontation; pupils reactive  III, IV, VI - extraocular muscles intact; no CARLA; no nystagmus; no ptosis   V - normal facial sensation                                                               VII - normal facial symmetry

## 2020-03-11 NOTE — CARE COORDINATION
CASE MANAGEMENT NOTE:    Admission Date:  3/9/2020 Brandie Schilling is a 78 y.o.  male    Admitted for : Acute mastoiditis of left side [H70.002]     Met with:  Reviewed chart     PCP:  Delisa Pruitt                                Insurance:  Meaghan ZULETA      Current Residence/ Living Arrangements:  in nursing home             Current Services PTA:  Yes Hemodialysis at [facility] on MWF at 1130 Per nephrologist note    Is patient agreeable to VNS: NA    Freedom of choice provided:  Yes    List of [address] provided: NA    VNS chosen:  NA    DME:  wheelchair    Home Oxygen: Yes 3 l/min nasal cannula 24/7    Nebulizer: No    CPAP/BIPAP: No    Supplier: N/A    Potential Assistance Needed: Yes    SNF needed: Yes  Currently resides at [facility] of choice and list provided: Yes    Pharmacy:  [address]       Does Patient want to use MEDS to BEDS? No    Is the Patient an DEEPA [facility] with Readmission Risk Score greater than 14%? No  If yes, pt needs a follow up appointment made within 7 days. Family Members/Caregivers that pt would like involved in their care:    Yes    If yes, list name here:  Lynne Pond son     Transportation Provider:  [id number] [facility]             Is patient in Isolation/One on One/Altered Mental Status? Yes  If yes, skip next question. If no, would they like an I-Pad to  use? No  If yes, call [phone]. Discharge Plan:  03/11/2020 Lives at  [address] Altered Mental Status . DME wheelchair and Oxygen at 3 l/min nasal cannula 24/7. Hemodialysis at [facility] on MWF at 1130 Per nephrologist note. On Eliquis for Atrial fib  Plan to return to [facility].  notified of admission. Acute Mastoiditis. Scrotal edema with purulence.   .mk      Electronically signed by: Tracy Del Cid RN on 3/11/2020 at 5:11 PM

## 2020-03-11 NOTE — CARE COORDINATION
ROBLES continuing to follow for DC back to [facility] when he is ready/DC'd. ROBLES will continue to follow.

## 2020-03-11 NOTE — PROGRESS NOTES
Physical Therapy    Facility/Department: [address] [place] [address] CARE  Initial Assessment    NAME: Kay Chandler  : 1950  MRN: [id number]    Date of Service: 3/11/2020    Discharge Recommendations:  Patient would benefit from continued therapy after discharge   PT Equipment Recommendations  Equipment Needed: No    Assessment   Body structures, Functions, Activity limitations: Decreased functional mobility ; Decreased ROM; Decreased strength;Decreased safe awareness;Decreased cognition;Decreased ADL status; Decreased balance;Decreased endurance; Increased pain;Decreased posture  Assessment: Pt requiring assist for bed mobility, not safe to sit EOB on own due to cognitive status. Pt would benefit from continued therapy to maximize pt's mobility. Treatment Diagnosis: Impaired functional mobility due to decreased strength and activity tolerance  Specific instructions for Next Treatment: progress bed mobility, encourage active participation and strengthening  Prognosis: Guarded  Decision Making: Medium Complexity  History: This is a 76-year-old male who has multiple medical problems COPD ESRD hypertension A. fib on Eliquis who comes in today with altered mental status. Earlier today the patient had a witnessed fall onto his buttock did not hit his head. He came in altered mental status. Patient initially hypoxic to 80% when being moved onto the stretcher but improved with 4 L of oxygen. Will perform altered mental status work-up. Was just discharged from the hospital 2 days ago for hypoxia pleural effusions and rib fractures. Exam: ROM, MMT, bed mobility, sitting balance  Clinical Presentation: Pt very groggy/lethargic, slowed responses. Barriers to Learning: cognition, safety awareness  REQUIRES PT FOLLOW UP: Yes  Activity Tolerance  Activity Tolerance: Patient limited by cognitive status; Patient limited by fatigue;Patient limited by endurance  Activity Tolerance: Pt very lethargic/sleepy, slowed responses this No  Occupation: Retired  Type of occupation:   Additional Comments: Previous eval 3/4/20 by Mike Huertas. Pt able to confirm some information but appears confused and consistently closing eyes/now following all commands. Pt has stated he usually transfers to w/c on his own or with 1 assist but had a recent fall. Cognition        Objective          PROM RLE (degrees)  RLE PROM: WFL  PROM LLE (degrees)  LLE PROM: WFL  AROM RUE (degrees)  RUE AROM : WFL  AROM LUE (degrees)  LUE AROM : WFL  Strength RLE  Strength RLE: Exception  Comment: requiring additional cueing for active participation to engage muscles  R Hip Flexion: 2-/5  R Hip ABduction: 0/5  R Knee Flexion: 1+/5  R Knee Extension: 2-/5  R Ankle Dorsiflexion: 1+/5  R Ankle Plantar flexion: 0/5  Strength LLE  Strength LLE: Exception  Comment: requiring additional cueing for active participation to engage muscles  L Hip Flexion: 2-/5  L Hip ABduction: 0/5  L Knee Flexion: 2-/5  L Knee Extension: 2-/5  L Ankle Dorsiflexion: 1+/5  L Ankle Plantar Flexion: 0/5  Strength RUE  Strength RUE: WFL  Strength LUE  Strength LUE: WFL     Sensation  Overall Sensation Status: (ANGELLA)  Bed mobility  Rolling to Left: Moderate assistance;Maximum assistance(with use of bed rail by pt)  Rolling to Right: Moderate assistance;Maximum assistance(with use of bedrail by pt)  Supine to Sit: (Pt up at EOB (Mod I) at start of session)  Sit to Supine: Moderate assistance  Comment: Pt sitting EOB when therapist walked in. Pt appears sleepy and confused. Writer encouraged pt to lay back down, requiring assist at BLE. Rolling to assist with new sheets and repositioning pt in bed. Transfers  Sit to Stand: Unable to assess  Stand to sit: Unable to assess  Comment: Pt does not stand/ambulate at baseline. Pt is very confused and not safe to transfer this date.   Ambulation  Ambulation?: No  Stairs/Curb  Stairs?: No     Balance  Posture: Fair  Sitting - Static: Good;-  Sitting - Dynamic:

## 2020-03-11 NOTE — PROGRESS NOTES
HEMODIALYSIS PRE-TREATMENT NOTE    Patient Identifiers prior to treatment: Name and     Isolation Required: No                      Isolation Type:    Hepatitis status:                           Date Drawn                             Result  Hepatitis B Surface Antigen 2020     Negative                     Hepatitis B Surface Antibody 2020 437.3        Hepatitis B Core Antibody 2020 Negative          Hepatitis Status verified by: EMR     Copy provided to facility for patient's chart: n/a    Hemodialysis orders verified: yes    Access Within normal limits ( I.e. s/s of infection,...): yes     Pre-Assessment completed: yes    Pre-dialysis report received from: Alisson                      Time: 912

## 2020-03-11 NOTE — PROGRESS NOTES
HEMODIALYSIS POST TREATMENT NOTE    Treatment time ordered: 3.5 hours    Actual treatment time: 67 minutes    UltraFiltration Goal: 2000 ml  UltraFiltration Removed: 0      Pre Treatment weight: 136.3 kg  Post Treatment weight: 136.3 kg  Estimated Dry Weight:     Access used:     Central Venous Catheter: n/a     Internal Access: Left forearm AVF       Access Flow: Good     Sign and symptoms of infection: no       If YES:     Medications or blood products given: None    Regular outpatient schedule: MWF    Summary of response to treatment: Patient tolerated treatment poorly. Patient very restless during treatment. Dr Lena Chahal stated patient usually doesn't stay for whole treatments and to continue to Monitor    Explain if orders NOT met, was physician notified:Yes      ACES flowsheet faxed to patient unit/ placed in patient chart: Yes    Post assessment completed: Yes    Report given to:  Magaly Rendon documented Safety Checks include the followin) Access and face visible at all times. 2) All connections and blood lines are secure with no kinks. 3) NVL alarm engaged. 4) Hemosafe device applied (if applicable). 5) No collapse of Arterial or Venous blood chambers. 6) All blood lines / pump segments in the air detectors.

## 2020-03-11 NOTE — PROGRESS NOTES
Itching    Yes Historical Provider, MD   aspirin 81 MG chewable tablet Take 81 mg by mouth daily   Yes Historical Provider, MD   sevelamer (RENVELA) 800 MG tablet Take 3 tablets by mouth 3 times daily (with meals)    Yes Historical Provider, MD   senna (SENOKOT) 8.6 MG tablet Take 1 tablet by mouth 2 times daily   Yes Historical Provider, MD   escitalopram (LEXAPRO) 10 MG tablet Take 10 mg by mouth every morning    Yes Historical Provider, MD   isosorbide mononitrate (IMDUR) 60 MG CR tablet Take 60 mg by mouth daily   Yes Historical Provider, MD   fluticasone (FLONASE) 50 MCG/ACT nasal spray 1 spray by Nasal route daily    Yes Historical Provider, MD   finasteride (PROSCAR) 5 MG tablet Take 5 mg by mouth daily. Yes Historical Provider, MD   omeprazole (PRILOSEC) 20 MG capsule Take 20 mg by mouth every morning    Yes Historical Provider, MD   metoprolol tartrate (LOPRESSOR) 25 MG tablet Take 25 mg by mouth daily Indications: Hold for SBP less than 100 or HR less than 60    Yes Historical Provider, MD   tamsulosin (FLOMAX) 0.4 MG capsule Take 0.4 mg by mouth daily.      Yes Historical Provider, MD       Scheduled Meds:   vancomycin  1,000 mg Intravenous Once    cefTRIAXone (ROCEPHIN) IV  2 g Intravenous Q12H    heparin (porcine)  4,000 Units Intravenous Once    ALPRAZolam  0.5 mg Oral BID    [Held by provider] apixaban  5 mg Oral BID    aspirin  81 mg Oral Daily    dilTIAZem  240 mg Oral Daily    escitalopram  10 mg Oral QAM    finasteride  5 mg Oral Daily    fluticasone  1 spray Nasal Daily    fentaNYL  1 patch Transdermal Q72H    budesonide-formoterol  2 puff Inhalation BID    insulin glargine  10 Units Subcutaneous BID    isosorbide mononitrate  60 mg Oral Daily    lactulose  10 g Oral Daily    lidocaine  1 patch Transdermal Daily    cetirizine  5 mg Oral Daily    midodrine  5 mg Oral Once per day on Mon Wed Fri    metoprolol tartrate  25 mg Oral Daily    lidocaine-prilocaine   Topical Once per

## 2020-03-11 NOTE — PROGRESS NOTES
[place] [id number] Internal Medicine    Progress Note     3/11/2020    11:15 AM    Name:   Jose A Hurtado  MRN:     [id number]     Acct:      [de-identified]   Room:   [address] [place] [address] Day:  2  Admit Date:  3/9/2020  9:16 PM    PCP:   Rena Najera DO  Code Status:  DNR-CC    Subjective:     C/C:   Chief Complaint   Patient presents with    Altered Mental Status     Active Problems:    Acute mastoiditis of left side  Resolved Problems:    * No resolved hospital problems. *      Interval History Status: improved.        Mentation has improved but not at baseline not oriented to time place person or date     Significant last 24 hr data reviewed ;   Vitals:    03/10/20 2317 03/11/20 0114 03/11/20 0549 03/11/20 0721   BP:  (!) 101/50  (!) 160/69   Pulse: 68 61  66   Resp:  18  18   Temp:  99.6 °F (37.6 °C)  98.8 °F (37.1 °C)   TempSrc:  Axillary  Oral   SpO2:  97%  97%   Weight:   (!) 300 lb 7.8 oz (136.3 kg)    Height:          Recent Results (from the past 24 hour(s))   POC Glucose Fingerstick    Collection Time: 03/10/20  5:00 PM   Result Value Ref Range    POC Glucose 99 75 - 110 mg/dL   POC Glucose Fingerstick    Collection Time: 03/10/20  9:15 PM   Result Value Ref Range    POC Glucose 100 75 - 110 mg/dL   Basic Metabolic Panel w/ Reflex to MG    Collection Time: 03/11/20  4:57 AM   Result Value Ref Range    Glucose 102 (H) 70 - 99 mg/dL    BUN 26 (H) 8 - 23 mg/dL    CREATININE 4.99 (H) 0.70 - 1.20 mg/dL    Bun/Cre Ratio NOT REPORTED 9 - 20    Calcium 8.9 8.6 - 10.4 mg/dL    Sodium 136 135 - 144 mmol/L    Potassium 4.3 3.7 - 5.3 mmol/L    Chloride 97 (L) 98 - 107 mmol/L    CO2 28 20 - 31 mmol/L    Anion Gap 11 9 - 17 mmol/L    GFR Non-African American 12 (L) >60 mL/min    GFR  14 (L) >60 mL/min    GFR Comment          GFR Staging NOT REPORTED    CBC    Collection Time: 03/11/20  4:57 AM   Result Value Ref Range    WBC 3.7 3.5 - 11.0 k/uL    RBC 2.92 (L) 4.5 - 5.9 m/uL is noted with corresponding prominence of the sulci and ventricles. ORBITS: The visualized portion of the orbits demonstrate no acute abnormality. SINUSES: Large retention cyst is noted within the right maxillary sinus. Fluid opacification of left-sided mastoid air cells and middle ear cavity is present. SOFT TISSUES/SKULL:  No acute abnormality of the visualized skull or soft tissues. 1. No evidence of acute intracranial trauma. 2. Mild cerebral white matter chronic microvascular ischemic disease. 3. Mild diffuse cerebral atrophy. 4. Left-sided acute otomastoiditis. 5. Right maxillary sinus large retention cyst. 6. Basilar artery dolichoectasia. Ct Iac Posterior Fossa Wo Contrast    Result Date: 3/10/2020  EXAMINATION: CT OF THE INTERNAL AUDITORY CANAL WITHOUT CONTRAST 3/10/2020 7:10 am TECHNIQUE: CT of the internal auditory canal was performed without contrast was performed without the administration of intravenous contrast. Multiplanar reformatted images are provided for review. Dose modulation, iterative reconstruction, and/or weight based adjustment of the mA/kV was utilized to reduce the radiation dose to as low as reasonably achievable. COMPARISON: Temporal bone CT 09/19/2014 HISTORY: ORDERING SYSTEM PROVIDED HISTORY: Mastoiditis TECHNOLOGIST PROVIDED HISTORY: Mastoiditis Reason for Exam: Mastoiditis; Patient with mastoiditis history of ear cancer and diabetic possible malignant otitis externa Acuity: Acute Type of Exam: Unknown FINDINGS: RIGHT TEMPORAL BONE:  The external auditory canal is clear without osseous erosion. The scutum is intact. The middle ear cavity is clear. The ossicular chain is intact. The mastoid air cells are clear. The inner ear structures appear unremarkable. Normal mineralization of the otic capsule. The internal auditory canal and vestibular aqueduct appear unremarkable. The carotid canal is normal in appearance. The jugular bulb is unremarkable.  LEFT TEMPORAL BONE: Soft tissue thickening of the external auditory canal. No osseous erosion. The scutum is intact. Near complete opacification of the middle ear cavity. The ossicular chain is intact. Complete opacification of the mastoid air cells. The inner ear structures appear unremarkable. Normal mineralization of the otic capsule. The internal auditory canal and vestibular aqueduct appear unremarkable. The carotid canal is normal in appearance. The jugular bulb is unremarkable. BRAIN: The visualized portion of the intracranial contents appear unremarkable. ORBITS: The visualized portion of the orbits demonstrate no acute abnormality. SINUSES: Right maxillary sinus polyp or mucosal retention cyst.     1. Left external auditory canal soft tissue thickening and left tympanomastoid cavity opacification. Findings are compatible with otitis externa and otomastoiditis. 2. Unremarkable right temporal bone CT. Ct Abdomen Pelvis W Iv Contrast Additional Contrast? None    Result Date: 3/10/2020  EXAMINATION: CT OF THE ABDOMEN AND PELVIS WITH CONTRAST 3/10/2020 1:30 am TECHNIQUE: CT of the abdomen and pelvis was performed with the administration of intravenous contrast. Multiplanar reformatted images are provided for review. Dose modulation, iterative reconstruction, and/or weight based adjustment of the mA/kV was utilized to reduce the radiation dose to as low as reasonably achievable. COMPARISON: 03/02/2020 HISTORY: ORDERING SYSTEM PROVIDED HISTORY: scrotal drainage concern for Buzz's TECHNOLOGIST PROVIDED HISTORY: scrotal drainage concern for Buzz's Reason for Exam: scrotal drainage concern for Buzz's Acuity: Acute Type of Exam: Unknown FINDINGS: Lower Chest: Study is motion degraded. Mild bibasilar pleural and parenchymal disease has not changed significantly. There are several bilateral acute, subacute and old rib fractures. Organs: Study was degraded by artifact from the arm down position.   The liver, Stable cardiomegaly. There are a few bands of stable bibasilar chronic atelectasis. No acute infiltrate, pneumothorax or pleural effusion. No acute bone finding. Stable portable chest x-ray. No acute disease. Ct Chest Pulmonary Embolism W Contrast    Result Date: 3/9/2020  EXAMINATION: CTA OF THE CHEST 3/9/2020 10:08 pm TECHNIQUE: CTA of the chest was performed after the administration of intravenous contrast.  Multiplanar reformatted images are provided for review. MIP images are provided for review. Dose modulation, iterative reconstruction, and/or weight based adjustment of the mA/kV was utilized to reduce the radiation dose to as low as reasonably achievable. COMPARISON: None. HISTORY: ORDERING SYSTEM PROVIDED HISTORY: concern for PE TECHNOLOGIST PROVIDED HISTORY: concern for PE Reason for Exam: sob; r/o PE, pt unable to follow breathing instructions and did not stop moving or yelling during this scan. best imaging sent through. Acuity: Acute Type of Exam: Unknown FINDINGS: Pulmonary Arteries: Study significantly limited by patient breathing motion related artifact. Study significantly limited by suboptimal volume of intravenous contrast within the pulmonary arterial system. Pulmonary arteries are adequately opacified for evaluation. No evidence of intraluminal filling defect to suggest pulmonary embolism. Main pulmonary artery is normal in caliber. Mediastinum: No evidence of mediastinal lymphadenopathy. The heart is moderately enlarged with otherwise unremarkable configuration. No evidence of pericardial effusion is seen. Bilateral coronary artery scattered atherosclerotic calcification is evident. .  There is no acute abnormality of the thoracic aorta. Lungs/pleura: Lung volumes are low with scattered bilateral subsegmental atelectasis visualized. .  No focal consolidation or pulmonary edema. No evidence of pleural effusion or pneumothorax.  Upper Abdomen: Limited images of the upper abdomen

## 2020-03-11 NOTE — PLAN OF CARE
Problem: Nutritional:  Goal: Nutritional status will improve  Description: Nutritional status will improve  3/11/2020 1945 by Rosemarie Acosta RN  Outcome: Ongoing  3/11/2020 1836 by Rosemarie Acosta RN  Outcome: Ongoing     Problem: Physical Regulation:  Goal: Diagnostic test results will improve  Description: Diagnostic test results will improve  Outcome: Ongoing  Goal: Will remain free from infection  Description: Will remain free from infection  3/11/2020 1945 by Rosemarie Acosta RN  Outcome: Ongoing  Note: Patient remains free from infection and no signs and symptoms of infection. 3/11/2020 1836 by Rosemarie Acosta RN  Outcome: Ongoing  Goal: Ability to maintain vital signs within normal range will improve  Description: Ability to maintain vital signs within normal range will improve  Outcome: Ongoing     Problem: Respiratory:  Goal: Ability to maintain normal respiratory secretions will improve  Description: Ability to maintain normal respiratory secretions will improve  Outcome: Ongoing  Note: Respiratory assessment as charted. No signs or symptoms of distress. Problem: Skin Integrity:  Goal: Demonstration of wound healing without infection will improve  Description: Demonstration of wound healing without infection will improve  Outcome: Ongoing  Goal: Complications related to intravenous access or infusion will be avoided or minimized  Description: Complications related to intravenous access or infusion will be avoided or minimized  Outcome: Ongoing     Problem: Fluid Volume:  Goal: Will show no signs or symptoms of fluid imbalance  Description: Will show no signs or symptoms of fluid imbalance  Outcome: Ongoing     Problem: Safety:  Goal: Free from accidental physical injury  Description: Free from accidental physical injury  Outcome: Ongoing  Note: No falls this shift. Call light within reach and siderails x2. Bed in lowest position. Patient safety maintained.     Goal: Free from intentional

## 2020-03-11 NOTE — CONSULTS
[facility] - RETREAT -   Infectious diseases evaluation  admission date 3/9/2020        Impression :   Current:  · Left mastoiditis /encephalopathy r/o meningitis   ·  H/O left ear cancer   · ESRD on HD   · AFib   · Anemia   · COPD   · DM   · H/O cdiff colitis   · Neurofisromatosis   · PVD   · Renal cyst versus mass    Recommendations   · Continue IV vancomycin, acyclovir  · Continue IV ceftriaxone increase the dose to 2 g every 12 hours  · Interventional radiology consult for spinal tap. · Case was discussed with ENT Dr. Francisco Stringer, may need mastoidectomy. · MRI brain pending        History of Present Illness:   Initial history:  Deila Evans is a 78y.o.-year-old male was brought to the hospital by EMS on 3/9/2020 for altered mental status, reported hypoxia and fall. He had reported ear pain for several days with foul-smelling drainage. CT head without contrast suggestive of left-sided acute otomastoiditis. CT OF THE INTERNAL AUDITORY CANAL WITHOUT CONTRAST 3/10/2020 suggestive of otitis externa and otomastoiditis. CT abdomen pelvis showed unchanged 6.9 cm hemorrhagic left renal cyst versus mass  Patient has been on Eliquis so spinal tap was not done at the ER  He was hospitalized recently 3-2-20 untill 3-6 -20  hypotension and atrial fibrillation apparently was discharged on oral doxycycline  Interval changes  3/11/2020   The patient is confused now unable to provide history, history was obtained from chart review and nursing staff. No reported fever      I have personally reviewed the past medical history, past surgical history, medications, social history, and family history, and I haveupdated the database accordingly.   Past Medical History:     Past Medical History:   Diagnosis Date    Acute combined systolic and diastolic congestive heart failure ([address] 11/25/2016    Anemia     BPH (benign prostatic hyperplasia)     Cancer (HCC)     left ear 8/2013    CKD (chronic kidney disease) stage 3, GFR 30-59 ml/min (HCC)     Constipation     COPD (chronic obstructive pulmonary disease) (HCC)     Depression     GERD (gastroesophageal reflux disease)     Hemodialysis patient ([address]     3 times a week    HTN (hypertension)     Hx of blood clots     \" rt.shoulder/neck\"    Hyperparathyroidism ([address]     Hypothyroidism     IDDM (insulin dependent diabetes mellitus) ([address]     Insomnia     Liver disease     patient is unsure what it is    MDRO (multiple drug resistant organisms) resistance     hx. c-dif.  Memory deficit     Neurofibromatosis ([address]     Osteoarthritis     Paraplegia ([address]     chair to bed and chair transfer only    Peptic ulcer     PVD (peripheral vascular disease) ([address]     Secondary hyperparathyroidism ([address]     Sinus disorder     Syncope     Type II or unspecified type diabetes mellitus without mention of complication, not stated as uncontrolled     Venous thrombosis        Past Surgical  History:     Past Surgical History:   Procedure Laterality Date    CHOLECYSTECTOMY      COLONOSCOPY  08/22/2011    2 POLYPS REMOBED TUBULAR ADENOMA    DIALYSIS FISTULA CREATION Left 06/13/2016    LEFT WRIST, no longer functional    DIALYSIS FISTULA CREATION Left     antecubital, was revised one time    HIP SURGERY Right     deep laceration was repaired    INTRACAPSULAR CATARACT EXTRACTION Right 5/7/2019    EYE CATARACT EMULSIFICATION IOL IMPLANT performed by Alejandro Peabody, MD at [address] [place] [address] Left 5/28/2019    EYE CATARACT EMULSIFICATION IOL IMPLANT performed by Alejandro Peabody, MD at [address] [place] [address]. Left     ear.     SKIN GRAFT      right axilla    THYROID SURGERY      non-cancerous lump removed    TOTAL NEPHRECTOMY Right     as a donor    TUNNELED VENOUS CATHETER PLACEMENT Right 09/03/2016    later removed    TURP         Medications:      vancomycin  1,000 mg Intravenous Once    cefTRIAXone (ROCEPHIN) IV  2 g Intravenous Q12H    heparin (porcine)  4,000 Units Intravenous Once    ALPRAZolam  0.5 mg Oral BID    [Held by provider] apixaban  5 mg Oral BID    aspirin  81 mg Oral Daily    dilTIAZem  240 mg Oral Daily    escitalopram  10 mg Oral QAM    finasteride  5 mg Oral Daily    fluticasone  1 spray Nasal Daily    fentaNYL  1 patch Transdermal Q72H    budesonide-formoterol  2 puff Inhalation BID    insulin glargine  10 Units Subcutaneous BID    isosorbide mononitrate  60 mg Oral Daily    lactulose  10 g Oral Daily    lidocaine  1 patch Transdermal Daily    cetirizine  5 mg Oral Daily    midodrine  5 mg Oral Once per day on     metoprolol tartrate  25 mg Oral Daily    lidocaine-prilocaine   Topical Once per day on     pantoprazole  40 mg Oral QAM AC    senna  1 tablet Oral BID    sevelamer  2,400 mg Oral TID WC    tamsulosin  0.4 mg Oral Daily    insulin lispro  0-12 Units Subcutaneous TID WC    insulin lispro  0-6 Units Subcutaneous Nightly    sodium chloride flush  10 mL Intravenous 2 times per day    darbepoetin [place]-polysorbate  60 mcg Subcutaneous Weekly    acyclovir  500 mg Intravenous Q24H    vancomycin (VANCOCIN) intermittent dosing (placeholder)   Other RX Placeholder    influenza virus vaccine  0.5 mL Intramuscular Once       Social History:     Social History     Socioeconomic History    Marital status: Single     Spouse name: Not on file    Number of children: Not on file    Years of education: Not on file    Highest education level: Not on file   Occupational History    Not on file   Social Needs    Financial resource strain: Not on file    Food insecurity     Worry: Not on file     Inability: Not on file    Transportation needs     Medical: Not on file     Non-medical: Not on file   Tobacco Use    Smoking status: Former Smoker     Packs/day: 1.00     Types: Cigarettes     Last attempt to quit: 3/1/2019     Years since quittin.0  Smokeless tobacco: Never Used   Substance and Sexual Activity    Alcohol use: No    Drug use: No    Sexual activity: Not Currently   Lifestyle    Physical activity     Days per week: Not on file     Minutes per session: Not on file    Stress: Not on file   Relationships    Social connections     Talks on phone: Not on file     Gets together: Not on file     Attends [employer] service: Not on file     Active member of club or organization: Not on file     Attends meetings of clubs or organizations: Not on file     Relationship status: Not on file    Intimate partner violence     Fear of current or ex partner: Not on file     Emotionally abused: Not on file     Physically abused: Not on file     Forced sexual activity: Not on file   Other Topics Concern    Not on file   Social History Narrative    Not on file       Family History:     Family History   Family history unknown: Yes        Allergies:   Cymbalta [duloxetine hcl]; Tromethamine; and Toradol [ketorolac tromethamine]     Review of Systems:   Confused unable to provide review of the system    Physical Examination :     Patient Vitals for the past 8 hrs:   BP Temp Temp src Pulse Resp SpO2 Weight   03/11/20 1430 (!) 110/52 -- -- 70 -- -- --   03/11/20 1418 117/63 -- -- 66 -- -- --   03/11/20 1415 (!) 138/56 98.2 °F (36.8 °C) -- 66 18 -- (!) 300 lb 7.8 oz (136.3 kg)   03/11/20 1225 128/64 98.6 °F (37 °C) Axillary 66 16 96 % --   03/11/20 0721 (!) 160/69 98.8 °F (37.1 °C) Oral 66 18 97 % --       Physical Exam  Constitutional:       Comments: Awake, confused   Eyes:      General:         Right eye: No discharge. Left eye: No discharge. Conjunctiva/sclera: Conjunctivae normal.   Neck:      Musculoskeletal: Neck supple. No neck rigidity. Cardiovascular:      Rate and Rhythm: Normal rate. Heart sounds: No murmur. Pulmonary:      Effort: Pulmonary effort is normal. No respiratory distress. Breath sounds: Normal breath sounds.  No

## 2020-03-11 NOTE — PROGRESS NOTES
Patient still complains of left ear pain. Still with mastoid tenderness. Mental status is fluctuating. He is easily confused. Case discussed with Medicine and Infectious Disease. May need mastoidectomy if no improvement on IV abx.

## 2020-03-12 NOTE — DISCHARGE INSTR - COC
Continuity of Care Form    Patient Name: Cyn Johnston   :  1950  MRN:  [id number]    Admit date:  3/9/2020  Discharge date:  3/14/2020    Code Status Order: [facility]   Advance Directives:   [facility] Documentation     Date/Time Healthcare Directive Type of Healthcare Directive Copy in [address] Agent's Name Healthcare Agent's Phone Number    03/10/20 8680  Yes, patient has an advance directive for healthcare treatment  --  -- pt states yes  --  --  --          Admitting Physician:  Satinder Díaz MD  PCP: Sánchez Smith DO    Discharging Nurse: Kody Beltran [facility] Unit/Room#: 2118/2118-01  Discharging Unit Phone Number: [phone]    Emergency Contact:   Extended Emergency Contact Information  Primary Emergency Contact: Yudelka, Alejandra [address] Phone: [phone]  Relation: Other  Hearing or visual needs: None  Other needs: None  Preferred language: English   needed? No  Secondary Emergency Contact: Renard Mi  Address: [place]ín Yuan [address] Phone: [phone]  Work Phone: [phone]  Relation: Child  Hearing or visual needs: None  Other needs: None  Preferred language: English   needed?  No    Past Surgical History:  Past Surgical History:   Procedure Laterality Date    CHOLECYSTECTOMY      COLONOSCOPY  2011    2 POLYPS REMOBED TUBULAR ADENOMA    DIALYSIS FISTULA CREATION Left 2016    LEFT WRIST, no longer functional    DIALYSIS FISTULA CREATION Left     antecubital, was revised one time    HIP SURGERY Right     deep laceration was repaired    INTRACAPSULAR CATARACT EXTRACTION Right 2019    EYE CATARACT EMULSIFICATION IOL IMPLANT performed by Lawanda Puga MD at [address] [place] [address] Left 2019    EYE CATARACT EMULSIFICATION IOL IMPLANT performed by Lawanda Puga MD at [address] [place] [address] EXCISION Left     ear.     SKIN GRAFT      right axilla    THYROID SURGERY      non-cancerous lump removed    TOTAL NEPHRECTOMY Right     as a donor    TUNNELED VENOUS CATHETER PLACEMENT Right 09/03/2016    later removed    TURP         Immunization History:   Immunization History   Administered Date(s) Administered    Influenza Vaccine, unspecified formulation 11/25/2016       Active Problems:  Patient Active Problem List   Diagnosis Code    CKD (chronic kidney disease) stage 3, GFR 30-59 ml/min ([facility]) N18.3    HTN (hypertension) I10    Secondary hyperparathyroidism ([facility]) N25.81    Neurofibromatosis ([facility] Q85.00    Type 2 diabetes mellitus with renal complication ([facility]) I18.69    COPD (chronic obstructive pulmonary disease) (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) D69.6    Chest pain R07.9    Left flank pain R10.9    ESRD (end stage renal disease) ([facility]) N18.6    Venous stasis dermatitis of both lower extremities I87.2    Dizziness R42    Hypocalcemia E83.51    Acute combined systolic and diastolic congestive heart failure ([facility]) I50.41    Respiratory distress R06.03    Acute on chronic respiratory failure with hypoxia ([facility]) J96.21    Fluid overload E87.70    Rib pain on left side R07.81    Hypoglycemia E16.2    CRF (chronic renal failure), stage 5 ([facility]) N18.5    Chronic renal failure, stage 3 (moderate) ([facility]) N18.3    Hyperuricemia E79.0    Altered mental status R41.82    Atrial fibrillation ([facility]) I48.91    ESRD on hemodialysis ([facility]) N18.6, Z99.2    Fracture of rib of right side S22.31XA    Hypoxia R09.02    Phlegmon on Right Buttock L02.91    Mass of lingula of lung R91.8    Acute mastoiditis of left side H70.002    Acute metabolic encephalopathy B26.99       Isolation/Infection:   Isolation          Contact and Droplet Patient Infection Status     Infection Onset Added Last Indicated Last Indicated By Review Planned Expiration Resolved Resolved By    [facility]  06/10/16 06/10/16 PENELOPE Callawayin - 5/2013          Nurse Assessment:  Last Vital Signs: /63   Pulse 64   Temp 98.9 °F (37.2 °C) (Oral)   Resp 18   Ht 6' (1.829 m)   Wt 299 lb 13.2 oz (136 kg)   SpO2 96%   BMI 40.66 kg/m²     Last documented pain score (0-10 scale): Pain Level: 0  Last Weight:   Wt Readings from Last 1 Encounters:   03/11/20 299 lb 13.2 oz (136 kg)     Mental Status:  oriented and alert - intermittently confused    IV Access:  - None , Left AV fistula  Nursing Mobility/ADLs:  Walking   Dependent  Transfer  Dependent  Bathing  Dependent  Dressing  Dependent  Toileting  Dependent  Feeding  Assisted  Med Admin  Assisted  Med Delivery   whole    Wound Care Documentation and Therapy:  Wound 01/03/20 Axilla Anterior;Proximal;Right;Upper redness tear ertythema (Active)   Wound Other 3/10/2020 11:17 PM   Wound Assessment Dry;Red 3/11/2020  8:53 AM   Drainage Amount None 3/7/2020 11:35 AM   Odor None 3/7/2020 11:35 AM   Margins Attached edges 3/7/2020 11:35 AM   Gloria-wound Assessment Clean;Dry; Intact 3/7/2020 11:35 AM   Number of days: 69       Wound 01/03/20 Axilla Anterior;Left;Proximal;Upper redness erythema (Active)   Wound Other 3/7/2020 11:35 AM   Wound Assessment Clean;Dry; Intact; Red 3/7/2020 11:35 AM   Drainage Amount None 3/7/2020 11:35 AM   Odor None 3/7/2020 11:35 AM   Margins Attached edges 3/7/2020 11:35 AM   Number of days: 69       Wound 01/03/20 Scrotum redness (Active)   Wound Skin Tear 3/7/2020 11:01 AM   Dressing/Treatment Other (comment) 3/7/2020 11:01 AM   Wound Assessment Bleeding;Red;Dry;Clean 3/7/2020 11:01 AM   Drainage Amount Scant 3/7/2020 11:01 AM   Drainage Description Other (Comment) 3/7/2020 11:01 AM   Odor None 3/7/2020 11:01 AM   Gloria-wound Assessment Red; Swelling 3/7/2020 11:01 AM   Number of days: 69 Wound 03/02/20 Rectum area approx 6 inch bright red (Active)   Wound Pressure Stage  1 3/10/2020 11:17 PM   Dressing Status Clean;Dry; Intact 3/11/2020  8:53 AM   Dressing Changed Changed/New 3/7/2020 11:04 AM   Dressing/Treatment Open to air 3/12/2020  2:00 AM   Dressing Change Due 03/08/20 3/7/2020 11:04 AM   Wound Length (cm) 16 cm 3/2/2020  9:30 PM   Wound Width (cm) 16 cm 3/2/2020  9:30 PM   Wound Surface Area (cm^2) 256 cm^2 3/2/2020  9:30 PM   Wound Assessment Red 3/12/2020  2:00 AM   Drainage Amount None 3/12/2020  2:00 AM   Odor None 3/7/2020 11:04 AM   Margins Attached edges 3/12/2020  2:00 AM   Gloria-wound Assessment Red 3/12/2020  2:00 AM   Number of days: 9       Wound 03/03/20 Thigh Proximal;Right;Dorsal Baseball sized redish- purple tee with pin size exit point (Active)   Wound Other 3/7/2020 11:04 AM   Dressing/Treatment Open to air 3/12/2020  2:00 AM   Wound Assessment Intact; Purple 3/7/2020 11:04 AM   Drainage Amount None 3/7/2020 11:04 AM   Drainage Description Other (Comment) 3/5/2020  8:00 AM   Odor None 3/7/2020 11:04 AM   Margins Attached edges 3/7/2020 11:04 AM   Number of days: 9        Elimination:  Continence:   · Bowel: Yes  · Bladder: Yes  Urinary Catheter: None   Colostomy/Ileostomy/Ileal Conduit: No       Date of Last BM: 3/13/20    Intake/Output Summary (Last 24 hours) at 3/12/2020 1610  Last data filed at 3/11/2020 1835  Gross per 24 hour   Intake 240 ml   Output --   Net 240 ml     I/O last 3 completed shifts: In: 240 [P.O.:240]  Out: -     Safety Concerns:      At Risk for Falls    Impairments/Disabilities:      None    Nutrition Therapy:  Current Nutrition Therapy:   - Oral Diet:  Renal    Routes of Feeding: Oral  Liquids: No Restrictions  Daily Fluid Restriction: yes - amount 1200  Last Modified Barium Swallow with Video (Video Swallowing Test): not done    Treatments at the Time of Hospital Discharge:   Respiratory Treatments:   Oxygen Therapy:  is on oxygen at 2 L/min per

## 2020-03-12 NOTE — PROGRESS NOTES
Nephrology progress Note    Reason for Consult:  ESRD    Requesting Physician:  Valentina Gallegos MD    Interval History:  More awake  MRI on hold until family can be reached for MRI screening. BP stable. Had about 1 hour HD yesterday    HISTORY OF PRESENT ILLNESS:    The patient is a 78 y.o. male who presents with AMS. He had reported fall yesterday. He was hypoxic on arrival, which improved with oxygen per nasal cannula. CXR no acute disease. CT head No acute intracranial trauma. CT chest did not show definitive evidence of PE. CT abd/pelvis showed scrotal edema with wall thickening. Left hydrocele. No evidence of kevin's gangrene/nerotizing fasciitis, unchanged hemorrhagic left cyst. CT of internal auditory canal showed left otitis externa and osomastoiditis. Receive IV contrast with imaging. Otolaryngology has been consulted. Neuro has been consulted. MRI and lumbar puncture planned. LP delayed as he is on eliquis. ID has been consulted. Pt has hx of ESRD on HD M[facility] at [facility]. He ran full outpatient HD treatment yesterday. Today K is 5.1. Hgb 9.8. Hx of HTN. BP is controlled. He is lethargic/encephalopathic. He was recently admitted for afib last week. Urology saw pt at that time for renal cyst.       Prior to Admission medications    Medication Sig Start Date End Date Taking? Authorizing Provider   fentaNYL (DURAGESIC) 25 MCG/HR Place 1 patch onto the skin every 72 hours for 6 days. 3/7/20 3/13/20 Yes Domenica Valdes MD   oxyCODONE HCl (OXY-IR) 10 MG immediate release tablet Take 1 tablet by mouth every 6 hours as needed for Pain for up to 5 days. 3/7/20 3/12/20 Yes Domenica Valdes MD   ALPRAZolam Patsy Simpler) 0.5 MG tablet Take 1 tablet by mouth 2 times daily for 5 days.  3/7/20 3/12/20 Yes Domenica Valdes MD   apixaban (ELIQUIS) 5 MG TABS tablet Take 1 tablet by mouth 2 times daily 3/6/20  Yes Domenica aVldes MD   dilTIAZem (CARDIZEM CD) 240 MG extended release capsule Take 1 capsule by mouth daily 3/6/20  Yes Berny Staton MD   lidocaine 4 % external patch Place 1 patch onto the skin daily 3/6/20  Yes Berny Staton MD   fluticasone-vilanterol (BREO ELLIPTA) 100-25 MCG/INH AEPB inhaler Inhale 1 puff into the lungs daily   Yes Historical Provider, MD   glucagon 1 MG injection Inject 1 kit into the muscle as needed (hypoglycemia BG <60)   Yes Historical Provider, MD   glucose (GLUTOSE) 40 % GEL Take 15 g by mouth as needed (blood sugar <60)   Yes Historical Provider, MD   loratadine (CLARITIN) 5 MG chewable tablet Take 5 mg by mouth daily    Yes Historical Provider, MD   nitroGLYCERIN (NITROSTAT) 0.4 MG SL tablet Place 0.4 mg under the tongue every 5 minutes as needed for Chest pain up to max of 3 total doses. If no relief after 1 dose, call 911.    Yes Historical Provider, MD   Dextromethorphan-guaiFENesin  MG/5ML SYRP Take 5 mLs by mouth every 6 hours as needed for Cough   Yes Historical Provider, MD   insulin glargine (LANTUS) 100 UNIT/ML injection vial Inject 10 Units into the skin 2 times daily 3/4/19  Yes Ara Kwong MD   lidocaine-prilocaine (EMLA) 2.5-2.5 % cream Apply topically three times a week Apply topically to arm/port three times weekly on Monday, Wednesday, and Friday for dialysis   Yes Historical Provider, MD   lactulose (CHRONULAC) 10 GM/15ML solution Take 10 g by mouth daily   Yes Historical Provider, MD   ondansetron (ZOFRAN) 4 MG tablet Take 4 mg by mouth every 6 hours as needed for Nausea or Vomiting   Yes Historical Provider, MD   midodrine (PROAMATINE) 5 MG tablet Take 5 mg by mouth three times a week On Monday, Wednesday, and Friday for dialysis   Yes Historical Provider, MD   ramelteon (ROZEREM) 8 MG tablet Take 8 mg by mouth nightly   Yes Historical Provider, MD   diphenhydrAMINE (BENADRYL) 25 MG tablet Take 25 mg by mouth every 8 hours as needed for Itching    Yes Historical Provider, MD   aspirin 81 MG chewable tablet Take 81 mg by mouth daily   Yes Historical Provider, MD   sevelamer (RENVELA) 800 MG tablet Take 3 tablets by mouth 3 times daily (with meals)    Yes Historical Provider, MD   senna (SENOKOT) 8.6 MG tablet Take 1 tablet by mouth 2 times daily   Yes Historical Provider, MD   escitalopram (LEXAPRO) 10 MG tablet Take 10 mg by mouth every morning    Yes Historical Provider, MD   isosorbide mononitrate (IMDUR) 60 MG CR tablet Take 60 mg by mouth daily   Yes Historical Provider, MD   fluticasone (FLONASE) 50 MCG/ACT nasal spray 1 spray by Nasal route daily    Yes Historical Provider, MD   finasteride (PROSCAR) 5 MG tablet Take 5 mg by mouth daily. Yes Historical Provider, MD   omeprazole (PRILOSEC) 20 MG capsule Take 20 mg by mouth every morning    Yes Historical Provider, MD   metoprolol tartrate (LOPRESSOR) 25 MG tablet Take 25 mg by mouth daily Indications: Hold for SBP less than 100 or HR less than 60    Yes Historical Provider, MD   tamsulosin (FLOMAX) 0.4 MG capsule Take 0.4 mg by mouth daily.      Yes Historical Provider, MD       Scheduled Meds:   influenza virus vaccine  0.5 mL Intramuscular Once    cefTRIAXone (ROCEPHIN) IV  2 g Intravenous Q12H    ALPRAZolam  0.5 mg Oral BID    [Held by provider] apixaban  5 mg Oral BID    aspirin  81 mg Oral Daily    dilTIAZem  240 mg Oral Daily    escitalopram  10 mg Oral QAM    finasteride  5 mg Oral Daily    fluticasone  1 spray Nasal Daily    fentaNYL  1 patch Transdermal Q72H    budesonide-formoterol  2 puff Inhalation BID    insulin glargine  10 Units Subcutaneous BID    isosorbide mononitrate  60 mg Oral Daily    lactulose  10 g Oral Daily    lidocaine  1 patch Transdermal Daily    cetirizine  5 mg Oral Daily    midodrine  5 mg Oral Once per day on Mon Wed Fri    metoprolol tartrate  25 mg Oral Daily    lidocaine-prilocaine   Topical Once per day on Mon Wed Fri    pantoprazole  40 mg Oral QAM AC    senna  1 tablet Oral BID    sevelamer  2,400 mg restriction  ID following. BP stable. Avoid nephrotoxic drugs, fleet's enemas, and IV contrast exposure. Follow up labs. Please do not hesitate to contact us for any further questions/concerns. We will continue to follow along with you. Pt seen in collaboration with Dr. Janette Good. Electronically signed by KELECHI Wilson CNP  on 3/12/2020 at 12:08 PM     Physician Addendum  I have seen and examined pt at bed side.    I reviewed and agree with CNP's note. I performed all key and critical portions of this evaluation.  I agree with the plan of care as noted above.     Electronically signed by Rose Moss MD on 03/12/20 12:18 PM

## 2020-03-12 NOTE — CARE COORDINATION
DISCHARGE PLANNING NOTE:      LSW is following for patient to return to [facility]. PT recommends ECF with OT. ID recommends continuing IV vanco, IV ceftriaxone and acyclovir. The patient remains confused and unable to give consent for MRI. Patient may need to be transferred to [facility] for mastoidectomy. SONG NEEDS SIGNED AND COMPLETED. Will continue to follow with LSW for discharge needs.

## 2020-03-12 NOTE — PROGRESS NOTES
Spoke with Martínez Meyers and pt being a [facility] and a stroke alert. OK for pt to remain on PCUC while being evaluated and awaiting CT results.

## 2020-03-12 NOTE — PROGRESS NOTES
 CKD (chronic kidney disease) stage 3, GFR 30-59 ml/min (HCC)     Constipation     COPD (chronic obstructive pulmonary disease) (HCC)     Depression     GERD (gastroesophageal reflux disease)     Hemodialysis patient ([facility] [address]     3 times a week    HTN (hypertension)     Hx of blood clots     \" rt.shoulder/neck\"    Hyperparathyroidism ([address]     Hypothyroidism     IDDM (insulin dependent diabetes mellitus) ([address]     Insomnia     Liver disease     patient is unsure what it is    MDRO (multiple drug resistant organisms) resistance     hx. c-dif.  Memory deficit     Neurofibromatosis ([address]     Osteoarthritis     Paraplegia ([address]     chair to bed and chair transfer only    Peptic ulcer     PVD (peripheral vascular disease) ([facility] [address]     Secondary hyperparathyroidism ([facility] [address]     Sinus disorder     Syncope     Type II or unspecified type diabetes mellitus without mention of complication, not stated as uncontrolled     Venous thrombosis        Past Surgical  History:     Past Surgical History:   Procedure Laterality Date    CHOLECYSTECTOMY      COLONOSCOPY  08/22/2011    2 POLYPS REMOBED TUBULAR ADENOMA    DIALYSIS FISTULA CREATION Left 06/13/2016    LEFT WRIST, no longer functional    DIALYSIS FISTULA CREATION Left     antecubital, was revised one time    HIP SURGERY Right     deep laceration was repaired    INTRACAPSULAR CATARACT EXTRACTION Right 5/7/2019    EYE CATARACT EMULSIFICATION IOL IMPLANT performed by Ty Denver, MD at [address] [place] [address] Left 5/28/2019    EYE CATARACT EMULSIFICATION IOL IMPLANT performed by Ty Denver, MD at [address] [place] [address]. Left     ear.     SKIN GRAFT      right axilla    THYROID SURGERY      non-cancerous lump removed    TOTAL NEPHRECTOMY Right     as a donor    TUNNELED VENOUS CATHETER PLACEMENT Right 09/03/2016    later removed    TURP         Medications:      influenza virus vaccine  0.5 mL Intramuscular

## 2020-03-12 NOTE — PLAN OF CARE
Problem: Nutritional:  Goal: Nutritional status will improve  Description: Nutritional status will improve  3/12/2020 1944 by Christ Sherman RN  Outcome: Ongoing  3/12/2020 0626 by Meka Gomez RN  Outcome: Ongoing     Problem: Physical Regulation:  Goal: Diagnostic test results will improve  Description: Diagnostic test results will improve  3/12/2020 1944 by Christ Sherman RN  Outcome: Ongoing  3/12/2020 0626 by Meka Gomez RN  Outcome: Ongoing  Goal: Will remain free from infection  Description: Will remain free from infection  Outcome: Ongoing  Goal: Ability to maintain vital signs within normal range will improve  Description: Ability to maintain vital signs within normal range will improve  3/12/2020 1944 by Christ Sherman RN  Outcome: Ongoing  3/12/2020 0626 by Meka Gomez RN  Outcome: Ongoing     Problem: Respiratory:  Goal: Ability to maintain normal respiratory secretions will improve  Description: Ability to maintain normal respiratory secretions will improve  3/12/2020 1944 by Christ Sherman RN  Outcome: Ongoing  3/12/2020 0626 by Meka Gomez RN  Outcome: Ongoing     Problem: Skin Integrity:  Goal: Demonstration of wound healing without infection will improve  Description: Demonstration of wound healing without infection will improve  3/12/2020 1944 by Christ Sherman RN  Outcome: Ongoing  3/12/2020 0626 by Meka Gomez RN  Outcome: Ongoing  Goal: Complications related to intravenous access or infusion will be avoided or minimized  Description: Complications related to intravenous access or infusion will be avoided or minimized  3/12/2020 1944 by Christ Sherman RN  Outcome: Ongoing  3/12/2020 0626 by Meka Gomez RN  Outcome: Ongoing     Problem: Fluid Volume:  Goal: Will show no signs or symptoms of fluid imbalance  Description: Will show no signs or symptoms of fluid imbalance  3/12/2020 1944 by Christ Sherman RN  Outcome: Ongoing  3/12/2020 0626 by Meka Gomez RN  Outcome: Ongoing Problem: Safety:  Goal: Free from accidental physical injury  Description: Free from accidental physical injury  3/12/2020 1944 by Cristopher Jeter RN  Outcome: Ongoing  3/12/2020 0626 by Karyn Metzger RN  Outcome: Met This Shift  Goal: Free from intentional harm  Description: Free from intentional harm  3/12/2020 1944 by Cristopher Jeter RN  Outcome: Ongoing  3/12/2020 0626 by Karyn Metzger RN  Outcome: Met This Shift     Problem: Daily Care:  Goal: Daily care needs are met  Description: Daily care needs are met  3/12/2020 1944 by Cristopher Jeter RN  Outcome: Ongoing  3/12/2020 0626 by Karyn Metzger RN  Outcome: Ongoing     Problem: Pain:  Goal: Patient's pain/discomfort is manageable  Description: Patient's pain/discomfort is manageable  3/12/2020 1944 by Cristopher Jeter RN  Outcome: Ongoing  3/12/2020 0626 by Karyn Metzger RN  Outcome: Ongoing  Goal: Pain level will decrease  Description: Pain level will decrease  Outcome: Ongoing  Goal: Control of acute pain  Description: Control of acute pain  Outcome: Ongoing  Goal: Control of chronic pain  Description: Control of chronic pain  Outcome: Ongoing     Problem: Falls - Risk of:  Goal: Will remain free from falls  Description: Will remain free from falls  3/12/2020 1944 by Cristopher Jeter RN  Outcome: Ongoing  3/12/2020 0626 by Karyn Metzger RN  Outcome: Met This Shift  Goal: Absence of physical injury  Description: Absence of physical injury  3/12/2020 1944 by Cristopher Jeter RN  Outcome: Ongoing  3/12/2020 0626 by Karyn Metzger RN  Outcome: Met This Shift     Problem: Musculor/Skeletal Functional Status  Goal: Highest potential functional level  Outcome: Ongoing  Goal: Absence of falls  Outcome: Ongoing     Problem: Musculor/Skeletal Functional Status  Goal: Highest potential functional level  Outcome: Ongoing  Goal: Absence of falls  Outcome: Ongoing

## 2020-03-12 NOTE — FLOWSHEET NOTE
Writer responded to RRT/stroke alert; staff evaluating patient.     03/12/20 4013   Encounter Summary   Services provided to: Patient not available   Referral/Consult From: Multi-disciplinary team   Complexity of Encounter Moderate   Length of Encounter 15 minutes   Crisis   Type Stroke Alert   Intervention Prayer;Sustaining presence/ Ministry of presence

## 2020-03-12 NOTE — PROGRESS NOTES
[place] [address] Internal Medicine    Progress Note     3/12/2020    12:09 PM    Name:   Mara Patel  MRN:     [id number]     Acct:      [de-identified]   Room:   [address] [place] [address] Day:  3  Admit Date:  3/9/2020  9:16 PM    PCP:   Ajith Phan DO  Code Status:  DNR-CC    Subjective:     C/C:   Chief Complaint   Patient presents with    Altered Mental Status     Active Problems:    Acute mastoiditis of left side    Acute metabolic encephalopathy  Resolved Problems:    * No resolved hospital problems. *      Interval History Status: improved.        Mentation has improved but not at baseline not oriented to time place person or date     Significant last 24 hr data reviewed ;   Vitals:    03/11/20 1952 03/12/20 0009 03/12/20 0045 03/12/20 0820   BP: (!) 154/67 (!) 170/64 (!) 148/53 (!) 145/47   Pulse: 83 65  64   Resp: 18 22  18   Temp: 98.2 °F (36.8 °C) 98.6 °F (37 °C)  98.7 °F (37.1 °C)   TempSrc: Oral Oral  Axillary   SpO2: 96% 100%  97%   Weight:       Height:          Recent Results (from the past 24 hour(s))   POC Glucose Fingerstick    Collection Time: 03/11/20  4:17 PM   Result Value Ref Range    POC Glucose 106 75 - 110 mg/dL   APTT    Collection Time: 03/11/20  5:06 PM   Result Value Ref Range    PTT 74.7 (H) 24.0 - 36.0 sec   HEPARIN LEVEL/ANTI-XA    Collection Time: 03/11/20  8:32 PM   Result Value Ref Range    Anti-XA Unfrac Heparin 0.29 (L) 0.30 - 0.70 IU/L   POC Glucose Fingerstick    Collection Time: 03/12/20 12:06 AM   Result Value Ref Range    POC Glucose 117 (H) 75 - 110 mg/dL   HEPARIN LEVEL/ANTI-XA    Collection Time: 03/12/20  2:08 AM   Result Value Ref Range    Anti-XA Unfrac Heparin 0.26 (L) 0.30 - 0.70 IU/L   Basic Metabolic Panel w/ Reflex to MG    Collection Time: 03/12/20  5:11 AM   Result Value Ref Range    Glucose 106 (H) 70 - 99 mg/dL    BUN 31 (H) 8 - 23 mg/dL    CREATININE 5.82 () 0.70 - 1.20 mg/dL    Bun/Cre Ratio NOT REPORTED 9 - 20    Calcium 8.9 8.6 - 10.4 mg/dL    Sodium 139 135 - 144 mmol/L    Potassium 4.3 3.7 - 5.3 mmol/L    Chloride 100 98 - 107 mmol/L    CO2 24 20 - 31 mmol/L    Anion Gap 15 9 - 17 mmol/L    GFR Non-African American 10 (L) >60 mL/min    GFR  12 (L) >60 mL/min    GFR Comment          GFR Staging NOT REPORTED    CBC    Collection Time: 03/12/20  5:11 AM   Result Value Ref Range    WBC 3.5 3.5 - 11.0 k/uL    RBC 2.86 (L) 4.5 - 5.9 m/uL    Hemoglobin 8.3 (L) 13.5 - 17.5 g/dL    Hematocrit 26.9 (L) 41 - 53 %    MCV 94.1 80 - 100 fL    MCH 29.2 26 - 34 pg    MCHC 31.0 31 - 37 g/dL    RDW 15.8 (H) 11.5 - 14.9 %    Platelets 123 334 - 897 k/uL    MPV 6.7 6.0 - 12.0 fL    NRBC Automated NOT REPORTED per 100 WBC   Phosphorus    Collection Time: 03/12/20  5:11 AM   Result Value Ref Range    Phosphorus 4.4 2.5 - 4.5 mg/dL   POC Glucose Fingerstick    Collection Time: 03/12/20  7:13 AM   Result Value Ref Range    POC Glucose 98 75 - 110 mg/dL   HEPARIN LEVEL/ANTI-XA    Collection Time: 03/12/20  8:07 AM   Result Value Ref Range    Anti-XA Unfrac Heparin 0.34 0.30 - 0.70 IU/L   POC Glucose Fingerstick    Collection Time: 03/12/20 11:29 AM   Result Value Ref Range    POC Glucose 100 75 - 110 mg/dL     Recent Labs     03/11/20  1103 03/11/20  1617 03/12/20  0006 03/12/20  0713 03/12/20  1129   POCGLU 140* 106 117* 98 100        Ct Head Wo Contrast    Result Date: 3/9/2020  EXAMINATION: CT OF THE HEAD WITHOUT CONTRAST  3/9/2020 10:04 pm TECHNIQUE: CT of the head was performed without the administration of intravenous contrast. Dose modulation, iterative reconstruction, and/or weight based adjustment of the mA/kV was utilized to reduce the radiation dose to as low as reasonably achievable.  COMPARISON: CT head scan without contrast March 2, 2020 at [address] hours HISTORY: ORDERING SYSTEM PROVIDED HISTORY: fall on eliquis TECHNOLOGIST PROVIDED HISTORY: fall on eliquis Reason for Exam: fall on eliquis; ams Acuity: Unknown Type of Exam: Unknown FINDINGS: BRAIN/VENTRICLES: There is no acute intracranial hemorrhage, mass effect or midline shift. No abnormal extra-axial fluid collection. The gray-white differentiation is maintained without evidence of an acute infarct. There is no evidence of hydrocephalus. Basilar artery dolichoectasia is present. Mild ill-defined hypoattenuation is present within cerebral white matter consistent with microvascular ischemic change. Mild diffuse decrease in cerebral volume is noted with corresponding prominence of the sulci and ventricles. ORBITS: The visualized portion of the orbits demonstrate no acute abnormality. SINUSES: Large retention cyst is noted within the right maxillary sinus. Fluid opacification of left-sided mastoid air cells and middle ear cavity is present. SOFT TISSUES/SKULL:  No acute abnormality of the visualized skull or soft tissues. 1. No evidence of acute intracranial trauma. 2. Mild cerebral white matter chronic microvascular ischemic disease. 3. Mild diffuse cerebral atrophy. 4. Left-sided acute otomastoiditis. 5. Right maxillary sinus large retention cyst. 6. Basilar artery dolichoectasia. Ct Iac Posterior Fossa Wo Contrast    Result Date: 3/10/2020  EXAMINATION: CT OF THE INTERNAL AUDITORY CANAL WITHOUT CONTRAST 3/10/2020 7:10 am TECHNIQUE: CT of the internal auditory canal was performed without contrast was performed without the administration of intravenous contrast. Multiplanar reformatted images are provided for review. Dose modulation, iterative reconstruction, and/or weight based adjustment of the mA/kV was utilized to reduce the radiation dose to as low as reasonably achievable. COMPARISON: Temporal bone CT 09/19/2014 HISTORY: ORDERING SYSTEM PROVIDED HISTORY: Mastoiditis TECHNOLOGIST PROVIDED HISTORY: Mastoiditis Reason for Exam: Mastoiditis;  Patient with mastoiditis history of ear cancer and diabetic possible malignant otitis externa Acuity: Acute Type of Exam: Unknown FINDINGS: RIGHT TEMPORAL BONE:  The external auditory canal is clear without osseous erosion. The scutum is intact. The middle ear cavity is clear. The ossicular chain is intact. The mastoid air cells are clear. The inner ear structures appear unremarkable. Normal mineralization of the otic capsule. The internal auditory canal and vestibular aqueduct appear unremarkable. The carotid canal is normal in appearance. The jugular bulb is unremarkable. LEFT TEMPORAL BONE: Soft tissue thickening of the external auditory canal. No osseous erosion. The scutum is intact. Near complete opacification of the middle ear cavity. The ossicular chain is intact. Complete opacification of the mastoid air cells. The inner ear structures appear unremarkable. Normal mineralization of the otic capsule. The internal auditory canal and vestibular aqueduct appear unremarkable. The carotid canal is normal in appearance. The jugular bulb is unremarkable. BRAIN: The visualized portion of the intracranial contents appear unremarkable. ORBITS: The visualized portion of the orbits demonstrate no acute abnormality. SINUSES: Right maxillary sinus polyp or mucosal retention cyst.     1. Left external auditory canal soft tissue thickening and left tympanomastoid cavity opacification. Findings are compatible with otitis externa and otomastoiditis. 2. Unremarkable right temporal bone CT. Ct Abdomen Pelvis W Iv Contrast Additional Contrast? None    Result Date: 3/10/2020  EXAMINATION: CT OF THE ABDOMEN AND PELVIS WITH CONTRAST 3/10/2020 1:30 am TECHNIQUE: CT of the abdomen and pelvis was performed with the administration of intravenous contrast. Multiplanar reformatted images are provided for review. Dose modulation, iterative reconstruction, and/or weight based adjustment of the mA/kV was utilized to reduce the radiation dose to as low as reasonably achievable.  COMPARISON: 03/02/2020 HISTORY: ORDERING SYSTEM PROVIDED HISTORY: scrotal drainage concern for Buzz's TECHNOLOGIST PROVIDED HISTORY: scrotal drainage concern for Buzz's Reason for Exam: scrotal drainage concern for Buzz's Acuity: Acute Type of Exam: Unknown FINDINGS: Lower Chest: Study is motion degraded. Mild bibasilar pleural and parenchymal disease has not changed significantly. There are several bilateral acute, subacute and old rib fractures. Organs: Study was degraded by artifact from the arm down position. The liver, spleen, pancreas, and adrenal glands are normal.  The liver is borderline enlarged and unchanged. Absent right kidney. Hemorrhagic cyst versus solid mass in the lower pole of the left kidney measuring up to 6.9 cm, previously 6.8 cm. There is a lobulated partially calcified nodule medial to this mass and there is a similar appearing hemorrhagic cyst versus solid mass projecting left laterally from this mass. [place] GI/Bowel: There is a mild rectal fecal impaction. Mild stool load throughout the rest of the colon. Small bowel loops are normal in caliber. Pelvis: Urinary bladder is grossly normal. Peritoneum/Retroperitoneum: There is no adenopathy, free air or free fluid. Calcific aorto iliac atherosclerotic disease with no evidence of an abdominal aortic aneurysm. Bones/Soft Tissues: There is scrotal edema with wall thickening. There is a left hydrocele. There are several small bubbles of peripherally located air apparently trapped between the thickened scrotal wall and adjacent skin surface of the medial thighs. There is no soft tissue gas. There are no findings to suggest necrotizing fasciitis. No acute bone finding. Scrotal edema with wall thickening. No evidence of soft tissue gas to suggest a necrotizing fasciitis/Buzz gangrene. Several bilateral acute, subacute and old rib fractures. Unchanged mild bibasilar pleural and parenchymal lung disease. Unchanged 6.9 cm hemorrhagic left renal cyst versus solid mass in the lower pole. There are smaller adjacent complicated cysts as above, not significantly changed. Xr Chest Portable    Result Date: 3/9/2020  EXAMINATION: ONE XRAY VIEW OF THE CHEST 3/9/2020 10:39 pm COMPARISON: Prior studies including 03/04/2020 HISTORY: ORDERING SYSTEM PROVIDED HISTORY: AMS TECHNOLOGIST PROVIDED HISTORY: AMS Reason for Exam: AMS Acuity: Acute Type of Exam: Initial FINDINGS: Portable study is limited by body habitus. Unchanged elevation of the right hemidiaphragm. Stable cardiomegaly. There are a few bands of stable bibasilar chronic atelectasis. No acute infiltrate, pneumothorax or pleural effusion. No acute bone finding. Stable portable chest x-ray. No acute disease. Ct Chest Pulmonary Embolism W Contrast    Result Date: 3/9/2020  EXAMINATION: CTA OF THE CHEST 3/9/2020 10:08 pm TECHNIQUE: CTA of the chest was performed after the administration of intravenous contrast.  Multiplanar reformatted images are provided for review. MIP images are provided for review. Dose modulation, iterative reconstruction, and/or weight based adjustment of the mA/kV was utilized to reduce the radiation dose to as low as reasonably achievable. COMPARISON: None. HISTORY: ORDERING SYSTEM PROVIDED HISTORY: concern for PE TECHNOLOGIST PROVIDED HISTORY: concern for PE Reason for Exam: sob; r/o PE, pt unable to follow breathing instructions and did not stop moving or yelling during this scan. best imaging sent through. Acuity: Acute Type of Exam: Unknown FINDINGS: Pulmonary Arteries: Study significantly limited by patient breathing motion related artifact. Study significantly limited by suboptimal volume of intravenous contrast within the pulmonary arterial system. Pulmonary arteries are adequately opacified for evaluation. No evidence of intraluminal filling defect to suggest pulmonary embolism. Main pulmonary artery is normal in caliber. Mediastinum: No evidence of mediastinal lymphadenopathy.   The heart is moderately enlarged with otherwise unremarkable configuration. No evidence of pericardial effusion is seen. Bilateral coronary artery scattered atherosclerotic calcification is evident. .  There is no acute abnormality of the thoracic aorta. Lungs/pleura: Lung volumes are low with scattered bilateral subsegmental atelectasis visualized. .  No focal consolidation or pulmonary edema. No evidence of pleural effusion or pneumothorax. Upper Abdomen: Limited images of the upper abdomen are unremarkable. Soft Tissues/Bones: No acute bone or soft tissue abnormality. 1. Note: Study significantly limited by patient breathing motion related artifact and suboptimal volume of intravenous iodinated contrast within the pulmonary arterial system. 2. No definitive evidence of acute pulmonary embolism. 3. Moderate cardiomegaly. 4. Low lung volumes. 5. Bilateral coronary artery scattered atherosclerotic calcification. 6. Note: Study also limited by marked hypoattenuation associated with patient body habitus. HPI:   See history in H and P      Review of Systems:     Delirious unable to get review of systems    Medications: Allergies: Allergies   Allergen Reactions    Cymbalta [Duloxetine Hcl]      Intolerance.     Tromethamine Other (See Comments)    Toradol [Ketorolac Tromethamine] Other (See Comments)     Headaches        Current Meds:   Scheduled Meds:    influenza virus vaccine  0.5 mL Intramuscular Once    cefTRIAXone (ROCEPHIN) IV  2 g Intravenous Q12H    ALPRAZolam  0.5 mg Oral BID    [Held by provider] apixaban  5 mg Oral BID    aspirin  81 mg Oral Daily    dilTIAZem  240 mg Oral Daily    escitalopram  10 mg Oral QAM    finasteride  5 mg Oral Daily    fluticasone  1 spray Nasal Daily    fentaNYL  1 patch Transdermal Q72H    budesonide-formoterol  2 puff Inhalation BID    insulin glargine  10 Units Subcutaneous BID    isosorbide mononitrate  60 mg Oral Daily    lactulose  10 g Oral Daily    lidocaine  1 patch Transdermal Daily    cetirizine  5 mg Oral Daily    midodrine  5 mg Oral Once per day on Mon Wed Fri    metoprolol tartrate  25 mg Oral Daily    lidocaine-prilocaine   Topical Once per day on Mon Wed Fri    pantoprazole  40 mg Oral QAM AC    senna  1 tablet Oral BID    sevelamer  2,400 mg Oral TID WC    tamsulosin  0.4 mg Oral Daily    insulin lispro  0-12 Units Subcutaneous TID WC    insulin lispro  0-6 Units Subcutaneous Nightly    sodium chloride flush  10 mL Intravenous 2 times per day    darbepoetin [place]-polysorbate  60 mcg Subcutaneous Weekly    acyclovir  500 mg Intravenous Q24H    vancomycin (VANCOCIN) intermittent dosing (placeholder)   Other RX Placeholder     Continuous Infusions:    heparin (porcine) 11.35 Units/kg/hr (03/12/20 0955)    dextrose       PRN Meds: heparin (porcine), heparin (porcine), guaiFENesin-dextromethorphan, diphenhydrAMINE, nitroGLYCERIN, glucose, dextrose, glucagon (rDNA), dextrose, sodium chloride flush, potassium chloride **OR** potassium alternative oral replacement **OR** potassium chloride, magnesium sulfate, acetaminophen **OR** acetaminophen, polyethylene glycol, promethazine **OR** ondansetron, midodrine, albumin human    Data:     Past Medical History:  no change     Social History:  no change    Family History: @no change    Vitals:      I/O (24Hr):     Intake/Output Summary (Last 24 hours) at 3/12/2020 1209  Last data filed at 3/11/2020 1835  Gross per 24 hour   Intake 480 ml   Output --   Net 480 ml       Labs:    Hematology:  Recent Labs     03/09/20  2135 03/10/20  0652 03/11/20  0457 03/11/20  1706 03/12/20  0511   WBC 4.9 4.3 3.7  --  3.5   RBC 3.13* 3.25* 2.92*  --  2.86*   HGB 9.4* 9.8* 8.5*  --  8.3*   HCT 29.8* 30.8* 27.4*  --  26.9*   MCV 95.1 94.8 94.0  --  94.1   MCH 30.0 30.3 29.1  --  29.2   MCHC 31.6 31.9 30.9*  --  31.0   RDW 16.6* 16.2* 16.1*  --  15.8*    144* 141*  --  150   MPV 7.1 6.4 6.8  --  6.7   SEGS 79* --   --   --   --    LYMPHOPCT 5*  --   --   --   --    MONOPCT 12*  --   --   --   --    EOSRELPCT 4  --   --   --   --    BASOPCT 0  --   --   --   --    PROTIME 13.1 12.9  --   --   --    APTT 38.5*  --   --  74.7*  --    INR 1.0 1.0  --   --   --      Chemistry:  Recent Labs     03/09/20  2135  03/10/20  0652 03/11/20  0457 03/12/20  0511     --  135 136 139   K 5.0  --  5.1 4.3 4.3   CL 97*  --  96* 97* 100   CO2 25  --  25 28 24   GLUCOSE 119*   < > 97 102* 106*   BUN 37*  --  41* 26* 31*   CREATININE 6.05*  --  6.90* 4.99* 5.82*   ANIONGAP 14  --  14 11 15   LABGLOM 9*  --  8* 12* 10*   GFRAA 11*  --  10* 14* 12*   CALCIUM 8.6  --  9.0 8.9 8.9   PHOS  --   --   --  4.3 4.4   PROBNP 7,221*  --   --   --   --    TROPHS 123*  --   --   --   --    LACTA 0.6  --   --   --   --     < > = values in this interval not displayed.      Recent Labs     03/09/20 2135   PROT 6.1*   LABALBU 3.4*   TSH 3.08   AST 29   ALT 39   ALKPHOS 244*   BILITOT 0.63   AMMONIA 45     Glucose:  Recent Labs     03/11/20  0718 03/11/20  1103 03/11/20  1617 03/12/20  0006 03/12/20  0713 03/12/20  1129   POCGLU 82 140* 106 117* 98 100         Lab Results   Component Value Date/Time    SPECIAL NOT REPORTED 03/02/2020 07:45 PM     Lab Results   Component Value Date/Time    CULTURE NO SIGNIFICANT GROWTH 03/02/2020 07:45 PM       Lab Results   Component Value Date    PHART 7.368 03/02/2020    PH 7.396 09/07/2012    UDC7PMC 47.1 03/02/2020    PCO2 38.8 09/07/2012    PO2ART 67.5 03/02/2020    UFR3CBM 27.1 03/02/2020    NBEA NOT REPORTED 03/02/2020    PBEA 1.8 03/02/2020    K0MCWPIL 90.0 03/02/2020    FIO2 NOT REPORTED 03/02/2020       Radiology:        Physical Examination:      Morbid obese  General appearance:  alert, cooperative and no distress  Mental Status: confulseid  Lungs:  clear to auscultation bilaterally, normal effort  Heart:  regular rate and rhythm, no murmur  Abdomen:  soft, nontender, nondistended, normal bowel sounds, no

## 2020-03-12 NOTE — PROGRESS NOTES
Writer spoke with RN at [address] [place] [address] regarding patient. Patient has no family. He is normally alert and oriented x4. He gets up to the bathroom unassisted. He is usually continent of bladder and bowels and refuses to allow anyone to help with his hygiene.

## 2020-03-12 NOTE — PROGRESS NOTES
[address]   Occupational Therapy Evaluation  Date: 3/12/20  Patient Name: Morgan Ramos       Room: 2118/2118-01  MRN: [id number]  Account: [de-identified]   : 1950  (78 y.o.) Gender: male     Discharge Recommendations:  Further Occupational  Therapy is recommended upon facility discharge. Equipment Needed: (TBD)    Referring Practitioner: Dr. Kylie Guerrier          Treatment Diagnosis: Impaired self-care status. Past Medical History:  has a past medical history of Acute combined systolic and diastolic congestive heart failure ([address] Anemia, BPH (benign prostatic hyperplasia), Cancer ([address] CKD (chronic kidney disease) stage 3, GFR 30-59 ml/min ([facility]), Constipation, COPD (chronic obstructive pulmonary disease) ([address] Depression, GERD (gastroesophageal reflux disease), Hemodialysis patient ([address] HTN (hypertension), Hx of blood clots, Hyperparathyroidism ([address] Hypothyroidism, IDDM (insulin dependent diabetes mellitus) ([address] Insomnia, Liver disease, MDRO (multiple drug resistant organisms) resistance, Memory deficit, Neurofibromatosis ([address] Osteoarthritis, Paraplegia ([address] Peptic ulcer, PVD (peripheral vascular disease) ([address] Secondary hyperparathyroidism ([address] Sinus disorder, Syncope, Type II or unspecified type diabetes mellitus without mention of complication, not stated as uncontrolled, and Venous thrombosis. Past Surgical History:   has a past surgical history that includes total nephrectomy (Right); Cholecystectomy; Skin graft; Colonoscopy (2011); Skin cancer excision (Left); TURP; Dialysis fistula creation (Left, 2016); Tunneled venous catheter placement (Right, 2016); Intracapsular cataract extraction (Right, 2019); Intracapsular cataract extraction (Left, 2019); Dialysis fistula creation (Left); Thyroid surgery; and hip surgery (Right).     Restrictions  Restrictions/Precautions: Fall Risk, Contact Precautions(no BP/blood draws L arm; DROPLET)  Other position/activity restrictions: up with assistance  Required Braces or Orthoses?: No     Vitals  Temp: 98.9 °F (37.2 °C)  Pulse: 64  Resp: 18  BP: 132/63  Height: 6' (182.9 cm)  Weight: 299 lb 13.2 oz (136 kg)  BMI (Calculated): 40.7  Oxygen Therapy  SpO2: 96 %  Pulse Oximeter Device Mode: Continuous  O2 Device: Nasal cannula  O2 Flow Rate (L/min): 2 L/min  Level of Consciousness: Alert    Subjective  Subjective: \"I need to pee\"  Overall Orientation Status: Impaired  Orientation Level: Disoriented X4(Able to state name, but not oriented to birth date)  Vision  Vision: Impaired  Vision Exceptions: Wears glasses for reading  Hearing  Hearing: Exceptions to [facility] [address] [facility]  Hearing Exceptions: Hard of hearing/hearing concerns, No hearing aid  Social/Functional History  Type of Home: Facility([facility] for [facility])  Home Layout: One level  Home Access: Level entry  Bathroom Shower/Tub: Walk-in shower  Bathroom Toilet: Standard  Bathroom Equipment: Grab bars in shower, Shower chair, Hand-held shower, Grab bars around toilet(roll-in shower chair)  Home Equipment: Wheelchair-manual, Oxygen(3L O2 PRN)  Receives Help From: Personal care attendant  ADL Assistance: Needs assistance(MI w/ toileting & feeding; Assist with bathing and dressing)  Homemaking Assistance: (Staff is primary)  Homemaking Responsibilities: No  Ambulation Assistance: (NONAMBULATORY - pivots to w/c)  Transfer Assistance: Independent(reports MI with stand pivot to w/c)  Active : No  Occupation: Retired  Type of occupation:   Additional Comments: Pt reports that typically he pivots to the w/c with MI or 1A PRN, however had a recent fall. Pt is a questionable historian with no family present to verfiy above information.        Objective  Vision - Basic Assessment  Prior Vision: Wears glasses only for reading   Cognition  Overall Cognitive Status: Impaired  Arousal/Alertness: Inconsistent responses to stimuli  Attention Span: Difficulty attending to

## 2020-03-12 NOTE — PROGRESS NOTES
[facility] Neurology   IN-PATIENT SERVICE      NEUROLOGY PROGRESS  NOTE                Interval History:     Patient continues to be altered. Nursing staff reached out to nursing home patient lives at who state patient is typically alert and oriented x4 and this is not his baseline. Unable to get consent for LP and MRI secondary to altered mentation. Patient is DNR CC and unsure if he would want to proceed with this testing. Discussed with nurse, charge nurse and decided will get palliative care team involved. History of Present Illness: The patient is a 78 y.o. male who presents with Altered Mental Status  . .. The patient was seen and examined and the chart was reviewed.  Patient recently hospitalized at [facility], discharged 2 days ago with diagnosis of hypoxia, pleural effusions, rib fractures, A. fib.  Brought in from SNF with altered mental status, noted to be hypoxic at 80% and placed on 4 L nasal cannula oxygen.  Noted to have purulent drainage from his left ear so CT of the internal auditory canals was performed showing evidence of mastoiditis.  Otolaryngology was consulted. Flavia Cogan has been placed on antibiotics.  Neurology consulted for possible encephalitis/meningitis from mastoiditis source.  Patient is confused at this time and not able to give any pertinent history. Ena Bass does deny headache or neck pain or photophobia. Priya Cedar is no overt nuchal rigidity on examination.  There is no fever or white count.  He does have elevated creatinine at baseline with end-stage renal disease on hemodialysis.  Lumbar puncture cannot be performed in ED secondary to being on Eliquis. Patient to be admitted for further work-up.     Physical Exam:   BP (!) 145/47   Pulse 64   Temp 98.7 °F (37.1 °C) (Axillary)   Resp 18   Ht 6' (1.829 m)   Wt 299 lb 13.2 oz (136 kg)   SpO2 97%   BMI 40.66 kg/m²   Temp (24hrs), Av.4 °F (36.9 °C), Min:98.2 °F (36.8 °C), Max:98.7 °F (37.1

## 2020-03-12 NOTE — PLAN OF CARE
Patient's safety maintained. Problem: Daily Care:  Goal: Daily care needs are met  Description: Daily care needs are met  3/12/2020 0626 by Vandana Lloyd RN  Outcome: Ongoing     Problem: Pain:  Goal: Patient's pain/discomfort is manageable  Description: Patient's pain/discomfort is manageable  3/12/2020 0626 by Vandana Lloyd RN  Outcome: Ongoing     Problem: Falls - Risk of:  Goal: Will remain free from falls  Description: Will remain free from falls  3/12/2020 0626 by Vandana Lloyd RN  Outcome: Met This Shift   Pt. Free of falls and injuries this shift. Problem: Falls - Risk of:  Goal: Absence of physical injury  Description: Absence of physical injury  3/12/2020 0626 by Vandana Lloyd RN  Outcome: Met This Shift   No injuries this shift. Patient's safety maintained.

## 2020-03-13 NOTE — PROGRESS NOTES
[facility] -   Infectious diseases evaluation  admission date 3/9/2020        Impression :   Current:  · Left mastoiditis /encephalopathy r/o meningitis   ·  H/O left ear cancer   · ESRD on HD   · AFib   · Anemia   · COPD   · DM   · H/O cdiff colitis   · Neurofisromatosis   · PVD   · Renal cyst versus mass    Recommendations   · Continue IV vancomycin, acyclovir and IV ceftriaxone  · Spinal tap was not done, patient unable to give consent and patient had no family according to nursing home nursing staff, spinal tap need to be done in my opinion to rule out meningitis. Hopefully that will be arranged today by interventional radiology. · MRI brain pending  · Follow CBC renal function        History of Present Illness:   Initial history:  Kathy Carrasco is a 78y.o.-year-old male was brought to the hospital by EMS on 3/9/2020 for altered mental status, reported hypoxia and fall. He had reported ear pain for several days with foul-smelling drainage. CT head without contrast suggestive of left-sided acute otomastoiditis. CT OF THE INTERNAL AUDITORY CANAL WITHOUT CONTRAST 3/10/2020 suggestive of otitis externa and otomastoiditis. CT abdomen pelvis showed unchanged 6.9 cm hemorrhagic left renal cyst versus mass  Patient has been on Eliquis so spinal tap was not done at the ER  He was hospitalized recently 3-2-20 untill 3-6 -20  hypotension and atrial fibrillation apparently was discharged on oral doxycycline  Interval changes  3/13/2020   The patient seen at hemodialysis today, seems less confused today, denied any headache or neck pain, denied cough, no other complaints. I have personally reviewed the past medical history, past surgical history, medications, social history, and family history, and I haveupdated the database accordingly.   Past Medical History:     Past Medical History:   Diagnosis Date    Acute combined systolic and diastolic congestive heart Years since quittin.0    Smokeless tobacco: Never Used   Substance and Sexual Activity    Alcohol use: No    Drug use: No    Sexual activity: Not Currently   Lifestyle    Physical activity     Days per week: Not on file     Minutes per session: Not on file    Stress: Not on file   Relationships    Social connections     Talks on phone: Not on file     Gets together: Not on file     Attends Anabaptist service: Not on file     Active member of club or organization: Not on file     Attends meetings of clubs or organizations: Not on file     Relationship status: Not on file    Intimate partner violence     Fear of current or ex partner: Not on file     Emotionally abused: Not on file     Physically abused: Not on file     Forced sexual activity: Not on file   Other Topics Concern    Not on file   Social History Narrative    Not on file       Family History:     Family History   Family history unknown: Yes        Allergies:   Cymbalta [duloxetine hcl]; Tromethamine; and Toradol [ketorolac tromethamine]     Review of Systems:   As per history of present illness other than above 14 systems reviewed were negative    Physical Examination :     Patient Vitals for the past 8 hrs:   BP Temp Temp src Pulse Resp SpO2   20 1145 (!) 161/63 97.9 °F (36.6 °C) Oral 68 16 99 %   20 1030 (!) 156/70 -- -- 68 -- --   20 0930 (!) 160/75 -- -- 64 -- --   20 0900 136/70 -- -- 61 -- --   20 0842 (!) 152/73 98.4 °F (36.9 °C) -- 67 18 --   20 0832 (!) 168/61 98.6 °F (37 °C) Oral 100 18 100 %       Physical Exam  Constitutional:       Comments: Awake   Eyes:      General:         Right eye: No discharge. Left eye: No discharge. Conjunctiva/sclera: Conjunctivae normal.   Neck:      Musculoskeletal: Neck supple. No neck rigidity. Cardiovascular:      Rate and Rhythm: Normal rate. Heart sounds: No murmur.    Pulmonary:      Effort: Pulmonary effort is normal. No respiratory

## 2020-03-13 NOTE — PROGRESS NOTES
Physical Therapy  DATE: 3/13/2020    NAME: Bryant Baugh  MRN: [id number]   : 1950    Patient not seen this date for Physical Therapy due to:  [] Blood transfusion in progress  [] Hemodialysis  []  Patient Declined  [] Spine Precautions   [] Strict Bedrest  [x] Surgery/ Procedure: Lumbar puncture at 1250, per RN [facility] [place]. [] Testing      [] Other        [] PT being discontinued at this time. Patient independent. No further needs. [] PT being discontinued at this time as the patient has been transferred to palliative care. No further needs.     Shirin Randolph, ARVIN

## 2020-03-13 NOTE — PROGRESS NOTES
I found the number for a patient contact. The patient told me where the contact number was for Katina Montoya, whom is his only contact and his ex SO,  and the Mother of their 2 sons. I updated Shiloh Baig, and we talked about the patient. Shiloh Baig stated that \" I am  all that he has. \" I asked if she would agree to be his [address] [employer] decision maker. She stated\" yes I will and that I am familiar with that as I have taken care or someone else with medical conditions. \" I had the  here and she witnessed by phone that Shiloh Baig was agreeable to be appointed as [address] Arlen Donato know that the  would complete the paperwork, and that she would receive a copy for her records. Shiloh Baig was very appreciaitve for the call and the information. I offered much emotional support. I did update the patient as well, and he remained agreeable to complete the [address]. Will update contact information in Epic. Will follow for goals of care and support. Dominique Banegas .[address] [place] [address]  Taran Frankel RN  [facility]: [phone]  [facility]: [phone]  Work Cell: [phone]

## 2020-03-13 NOTE — PROGRESS NOTES
WBC    Differential Type NOT REPORTED     Immature Granulocytes NOT REPORTED 0 %    Absolute Immature Granulocyte NOT REPORTED 0.00 - 0.30 k/uL    WBC Morphology NOT REPORTED     RBC Morphology NOT REPORTED     Platelet Estimate NOT REPORTED     Seg Neutrophils 78 (H) 36 - 66 %    Lymphocytes 6 (L) 24 - 44 %    Monocytes 8 (H) 1 - 7 %    Eosinophils % 2 0 - 4 %    Basophils 1 0 - 2 %    Bands 1 0 - 10 %    Myelocytes 4 (H) 0 %    Segs Absolute 2.66 1.3 - 9.1 k/uL    Absolute Lymph # 0.20 (L) 1.0 - 4.8 k/uL    Absolute Mono # 0.27 0.1 - 1.3 k/uL    Absolute Eos # 0.07 0.0 - 0.4 k/uL    Basophils Absolute 0.03 0.0 - 0.2 k/uL    Absolute Bands # 0.03 0.0 - 1.0 k/uL    Myelocytes Absolute 0.14 (H) 0 k/uL    Morphology Normal     Protime 13.2 11.8 - 14.6 sec    INR 1.0     .4 (HH) 24.0 - 36.0 sec    Myoglobin 208 (H) 28 - 72 ng/mL    Troponin, High Sensitivity 126 (HH) 0 - 22 ng/L    Troponin T NOT REPORTED <0.03 ng/mL    Troponin Interp NOT REPORTED     Glucose 127 (H) 70 - 99 mg/dL    BUN 37 (H) 8 - 23 mg/dL    CREATININE 6.33 (HH) 0.70 - 1.20 mg/dL    Bun/Cre Ratio NOT REPORTED 9 - 20    Calcium 8.9 8.6 - 10.4 mg/dL    Sodium 136 135 - 144 mmol/L    Potassium 4.4 3.7 - 5.3 mmol/L    Chloride 97 (L) 98 - 107 mmol/L    CO2 24 20 - 31 mmol/L    Anion Gap 15 9 - 17 mmol/L    GFR Non-African American 9 (L) >60 mL/min    GFR  11 (L) >60 mL/min    GFR Comment          GFR Staging NOT REPORTED     Total CK 20 (L) 39 - 308 U/L    CK-MB 2.2 <10.5 ng/mL    % CKMB 11.0 (H) 0.0 - 3.5 %    CKMB Interpretation NORMAL ISOENZYME PATTERN    Anti-Xa Unfract Heparin    Collection Time: 03/12/20  5:55 PM   Result Value Ref Range    Anti-XA Unfrac Heparin 0.50 0.30 - 0.70 IU/L   POC Glucose Fingerstick    Collection Time: 03/12/20  8:06 PM   Result Value Ref Range    POC Glucose 112 (H) 75 - 110 mg/dL   Anti-Xa Unfract Heparin    Collection Time: 03/12/20  9:48 PM   Result Value Ref Range    Anti-XA Unfrac Heparin 0. 30 0.30 - 0.70 IU/L   Basic Metabolic Panel w/ Reflex to MG    Collection Time: 03/13/20  4:24 AM   Result Value Ref Range    Glucose 124 (H) 70 - 99 mg/dL    BUN 41 (H) 8 - 23 mg/dL    CREATININE 7.55 (HH) 0.70 - 1.20 mg/dL    Bun/Cre Ratio NOT REPORTED 9 - 20    Calcium 8.8 8.6 - 10.4 mg/dL    Sodium 137 135 - 144 mmol/L    Potassium 4.3 3.7 - 5.3 mmol/L    Chloride 98 98 - 107 mmol/L    CO2 24 20 - 31 mmol/L    Anion Gap 15 9 - 17 mmol/L    GFR Non-African American 7 (L) >60 mL/min    GFR  9 (L) >60 mL/min    GFR Comment          GFR Staging NOT REPORTED    CBC    Collection Time: 03/13/20  4:24 AM   Result Value Ref Range    WBC 3.4 (L) 3.5 - 11.0 k/uL    RBC 2.88 (L) 4.5 - 5.9 m/uL    Hemoglobin 8.6 (L) 13.5 - 17.5 g/dL    Hematocrit 26.8 (L) 41 - 53 %    MCV 93.2 80 - 100 fL    MCH 29.9 26 - 34 pg    MCHC 32.1 31 - 37 g/dL    RDW 15.9 (H) 11.5 - 14.9 %    Platelets 032 (L) 746 - 450 k/uL    MPV 6.5 6.0 - 12.0 fL    NRBC Automated NOT REPORTED per 100 WBC   Phosphorus    Collection Time: 03/13/20  4:24 AM   Result Value Ref Range    Phosphorus 5.8 (H) 2.5 - 4.5 mg/dL   VANCOMYCIN, RANDOM    Collection Time: 03/13/20  4:24 AM   Result Value Ref Range    Vancomycin Rm 21.2 ug/mL    Vancomycin Random Dose amount NOT REPORTED     Vancomycin Random Date last dose NOT REPORTED     Vancomycin Random Time last dose NOT REPORTED    Anti-Xa Unfract Heparin    Collection Time: 03/13/20  4:24 AM   Result Value Ref Range    Anti-XA Unfrac Heparin 0.30 0.30 - 0.70 IU/L   POC Glucose Fingerstick    Collection Time: 03/13/20  8:30 AM   Result Value Ref Range    POC Glucose 142 (H) 75 - 110 mg/dL     Recent Labs     03/12/20  1129 03/12/20  1630 03/12/20  1744 03/12/20 2006 03/13/20  0830   POCGLU 100 126* 161* 112* 142*        Ct Head Wo Contrast    Result Date: 3/9/2020  EXAMINATION: CT OF THE HEAD WITHOUT CONTRAST  3/9/2020 10:04 pm TECHNIQUE: CT of the head was performed without the administration of reconstruction, and/or weight based adjustment of the mA/kV was utilized to reduce the radiation dose to as low as reasonably achievable. COMPARISON: Temporal bone CT 09/19/2014 HISTORY: ORDERING SYSTEM PROVIDED HISTORY: Mastoiditis TECHNOLOGIST PROVIDED HISTORY: Mastoiditis Reason for Exam: Mastoiditis; Patient with mastoiditis history of ear cancer and diabetic possible malignant otitis externa Acuity: Acute Type of Exam: Unknown FINDINGS: RIGHT TEMPORAL BONE:  The external auditory canal is clear without osseous erosion. The scutum is intact. The middle ear cavity is clear. The ossicular chain is intact. The mastoid air cells are clear. The inner ear structures appear unremarkable. Normal mineralization of the otic capsule. The internal auditory canal and vestibular aqueduct appear unremarkable. The carotid canal is normal in appearance. The jugular bulb is unremarkable. LEFT TEMPORAL BONE: Soft tissue thickening of the external auditory canal. No osseous erosion. The scutum is intact. Near complete opacification of the middle ear cavity. The ossicular chain is intact. Complete opacification of the mastoid air cells. The inner ear structures appear unremarkable. Normal mineralization of the otic capsule. The internal auditory canal and vestibular aqueduct appear unremarkable. The carotid canal is normal in appearance. The jugular bulb is unremarkable. BRAIN: The visualized portion of the intracranial contents appear unremarkable. ORBITS: The visualized portion of the orbits demonstrate no acute abnormality. SINUSES: Right maxillary sinus polyp or mucosal retention cyst.     1. Left external auditory canal soft tissue thickening and left tympanomastoid cavity opacification. Findings are compatible with otitis externa and otomastoiditis. 2. Unremarkable right temporal bone CT.      Ct Abdomen Pelvis W Iv Contrast Additional Contrast? None    Result Date: 3/10/2020  EXAMINATION: CT OF THE ABDOMEN AND PELVIS WITH CONTRAST 3/10/2020 1:30 am TECHNIQUE: CT of the abdomen and pelvis was performed with the administration of intravenous contrast. Multiplanar reformatted images are provided for review. Dose modulation, iterative reconstruction, and/or weight based adjustment of the mA/kV was utilized to reduce the radiation dose to as low as reasonably achievable. COMPARISON: 03/02/2020 HISTORY: ORDERING SYSTEM PROVIDED HISTORY: scrotal drainage concern for Buzz's TECHNOLOGIST PROVIDED HISTORY: scrotal drainage concern for Buzz's Reason for Exam: scrotal drainage concern for Buzz's Acuity: Acute Type of Exam: Unknown FINDINGS: Lower Chest: Study is motion degraded. Mild bibasilar pleural and parenchymal disease has not changed significantly. There are several bilateral acute, subacute and old rib fractures. Organs: Study was degraded by artifact from the arm down position. The liver, spleen, pancreas, and adrenal glands are normal.  The liver is borderline enlarged and unchanged. Absent right kidney. Hemorrhagic cyst versus solid mass in the lower pole of the left kidney measuring up to 6.9 cm, previously 6.8 cm. There is a lobulated partially calcified nodule medial to this mass and there is a similar appearing hemorrhagic cyst versus solid mass projecting left laterally from this mass. Lizet Tol[place] GI/Bowel: There is a mild rectal fecal impaction. Mild stool load throughout the rest of the colon. Small bowel loops are normal in caliber. Pelvis: Urinary bladder is grossly normal. Peritoneum/Retroperitoneum: There is no adenopathy, free air or free fluid. Calcific aorto iliac atherosclerotic disease with no evidence of an abdominal aortic aneurysm. Bones/Soft Tissues: There is scrotal edema with wall thickening. There is a left hydrocele. There are several small bubbles of peripherally located air apparently trapped between the thickened scrotal wall and adjacent skin surface of the medial thighs.   There is no soft tissue gas. There are no findings to suggest necrotizing fasciitis. No acute bone finding. Scrotal edema with wall thickening. No evidence of soft tissue gas to suggest a necrotizing fasciitis/Buzz gangrene. Several bilateral acute, subacute and old rib fractures. Unchanged mild bibasilar pleural and parenchymal lung disease. Unchanged 6.9 cm hemorrhagic left renal cyst versus solid mass in the lower pole. There are smaller adjacent complicated cysts as above, not significantly changed. Xr Chest Portable    Result Date: 3/9/2020  EXAMINATION: ONE XRAY VIEW OF THE CHEST 3/9/2020 10:39 pm COMPARISON: Prior studies including 03/04/2020 HISTORY: ORDERING SYSTEM PROVIDED HISTORY: AMS TECHNOLOGIST PROVIDED HISTORY: AMS Reason for Exam: AMS Acuity: Acute Type of Exam: Initial FINDINGS: Portable study is limited by body habitus. Unchanged elevation of the right hemidiaphragm. Stable cardiomegaly. There are a few bands of stable bibasilar chronic atelectasis. No acute infiltrate, pneumothorax or pleural effusion. No acute bone finding. Stable portable chest x-ray. No acute disease. Ct Chest Pulmonary Embolism W Contrast    Result Date: 3/9/2020  EXAMINATION: CTA OF THE CHEST 3/9/2020 10:08 pm TECHNIQUE: CTA of the chest was performed after the administration of intravenous contrast.  Multiplanar reformatted images are provided for review. MIP images are provided for review. Dose modulation, iterative reconstruction, and/or weight based adjustment of the mA/kV was utilized to reduce the radiation dose to as low as reasonably achievable. COMPARISON: None. HISTORY: ORDERING SYSTEM PROVIDED HISTORY: concern for PE TECHNOLOGIST PROVIDED HISTORY: concern for PE Reason for Exam: sob; r/o PE, pt unable to follow breathing instructions and did not stop moving or yelling during this scan. best imaging sent through.  Acuity: Acute Type of Exam: Unknown FINDINGS: Pulmonary Arteries: Study 03/13/20  0424   WBC  --  3.5 3.4* 3.4*   RBC  --  2.86* 2.82* 2.88*   HGB  --  8.3* 8.2* 8.6*   HCT  --  26.9* 26.4* 26.8*   MCV  --  94.1 93.6 93.2   MCH  --  29.2 29.1 29.9   MCHC  --  31.0 31.1 32.1   RDW  --  15.8* 16.0* 15.9*   PLT  --  150 150 147*   MPV  --  6.7 6.5 6.5   SEGS  --   --  78*  --    LYMPHOPCT  --   --  6*  --    MONOPCT  --   --  8*  --    EOSRELPCT  --   --  2  --    BASOPCT  --   --  1  --    PROTIME  --   --  13.2  --    APTT 74.7*  --  106.4*  --    INR  --   --  1.0  --      Chemistry:  Recent Labs     03/11/20  0457 03/12/20  0511 03/12/20  1755 03/13/20  0424    139 136 137   K 4.3 4.3 4.4 4.3   CL 97* 100 97* 98   CO2 28 24 24 24   GLUCOSE 102* 106* 127* 124*   BUN 26* 31* 37* 41*   CREATININE 4.99* 5.82* 6.33* 7.55*   ANIONGAP 11 15 15 15   LABGLOM 12* 10* 9* 7*   GFRAA 14* 12* 11* 9*   CALCIUM 8.9 8.9 8.9 8.8   PHOS 4.3 4.4  --  5.8*   TROPHS  --   --  126*  --    CKTOTAL  --   --  20*  --    CKMB  --   --  2.2  --    MYOGLOBIN  --   --  208*  --      No results for input(s): PROT, LABALBU, EAG, A1GHSSN, K0FBLDZ, FT4, TSH, CORTISOL, PROLACTIN, AST, ALT, LDH, GGT, ALKPHOS, LABGGT, BILITOT, BILIDIR, AMMONIA, AMYLASE, LIPASE, LACTATE, CHOL, HDL, LDLCHOLESTEROL, CHOLHDLRATIO, TRIG, VLDL, PHENYTOIN, PHENYF, VALPROATE in the last 72 hours.   Glucose:  Recent Labs     03/12/20  0713 03/12/20  1129 03/12/20  1630 03/12/20  1744 03/12/20 2006 03/13/20  0830   POCGLU 98 100 126* 161* 112* 142*         Lab Results   Component Value Date/Time    SPECIAL NOT REPORTED 03/02/2020 07:45 PM     Lab Results   Component Value Date/Time    CULTURE NO SIGNIFICANT GROWTH 03/02/2020 07:45 PM       Lab Results   Component Value Date    PHART 7.368 03/02/2020    PH 7.396 09/07/2012    YMV8GTS 47.1 03/02/2020    PCO2 38.8 09/07/2012    PO2ART 67.5 03/02/2020    YNE5YHN 27.1 03/02/2020    NBEA NOT REPORTED 03/02/2020    PBEA 1.8 03/02/2020    M2QIVYMF 90.0 03/02/2020    FIO2 NOT REPORTED 03/02/2020 Radiology:        Physical Examination:      Morbid obese  General appearance:  alert, cooperative and no distress  Mental Status: confulseid  Lungs:  clear to auscultation bilaterally, normal effort  Heart:  regular rate and rhythm, no murmur  Abdomen:  soft, nontender, nondistended, normal bowel sounds, no masses, hepatomegaly, splenomegaly  Extremities:  no edema, redness, tenderness in the calves  Skin:  no gross lesions, rashes, induration    Assessment:        Primary Problem  <principal problem not specified>    Active Hospital Problems    Diagnosis Date Noted    Acute metabolic encephalopathy [P01.66]     Acute mastoiditis of left side [H70.002] 03/09/2020       Plan:        Left ear mastoiditis possible intracranial infection rule out meningitis IV antibiotics ENT consult noted neurology input noted ID consulted  Patient is DNR CC a  Poor candidate for surgery  End-stage kidney disease stage V on hemodialysis  Eliquis is held for possible lumbar puncture  Acute toxic metabolic encephalopathy secondary to infection  Unable to take consult for lumbar puncture patient is not fully cooperative with his altered mental status continue IV antibiotics and ENT input  Iv abx  Chronic respiratory failure with hypoxia and hypercarbia with acute mastoiditis acute toxic metabolic encephalopathy due to above infection    Kymberly Day MD  3/13/2020  10:38 AM

## 2020-03-13 NOTE — PROGRESS NOTES
Nephrology progress Note    Reason for Consult:  ESRD    Requesting Physician:  Suleiman Sigala MD    Interval History:  More awake. BP stable. He came off HD early and had only 2 hour HD today. HISTORY OF PRESENT ILLNESS:    The patient is a 78 y.o. male who presents with AMS. He had reported fall yesterday. He was hypoxic on arrival, which improved with oxygen per nasal cannula. CXR no acute disease. CT head No acute intracranial trauma. CT chest did not show definitive evidence of PE. CT abd/pelvis showed scrotal edema with wall thickening. Left hydrocele. No evidence of kevin's gangrene/nerotizing fasciitis, unchanged hemorrhagic left cyst. CT of internal auditory canal showed left otitis externa and osomastoiditis. Receive IV contrast with imaging. Otolaryngology has been consulted. Neuro has been consulted. MRI and lumbar puncture planned. LP delayed as he is on eliquis. ID has been consulted. Pt has hx of ESRD on HD MWF at [facility]. He ran full outpatient HD treatment yesterday. Today K is 5.1. Hgb 9.8. Hx of HTN. BP is controlled. He is lethargic/encephalopathic. He was recently admitted for afib last week. Urology saw pt at that time for renal cyst.       Prior to Admission medications    Medication Sig Start Date End Date Taking? Authorizing Provider   fentaNYL (DURAGESIC) 25 MCG/HR Place 1 patch onto the skin every 72 hours for 6 days.  3/7/20 3/13/20 Yes Efren Anders MD   apixaban (ELIQUIS) 5 MG TABS tablet Take 1 tablet by mouth 2 times daily 3/6/20  Yes Efren Anders MD   dilTIAZem (CARDIZEM CD) 240 MG extended release capsule Take 1 capsule by mouth daily 3/6/20  Yes Efren Anders MD   lidocaine 4 % external patch Place 1 patch onto the skin daily 3/6/20  Yes Efren Anders MD   fluticasone-vilanterol (BREO ELLIPTA) 100-25 MCG/INH AEPB inhaler Inhale 1 puff into the lungs daily   Yes Historical Provider, MD   glucagon 1 MG injection Inject Yes Historical Provider, MD   isosorbide mononitrate (IMDUR) 60 MG CR tablet Take 60 mg by mouth daily   Yes Historical Provider, MD   fluticasone (FLONASE) 50 MCG/ACT nasal spray 1 spray by Nasal route daily    Yes Historical Provider, MD   finasteride (PROSCAR) 5 MG tablet Take 5 mg by mouth daily. Yes Historical Provider, MD   omeprazole (PRILOSEC) 20 MG capsule Take 20 mg by mouth every morning    Yes Historical Provider, MD   metoprolol tartrate (LOPRESSOR) 25 MG tablet Take 25 mg by mouth daily Indications: Hold for SBP less than 100 or HR less than 60    Yes Historical Provider, MD   tamsulosin (FLOMAX) 0.4 MG capsule Take 0.4 mg by mouth daily.      Yes Historical Provider, MD       Scheduled Meds:   influenza virus vaccine  0.5 mL Intramuscular Once    dilTIAZem  240 mg Oral Daily    cefTRIAXone (ROCEPHIN) IV  2 g Intravenous Q12H    ALPRAZolam  0.5 mg Oral BID    [Held by provider] apixaban  5 mg Oral BID    aspirin  81 mg Oral Daily    escitalopram  10 mg Oral QAM    finasteride  5 mg Oral Daily    fluticasone  1 spray Nasal Daily    fentaNYL  1 patch Transdermal Q72H    budesonide-formoterol  2 puff Inhalation BID    insulin glargine  10 Units Subcutaneous BID    isosorbide mononitrate  60 mg Oral Daily    lactulose  10 g Oral Daily    lidocaine  1 patch Transdermal Daily    cetirizine  5 mg Oral Daily    midodrine  5 mg Oral Once per day on Mon Wed Fri    metoprolol tartrate  25 mg Oral Daily    lidocaine-prilocaine   Topical Once per day on Mon Wed Fri    pantoprazole  40 mg Oral QAM AC    senna  1 tablet Oral BID    sevelamer  2,400 mg Oral TID WC    tamsulosin  0.4 mg Oral Daily    insulin lispro  0-12 Units Subcutaneous TID WC    insulin lispro  0-6 Units Subcutaneous Nightly    sodium chloride flush  10 mL Intravenous 2 times per day    darbepoetin [place]-polysorbate  60 mcg Subcutaneous Weekly    acyclovir  500 mg Intravenous Q24H    vancomycin (VANCOCIN) intermittent MD  on 3/13/2020 at 3:58 PM

## 2020-03-13 NOTE — PROGRESS NOTES
Pharmacy Vancomycin Consult     Vancomycin Day: 4  Current Dosing: HD protocol    Temp max:  98.9 F    Recent Labs     03/12/20  1755 03/13/20  0424   BUN 37* 41*       Recent Labs     03/12/20  1755 03/13/20  0424   CREATININE 6.33* 7.55*       Recent Labs     03/12/20  1755 03/13/20  0424   WBC 3.4* 3.4*         Intake/Output Summary (Last 24 hours) at 3/13/2020 0820  Last data filed at 3/13/2020 0732  Gross per 24 hour   Intake 572 ml   Output --   Net 572 ml       Culture Date      Source                       Results  See micro    Ht Readings from Last 1 Encounters:   03/10/20 6' (1.829 m)        Wt Readings from Last 1 Encounters:   03/13/20 291 lb 3.6 oz (132.1 kg)         Body mass index is 39.5 kg/m². Estimated Creatinine Clearance: 13 mL/min (A) (based on SCr of 7.55 mg/dL [facility])). Random Level: 21.2 mcg/ml    Assessment/Plan:  Will give vancomycin 1000 mg dose today right after dialysis session.     Thank you  Yue Mijares, PharmD, [facility]   3/13/2020 8:21 AM

## 2020-03-13 NOTE — FLOWSHEET NOTE
Referral made by Julia Mcdermott RN regarding patient wanting to complete AD documents; explained and executed HCPOA and LW documents; patient named Teresa Muhammad (sig other) and Chayito Liu (roommate) as his HCPOAs; a copy of these documents was placed in patient's chart and given to Julia Mcdermott RN; listening presence and support;     03/13/20 1645   Encounter Summary   Services provided to: Patient   Referral/Consult From: Nurse;Palliative Care   Support System Significant other;Friends/neighbors   Continue Visiting   (3/13/20)   Complexity of Encounter Moderate   Length of Encounter 45 minutes   Spiritual Assessment Completed Yes   Advance Care Planning Yes   Grief and Life Adjustment   Type Palliative care   Assessment Approachable;Coping   Intervention Active listening;Sustaining presence/ Ministry of presence; Discussed illness/injury and it's impact   Outcome Expressed gratitude;Engaged in conversation;Expressed feelings/needs/concerns   Advance Directives (For Healthcare)   Healthcare Directive Yes, patient has an advance directive for healthcare treatment   Type of Healthcare Directive Durable power of  for health care;Living will   Copy in Chart Yes, copy in chart   Chart Copy Status : Active;Current   Date Reviewed and Current: 03/13/20   Zhane Marques & Dwaine Nunez,[id number] power of    Healthcare Agent's Name [id number] Agent's Phone Number [phone]

## 2020-03-13 NOTE — PLAN OF CARE
signs or symptoms of fluid imbalance  3/12/2020 2256 by Renato Hunt RN  Outcome: Met This Shift  3/12/2020 1944 by Karla Jimenez RN  Outcome: Ongoing     Problem: Safety:  Goal: Free from accidental physical injury  Description: Free from accidental physical injury  3/12/2020 2256 by Renato Hunt RN  Outcome: Met This Shift  3/12/2020 1944 by Karla Jimenez RN  Outcome: Ongoing  Goal: Free from intentional harm  Description: Free from intentional harm  3/12/2020 2256 by Renato Hunt RN  Outcome: Met This Shift  3/12/2020 1944 by Karla Jimenez RN  Outcome: Ongoing     Problem: Daily Care:  Goal: Daily care needs are met  Description: Daily care needs are met  3/12/2020 2256 by Renato Hunt RN  Outcome: Met This Shift  3/12/2020 1944 by Karla Jimenez RN  Outcome: Ongoing     Problem: Pain:  Goal: Patient's pain/discomfort is manageable  Description: Patient's pain/discomfort is manageable  3/12/2020 2256 by Renato Hunt RN  Outcome: Met This Shift  3/12/2020 1944 by Karla Jimenez RN  Outcome: Ongoing  Goal: Pain level will decrease  Description: Pain level will decrease  3/12/2020 2256 by Renato Hunt RN  Outcome: Met This Shift  3/12/2020 1944 by Karla Jimenez RN  Outcome: Ongoing  Goal: Control of acute pain  Description: Control of acute pain  3/12/2020 2256 by Renato Hunt RN  Outcome: Met This Shift  3/12/2020 1944 by Karla Jimenez RN  Outcome: Ongoing  Goal: Control of chronic pain  Description: Control of chronic pain  3/12/2020 2256 by Renato Hunt RN  Outcome: Met This Shift  3/12/2020 1944 by Karla Jimenez RN  Outcome: Ongoing     Problem: Falls - Risk of:  Goal: Will remain free from falls  Description: Will remain free from falls  3/12/2020 2256 by Renato Hunt RN  Outcome: Met This Shift  3/12/2020 1944 by Karla Jimenez RN  Outcome: Ongoing  Goal: Absence of physical injury  Description: Absence of physical injury  3/12/2020 2256 by Renato Hunt RN  Outcome:  Met

## 2020-03-13 NOTE — CARE COORDINATION
ONGOING DISCHARGE PLAN:    LSW following for [facility]. Per ENT notes - As mental status has significantly improved and without leukocytosis, fever, significant associated symptoms, definitive therapy - left tympanomastoidectomy may not be required   further, it is unclear if Mr. Eugene García would be cleared medically for a general anesthesia. Unable to do Lumbar puncture a patient not fully cooperative d/t AMS. MRI brain pending    Eliquis on hold. Remains on IV Zovirax, Iv rocephin, IV Vanco, heparin gtt    May need to be transferred to [facility] for mastoidectomy    Will continue to follow for additional discharge needs.     Electronically signed by Sapphire Judge RN on 3/13/2020 at 11:46 AM

## 2020-03-13 NOTE — PROGRESS NOTES
[facility]   OCCUPATIONAL THERAPY MISSED TREATMENT NOTE   INPATIENT   Date: 3/13/20  Patient Name: Iva Benites       Room: 4528/2799-76  MRN: [id number]   Account #: [de-identified]    : 1950  (78 y.o.)  Gender: male   Referring Practitioner: Dr. María Elena Clinton TREATMENT:  Patient unable to participate   -   Hemodialysis         KUN Sanchez

## 2020-03-13 NOTE — PROGRESS NOTES
Pt refusing lumbar puncture. Different positions offered. Pt continues to refuse. Maryanne Guevara RN and Dr Mary Guerra notified.

## 2020-03-13 NOTE — PLAN OF CARE
Neurology ---       Patient with acute suppurative mastoiditis, has been treated with antibiotics since admission. Has had persistent encephalopathy since admission. Per nursing home staff he is currently not at his baseline, usually he is A + O x 4. I do believe we need to proceed with the lumbar puncture at this time to rule out any spread of the infection to the CNS so we can treat appropriately. He is somewhat more responsive today and from my conversation with him he is in agreement to move forward with the testing at this time. Even if he is not able to sign consent due to his altered mentation he needs to have this procedure done emergently at this time in my opinion. He has no other family members that would be able to sign consent for him. He has been off the heparin gtt since 8:45 am. Discussed with nurse who states she will reach out to Interventional Radiology to confirm that we need to move forward with this procedure today.      Wyatt Soliman, [address] [facility]  Neurology

## 2020-03-13 NOTE — CONSULTS
resistant organisms) resistance     hx. c-dif.  Memory deficit     Neuro[facility]romatosis [facility]     Osteoarthritis     Paraplegia ([address]     chair to bed and chair transfer only    Peptic ulcer     PVD (peripheral vascular disease) ([address]     Secondary hyperparathyroidism ([facility]     Sinus disorder     Syncope     Type II or unspecified type diabetes mellitus without mention of complication, not stated as uncontrolled     Venous thrombosis        PAST SURGICAL HISTORY  Past Surgical History:   Procedure Laterality Date    CHOLECYSTECTOMY      COLONOSCOPY  2011    2 POLYPS REMOBED TUBULAR ADENOMA    DIALYSIS FISTULA CREATION Left 2016    LEFT WRIST, no longer functional    DIALYSIS FISTULA CREATION Left     antecubital, was revised one time    HIP SURGERY Right     deep laceration was repaired    INTRACAPSULAR CATARACT EXTRACTION Right 2019    EYE CATARACT EMULSIFICATION IOL IMPLANT performed by [place] MD at [address] [place] [address] Left 2019    EYE CATARACT EMULSIFICATION IOL IMPLANT performed by [place] MD at [address] [place] [address]. Left     ear.  SKIN GRAFT      right axilla    THYROID SURGERY      non-cancerous lump removed    TOTAL NEPHRECTOMY Right     as a donor    TUNNELED VENOUS CATHETER PLACEMENT Right 2016    later removed    TURP         SOCIAL HISTORY  Social History     Tobacco Use    Smoking status: Former Smoker     Packs/day: 1.00     Types: Cigarettes     Last attempt to quit: 3/1/2019     Years since quittin.0    Smokeless tobacco: Never Used   Substance Use Topics    Alcohol use: No    Drug use: No       ALLERGIES  Allergies   Allergen Reactions    Cymbalta [Duloxetine Hcl]      Intolerance.     Tromethamine Other (See Comments)    Toradol [Ketorolac Tromethamine] Other (See Comments)     Headaches          MEDICATIONS  Current Medications    influenza virus vaccine  0.5 mL Intramuscular Once times daily 60 tablet 0    dilTIAZem (CARDIZEM CD) 240 MG extended release capsule Take 1 capsule by mouth daily 30 capsule 0    lidocaine 4 % external patch Place 1 patch onto the skin daily 1 box 0    fluticasone-vilanterol (BREO ELLIPTA) 100-25 MCG/INH AEPB inhaler Inhale 1 puff into the lungs daily      glucagon 1 MG injection Inject 1 kit into the muscle as needed (hypoglycemia BG <60)      glucose (GLUTOSE) 40 % GEL Take 15 g by mouth as needed (blood sugar <60)      loratadine (CLARITIN) 5 MG chewable tablet Take 5 mg by mouth daily       nitroGLYCERIN (NITROSTAT) 0.4 MG SL tablet Place 0.4 mg under the tongue every 5 minutes as needed for Chest pain up to max of 3 total doses. If no relief after 1 dose, call 911.       Dextromethorphan-guaiFENesin  MG/5ML SYRP Take 5 mLs by mouth every 6 hours as needed for Cough      insulin glargine (LANTUS) 100 UNIT/ML injection vial Inject 10 Units into the skin 2 times daily 1 vial 3    lidocaine-prilocaine (EMLA) 2.5-2.5 % cream Apply topically three times a week Apply topically to arm/port three times weekly on Monday, Wednesday, and Friday for dialysis      lactulose (CHRONULAC) 10 GM/15ML solution Take 10 g by mouth daily      ondansetron (ZOFRAN) 4 MG tablet Take 4 mg by mouth every 6 hours as needed for Nausea or Vomiting      midodrine (PROAMATINE) 5 MG tablet Take 5 mg by mouth three times a week On Monday, Wednesday, and Friday for dialysis      ramelteon (ROZEREM) 8 MG tablet Take 8 mg by mouth nightly      diphenhydrAMINE (BENADRYL) 25 MG tablet Take 25 mg by mouth every 8 hours as needed for Itching       aspirin 81 MG chewable tablet Take 81 mg by mouth daily      sevelamer (RENVELA) 800 MG tablet Take 3 tablets by mouth 3 times daily (with meals)       senna (SENOKOT) 8.6 MG tablet Take 1 tablet by mouth 2 times daily      escitalopram (LEXAPRO) 10 MG tablet Take 10 mg by mouth every morning       isosorbide mononitrate (IMDUR) 60 Interaction:Patient is alert and oriented and appears very comfortable. I introduce my palliative care role to him. He is known to palliative care from previous admissions. GOALS OF CARE:  The patient is a dialysis patient and he wants to continue with dialysis and treatment . I asked about his previous Hospice services, and he stated\" I stopped that. \" He talked about and asked about palliative care and hospice. I explained that hospice focuses on comfort, and palliative care on quality of life and symptom management while continuing all treatment during his chronic disease progression. He stated that he understood. I did also ask if he has a decision maker for him if he was unable, or if he had anyone in mind that would be his decision , if he ever was unable to speak for himself. He stated \" yes my daughter Brielle. \" I asked for her number and he was not sure. I explained that if Brielle agrees to be appointed, that the chaplains can help him complete the paperwork. I explained to the patient that it only comes into play if at anytime he cannot speak for himself, that Brielle would be his voice , and honor his wishes for medical treatment. We then reached out to Brielle and the number was a non working number. We will continue to attempt find her valid contact information. CODE STATUS DISCUSSION:  I again mentioned his [address] code status, and that he would not receive CPR, shocking or intubation in the case his heart would stop, or he would stop breathing , and he stated\" yes that's right.'     I offered much emotional support, and will follow along for goals of care and support.        Education/support to patient  Providing support for coping/adaptation/distress of patient  Continue with current plan of care  Code status clarified: [address]  Palliative care orders introduced  Provided information about hospice  Validating patient/family distress  Continued communication updates  Patient is a

## 2020-03-13 NOTE — PROGRESS NOTES
nurse and decided will get palliative care team involved. Past Medical History:     Past Medical History:   Diagnosis Date    Acute combined systolic and diastolic congestive heart failure ([address] 11/25/2016    Anemia     BPH (benign prostatic hyperplasia)     Cancer (HCC)     left ear 8/2013    CKD (chronic kidney disease) stage 3, GFR 30-59 ml/min (HCC)     Constipation     COPD (chronic obstructive pulmonary disease) (HCC)     Depression     GERD (gastroesophageal reflux disease)     Hemodialysis patient ([address]     3 times a week    HTN (hypertension)     Hx of blood clots     \" rt.shoulder/neck\"    Hyperparathyroidism ([address]     Hypothyroidism     IDDM (insulin dependent diabetes mellitus) ([address]     Insomnia     Liver disease     patient is unsure what it is    MDRO (multiple drug resistant organisms) resistance     hx. c-dif.     Memory deficit     Neurofibromatosis ([address]     Osteoarthritis     Paraplegia ([address]     chair to bed and chair transfer only    Peptic ulcer     PVD (peripheral vascular disease) ([address]     Secondary hyperparathyroidism ([address]     Sinus disorder     Syncope     Type II or unspecified type diabetes mellitus without mention of complication, not stated as uncontrolled     Venous thrombosis         Past Surgical History:     Past Surgical History:   Procedure Laterality Date    CHOLECYSTECTOMY      COLONOSCOPY  08/22/2011    2 POLYPS REMOBED TUBULAR ADENOMA    DIALYSIS FISTULA CREATION Left 06/13/2016    LEFT WRIST, no longer functional    DIALYSIS FISTULA CREATION Left     antecubital, was revised one time    HIP SURGERY Right     deep laceration was repaired    INTRACAPSULAR CATARACT EXTRACTION Right 5/7/2019    EYE CATARACT EMULSIFICATION IOL IMPLANT performed by Dennie Prude, MD at [address] [place] [address] Left 5/28/2019    EYE CATARACT EMULSIFICATION IOL IMPLANT performed by Dennie Prude, MD at [address] [place] [address]. auditory canal soft tissue thickening and left tympanic mastoid cavity opacification.  Findings compatible with otitis externa and otomastoiditis. I personally reviewed all of the above medications, clinical laboratory, imaging and other diagnostic tests. Impression:      1. Acute metabolic encephalopathy secondary to acute suppurative mastoiditis. Rule out CNS infection.   2. A. fib on Eliquis; currently held    Plan:      Lumbar puncture by IR today   Restart Eliquis tonight   Lie flat for 3 to 4 hours after lumbar puncture   Continue antibiotics   Await CSF studies   ENT recommendations   Palliative care has been consulted   Discussed with nurse   Will follow      Electronically signed by Jaya Ghosh DO on 3/13/2020 at 10:11 AM      Jaya Ghosh, [address] [place] [address]  Neurology

## 2020-03-13 NOTE — PLAN OF CARE
Problem: Nutritional:  Goal: Nutritional status will improve  Description: Nutritional status will improve  Outcome: Ongoing     Problem: Physical Regulation:  Goal: Diagnostic test results will improve  Description: Diagnostic test results will improve  Outcome: Ongoing  Goal: Will remain free from infection  Description: Will remain free from infection  Outcome: Ongoing  Goal: Ability to maintain vital signs within normal range will improve  Description: Ability to maintain vital signs within normal range will improve  Outcome: Ongoing     Problem: Respiratory:  Goal: Ability to maintain normal respiratory secretions will improve  Description: Ability to maintain normal respiratory secretions will improve  Outcome: Ongoing     Problem: Skin Integrity:  Goal: Demonstration of wound healing without infection will improve  Description: Demonstration of wound healing without infection will improve  Outcome: Ongoing  Goal: Complications related to intravenous access or infusion will be avoided or minimized  Description: Complications related to intravenous access or infusion will be avoided or minimized  Outcome: Ongoing     Problem: Fluid Volume:  Goal: Will show no signs or symptoms of fluid imbalance  Description: Will show no signs or symptoms of fluid imbalance  Outcome: Ongoing     Problem: Safety:  Goal: Free from accidental physical injury  Description: Free from accidental physical injury  Outcome: Ongoing  Goal: Free from intentional harm  Description: Free from intentional harm  Outcome: Ongoing     Problem: Daily Care:  Goal: Daily care needs are met  Description: Daily care needs are met  Outcome: Ongoing     Problem: Pain:  Goal: Patient's pain/discomfort is manageable  Description: Patient's pain/discomfort is manageable  Outcome: Ongoing  Goal: Pain level will decrease  Description: Pain level will decrease  Outcome: Ongoing  Goal: Control of acute pain  Description: Control of acute pain  Outcome:

## 2020-03-14 NOTE — PROGRESS NOTES
[facility] -   Infectious diseases evaluation  admission date 3/9/2020    reason for consultation:   Left Mastoiditis /encephalopathy/ R/O Meningitis    Impression :   Current:  · Left mastoiditis /encephalopathy r/o meningitis   ·  H/O left ear cancer   · ESRD on HD   · AFib   · Anemia   · COPD   · DM   · H/O cdiff colitis   · Neurofisromatosis   · PVD   · Renal cyst versus mass    Other:  ·   Discussion / summary of stay / plan of care   ·   Recommendations   · Okay to discharge from ID standpoint. Med reconciliation completed. · Stop Acyclovir. Continue Vancomycin IV and Cefepime IV with HD x 6-weeks until 4/21/20. · Patient refused lumbar puncture and MRI brain. · Follow-up with ENT at [facility] for possible mastoidectomy. · Discussed with patient, RN, Dr. Malika Grullon. Infection Control Recommendations   · [place] Precautions  · Contact Isolation   · Droplet Isolation    Antimicrobial Stewardship Recommendations   · Simplification of therapy  · Targeted therapy    Coordination ofOutpatient Care:   · Estimated Length of IV antimicrobials:  · Patient will need Midline / picc Catheter Insertion:   · Patient will need SNF:  · Patient will need outpatient wound care:     History of Present Illness:   Initial history:  Jerome Mann is a 78y.o.-year-old male  was brought to the hospital by EMS on 3/9/2020 for altered mental status, reported hypoxia and fall. He had reported ear pain for several days with foul-smelling drainage. CT head without contrast suggestive of left-sided acute otomastoiditis. CT OF THE INTERNAL AUDITORY CANAL WITHOUT CONTRAST 3/10/2020 suggestive of otitis externa and otomastoiditis.   CT abdomen pelvis showed unchanged 6.9 cm hemorrhagic left renal cyst versus mass  Patient has been on Eliquis so spinal tap was not done at the ER  He was hospitalized recently 3-2-20 untill 3-6 -20  hypotension and atrial fibrillation apparently Laterality Date    CHOLECYSTECTOMY      COLONOSCOPY  08/22/2011    2 POLYPS REMOBED TUBULAR ADENOMA    DIALYSIS FISTULA CREATION Left 06/13/2016    LEFT WRIST, no longer functional    DIALYSIS FISTULA CREATION Left     antecubital, was revised one time    HIP SURGERY Right     deep laceration was repaired    INTRACAPSULAR CATARACT EXTRACTION Right 5/7/2019    EYE CATARACT EMULSIFICATION IOL IMPLANT performed by Johnnie Scanlon MD at [address] [place] [address] Left 5/28/2019    EYE CATARACT EMULSIFICATION IOL IMPLANT performed by Johnnie Scanlon MD at [address] [place] [address]. Left     ear.     SKIN GRAFT      right axilla    THYROID SURGERY      non-cancerous lump removed    TOTAL NEPHRECTOMY Right     as a donor    TUNNELED VENOUS CATHETER PLACEMENT Right 09/03/2016    later removed    TURP         Medications:      [START ON 3/15/2020] influenza virus vaccine  0.5 mL Intramuscular Once    dilTIAZem  240 mg Oral Daily    cefTRIAXone (ROCEPHIN) IV  2 g Intravenous Q12H    ALPRAZolam  0.5 mg Oral BID    apixaban  5 mg Oral BID    aspirin  81 mg Oral Daily    escitalopram  10 mg Oral QAM    finasteride  5 mg Oral Daily    fluticasone  1 spray Nasal Daily    fentaNYL  1 patch Transdermal Q72H    budesonide-formoterol  2 puff Inhalation BID    insulin glargine  10 Units Subcutaneous BID    isosorbide mononitrate  60 mg Oral Daily    lactulose  10 g Oral Daily    lidocaine  1 patch Transdermal Daily    cetirizine  5 mg Oral Daily    midodrine  5 mg Oral Once per day on Mon Wed Fri    metoprolol tartrate  25 mg Oral Daily    lidocaine-prilocaine   Topical Once per day on Mon Wed Fri    pantoprazole  40 mg Oral QAM AC    senna  1 tablet Oral BID    sevelamer  2,400 mg Oral TID WC    tamsulosin  0.4 mg Oral Daily    insulin lispro  0-12 Units Subcutaneous TID WC    insulin lispro  0-6 Units Subcutaneous Nightly    sodium chloride flush  10 mL Intravenous 2 congestion, ear pain, rhinorrhea and sore throat. Eyes: Negative for pain, discharge and itching. Respiratory: Negative for cough, choking and shortness of breath. Cardiovascular: Negative for chest pain and palpitations. Gastrointestinal: Negative for abdominal pain, diarrhea, nausea and vomiting. Endocrine: Negative. Genitourinary: Negative for difficulty urinating, dysuria and flank pain. Musculoskeletal: Positive for myalgias. Negative for arthralgias and back pain. Skin: Negative for rash and wound. Allergic/Immunologic: Negative. Neurological: Negative for dizziness, light-headedness and headaches. Hematological: Negative. Psychiatric/Behavioral: Negative for agitation and confusion. Physical Examination :     Patient Vitals for the past 8 hrs:   BP Temp Temp src Pulse Resp SpO2 Weight   03/14/20 1216 (!) 144/50 98.6 °F (37 °C) Axillary 61 16 100 % --   03/14/20 0735 (!) 155/59 98.1 °F (36.7 °C) Oral 55 16 100 % --   03/14/20 0545 -- -- -- -- -- -- 291 lb 10.7 oz (132.3 kg)       Physical Exam  Vitals signs and nursing note reviewed. Constitutional:       Appearance: Normal appearance. He is not ill-appearing. HENT:      Head: Normocephalic and atraumatic. Right Ear: External ear normal.      Left Ear: External ear normal.      Nose: Nose normal.      Mouth/Throat:      Mouth: Mucous membranes are moist.      Pharynx: Oropharynx is clear. Eyes:      General:         Right eye: No discharge. Left eye: No discharge. Extraocular Movements: Extraocular movements intact. Conjunctiva/sclera: Conjunctivae normal.      Pupils: Pupils are equal, round, and reactive to light. Neck:      Musculoskeletal: Normal range of motion and neck supple. No neck rigidity or muscular tenderness. Cardiovascular:      Rate and Rhythm: Normal rate and regular rhythm. Heart sounds: Normal heart sounds. No murmur.    Pulmonary:      Effort: Pulmonary effort is normal.      Breath sounds: Normal breath sounds. No rhonchi. Abdominal:      General: Bowel sounds are normal. There is no distension. Palpations: Abdomen is soft. Tenderness: There is no abdominal tenderness. Genitourinary:     Comments: No ramirez. Musculoskeletal: Normal range of motion. General: No swelling or tenderness. Lymphadenopathy:      Cervical: No cervical adenopathy. Skin:     General: Skin is warm and dry. Capillary Refill: Capillary refill takes less than 2 seconds. Findings: No rash. Neurological:      Mental Status: He is alert and oriented to person, place, and time. Cranial Nerves: No cranial nerve deficit. Psychiatric:         Mood and Affect: Mood normal.         Behavior: Behavior normal.         Thought Content: Thought content normal.         Judgment: Judgment normal.           Medical Decision Making:   I have independently reviewed/ordered the following labs:    CBC with Differential:   Recent Labs     03/12/20  1755 03/13/20 0424 03/14/20  0439   WBC 3.4* 3.4* 3.4*   HGB 8.2* 8.6* 8.7*   HCT 26.4* 26.8* 28.3*    147* 135*   LYMPHOPCT 6*  --   --    MONOPCT 8*  --   --      BMP:  Recent Labs     03/13/20  0424 03/14/20  0439    140   K 4.3 4.3   CL 98 100   CO2 24 26   BUN 41* 30*   CREATININE 7.55* 5.58*   MG  --  2.0     Hepatic Function Panel: No results for input(s): PROT, LABALBU, BILIDIR, IBILI, BILITOT, ALKPHOS, ALT, AST in the last 72 hours. No results for input(s): RPR in the last 72 hours. No results for input(s): HIV in the last 72 hours. No results for input(s): BC in the last 72 hours. Lab Results   Component Value Date    CREATININE 5.58 03/14/2020    GLUCOSE 94 03/14/2020    GLUCOSE 135 06/01/2012       Detailed results: Thank you for allowing us to participate in the care of this patient. Please call with questions.     This note is created with the assistance of a speech recognition program.  While intending to generate adocument that actually reflects the content of the visit, the document can still have some errors including those of syntax and sound a like substitutions which may escape proof reading. It such instances, actual meaningcan be extrapolated by contextual diversion.     KELECHI Moore - CNP  Office: [phone]  Perfect serve / office [phone]

## 2020-03-14 NOTE — PROGRESS NOTES
Vancomycin Day: 5    Patient's labs, cultures, vitals, and vancomycin regimen reviewed. No changes today.    Aj Romero, MS   3/14/2020  1:25 PM

## 2020-03-14 NOTE — PROGRESS NOTES
1 kit into the muscle as needed (hypoglycemia BG <60)   Yes Historical Provider, MD   glucose (GLUTOSE) 40 % GEL Take 15 g by mouth as needed (blood sugar <60)   Yes Historical Provider, MD   loratadine (CLARITIN) 5 MG chewable tablet Take 5 mg by mouth daily    Yes Historical Provider, MD   nitroGLYCERIN (NITROSTAT) 0.4 MG SL tablet Place 0.4 mg under the tongue every 5 minutes as needed for Chest pain up to max of 3 total doses. If no relief after 1 dose, call 911.    Yes Historical Provider, MD   insulin glargine (LANTUS) 100 UNIT/ML injection vial Inject 10 Units into the skin 2 times daily 3/4/19  Yes Carina Redding MD   lidocaine-prilocaine (EMLA) 2.5-2.5 % cream Apply topically three times a week Apply topically to arm/port three times weekly on Monday, Wednesday, and Friday for dialysis   Yes Historical Provider, MD   lactulose (CHRONULAC) 10 GM/15ML solution Take 10 g by mouth daily   Yes Historical Provider, MD   ondansetron (ZOFRAN) 4 MG tablet Take 4 mg by mouth every 6 hours as needed for Nausea or Vomiting   Yes Historical Provider, MD   midodrine (PROAMATINE) 5 MG tablet Take 5 mg by mouth three times a week On Monday, Wednesday, and Friday for dialysis   Yes Historical Provider, MD   ramelteon (ROZEREM) 8 MG tablet Take 8 mg by mouth nightly   Yes Historical Provider, MD   aspirin 81 MG chewable tablet Take 81 mg by mouth daily   Yes Historical Provider, MD   sevelamer (RENVELA) 800 MG tablet Take 3 tablets by mouth 3 times daily (with meals)    Yes Historical Provider, MD   senna (SENOKOT) 8.6 MG tablet Take 1 tablet by mouth 2 times daily   Yes Historical Provider, MD   escitalopram (LEXAPRO) 10 MG tablet Take 10 mg by mouth every morning    Yes Historical Provider, MD   isosorbide mononitrate (IMDUR) 60 MG CR tablet Take 60 mg by mouth daily   Yes Historical Provider, MD   fluticasone (FLONASE) 50 MCG/ACT nasal spray 1 spray by Nasal route daily    Yes Historical Provider, MD   finasteride TempSrc: Oral  Oral Axillary   SpO2: 99%  100% 100%   Weight:  291 lb 10.7 oz (132.3 kg)     Height:         I/O last 3 completed shifts: In: 1072 [P.O.:400; I.V.:672]  Out: 650 [Urine:650]    General: Not in distress. Appears to be stated age. HEENT: Atraumatic, normocephalic. Anicteric sclera. Pink and moist oral mucosa. Neck supple. No JVD. Chest: Bilateral air entry, clear to auscultation, no wheezing, rhonchi or rales. Cardiovascular: S1S2, no murmur, rub or gallop. No lower extremity edema. Abdomen: Soft, non tender to palpation. Active bowel sounds x 4 quadrants. Musculoskeletal: No cyanosis or clubbing. Integumentary: Pink, warm and dry. Free from rash or lesions. Skin turgor normal. Venous stasis change BLE.  CNS: Face symmetrical. No tremor.      Data:    CBC:   Lab Results   Component Value Date    WBC 3.4 (L) 03/14/2020    HGB 8.7 (L) 03/14/2020    HCT 28.3 (L) 03/14/2020    MCV 94.3 03/14/2020     (L) 03/14/2020     BMP:    Lab Results   Component Value Date     03/14/2020     03/13/2020     03/12/2020    K 4.3 03/14/2020    K 4.3 03/13/2020    K 4.4 03/12/2020     03/14/2020    CL 98 03/13/2020    CL 97 (L) 03/12/2020    CO2 26 03/14/2020    CO2 24 03/13/2020    CO2 24 03/12/2020    BUN 30 (H) 03/14/2020    BUN 41 (H) 03/13/2020    BUN 37 (H) 03/12/2020    CREATININE 5.58 (HH) 03/14/2020    CREATININE 7.55 (HH) 03/13/2020    CREATININE 6.33 (HH) 03/12/2020    GLUCOSE 94 03/14/2020    GLUCOSE 124 (H) 03/13/2020    GLUCOSE 127 (H) 03/12/2020     CMP:   Lab Results   Component Value Date     03/14/2020    K 4.3 03/14/2020     03/14/2020    CO2 26 03/14/2020    BUN 30 03/14/2020    CREATININE 5.58 03/14/2020    GLUCOSE 94 03/14/2020    GLUCOSE 135 06/01/2012    CALCIUM 9.0 03/14/2020    PROT 6.1 03/09/2020    LABALBU 3.4 03/09/2020    LABALBU 4.6 04/24/2012    BILITOT 0.63 03/09/2020    ALKPHOS 244 03/09/2020    AST 29 03/09/2020    ALT 39 03/09/2020

## 2020-03-14 NOTE — CARE COORDINATION
Social Work-Patient will return to [address] at 4 PM by [employer]. Orders faxed. Nurse to call report to [phone]. Unable to reach family to notify them of dcCierra Al

## 2020-03-14 NOTE — PLAN OF CARE
from accidental physical injury  Description: Free from accidental physical injury  3/14/2020 0533 by Gloria Hernandez RN  Outcome: Ongoing  3/13/2020 1655 by Luann Woodson RN  Outcome: Ongoing  Goal: Free from intentional harm  Description: Free from intentional harm  3/13/2020 1655 by Luann Woodson RN  Outcome: Ongoing     Problem: Daily Care:  Goal: Daily care needs are met  Description: Daily care needs are met  3/14/2020 0533 by Gloria Hernandez RN  Outcome: Ongoing  3/13/2020 1655 by Luann Woodson RN  Outcome: Ongoing     Problem: Pain:  Goal: Patient's pain/discomfort is manageable  Description: Patient's pain/discomfort is manageable  3/13/2020 1655 by Luann Woodson RN  Outcome: Ongoing  Goal: Pain level will decrease  Description: Pain level will decrease  3/13/2020 1655 by Luann Woodson RN  Outcome: Ongoing  Goal: Control of acute pain  Description: Control of acute pain  3/13/2020 1655 by Luann Woodson RN  Outcome: Ongoing  Goal: Control of chronic pain  Description: Control of chronic pain  3/13/2020 1655 by Luann Woodson RN  Outcome: Ongoing     Problem: Falls - Risk of:  Goal: Will remain free from falls  Description: Will remain free from falls  3/14/2020 0533 by Gloria Hernandez RN  Outcome: Ongoing  Note: Patient remained free of falls throughout the shift, call light within place.    3/13/2020 1655 by Luann Woodson RN  Outcome: Ongoing  Goal: Absence of physical injury  Description: Absence of physical injury  3/13/2020 1655 by Luann Woodson RN  Outcome: Ongoing     Problem: Musculor/Skeletal Functional Status  Goal: Highest potential functional level  3/13/2020 1655 by Luann Woodson RN  Outcome: Ongoing  Goal: Absence of falls  3/13/2020 1655 by Luann Woodson RN  Outcome: Ongoing     Problem: Musculor/Skeletal Functional Status  Goal: Highest potential functional level  3/13/2020 1655 by Luann Woodson RN  Outcome: Ongoing  Goal: Absence of falls  3/13/2020 1655 by Luann Woodson RN  Outcome: Ongoing

## 2020-03-14 NOTE — PROGRESS NOTES
[place] [id number] Internal Medicine    Progress Note     3/14/2020    10:42 AM    Name:   Rich Keen  MRN:     [id number]     Acct:      [de-identified]   Room:   [address] [place] [address] Day:  5  Admit Date:  3/9/2020  9:16 PM    PCP:   Viyr Canas DO  Code Status:  DNR-CC    Subjective:     C/C:   Chief Complaint   Patient presents with    Altered Mental Status     Active Problems:    Acute mastoiditis of left side    Acute metabolic encephalopathy  Resolved Problems:    * No resolved hospital problems. *      Interval History Status: improved.        Mentation has improved but not at baseline not oriented to time place person or date     Significant last 24 hr data reviewed ;   Vitals:    03/13/20 2000 03/14/20 0049 03/14/20 0545 03/14/20 0735   BP: 120/78 (!) 124/52  (!) 155/59   Pulse: 67 66  55   Resp: 16 18  16   Temp: 98.5 °F (36.9 °C) 98 °F (36.7 °C)  98.1 °F (36.7 °C)   TempSrc: Oral Oral  Oral   SpO2: 100% 99%  100%   Weight:   291 lb 10.7 oz (132.3 kg)    Height:          Recent Results (from the past 24 hour(s))   POC Glucose Fingerstick    Collection Time: 03/13/20 12:05 PM   Result Value Ref Range    POC Glucose 84 75 - 110 mg/dL   POC Glucose Fingerstick    Collection Time: 03/13/20  4:41 PM   Result Value Ref Range    POC Glucose 100 75 - 110 mg/dL   POC Glucose Fingerstick    Collection Time: 03/13/20  7:58 PM   Result Value Ref Range    POC Glucose 124 (H) 75 - 110 mg/dL   HEPARIN LEVEL/ANTI-XA    Collection Time: 03/13/20  8:34 PM   Result Value Ref Range    Anti-XA Unfrac Heparin 0.14 (L) 0.30 - 0.70 IU/L   CBC    Collection Time: 03/14/20  4:39 AM   Result Value Ref Range    WBC 3.4 (L) 3.5 - 11.0 k/uL    RBC 3.00 (L) 4.5 - 5.9 m/uL    Hemoglobin 8.7 (L) 13.5 - 17.5 g/dL    Hematocrit 28.3 (L) 41 - 53 %    MCV 94.3 80 - 100 fL    MCH 28.9 26 - 34 pg    MCHC 30.6 (L) 31 - 37 g/dL    RDW 15.8 (H) 11.5 - 14.9 %    Platelets 651 (L) 110 - 450 k/uL    MPV 6.8 6.0 - 12.0 fL mineralization of the otic capsule. The internal auditory canal and vestibular aqueduct appear unremarkable. The carotid canal is normal in appearance. The jugular bulb is unremarkable. LEFT TEMPORAL BONE: Soft tissue thickening of the external auditory canal. No osseous erosion. The scutum is intact. Near complete opacification of the middle ear cavity. The ossicular chain is intact. Complete opacification of the mastoid air cells. The inner ear structures appear unremarkable. Normal mineralization of the otic capsule. The internal auditory canal and vestibular aqueduct appear unremarkable. The carotid canal is normal in appearance. The jugular bulb is unremarkable. BRAIN: The visualized portion of the intracranial contents appear unremarkable. ORBITS: The visualized portion of the orbits demonstrate no acute abnormality. SINUSES: Right maxillary sinus polyp or mucosal retention cyst.     1. Left external auditory canal soft tissue thickening and left tympanomastoid cavity opacification. Findings are compatible with otitis externa and otomastoiditis. 2. Unremarkable right temporal bone CT. Ct Abdomen Pelvis W Iv Contrast Additional Contrast? None    Result Date: 3/10/2020  EXAMINATION: CT OF THE ABDOMEN AND PELVIS WITH CONTRAST 3/10/2020 1:30 am TECHNIQUE: CT of the abdomen and pelvis was performed with the administration of intravenous contrast. Multiplanar reformatted images are provided for review. Dose modulation, iterative reconstruction, and/or weight based adjustment of the mA/kV was utilized to reduce the radiation dose to as low as reasonably achievable. COMPARISON: 03/02/2020 HISTORY: ORDERING SYSTEM PROVIDED HISTORY: scrotal drainage concern for Buzz's TECHNOLOGIST PROVIDED HISTORY: scrotal drainage concern for Buzz's Reason for Exam: scrotal drainage concern for Buzz's Acuity: Acute Type of Exam: Unknown FINDINGS: Lower Chest: Study is motion degraded.   Mild bibasilar pleural scattered bilateral subsegmental atelectasis visualized. .  No focal consolidation or pulmonary edema. No evidence of pleural effusion or pneumothorax. Upper Abdomen: Limited images of the upper abdomen are unremarkable. Soft Tissues/Bones: No acute bone or soft tissue abnormality. 1. Note: Study significantly limited by patient breathing motion related artifact and suboptimal volume of intravenous iodinated contrast within the pulmonary arterial system. 2. No definitive evidence of acute pulmonary embolism. 3. Moderate cardiomegaly. 4. Low lung volumes. 5. Bilateral coronary artery scattered atherosclerotic calcification. 6. Note: Study also limited by marked hypoattenuation associated with patient body habitus. HPI:   See history in H and P      Review of Systems:     Patient denies any fever chills nausea no vomiting no confusion alert oriented to time place person no diarrhea no joint pain positive for fatigue    Medications: Allergies: Allergies   Allergen Reactions    Cymbalta [Duloxetine Hcl]      Intolerance.     Tromethamine Other (See Comments)    Toradol [Ketorolac Tromethamine] Other (See Comments)     Headaches        Current Meds:   Scheduled Meds:    influenza virus vaccine  0.5 mL Intramuscular Once    dilTIAZem  240 mg Oral Daily    cefTRIAXone (ROCEPHIN) IV  2 g Intravenous Q12H    ALPRAZolam  0.5 mg Oral BID    apixaban  5 mg Oral BID    aspirin  81 mg Oral Daily    escitalopram  10 mg Oral QAM    finasteride  5 mg Oral Daily    fluticasone  1 spray Nasal Daily    fentaNYL  1 patch Transdermal Q72H    budesonide-formoterol  2 puff Inhalation BID    insulin glargine  10 Units Subcutaneous BID    isosorbide mononitrate  60 mg Oral Daily    lactulose  10 g Oral Daily    lidocaine  1 patch Transdermal Daily    cetirizine  5 mg Oral Daily    midodrine  5 mg Oral Once per day on Mon Wed Fri    metoprolol tartrate  25 mg Oral Daily    lidocaine-prilocaine  136 137 140   K 4.3 4.4 4.3 4.3    97* 98 100   CO2 24 24 24 26   GLUCOSE 106* 127* 124* 94   BUN 31* 37* 41* 30*   CREATININE 5.82* 6.33* 7.55* 5.58*   MG  --   --   --  2.0   ANIONGAP 15 15 15 14   LABGLOM 10* 9* 7* 10*   GFRAA 12* 11* 9* 12*   CALCIUM 8.9 8.9 8.8 9.0   PHOS 4.4  --  5.8* 5.1*   TROPHS  --  126*  --   --    CKTOTAL  --  20*  --   --    CKMB  --  2.2  --   --    MYOGLOBIN  --  208*  --   --      No results for input(s): PROT, LABALBU, EAG, U1YVHPX, I6VESWC, FT4, TSH, CORTISOL, PROLACTIN, AST, ALT, LDH, GGT, ALKPHOS, LABGGT, BILITOT, BILIDIR, AMMONIA, AMYLASE, LIPASE, LACTATE, CHOL, HDL, LDLCHOLESTEROL, CHOLHDLRATIO, TRIG, VLDL, PHENYTOIN, PHENYF, VALPROATE in the last 72 hours.   Glucose:  Recent Labs     03/12/20  2006 03/13/20  0830 03/13/20  1205 03/13/20  1641 03/13/20  1958 03/14/20  0732   POCGLU 112* 142* 84 100 124* 89         Lab Results   Component Value Date/Time    SPECIAL NOT REPORTED 03/02/2020 07:45 PM     Lab Results   Component Value Date/Time    CULTURE NO SIGNIFICANT GROWTH 03/02/2020 07:45 PM       Lab Results   Component Value Date    PHART 7.368 03/02/2020    PH 7.396 09/07/2012    TDE4CSP 47.1 03/02/2020    PCO2 38.8 09/07/2012    PO2ART 67.5 03/02/2020    NKO1IOX 27.1 03/02/2020    NBEA NOT REPORTED 03/02/2020    PBEA 1.8 03/02/2020    F6AHLQTZ 90.0 03/02/2020    FIO2 NOT REPORTED 03/02/2020       Radiology:        Physical Examination:      Morbid obese  General appearance:  alert, cooperative and no distress  Mental Status: Alert oriented to time place and person  Lungs:  clear to auscultation bilaterally, normal effort  Heart:  regular rate and rhythm, no murmur  Abdomen:  soft, nontender, nondistended, normal bowel sounds, no masses, hepatomegaly, splenomegaly  Extremities:  no edema, redness, tenderness in the calves  Skin:  no gross lesions, rashes, induration    Assessment:        Primary Problem  <principal problem not specified>    Active Hospital Problems    Diagnosis Date Noted    Acute metabolic encephalopathy [R76.43]     Acute mastoiditis of left side [H70.002] 03/09/2020       Plan:        Left ear mastoiditis possible intracranial infection rule out meningitis IV antibiotics ENT consult noted neurology input noted ID consulted  Patient is DNR CC a  Poor candidate for surgery  End-stage kidney disease stage V on hemodialysis  Eliquis is held for possible lumbar puncture  Acute toxic metabolic encephalopathy secondary to infection  Unable to take consult for lumbar puncture patient is not fully cooperative with his altered mental status continue IV antibiotics and ENT input  Iv abx  Chronic respiratory failure with hypoxia and hypercarbia with acute mastoiditis acute toxic metabolic encephalopathy due to above infection  March 14  Patient is alert oriented to time place and person understand the situation refusing get any lumbar puncture  In the risk-benefit ratio after 5 days lumbar puncture would not be as useful as he has been on antibiotics I think patient could be discharged to nursing home with the antibiotics with hemodialysis  If agreed with other specialty given patient is refusing for further work-up    Mauro Thomas MD  3/14/2020  10:42 AM

## 2020-03-14 NOTE — PROGRESS NOTES
291 lb 10.7 oz (132.3 kg)   SpO2 100%   BMI 39.56 kg/m²   Temp (24hrs), Av.1 °F (36.7 °C), Min:97.9 °F (36.6 °C), Max:98.5 °F (36.9 °C)      Neurological examination:    Mental status    Alert and oriented to self, place, year; following all commands; speech is slow.      Cranial nerves    II - visual fields intact to confrontation; pupils reactive  III, IV, VI - extraocular muscles intact; no CARLA; no nystagmus; right eye ptosis   V - normal facial sensation                                                               VII - normal facial symmetry                                                             VIII - intact hearing                                                                             IX, X - symmetrical palate elevation                                               XI - symmetrical shoulder shrug                                                       XII - midline tongue without atrophy or fasciculation      Motor function  Strength: 5/5 RUE, 2/5 RLE, 5/5 LUE, 2/5  LLE   decreased bulk and tone lower extremities. No tremors                       Sensory function  decreased to touch, pin, vibration, distally to proximally in lower extremities   Cerebellar Intact finger-nose-finger testing.       Reflex function 1/4 symmetric throughout . Downgoing plantar response bilaterally.  (-)Santana's sign bilaterally    Gait                   not assessed          Diagnostics:      Laboratory Testing:  CBC:   Recent Labs     20  1755 20  0424 20  0439   WBC 3.4* 3.4* 3.4*   HGB 8.2* 8.6* 8.7*    147* 135*     BMP:    Recent Labs     20  1755 20  0424 20  0439    137 140   K 4.4 4.3 4.3   CL 97* 98 100   CO2 24 24 26   BUN 37* 41* 30*   CREATININE 6.33* 7.55* 5.58*   GLUCOSE 127* 124* 94         Lab Results   Component Value Date    CHOL 108 2019    LDLCHOLESTEROL 57 2019    HDL 37 (L) 2019    TRIG 71 2019    ALT 39 2020 AST 29 03/09/2020    TSH 3.08 03/09/2020    INR 1.0 03/12/2020    LABA1C 4.7 01/12/2019    LABMICR 1,661 01/28/2014    [id number] 403 02/19/2014         Impression:      1. Acute metabolic encephalopathy secondary to acute suppurative mastoiditis; improved. Low suspicion for CNS infection.   2. A. fib on Eliquis    Plan:      Antibiotics as per infectious disease   ENT recommendations   Maintain Eliquis   DNR CC CODE STATUS, confirmed by palliative care   Stable neurologically for discharge at this time      Electronically signed by Ferny Hunt DO on 3/14/2020 at 11:24 AM      Kamaljit Dutta [facility]  Neurology

## 2020-03-14 NOTE — CARE COORDINATION
DISCHARGE PLANNING NOTE:    Prescriptions for IV cefepime and IV vanco with dialysis and lab orders were faxed to [facility] at [phone].      Electronically signed by Arlene Munoz RN on 3/14/2020 at 2:55 PM

## 2020-03-16 NOTE — ADT AUTH CERT
LOC:Acute Adult-Infection: General (3/12/2020) by Que White RN         Review Entered Review Status   3/12/2020 14:37 In Primary       Criteria Review   REVIEW SUMMARY     Patient: Tae Pop  Review Number: [id number]  Review Status: In Primary     Condition Specific: Yes     Condition Level Of Care Code: ACUTE  Condition Level Of Care Description: Acute        OUTCOMES  Outcome Type: Primary           REVIEW DETAILS     Service Date: 03/09/2020  Admit Date: 03/09/2020  Product: Anabel White Adult  Subset: Infection: General      (Symptom or finding within 24h)         (Excludes PO medications unless noted)          [X] Select Day, One:              [X] Episode Day 4-6, One:                  [X] ACUTE, One:                      [X] Partial responder, not clinically stable for discharge and requires continued stay, >= One:                          [X] Mastoiditis and anti-infective <= 5d since initiation                          ~--Admin, IQ Admin Admin on 03- 02:37 PM--~                          Left ear mastoiditis possible intracranial infection rule out meningitis IV antibiotics ENT consult noted neurology input noted ID consulted                                                                                   Version: [email or web address] 2019.1  Rayna Hale  © 2019 [employer] and/or one of its [place]. All Rights Reserved. CPT only © 2018 [employer]. All Rights Reserved.    Additional Notes   3/12/2020 Update      Vitals: BP: 145/47, T: 98.7, P: 64, R: 18, SPO2: 96% 2L NC      Medications: Zovirax IV, Rocephin IV q 12h, Zyrtec, Cardizem CD, Xanax BID, Symbicort BID, Lexapro, Proscar, Flonase, Lantus 10units BID, Humalog SS, Imdur, Lactulose, Lopressor, Protonix, Senokot BID, Renvela 3x daily, Flomax, Heparin IV cont   Held: Eliquis BID, ASA    Abnormal labs: BUN 31, Creatine 5.82, Glucose 106, Hgb 8.3, Hct 26.9, Anti-XA Unfractionated Heparin

## 2020-03-17 NOTE — DISCHARGE SUMMARY
mA/kV was utilized to reduce the radiation dose to as low as reasonably achievable. COMPARISON: March 9 HISTORY: ORDERING SYSTEM PROVIDED HISTORY: ams, new right facial droop TECHNOLOGIST PROVIDED HISTORY: ams, new right facial droop Reason for Exam: patient has facial drooping and last known well a half hour ago FINDINGS: BRAIN/VENTRICLES: Ventricles and sulci are stable. There is a small amount of low density again noted in the white matter bilaterally. No new areas of abnormal density are noted in the parenchyma. Pontine detail is limited due to artifact. Numerous vascular calcifications are noted. Tortuosity of the basilar artery is again noted. No new area of vascular hyperdensity is noted. There is no hemorrhage or extra-axial collection ORBITS: No acute orbital abnormality is noted. No deviation of the pupils is noted SINUSES: Severe left mastoid opacification is again noted. Opacification is noted surrounding the ossicles. There is asymmetric opacification in the left external auditory canal is well. Right maxillary sinus retention cyst or polyp is noted. SOFT TISSUES/SKULL:  No acute abnormality of the visualized skull or soft tissues. Stable exam Mild small vessel ischemic changes No findings diagnostic of an acute infarct at this time. Ct Head Wo Contrast    Result Date: 3/9/2020  EXAMINATION: CT OF THE HEAD WITHOUT CONTRAST  3/9/2020 10:04 pm TECHNIQUE: CT of the head was performed without the administration of intravenous contrast. Dose modulation, iterative reconstruction, and/or weight based adjustment of the mA/kV was utilized to reduce the radiation dose to as low as reasonably achievable.  COMPARISON: CT head scan without contrast March 2, 2020 at [facility] hours HISTORY: ORDERING SYSTEM PROVIDED HISTORY: fall on eliquis TECHNOLOGIST PROVIDED HISTORY: fall on eliquis Reason for Exam: fall on eliquis; ams Acuity: Unknown Type of Exam: Unknown FINDINGS: BRAIN/VENTRICLES: There is no acute intracranial hemorrhage, mass effect or midline shift. No abnormal extra-axial fluid collection. The gray-white differentiation is maintained without evidence of an acute infarct. There is no evidence of hydrocephalus. Basilar artery dolichoectasia is present. Mild ill-defined hypoattenuation is present within cerebral white matter consistent with microvascular ischemic change. Mild diffuse decrease in cerebral volume is noted with corresponding prominence of the sulci and ventricles. ORBITS: The visualized portion of the orbits demonstrate no acute abnormality. SINUSES: Large retention cyst is noted within the right maxillary sinus. Fluid opacification of left-sided mastoid air cells and middle ear cavity is present. SOFT TISSUES/SKULL:  No acute abnormality of the visualized skull or soft tissues. 1. No evidence of acute intracranial trauma. 2. Mild cerebral white matter chronic microvascular ischemic disease. 3. Mild diffuse cerebral atrophy. 4. Left-sided acute otomastoiditis. 5. Right maxillary sinus large retention cyst. 6. Basilar artery dolichoectasia. Ct Head Wo Contrast    Result Date: 3/2/2020  EXAMINATION: CT OF THE HEAD WITHOUT CONTRAST  3/2/2020 6:35 pm TECHNIQUE: CT of the head was performed without the administration of intravenous contrast. Dose modulation, iterative reconstruction, and/or weight based adjustment of the mA/kV was utilized to reduce the radiation dose to as low as reasonably achievable. COMPARISON: February 27, 2020 HISTORY: ORDERING SYSTEM PROVIDED HISTORY: [facility] TECHNOLOGIST PROVIDED HISTORY: [facility] Reason for Exam: pt became altered mental status at Dialysis today FINDINGS: BRAIN/VENTRICLES: There is no acute intracranial hemorrhage, mass effect or midline shift. No abnormal extra-axial fluid collection. The gray-white differentiation is maintained without evidence of an acute infarct. There is no evidence of hydrocephalus.  ORBITS: The visualized portion of the provided for review. Dose modulation, iterative reconstruction, and/or weight based adjustment of the mA/kV was utilized to reduce the radiation dose to as low as reasonably achievable. COMPARISON: Temporal bone CT 09/19/2014 HISTORY: ORDERING SYSTEM PROVIDED HISTORY: Mastoiditis TECHNOLOGIST PROVIDED HISTORY: Mastoiditis Reason for Exam: Mastoiditis; Patient with mastoiditis history of ear cancer and diabetic possible malignant otitis externa Acuity: Acute Type of Exam: Unknown FINDINGS: RIGHT TEMPORAL BONE:  The external auditory canal is clear without osseous erosion. The scutum is intact. The middle ear cavity is clear. The ossicular chain is intact. The mastoid air cells are clear. The inner ear structures appear unremarkable. Normal mineralization of the otic capsule. The internal auditory canal and vestibular aqueduct appear unremarkable. The carotid canal is normal in appearance. The jugular bulb is unremarkable. LEFT TEMPORAL BONE: Soft tissue thickening of the external auditory canal. No osseous erosion. The scutum is intact. Near complete opacification of the middle ear cavity. The ossicular chain is intact. Complete opacification of the mastoid air cells. The inner ear structures appear unremarkable. Normal mineralization of the otic capsule. The internal auditory canal and vestibular aqueduct appear unremarkable. The carotid canal is normal in appearance. The jugular bulb is unremarkable. BRAIN: The visualized portion of the intracranial contents appear unremarkable. ORBITS: The visualized portion of the orbits demonstrate no acute abnormality. SINUSES: Right maxillary sinus polyp or mucosal retention cyst.     1. Left external auditory canal soft tissue thickening and left tympanomastoid cavity opacification. Findings are compatible with otitis externa and otomastoiditis. 2. Unremarkable right temporal bone CT.      Ct Chest Wo Contrast    Result Date: 3/2/2020  EXAMINATION: CT OF THE BONES/ALIGNMENT: There is no acute fracture or traumatic malalignment. DEGENERATIVE CHANGES: Severe disc degenerative change at C5-6 and C7-T1. SOFT TISSUES: There is no prevertebral soft tissue swelling. No acute abnormality of the cervical spine. Us Renal Complete    Result Date: 3/3/2020  EXAMINATION: RETROPERITONEAL ULTRASOUND OF THE KIDNEYS 3/3/2020 COMPARISON: None HISTORY: ORDERING SYSTEM PROVIDED HISTORY: renal mass TECHNOLOGIST PROVIDED HISTORY: renal mass FINDINGS: Kidneys: Status post right nephrectomy. Unremarkable appearing right renal fossa. 6.8 cm cyst laterally left kidney. No solid masses. No hydronephrosis. .     1. Status post right nephrectomy 2. 6.8 cm cyst laterally left kidney 3. Left kidney otherwise appears unremarkable     Ct Abdomen Pelvis W Iv Contrast Additional Contrast? None    Result Date: 3/10/2020  EXAMINATION: CT OF THE ABDOMEN AND PELVIS WITH CONTRAST 3/10/2020 1:30 am TECHNIQUE: CT of the abdomen and pelvis was performed with the administration of intravenous contrast. Multiplanar reformatted images are provided for review. Dose modulation, iterative reconstruction, and/or weight based adjustment of the mA/kV was utilized to reduce the radiation dose to as low as reasonably achievable. COMPARISON: 03/02/2020 HISTORY: ORDERING SYSTEM PROVIDED HISTORY: scrotal drainage concern for Buzz's TECHNOLOGIST PROVIDED HISTORY: scrotal drainage concern for Buzz's Reason for Exam: scrotal drainage concern for Buzz's Acuity: Acute Type of Exam: Unknown FINDINGS: Lower Chest: Study is motion degraded. Mild bibasilar pleural and parenchymal disease has not changed significantly. There are several bilateral acute, subacute and old rib fractures. Organs: Study was degraded by artifact from the arm down position. The liver, spleen, pancreas, and adrenal glands are normal.  The liver is borderline enlarged and unchanged. Absent right kidney.   Hemorrhagic cyst versus solid PROVIDED HISTORY: Acute respiratory failure TECHNOLOGIST PROVIDED HISTORY: Acute respiratory failure Reason for Exam: Acute respiratory failure Acuity: Acute Type of Exam: Subsequent/Follow-up Additional signs and symptoms: Acute respiratory failure FINDINGS: The cardiac and mediastinal contours appear unchanged. Right internal jugular line terminates at the distal SVC. Basilar linear opacities are noted. No new airspace disease identified in the interval.  No evidence for pneumothorax. Surgical clips project over the left neck. Basilar linear opacities favoring subsegmental atelectasis. Xr Chest Portable    Result Date: 3/3/2020  EXAMINATION: ONE XRAY VIEW OF THE CHEST 3/3/2020 6:01 am COMPARISON: Chest radiograph performed 03/02/2020. HISTORY: ORDERING SYSTEM PROVIDED HISTORY: follow up rib fractures on right TECHNOLOGIST PROVIDED HISTORY: follow up rib fractures on right Reason for Exam: follow up right side rib fractures. Acuity: Acute Type of Exam: Subsequent/Follow-up FINDINGS: The lungs are without consolidation or effusion. There is no pneumothorax. The heart is enlarged. The upper abdomen is unremarkable. The extrathoracic soft tissues are unremarkable. Cardiomegaly without acute pulmonary process. Xr Chest Portable    Result Date: 3/2/2020  EXAMINATION: ONE XRAY VIEW OF THE CHEST 3/2/2020 1:34 pm COMPARISON: 01/02/2020 HISTORY: ORDERING SYSTEM PROVIDED HISTORY: chest pain, r/o PTX TECHNOLOGIST PROVIDED HISTORY: chest pain, r/o PTX Reason for Exam: Chest pain. R/o PTX Acuity: Acute Type of Exam: Initial Additional signs and symptoms: Chest pain. R/o PTX FINDINGS: Examination is limited by patient rotation and the patient's body habitus. There is elevation of the right hemidiaphragm. Mild bibasilar airspace disease is noted. Cardial pericardial silhouette is enlarged but stable. No pneumothorax is found. No free air. No acute bony abnormality.      Mild bibasilar airspace disease, atelectasis versus pneumonia versus edema. No pneumothorax is identified. Ct Chest Pulmonary Embolism W Contrast    Result Date: 3/9/2020  EXAMINATION: CTA OF THE CHEST 3/9/2020 10:08 pm TECHNIQUE: CTA of the chest was performed after the administration of intravenous contrast.  Multiplanar reformatted images are provided for review. MIP images are provided for review. Dose modulation, iterative reconstruction, and/or weight based adjustment of the mA/kV was utilized to reduce the radiation dose to as low as reasonably achievable. COMPARISON: None. HISTORY: ORDERING SYSTEM PROVIDED HISTORY: concern for PE TECHNOLOGIST PROVIDED HISTORY: concern for PE Reason for Exam: sob; r/o PE, pt unable to follow breathing instructions and did not stop moving or yelling during this scan. best imaging sent through. Acuity: Acute Type of Exam: Unknown FINDINGS: Pulmonary Arteries: Study significantly limited by patient breathing motion related artifact. Study significantly limited by suboptimal volume of intravenous contrast within the pulmonary arterial system. Pulmonary arteries are adequately opacified for evaluation. No evidence of intraluminal filling defect to suggest pulmonary embolism. Main pulmonary artery is normal in caliber. Mediastinum: No evidence of mediastinal lymphadenopathy. The heart is moderately enlarged with otherwise unremarkable configuration. No evidence of pericardial effusion is seen. Bilateral coronary artery scattered atherosclerotic calcification is evident. .  There is no acute abnormality of the thoracic aorta. Lungs/pleura: Lung volumes are low with scattered bilateral subsegmental atelectasis visualized. .  No focal consolidation or pulmonary edema. No evidence of pleural effusion or pneumothorax. Upper Abdomen: Limited images of the upper abdomen are unremarkable. Soft Tissues/Bones: No acute bone or soft tissue abnormality.      1. Note: Study significantly limited by patient breathing !Yes       !Yes            ! None      ! +------------------------------------+----------+---------------+----------+ ! Dist Femoral                        !Yes       ! Yes            ! None      ! +------------------------------------+----------+---------------+----------+ ! Deep Femoral                        !Yes       ! Yes            ! None      ! +------------------------------------+----------+---------------+----------+ ! Popliteal                           !Yes       ! Yes            ! None      ! +------------------------------------+----------+---------------+----------+ ! Sapheno Femoral Junction            ! Yes       ! Yes            ! None      ! +------------------------------------+----------+---------------+----------+ ! PTV                                 ! Yes       ! Yes            ! None      ! +------------------------------------+----------+---------------+----------+ ! Peroneal                            !Yes       ! Yes            ! None      ! +------------------------------------+----------+---------------+----------+ ! Gastroc                             ! Yes       ! Yes            ! None      ! +------------------------------------+----------+---------------+----------+ ! GSV Thigh                           ! Yes       ! Yes            ! None      ! +------------------------------------+----------+---------------+----------+ ! GSV Knee                            ! Yes       ! Yes            ! None      ! +------------------------------------+----------+---------------+----------+ ! GSV Ankle                           ! Yes       ! Yes            ! None      ! +------------------------------------+----------+---------------+----------+ ! SSV                                 ! Yes       ! Yes            ! None      ! +------------------------------------+----------+---------------+----------+    Us Extremity Right Non Vasc Limited    Result Date: 3/6/2020  EXAMINATION: NONVASCULAR ULTRASOUND OF THE RIGHT EXTREMITY 3/6/2020 9:35 am COMPARISON: None. HISTORY: ORDERING SYSTEM PROVIDED HISTORY: Evaluate for abscess on right thigh buttocks TECHNOLOGIST PROVIDED HISTORY: Evaluate for abscess on right thigh buttocks FINDINGS: Direct scanning in the right posterior upper thigh was performed. There is regional superficial soft tissue edema. A heterogeneous hypoechoic area in the superficial soft tissues measuring approximately 6.9 x 26.5 x 22.5 mm may represent an area of inflammation/phlegmon/cellulitis. It does not appear typical for fluid collection/abscess. Mild hyperemia in the region. Small heterogeneous hypoechoic area in the superficial soft tissues, not typical for abscess; this may represent an area of inflammation/phlegmon. Consultations:    Consults:     Final Specialist Recommendations/Findings:   IP CONSULT TO OTOLARYNGOLOGY  IP CONSULT TO INTERNAL MEDICINE  IP CONSULT TO NEUROLOGY  IP CONSULT TO NEPHROLOGY  PHARMACY TO DOSE MEDICATION  PHARMACY TO DOSE VANCOMYCIN  IP CONSULT TO SOCIAL WORK  IP CONSULT TO INFECTIOUS DISEASES  PHARMACY TO DOSE VANCOMYCIN  IP CONSULT TO PALLIATIVE CARE      The patient was seen and examined on day of discharge and this discharge summary is in conjunction with any daily progress note from day of discharge. Discharge plan:     Disposition: ecf    Physician Follow Up:     DO Aron Rivero [address]   [facility] [id number]  [address] [address] [address]  [facility] [id number] [phone]    Schedule an appointment as soon as possible for a visit in 1 month         Requiring Further Evaluation/Follow Up POST HOSPITALIZATION/Incidental Findings:    Diet: cardiac diet    Activity: As tolerated    Instructions to Patient:     Discharge Medications:      Medication List      START taking these medications    cefepime  infusion  Commonly known as:  MAXIPIME  Infuse 2,000 mg intravenously three times a week Compound per protocol     vancomycin infusion  Commonly known as:  VANCOCIN  Infuse 1,000 mg intravenously every 48 hours Compound per protocol. CONTINUE taking these medications    apixaban 5 MG Tabs tablet  Commonly known as:  ELIQUIS  Take 1 tablet by mouth 2 times daily     aspirin 81 MG chewable tablet     Breo Ellipta 100-25 MCG/INH Aepb inhaler  Generic drug:  fluticasone-vilanterol     dilTIAZem 240 MG extended release capsule  Commonly known as:  CARDIZEM CD  Take 1 capsule by mouth daily     escitalopram 10 MG tablet  Commonly known as:  LEXAPRO     fentaNYL 25 MCG/HR  Commonly known as:  DURAGESIC  Place 1 patch onto the skin every 72 hours for 6 days.      finasteride 5 MG tablet  Commonly known as:  PROSCAR     fluticasone 50 MCG/ACT nasal spray  Commonly known as:  FLONASE     glucagon 1 MG injection     glucose 40 % Gel  Commonly known as:  GLUTOSE     insulin glargine 100 UNIT/ML injection vial  Commonly known as:  LANTUS  Inject 10 Units into the skin 2 times daily     isosorbide mononitrate 60 MG extended release tablet  Commonly known as:  IMDUR     lactulose 10 GM/15ML solution  Commonly known as:  CHRONULAC     lidocaine 4 % external patch  Place 1 patch onto the skin daily     lidocaine-prilocaine 2.5-2.5 % cream  Commonly known as:  EMLA     loratadine 5 MG chewable tablet  Commonly known as:  CLARITIN     metoprolol tartrate 25 MG tablet  Commonly known as:  LOPRESSOR     midodrine 5 MG tablet  Commonly known as:  PROAMATINE     nitroGLYCERIN 0.4 MG SL tablet  Commonly known as:  NITROSTAT     omeprazole 20 MG delayed release capsule  Commonly known as:  PRILOSEC     ondansetron 4 MG tablet  Commonly known as:  ZOFRAN     Rozerem 8 MG tablet  Generic drug:  ramelteon     senna 8.6 MG tablet  Commonly known as:  SENOKOT     sevelamer 800 MG tablet  Commonly known as:  RENVELA     tamsulosin 0.4 MG capsule  Commonly known as:  FLOMAX        STOP taking these medications    ALPRAZolam 0.5 MG tablet  Commonly known as: XANAX     Dextromethorphan-guaiFENesin  MG/5ML Syrp     diphenhydrAMINE 25 MG tablet  Commonly known as:  BENADRYL     doxycycline monohydrate 100 MG capsule  Commonly known as:  MONODOX     oxyCODONE HCl 10 MG immediate release tablet  Commonly known as:  OXY-IR           Where to Get Your Medications      You can get these medications from any pharmacy    Bring a paper prescription for each of these medications  · cefepime  infusion  · fentaNYL 25 MCG/HR  · vancomycin  infusion         Time Spent on discharge is  35 mins in patient examination, evaluation, counseling as well as medication reconciliation, prescriptions for required medications, discharge plan and follow up. Electronically signed by   David Parker MD  3/17/2020  11:45 AM      Thank you Dr. Chetna Kapadia DO for the opportunity to be involved in this patient's care.

## 2020-04-07 NOTE — PROGRESS NOTES
Infectious disease Consult Note      Patient: Nilay Castrejon  : 1950  Acct#:  [de-identified]     Date:  2020    Subjective:       History of Present Illness  Patient is a 78 y.o.  male    Chief Complaint   Patient presents with    Frequent Infections     [facility]    The patient was hospitalized at [facility] 3/9/2020 for mental status changes, hypoxia and fall. He also had ear pain and drainage  CT head without contrast suggestive of left-sided acute otomastoiditis. CT OF THE INTERNAL AUDITORY CANAL WITHOUT CONTRAST 3/10/2020 suggestive of otitis externa and otomastoiditis. The patient refused a spinal tap and MRI of the brain at that time. He was seen by ENT during his hospitalization, no surgical intervention was needed. He was discharged on IV vancomycin and cefepime with hemodialysis to be finished on 2020  Interval history 2020  The patient is feeling well today, denied any headache, no ear pain or drainage, denied fever or chills, no cough or shortness of breath no other complaints. Past Medical History:   Diagnosis Date    Acute combined systolic and diastolic congestive heart failure ([address] 2016    Anemia     BPH (benign prostatic hyperplasia)     Cancer (HCC)     left ear 2013    CKD (chronic kidney disease) stage 3, GFR 30-59 ml/min (HCC)     Constipation     COPD (chronic obstructive pulmonary disease) (HCC)     Depression     GERD (gastroesophageal reflux disease)     Hemodialysis patient ([address]     3 times a week    HTN (hypertension)     Hx of blood clots     \" rt.shoulder/neck\"    Hyperparathyroidism ([address]     Hypothyroidism     IDDM (insulin dependent diabetes mellitus) ([address]     Insomnia     Liver disease     patient is unsure what it is    MDRO (multiple drug resistant organisms) resistance     hx. c-dif.     Memory deficit     Neurofibromatosis ([address]     Osteoarthritis     Paraplegia ([address]     chair to bed and organomegaly  Extremities: no cyanosis, clubbing , bilateral venous stasis dermatitis  Musculoskeletal: normal range of motion, no joint swelling, deformity or tenderness  Right arm fistula in place    Data Review:    WBC   Date Value Ref Range Status   03/14/2020 3.4 (L) 3.5 - 11.0 k/uL Final   03/13/2020 3.4 (L) 3.5 - 11.0 k/uL Final   03/12/2020 3.4 (L) 3.5 - 11.0 k/uL Final     Hemoglobin   Date Value Ref Range Status   03/14/2020 8.7 (L) 13.5 - 17.5 g/dL Final   03/13/2020 8.6 (L) 13.5 - 17.5 g/dL Final   03/12/2020 8.2 (L) 13.5 - 17.5 g/dL Final     Hematocrit   Date Value Ref Range Status   03/14/2020 28.3 (L) 41 - 53 % Final   03/13/2020 26.8 (L) 41 - 53 % Final   03/12/2020 26.4 (L) 41 - 53 % Final     MCV   Date Value Ref Range Status   03/14/2020 94.3 80 - 100 fL Final   03/13/2020 93.2 80 - 100 fL Final   03/12/2020 93.6 80 - 100 fL Final     Platelet Count   Date Value Ref Range Status   04/24/2012 148 130 - 400 k/uL Final   04/20/2012 167 130 - 400 k/uL Final   09/14/2011 143 140 - 450 k/uL Final     Platelets   Date Value Ref Range Status   03/14/2020 135 (L) 150 - 450 k/uL Final   03/13/2020 147 (L) 150 - 450 k/uL Final   03/12/2020 150 150 - 450 k/uL Final     Sodium   Date Value Ref Range Status   03/14/2020 140 135 - 144 mmol/L Final   03/13/2020 137 135 - 144 mmol/L Final   03/12/2020 136 135 - 144 mmol/L Final     Potassium   Date Value Ref Range Status   03/14/2020 4.3 3.7 - 5.3 mmol/L Final   03/13/2020 4.3 3.7 - 5.3 mmol/L Final   03/12/2020 4.4 3.7 - 5.3 mmol/L Final     Chloride   Date Value Ref Range Status   03/14/2020 100 98 - 107 mmol/L Final   03/13/2020 98 98 - 107 mmol/L Final   03/12/2020 97 (L) 98 - 107 mmol/L Final     CO2   Date Value Ref Range Status   03/14/2020 26 20 - 31 mmol/L Final   03/13/2020 24 20 - 31 mmol/L Final   03/12/2020 24 20 - 31 mmol/L Final     Phosphorus   Date Value Ref Range Status   03/14/2020 5.1 (H) 2.5 - 4.5 mg/dL Final   03/13/2020 5.8 (H) 2.5 - 4.5 mg/dL Final   03/12/2020 4.4 2.5 - 4.5 mg/dL Final     BUN   Date Value Ref Range Status   03/14/2020 30 (H) 8 - 23 mg/dL Final   03/13/2020 41 (H) 8 - 23 mg/dL Final   03/12/2020 37 (H) 8 - 23 mg/dL Final     CREATININE   Date Value Ref Range Status   03/14/2020 5.58 (HH) 0.70 - 1.20 mg/dL Final   03/13/2020 7.55 (HH) 0.70 - 1.20 mg/dL Final   03/12/2020 6.33 (HH) 0.70 - 1.20 mg/dL Final     AST   Date Value Ref Range Status   03/09/2020 29 <40 U/L Final   01/02/2020 18 <40 U/L Final   01/18/2019 21 <40 U/L Final     ALT   Date Value Ref Range Status   03/09/2020 39 5 - 41 U/L Final   01/02/2020 22 5 - 41 U/L Final   01/18/2019 29 5 - 41 U/L Final     Bilirubin, Direct   Date Value Ref Range Status   08/19/2018 0.24 <0.31 mg/dL Final   11/03/2013 0.14 0.0 - 0.3 mg/dL Final   09/06/2012 0.10 0.0 - 0.3 mg/dL Final     Total Bilirubin   Date Value Ref Range Status   03/09/2020 0.63 0.3 - 1.2 mg/dL Final   01/02/2020 0.89 0.3 - 1.2 mg/dL Final   01/18/2019 0.80 0.3 - 1.2 mg/dL Final     Alkaline Phosphatase   Date Value Ref Range Status   03/09/2020 244 (H) 40 - 129 U/L Final   01/02/2020 179 (H) 40 - 129 U/L Final   01/18/2019 181 (H) 40 - 129 U/L Final     Lipase   Date Value Ref Range Status   08/19/2018 15 13 - 60 U/L Final   09/01/2016 23 13 - 60 U/L Final     Comment:     Performed at [address]   [phone]     11/03/2013 26 5.6 - 51.3 U/L Final     Comment:     Performed at [facility] [address] [place] [address]. [place], [address] [facility]   [phone]     Protime   Date Value Ref Range Status   03/12/2020 13.2 11.8 - 14.6 sec Final   03/10/2020 12.9 11.8 - 14.6 sec Final   03/09/2020 13.1 11.8 - 14.6 sec Final     INR   Date Value Ref Range Status   03/12/2020 1.0  Final     Comment:           Non-therapeutic Range:     INR = 0.9-1.2  Therapeutic Range:    Moderate Anticoagulant Intensity:     INR = 2.0-3.0   High Anticoagulant Intensity:     INR = 2.5-3.5 of the orbits demonstrate no acute abnormality. SINUSES: Right maxillary sinus polyp or mucosal retention cyst.     1. Left external auditory canal soft tissue thickening and left tympanomastoid cavity opacification. Findings are compatible with otitis externa and otomastoiditis. 2. Unremarkable right temporal bone CT. Ct Abdomen Pelvis W Iv Contrast Additional Contrast? None    Result Date: 3/10/2020  EXAMINATION: CT OF THE ABDOMEN AND PELVIS WITH CONTRAST 3/10/2020 1:30 am TECHNIQUE: CT of the abdomen and pelvis was performed with the administration of intravenous contrast. Multiplanar reformatted images are provided for review. Dose modulation, iterative reconstruction, and/or weight based adjustment of the mA/kV was utilized to reduce the radiation dose to as low as reasonably achievable. COMPARISON: 03/02/2020 HISTORY: ORDERING SYSTEM PROVIDED HISTORY: scrotal drainage concern for Buzz's TECHNOLOGIST PROVIDED HISTORY: scrotal drainage concern for Buzz's Reason for Exam: scrotal drainage concern for Buzz's Acuity: Acute Type of Exam: Unknown FINDINGS: Lower Chest: Study is motion degraded. Mild bibasilar pleural and parenchymal disease has not changed significantly. There are several bilateral acute, subacute and old rib fractures. Organs: Study was degraded by artifact from the arm down position. The liver, spleen, pancreas, and adrenal glands are normal.  The liver is borderline enlarged and unchanged. Absent right kidney. Hemorrhagic cyst versus solid mass in the lower pole of the left kidney measuring up to 6.9 cm, previously 6.8 cm. There is a lobulated partially calcified nodule medial to this mass and there is a similar appearing hemorrhagic cyst versus solid mass projecting left laterally from this mass. Niurka Mantis GI/Bowel: There is a mild rectal fecal impaction. Mild stool load throughout the rest of the colon. Small bowel loops are normal in caliber.  Pelvis: Urinary bladder is grossly normal. Peritoneum/Retroperitoneum: There is no adenopathy, free air or free fluid. Calcific aorto iliac atherosclerotic disease with no evidence of an abdominal aortic aneurysm. Bones/Soft Tissues: There is scrotal edema with wall thickening. There is a left hydrocele. There are several small bubbles of peripherally located air apparently trapped between the thickened scrotal wall and adjacent skin surface of the medial thighs. There is no soft tissue gas. There are no findings to suggest necrotizing fasciitis. No acute bone finding. Scrotal edema with wall thickening. No evidence of soft tissue gas to suggest a necrotizing fasciitis/Buzz gangrene. Several bilateral acute, subacute and old rib fractures. Unchanged mild bibasilar pleural and parenchymal lung disease. Unchanged 6.9 cm hemorrhagic left renal cyst versus solid mass in the lower pole. There are smaller adjacent complicated cysts as above, not significantly changed. Xr Chest Portable    Result Date: 3/9/2020  EXAMINATION: ONE XRAY VIEW OF THE CHEST 3/9/2020 10:39 pm COMPARISON: Prior studies including 03/04/2020 HISTORY: ORDERING SYSTEM PROVIDED HISTORY: AMS TECHNOLOGIST PROVIDED HISTORY: AMS Reason for Exam: AMS Acuity: Acute Type of Exam: Initial FINDINGS: Portable study is limited by body habitus. Unchanged elevation of the right hemidiaphragm. Stable cardiomegaly. There are a few bands of stable bibasilar chronic atelectasis. No acute infiltrate, pneumothorax or pleural effusion. No acute bone finding. Stable portable chest x-ray. No acute disease. Ct Chest Pulmonary Embolism W Contrast    Result Date: 3/9/2020  EXAMINATION: CTA OF THE CHEST 3/9/2020 10:08 pm TECHNIQUE: CTA of the chest was performed after the administration of intravenous contrast.  Multiplanar reformatted images are provided for review. MIP images are provided for review.  Dose modulation, iterative reconstruction, and/or weight based adjustment of the mA/kV was utilized to reduce the radiation dose to as low as reasonably achievable. COMPARISON: None. HISTORY: ORDERING SYSTEM PROVIDED HISTORY: concern for PE TECHNOLOGIST PROVIDED HISTORY: concern for PE Reason for Exam: sob; r/o PE, pt unable to follow breathing instructions and did not stop moving or yelling during this scan. best imaging sent through. Acuity: Acute Type of Exam: Unknown FINDINGS: Pulmonary Arteries: Study significantly limited by patient breathing motion related artifact. Study significantly limited by suboptimal volume of intravenous contrast within the pulmonary arterial system. Pulmonary arteries are adequately opacified for evaluation. No evidence of intraluminal filling defect to suggest pulmonary embolism. Main pulmonary artery is normal in caliber. Mediastinum: No evidence of mediastinal lymphadenopathy. The heart is moderately enlarged with otherwise unremarkable configuration. No evidence of pericardial effusion is seen. Bilateral coronary artery scattered atherosclerotic calcification is evident. .  There is no acute abnormality of the thoracic aorta. Lungs/pleura: Lung volumes are low with scattered bilateral subsegmental atelectasis visualized. .  No focal consolidation or pulmonary edema. No evidence of pleural effusion or pneumothorax. Upper Abdomen: Limited images of the upper abdomen are unremarkable. Soft Tissues/Bones: No acute bone or soft tissue abnormality. 1. Note: Study significantly limited by patient breathing motion related artifact and suboptimal volume of intravenous iodinated contrast within the pulmonary arterial system. 2. No definitive evidence of acute pulmonary embolism. 3. Moderate cardiomegaly. 4. Low lung volumes. 5. Bilateral coronary artery scattered atherosclerotic calcification. 6. Note: Study also limited by marked hypoattenuation associated with patient body habitus.                    Assessment:   Left mastoiditis  ·  H/O left ear cancer

## 2020-05-18 PROBLEM — J12.82 PNEUMONIA DUE TO COVID-19 VIRUS: Status: ACTIVE | Noted: 2020-01-01

## 2020-05-18 PROBLEM — J18.9 ATYPICAL PNEUMONIA: Status: ACTIVE | Noted: 2020-01-01

## 2020-05-18 PROBLEM — U07.1 PNEUMONIA DUE TO COVID-19 VIRUS: Status: ACTIVE | Noted: 2020-01-01

## 2020-05-18 NOTE — CARE COORDINATION
Case Management Initial Discharge Plan  Ruth Sandoval,             Spoke with Estevan Gandhi to discuss discharge plans and also Dimas Boas Pt confused and didn't answer   Information verified: address, contacts, phone number, , insurance No  Emergency Contact/Next of Kin name & number: Dimas Boas [phone] and Laurie Crenshaw [phone]  PCP: Mitali Shook,   Date of last visit:     Insurance Provider: Andrew Hudson Dual    Discharge Planning    Living Arrangements:  Alone(ECF)   Support Systems:  ECF/Assisted Living    Home has 1 story      Patient able to perform ADL's:Dependent    Current Services (outpatient & in home) ECF  DME equipment: Been in a W/C for 25-30 years. Mitch Kothari, listed as other,  didn't know why. She lived with him for 16 yrs and had 2 childrenn with him. DME provider:       Potential Assistance Needed:  N/A    Patient agreeable to home care: No  [place] of choice provided:  yes    Prior SNF/Rehab Placement and Facility: Current with [facility] to SNF/Rehab: Yes  [place] of choice provided: yes   Evaluation: no    Expected Discharge date:  20  Patient expects to be discharged to:  Back to [facility] at [place]  Follow Up Appointment: Best Day/ Time:      Transportation provider: ambulence  Transportation arrangements needed for discharge: Yes    Readmission Risk              Risk of Unplanned Readmission:        44             Does patient have a readmission risk score greater than 14?: Yes  If yes, follow-up appointment must be made within 7 days of discharge.      Goals of Care: Return home with kidney status resolving, R/O COVID      Discharge Plan: Back to [facility] at [facility]-long term resident          Electronically signed by Rahul Cody RN on 20 at 5:15 PM EDT

## 2020-05-18 NOTE — CONSULTS
mellitus. His outpatient history and dialysis orders, usual dry wt  and out pt dialysis run sheets have been requested from his parent hemodialysis unit. Past Medical History:        Diagnosis Date    Acute combined systolic and diastolic congestive heart failure ([address] 11/25/2016    Anemia     BPH (benign prostatic hyperplasia)     Cancer (HCC)     left ear 8/2013    CKD (chronic kidney disease) stage 3, GFR 30-59 ml/min (HCC)     Constipation     COPD (chronic obstructive pulmonary disease) (HCC)     Depression     GERD (gastroesophageal reflux disease)     Hemodialysis patient ([address]     3 times a week    HTN (hypertension)     Hx of blood clots     \" rt.shoulder/neck\"    Hyperparathyroidism ([address]     Hypothyroidism     IDDM (insulin dependent diabetes mellitus) ([address]     Insomnia     Liver disease     patient is unsure what it is    MDRO (multiple drug resistant organisms) resistance     hx. c-dif.     Memory deficit     Neurofibromatosis ([address]     Osteoarthritis     Paraplegia ([address]     chair to bed and chair transfer only    Peptic ulcer     PVD (peripheral vascular disease) ([address]     Secondary hyperparathyroidism ([address]     Sinus disorder     Syncope     Type II or unspecified type diabetes mellitus without mention of complication, not stated as uncontrolled     Venous thrombosis        Access:  previous  Left AV fistula    Past Surgical History:        Procedure Laterality Date    CHOLECYSTECTOMY      COLONOSCOPY  08/22/2011    2 POLYPS REMOBED TUBULAR ADENOMA    DIALYSIS FISTULA CREATION Left 06/13/2016    LEFT WRIST, no longer functional    DIALYSIS FISTULA CREATION Left     antecubital, was revised one time    HIP SURGERY Right     deep laceration was repaired    INTRACAPSULAR CATARACT EXTRACTION Right 5/7/2019    EYE CATARACT EMULSIFICATION IOL IMPLANT performed by Carlos Jurado MD at [address] [place] [address] Left 5/28/2019    EYE CATARACT No cough, phlegm or wheezing. Abdomen:  No abdominal pain, + nausea or vomiting. Neuro:  No focal weakness, abnormal movements orseizure like activity. Skin:   No rashes, no itching. :   No hematuria, no pyuria, no dysuria, no flank pain. Extremities:  No swelling or joint pains. ROS was otherwise negative except as mentioned in the [address] Objective:  Constitutional:    CURRENT TEMPERATURE:  Temp: 98.3 °F (36.8 °C)  MAXIMUM TEMPERATURE OVER 24HRS:  Temp (24hrs), Av.5 °F (36.9 °C), Min:98.3 °F (36.8 °C), Max:98.6 °F (37 °C)    CURRENT RESPIRATORY RATE:     CURRENT PULSE:     CURRENT BLOOD PRESSURE:     24HR BLOOD PRESSURE RANGE:  Systolic (56FDI), NJI:980 , Min:133 , FCH:959   ; Diastolic (03FML), CJQ:76, Min:49, Max:86    24HR INTAKE/OUTPUT:  No intake or output data in the 24 hours ending 20 1208      Physical Exam:  AAO x 3,          Appears mildly SOB . HEENT: Atraumatic, normocephalic, no throat congestion, moist mucosa. Eyes:   Pupils equal, round and reactive to light, EOMI. Neck:   No JVD, no thyromegaly, no lymphadenopathy. Chest:              Basal crackles heard . Cardiac:  S1 S2 RR, no murmurs, gallops or rubs . Abdomen: Soft, non-tender, no masses or organomegaly, BS audible. :   No suprapubic or flank tenderness. Neuro:  AAO x 3, No FND. SKIN:  No rashes, good skin turgor.   Extremities:  + slight  edema,     Labs:  PTH:   Lab Results   Component Value Date    IPTH 65.66 2016     CBC:   Recent Labs     202   WBC 3.5   RBC 2.47*   HGB 7.2*   HCT 24.9*   .8*   MCH 29.1   MCHC 28.9*   RDW 16.7*   PLT 82*   MPV 8.3      BMP:   Recent Labs     20  0312 20  0536     --    K 6.5*  --    *  --    CO2 17*  --    BUN 92*  --    CREATININE 7.47*  --    GLUCOSE 125* 123   CALCIUM 8.0*  --       IRON :IRON:    Lab Results   Component Value Date    IRON 37 2019   Albumin:   Recent Labs     20  0312   LABALBU 3.3*

## 2020-05-18 NOTE — CONSULTS
--    BILIDIR  --  0.17   BILITOT 0.33 0.32   ALKPHOS 187*  --    ALT 28  --    AST 23  --      No results for input(s): RPR in the last 72 hours. No results for input(s): HIV in the last 72 hours. No results for input(s): BC in the last 72 hours. Lab Results   Component Value Date    CREATININE 7.47 05/18/2020    GLUCOSE 123 05/18/2020    GLUCOSE 135 06/01/2012       Detailed results: Thank you for allowing us to participate in the care of this patient. Please call with questions. This note is created with the assistance of a speech recognition program.  While intending to generate adocument that actually reflects the content of the visit, the document can still have some errors including those of syntax and sound a like substitutions which may escape proof reading. It such instances, actual meaningcan be extrapolated by contextual diversion.     Koby Agrawal MD  Office: [phone]  Perfect serve / office [phone]

## 2020-05-18 NOTE — PROGRESS NOTES
nephrectomy (Right); Cholecystectomy; Skin graft; Colonoscopy (08/22/2011); Skin cancer excision (Left); TURP; Dialysis fistula creation (Left, 06/13/2016); Tunneled venous catheter placement (Right, 09/03/2016); Intracapsular cataract extraction (Right, 5/7/2019); Intracapsular cataract extraction (Left, 5/28/2019); Dialysis fistula creation (Left); Thyroid surgery; and hip surgery (Right).     Restrictions  Restrictions/Precautions  Restrictions/Precautions: Contact Precautions, Isolation(+COVID; droplet+)  Required Braces or Orthoses?: No  Vision/Hearing  Vision: Impaired  Vision Exceptions: Wears glasses for reading  Hearing: Exceptions to [facility]  Hearing Exceptions: Hard of hearing/hearing concerns     Subjective  General  Patient assessed for rehabilitation services?: Yes  Response To Previous Treatment: Not applicable  Family / Caregiver Present: No  Follows Commands: Within Functional Limits  Subjective  Subjective: RN and pt in agreement for PT eval; pt supine in bed upon PT arrival on 5L NC, pt frequently states things out of turn with mild disorientation requiring frequent verbal cueing to remain to on task   Pain Screening  Patient Currently in Pain: Denies  Vital Signs  Patient Currently in Pain: Denies       Orientation  Orientation  Overall Orientation Status: Impaired  Orientation Level: Oriented to place;Oriented to time;Disoriented to situation;Oriented to person  Social/Functional History  Social/Functional History  Lives With: Other (comment)  Type of Home: Facility  Home Layout: One level  Home Access: Level entry  Bathroom Accessibility: Accessible  Home Equipment: Wheelchair-manual, Wheelchair-electric  ADL Assistance: Needs assistance  Bath: Maximum assistance  Dressing: Minimal assistance(able to don shirt, socks and pull pants senior living up )  Toileting: Independent  Homemaking Responsibilities: No  Ambulation Assistance: Independent  Transfer Assistance: Independent  Active : 3-5x/week  Current Treatment Recommendations: Strengthening, Transfer Training, Endurance Training, Patient/Caregiver Education & Training, ROM, Balance Training, Gait Training, Home Exercise Program, Functional Mobility Training, Stair training, Safety Education & Training  Safety Devices  Type of devices: Gait belt, Left in bed, Call light within reach, Nurse notified, Patient at risk for falls  Restraints  Initially in place: No    AM-PAC Score     AM-PAC Inpatient Mobility without Stair Climbing Raw Score : 9 (05/18/20 1607)  AM-PAC Inpatient without Stair Climbing T-Scale Score : 32.44 (05/18/20 1607)  Mobility Inpatient CMS 0-100% Score: 76.07 (05/18/20 1607)  Mobility Inpatient without Stair CMS G-Code Modifier : CL (05/18/20 1607)       Goals  Short term goals  Time Frame for Short term goals: 14  Short term goal 1: Pt to peform bed mobility CGA  Short term goal 2: Demonstrate functional transfers [id number] term goal 3: Pt to demonstrate dynamic sitting balance of good - to decrease fall risk and promote independence  Short term goal 4: Tolerate 30 minutes of therapy to demo increased endurance  Patient Goals   Patient goals : Did not state       Therapy Time   Individual Concurrent Group Co-treatment   Time In 1448         Time Out 1523         Minutes 35         Timed Code Treatment Minutes: [address] [facility] [address] Sheron Black PT

## 2020-05-18 NOTE — ED PROVIDER NOTES
or staff charting and was written using speech recognition software    ## The patient was evaluated during the global COVID-19 pandemic, and that diagnosis was suspected/considered upon their initial presentation. PASTMEDICAL HISTORY     Past Medical History:   Diagnosis Date    Acute combined systolic and diastolic congestive heart failure (HCC) 11/25/2016    Anemia     BPH (benign prostatic hyperplasia)     Cancer (HCC)     left ear 8/2013    CKD (chronic kidney disease) stage 3, GFR 30-59 ml/min (HCC)     Constipation     COPD (chronic obstructive pulmonary disease) (HCC)     Depression     GERD (gastroesophageal reflux disease)     Hemodialysis patient ([address]     3 times a week    HTN (hypertension)     Hx of blood clots     \" rt.shoulder/neck\"    Hyperparathyroidism ([address]     Hypothyroidism     IDDM (insulin dependent diabetes mellitus) ([address]     Insomnia     Liver disease     patient is unsure what it is    MDRO (multiple drug resistant organisms) resistance     hx. c-dif.     Memory deficit     Neurofibromatosis ([address]     Osteoarthritis     Paraplegia ([address]     chair to bed and chair transfer only    Peptic ulcer     PVD (peripheral vascular disease) ([address]     Secondary hyperparathyroidism ([address]     Sinus disorder     Syncope     Type II or unspecified type diabetes mellitus without mention of complication, not stated as uncontrolled     Venous thrombosis      SURGICAL HISTORY       Past Surgical History:   Procedure Laterality Date    CHOLECYSTECTOMY      COLONOSCOPY  08/22/2011    2 POLYPS REMOBED TUBULAR ADENOMA    DIALYSIS FISTULA CREATION Left 06/13/2016    LEFT WRIST, no longer functional    DIALYSIS FISTULA CREATION Left     antecubital, was revised one time    HIP SURGERY Right     deep laceration was repaired    INTRACAPSULAR CATARACT EXTRACTION Right 5/7/2019    EYE CATARACT EMULSIFICATION IOL IMPLANT performed by Zahira Sterling MD at [address] [place] [address] INTRACAPSULAR CATARACT EXTRACTION Left 5/28/2019    EYE CATARACT EMULSIFICATION IOL IMPLANT performed by Nancy Griffin MD at [address] [facility]. Left     ear.  SKIN GRAFT      right axilla    THYROID SURGERY      non-cancerous lump removed    TOTAL NEPHRECTOMY Right     as a donor    TUNNELED VENOUS CATHETER PLACEMENT Right 09/03/2016    later removed    TURP       CURRENT MEDICATIONS       Previous Medications    ACETAMINOPHEN (TYLENOL) 500 MG TABLET    Take 1,000 mg by mouth every 8 hours as needed for Pain    APIXABAN (ELIQUIS) 5 MG TABS TABLET    Take 1 tablet by mouth 2 times daily    ASPIRIN 81 MG CHEWABLE TABLET    Take 81 mg by mouth daily    AZITHROMYCIN (ZITHROMAX) 250 MG TABLET    Take 250 mg by mouth daily    CALCIUM ACETATE (PHOSLO) 667 MG CAPSULE    Take 2,001 mg by mouth 3 times daily (with meals)     DILTIAZEM (CARDIZEM CD) 240 MG EXTENDED RELEASE CAPSULE    Take 1 capsule by mouth daily    DIPHENHYDRAMINE (BENADRYL) 25 MG CAPSULE    Take 25 mg by mouth every 6 hours as needed for Itching    ESCITALOPRAM (LEXAPRO) 10 MG TABLET    Take 10 mg by mouth every morning     FENTANYL (DURAGESIC) 25 MCG/HR    Place 1 patch onto the skin every 72 hours. FINASTERIDE (PROSCAR) 5 MG TABLET    Take 5 mg by mouth daily.     FLUTICASONE (FLONASE) 50 MCG/ACT NASAL SPRAY    1 spray by Nasal route daily     FLUTICASONE-VILANTEROL (BREO ELLIPTA) 100-25 MCG/INH AEPB INHALER    Inhale 1 puff into the lungs daily    GLUCAGON 1 MG INJECTION    Inject 1 kit into the muscle as needed (hypoglycemia BG <60)    GLUCOSE (GLUTOSE) 40 % GEL    Take 15 g by mouth as needed (blood sugar <60)    INSULIN GLARGINE (LANTUS) 100 UNIT/ML INJECTION VIAL    Inject 10 Units into the skin 2 times daily    ISOSORBIDE MONONITRATE (IMDUR) 60 MG CR TABLET    Take 60 mg by mouth daily    LACTULOSE (CHRONULAC) 10 GM/15ML SOLUTION    Take 10 g by mouth daily    LIDOCAINE 4 % EXTERNAL PATCH    Place 1 patch onto the skin daily    LIDOCAINE-PRILOCAINE (EMLA) 2.5-2.5 % CREAM    Apply topically three times a week Apply topically to arm/port three times weekly on Monday, Wednesday, and Friday for dialysis    LORATADINE (CLARITIN) 5 MG CHEWABLE TABLET    Take 5 mg by mouth daily as needed     METHYLPREDNISOLONE (MEDROL) 4 MG TABLET    Take 4 mg by mouth every evening    METOPROLOL TARTRATE (LOPRESSOR) 25 MG TABLET    Take 25 mg by mouth daily Indications: Hold for SBP less than 100 or HR less than 60     MIDODRINE (PROAMATINE) 5 MG TABLET    Take 5 mg by mouth three times a week On Monday, Wednesday, and Friday for dialysis    NITROGLYCERIN (NITROSTAT) 0.4 MG SL TABLET    Place 0.4 mg under the tongue every 5 minutes as needed for Chest pain up to max of 3 total doses. If no relief after 1 dose, call 911. OMEPRAZOLE (PRILOSEC) 20 MG CAPSULE    Take 20 mg by mouth every morning     ONDANSETRON (ZOFRAN) 4 MG TABLET    Take 4 mg by mouth every 6 hours as needed for Nausea or Vomiting    RAMELTEON (ROZEREM) 8 MG TABLET    Take 8 mg by mouth nightly    SENNA (SENOKOT) 8.6 MG TABLET    Take 1 tablet by mouth 2 times daily    SEVELAMER (RENVELA) 800 MG TABLET    Take 3 tablets by mouth 3 times daily (with meals)     TAMSULOSIN (FLOMAX) 0.4 MG CAPSULE    Take 0.4 mg by mouth daily. ALLERGIES     is allergic to cymbalta [duloxetine hcl]; tromethamine; and toradol [ketorolac tromethamine]. FAMILY HISTORY     has no family status information on file.       SOCIAL HISTORY       Social History     Tobacco Use    Smoking status: Former Smoker     Packs/day: 1.00     Types: Cigarettes     Last attempt to quit: 3/1/2019     Years since quittin.2    Smokeless tobacco: Never Used   Substance Use Topics    Alcohol use: No    Drug use: No     PHYSICAL EXAM     INITIAL VITALS: BP (!) 161/66   Pulse 70   Temp 98.6 °F (37 °C) (Oral)   Resp 22   Ht 6' (1.829 m)   Wt 280 lb (127 kg)   SpO2 99%   BMI 37.97 kg/m²    Physical Exam    MEDICAL DECISION MAKING:            Labs Reviewed   CBC WITH AUTO DIFFERENTIAL - Abnormal; Notable for the following components:       Result Value    RBC 2.47 (*)     Hemoglobin 7.2 (*)     Hematocrit 24.9 (*)     .8 (*)     MCHC 28.9 (*)     RDW 16.7 (*)     Platelets 82 (*)     Seg Neutrophils 77 (*)     Lymphocytes 7 (*)     Bands 11 (*)     Metamyelocytes 1 (*)     Absolute Lymph # 0.25 (*)     Metamyelocytes Absolute 0.04 (*)     All other components within normal limits   COMPREHENSIVE METABOLIC PANEL W/ REFLEX TO MG FOR LOW K - Abnormal; Notable for the following components:    Glucose 125 (*)     BUN 92 (*)     CREATININE 7.47 (*)     Calcium 8.0 (*)     Potassium 6.5 (*)     Chloride 109 (*)     CO2 17 (*)     Alkaline Phosphatase 187 (*)     Total Protein 6.1 (*)     Alb 3.3 (*)     GFR Non- 7 (*)     GFR  9 (*)     All other components within normal limits   COVID-19 - Abnormal; Notable for the following components:    SARS-CoV-2, Rapid DETECTED (*)     All other components within normal limits   BRAIN NATRIURETIC PEPTIDE - Abnormal; Notable for the following components:    Pro-BNP 2,938 (*)     All other components within normal limits   TROPONIN - Abnormal; Notable for the following components:    Troponin, High Sensitivity 150 (*)     All other components within normal limits   TROPONIN - Abnormal; Notable for the following components:    Troponin, High Sensitivity 148 (*)     All other components within normal limits   PROTIME-INR - Abnormal; Notable for the following components:    Protime 16.9 (*)     All other components within normal limits   APTT - Abnormal; Notable for the following components:    PTT 46.8 (*)     All other components within normal limits   POC GLUCOSE FINGERSTICK - Abnormal; Notable for the following components:    POC Glucose 123 (*)     All other components within normal limits   POCT GLUCOSE - Normal   CULTURE, BLOOD 1   CULTURE, BLOOD 1

## 2020-05-18 NOTE — H&P
(chronic kidney disease) stage 3, GFR 30-59 ml/min (HCC)     Constipation     COPD (chronic obstructive pulmonary disease) (HCC)     Depression     GERD (gastroesophageal reflux disease)     Hemodialysis patient ([address]     3 times a week    HTN (hypertension)     Hx of blood clots     \" rt.shoulder/neck\"    Hyperparathyroidism ([address]     Hypothyroidism     IDDM (insulin dependent diabetes mellitus) ([address]     Insomnia     Liver disease     patient is unsure what it is    MDRO (multiple drug resistant organisms) resistance     hx. c-dif.  Memory deficit     Neurofibromatosis ([address]     Osteoarthritis     Paraplegia ([address]     chair to bed and chair transfer only    Peptic ulcer     PVD (peripheral vascular disease) ([address]     Secondary hyperparathyroidism ([address]     Sinus disorder     Syncope     Type II or unspecified type diabetes mellitus without mention of complication, not stated as uncontrolled     Venous thrombosis         Past Surgical History:     Past Surgical History:   Procedure Laterality Date    CHOLECYSTECTOMY      COLONOSCOPY  08/22/2011    2 POLYPS REMOBED TUBULAR ADENOMA    DIALYSIS FISTULA CREATION Left 06/13/2016    LEFT WRIST, no longer functional    DIALYSIS FISTULA CREATION Left     antecubital, was revised one time    HIP SURGERY Right     deep laceration was repaired    INTRACAPSULAR CATARACT EXTRACTION Right 5/7/2019    EYE CATARACT EMULSIFICATION IOL IMPLANT performed by Elma Owen MD at [address] [place] [address] Left 5/28/2019    EYE CATARACT EMULSIFICATION IOL IMPLANT performed by Elma Owen MD at [address] [place] [address]. Left     ear.     SKIN GRAFT      right axilla    THYROID SURGERY      non-cancerous lump removed    TOTAL NEPHRECTOMY Right     as a donor    TUNNELED VENOUS CATHETER PLACEMENT Right 09/03/2016    later removed    TURP          Medications Prior to Admission:     Prior to Admission medications Medication Sig Start Date End Date Taking? Authorizing Provider   azithromycin (ZITHROMAX) 250 MG tablet Take 250 mg by mouth daily    Historical Provider, MD   diphenhydrAMINE (BENADRYL) 25 MG capsule Take 25 mg by mouth every 6 hours as needed for Itching    Historical Provider, MD   methylPREDNISolone (MEDROL) 4 MG tablet Take 4 mg by mouth every evening    Historical Provider, MD   acetaminophen (TYLENOL) 500 MG tablet Take 1,000 mg by mouth every 8 hours as needed for Pain    Historical Provider, MD   calcium acetate (PHOSLO) 667 MG capsule Take 2,001 mg by mouth 3 times daily (with meals)     Historical Provider, MD   fentaNYL (DURAGESIC) 25 MCG/HR Place 1 patch onto the skin every 72 hours. Historical Provider, MD   apixaban (ELIQUIS) 5 MG TABS tablet Take 1 tablet by mouth 2 times daily 3/6/20   Leandro Logan MD   dilTIAZem (CARDIZEM CD) 240 MG extended release capsule Take 1 capsule by mouth daily 3/6/20   Leandro Logan MD   lidocaine 4 % external patch Place 1 patch onto the skin daily 3/6/20   Leandro Logan MD   fluticasone-vilanterol (BREO ELLIPTA) 100-25 MCG/INH AEPB inhaler Inhale 1 puff into the lungs daily    Historical Provider, MD   glucagon 1 MG injection Inject 1 kit into the muscle as needed (hypoglycemia BG <60)    Historical Provider, MD   glucose (GLUTOSE) 40 % GEL Take 15 g by mouth as needed (blood sugar <60)    Historical Provider, MD   loratadine (CLARITIN) 5 MG chewable tablet Take 5 mg by mouth daily as needed     Historical Provider, MD   nitroGLYCERIN (NITROSTAT) 0.4 MG SL tablet Place 0.4 mg under the tongue every 5 minutes as needed for Chest pain up to max of 3 total doses. If no relief after 1 dose, call 911.     Historical Provider, MD   insulin glargine (LANTUS) 100 UNIT/ML injection vial Inject 10 Units into the skin 2 times daily 3/4/19   Tanner Kelley MD   lidocaine-prilocaine (EMLA) 2.5-2.5 % cream Apply topically three times a week Apply Slightly increased respiratory effort. Unable to accurately auscultate lungs d/t PPE interference  Cardiovascular: normal rate, regular rhythm, no murmur, gallop, rub  Abdomen: Soft, nontender, nondistended, normal bowel sounds, no hepatomegaly or splenomegaly  Neurologic: There are no new focal motor or sensory deficits, normal muscle tone and bulk, no abnormal sensation, normal speech, cranial nerves II through XII grossly intact  Skin: No gross lesions, rashes, bruising or bleeding on exposed skin area  Extremities: peripheral pulses palpable, no pedal edema.  Bilateral venous stasis changes in lower extremities  Psych: normal affect    Investigations:      Laboratory Testing:  Recent Results (from the past 24 hour(s))   EKG 12 Lead    Collection Time: 05/18/20  2:44 AM   Result Value Ref Range    Ventricular Rate 66 BPM    Atrial Rate 66 BPM    P-R Interval 166 ms    QRS Duration 128 ms    Q-T Interval 400 ms    QTc Calculation (Bazett) 419 ms    P Axis -7 degrees    R Axis -48 degrees    T Axis 30 degrees   CBC Auto Differential    Collection Time: 05/18/20  3:12 AM   Result Value Ref Range    WBC 3.5 3.5 - 11.0 k/uL    RBC 2.47 (L) 4.5 - 5.9 m/uL    Hemoglobin 7.2 (L) 13.5 - 17.5 g/dL    Hematocrit 24.9 (L) 41 - 53 %    .8 (H) 80 - 100 fL    MCH 29.1 26 - 34 pg    MCHC 28.9 (L) 31 - 37 g/dL    RDW 16.7 (H) 11.5 - 14.9 %    Platelets 82 (L) 760 - 450 k/uL    MPV 8.3 6.0 - 12.0 fL    NRBC Automated NOT REPORTED per 100 WBC    Differential Type NOT REPORTED     Immature Granulocytes NOT REPORTED 0 %    Absolute Immature Granulocyte NOT REPORTED 0.00 - 0.30 k/uL    WBC Morphology NOT REPORTED     RBC Morphology NOT REPORTED     Platelet Estimate NOT REPORTED     Seg Neutrophils 77 (H) 36 - 66 %    Lymphocytes 7 (L) 24 - 44 %    Monocytes 4 1 - 7 %    Eosinophils % 0 0 - 4 %    Basophils 0 0 - 2 %    Bands 11 (H) 0 - 10 %    Metamyelocytes 1 (H) 0 %    Segs Absolute 2.68 1.3 - 9.1 k/uL    Absolute Lymph # Range    Lactic Acid 0.5 0.5 - 2.2 mmol/L   Culture, Blood 1    Collection Time: 05/18/20  3:15 AM   Result Value Ref Range    Specimen Description . BLOOD 4ML EACH RT HAND     Special Requests NOT REPORTED     Culture NO GROWTH 4 HOURS    Culture, Blood 1    Collection Time: 05/18/20  3:23 AM   Result Value Ref Range    Specimen Description . BLOOD     Special Requests NOT REPORTED     Culture NO GROWTH 4 HOURS    COVID-19    Collection Time: 05/18/20  5:30 AM   Result Value Ref Range    SARS-CoV-2          SARS-CoV-2, Rapid DETECTED (A) Not Detected    Source . NASOPHARYNGEAL SWAB     SARS-CoV-2, PCR         Troponin    Collection Time: 05/18/20  5:34 AM   Result Value Ref Range    Troponin, High Sensitivity 148 (HH) 0 - 22 ng/L    Troponin T NOT REPORTED <0.03 ng/mL    Troponin Interp NOT REPORTED    POCT Glucose    Collection Time: 05/18/20  5:36 AM   Result Value Ref Range    Glucose 123 mg/dL    QC OK? yes    POC Glucose Fingerstick    Collection Time: 05/18/20  5:36 AM   Result Value Ref Range    POC Glucose 123 (H) 75 - 110 mg/dL   Respiratory Virus PCR Panel    Collection Time: 05/18/20  9:30 AM   Result Value Ref Range    Specimen Description . NASOPHARYNGEAL SWAB     Adenovirus PCR Not Detected Not Detected    Coronavirus 229E PCR Not Detected Not Detected    Coronavirus HKU1 PCR Not Detected Not Detected    Coronavirus NL63 PCR Not Detected Not Detected    Coronavirus OC43 PCR Not Detected Not Detected    Human Metapneumovirus PCR Not Detected Not Detected    Rhino/Enterovirus PCR Not Detected Not Detected    Influenza A by PCR Not Detected Not Detected    Influenza A H1 PCR NOT REPORTED Not Detected    Influenza A H1 (2009) PCR NOT REPORTED Not Detected    Influenza A H3 PCR NOT REPORTED Not Detected    Influenza B by PCR Not Detected Not Detected    Parainfluenza 1 PCR Not Detected Not Detected    Parainfluenza 2 PCR Not Detected Not Detected    Parainfluenza 3 PCR Not Detected Not Detected (Chronic) 5/18/2020 Yes    Atypical pneumonia 5/18/2020 Yes          Plan:     Patient status inpatient in the Progressive Unit/Step down    #Acute on chronic hypoxic respiratory failure 2/2 [id number] pneumonia complicated by COPD   - ID consult   - correlate with nephrology to maintain negative fluid balance   - supplemental oxygen to maintain saturation >92 percent   - will continue with azithromcyin in setting of underlying COPD, will also add 5 day steroid course   - albuterol MDI prn    #ESRD on hemodialysis   - nephrology consulted for dialysis maintenance   - monitor electrolytes    #Type 2 DM, insulin dependent, complicated by nephropathy    - resume home insulin regimen   - sliding scale, blood glucose checks AC/HS    #Hemoptysis   - no additional episodes since additional occurrence, will continue to monitor   - monitor hemoglobin    #Thrombocytopenia   - appears to be chronic; however, platelets currently lower than ever before (82K). Likely secondary to marrow suppression with acute on chronic illness with underlying renal failure. Will order peripheral smear, LDH, bili, haptoglobin, reticulocyte count to further assess    #Anemia  - chronic, secondary to chronic disease and underlying renal failure. Hemoglobin continues to downtrend. Will order hemolysis labs and transfuse <7. Check peripheral smear, FOBT, retic count. #Withhold DVT prophylaxis until hemoglobin and platelets stabilized    #Discussed code status with patient. Remains DNR-CCA, with intubation    #Resume selected home medications    #PT/OT assessment    Consultations:   IP CONSULT TO INFECTIOUS DISEASES  IP CONSULT TO NEPHROLOGY    Patient is admitted as inpatient status because of co-morbidities listed above, severity of signs and symptoms as outlined, requirement for current medical therapies and most importantly because of direct risk to patient if care not provided in a hospital setting.     Ying Mendez PA-C  5/18/2020  3:57 PM    Copy sent to Dr. Reina Glasgow,

## 2020-05-18 NOTE — ED NOTES
Transportation arrives at bedside. Report/paperwork given. Pt AOx4. Stable.  PWD     Mara Pisano RN  05/18/20 3102

## 2020-05-18 NOTE — PLAN OF CARE
78year old male presents to [facility] ER with complaints of shortness of breath. Patient had mild cough and was not feeling well so did not go to dialysis for one week. He has developed worsening shortness of breath, orthopnea and multiple episodes of vomiting. He also c/o abdominal pain, headache. Workup in ER shows: K+6.5, Hgb 7.2, Hct 24.9, Platelets 82, Seg Neutrophils 77, lyphocytes 7, bands 11, rapid COVID +, BNP 2,938, Trop 150. Blood cultures x2 drawn. CT abdomen/pelvis- Interval development of patchy airspace opacities in the visualized lung parenchyma, suspicious for atypical pneumonia. Stable left renal masses. CT Head- No acute intracranial abnormality. EKG-NSR, Lt. Axis deviation, RBBB. ER spoke with Dr. Mable Shaw, nephrology. Request transfer to [facility]. [facility] for further treatment.

## 2020-05-18 NOTE — PROGRESS NOTES
with poor handplacement, educated on proper hand placement for transfers      Cognition  Overall Cognitive Status: Exceptions  Arousal/Alertness: Appropriate responses to stimuli  Following Commands: Follows one step commands consistently; Follows one step commands with increased time  Attention Span: Difficulty attending to directions; Difficulty dividing attention  Safety Judgement: Decreased awareness of need for safety  Problem Solving: Assistance required to identify errors made;Assistance required to correct errors made;Decreased awareness of errors  Insights: Decreased awareness of deficits  Initiation: Requires cues for some  Sequencing: Requires cues for some        Sensation  Overall Sensation Status: WFL        LUE AROM : WFL  Left Hand AROM: WFL  RUE AROM : WFL  Right Hand AROM: WFL  LUE Strength  Gross LUE Strength: WFL  L Hand General: 4+/5  RUE Strength  Gross RUE Strength: WFL  R Hand General: 4+/5              Plan   Plan  Times per week: 3x/wk   Plan Comment: update frequency PRN per patient's potential to make functional gains based on PLOF    AM-PAC Score        AM-[facility] Inpatient Daily Activity Raw Score: 19 (05/18/20 1606)  AM-PAC Inpatient ADL T-Scale Score : 40.22 (05/18/20 1606)  ADL Inpatient CMS 0-100% Score: 42.8 (05/18/20 1606)  ADL Inpatient CMS G-Code Modifier : CK (05/18/20 1606)    Goals  Short term goals  Time Frame for Short term goals: Patient will, by discharge  Short term goal 1: demonstrate grooming tasks at Mod I <2 cues   Short term goal 2: demonstrate functional transfers using LRD at Mod A to engage in ADLs  Short term goal 3: demonstrate UB self-cares at Mod I <2 cues  Short term goal 4: demonstrate LB self-cares at [address] using LRD <2 cues  Short term goal 5: complete 4 step task <2 cues at Mod I with set up to increase independence with problem solving and sequencing with ADLs        Therapy Time   Individual Concurrent Group Co-treatment   Time In 1448         Time Out 2432

## 2020-05-19 NOTE — PLAN OF CARE
Problem: Falls - Risk of:  Goal: Will remain free from falls  Description: Will remain free from falls  5/19/2020 1827 by Ney Belle RN  Outcome: Met This Shift  5/19/2020 0852 by Ney Belle RN  Outcome: Ongoing  Goal: Absence of physical injury  Description: Absence of physical injury  5/19/2020 1827 by Ney Belle RN  Outcome: Met This Shift  5/19/2020 0852 by Ney Belle RN  Outcome: Ongoing       Pt remained free of falls this shift. Bed remains in lowest position, with 2/4 side rails up and all wheels locked. Non skid socks on. Bedside table and call light within reach. Pt calls out appropriately. Will continue to monitor.     Electronically signed by Ney Belle RN on 5/19/2020 at 6:28 PM

## 2020-05-19 NOTE — PROGRESS NOTES
John Medina 18    Progress Note    5/19/2020    1:33 PM    Name:   Marlys Salamanca  MRN:     [id number]     [place]:      [de-identified]   Room:   2027/2027-01  IP Day:  1  Admit Date:  5/18/2020  8:46 AM    PCP:   Rina Macias DO  Code Status:  DNR-CCA    Subjective:     C/C: Shortness of breat    Interval History Status: improved. Pt reporting improvement in breathing today. At the time of my evaluation, he was seated on the bedside doing a word search and appeared comfortable. He is saturating well on 5L nasal cannula. He is afebrile and hemodynamically stable. He underwent hemodialysis yesterday and tolerated well. Pain is well controlled. Labs reviewed. Case discussed with nursing at bedside. Brief History:        Marlys Salamanca is a 78 y.o. male with a complex medical history including COPD (on 4L NC at baseline), severe LVH, CKD on HD (M, W, F), diabetes mellitus, PAD, bilateral lower extremity paraplegia secondary to previous traumatic injury. Presented complaining of shortness of breath and fatigue.     Pt is a very poor historian and the majority of my history is obtained via EMR.     Pt initially presented to [facility] ED. Pt reports that symptoms began one week ago. States that he felt as if he had a \"cold\" complaining of body aches, mildly productive cough. Symptoms continued to worsen daily. He reports an episode of coughing up a large amount of blood clots. States that this has never previously happened. Due to being ill, he missed two sessions of dialysis, with his last session on May 11. He is complaining of associated nausea and vomiting. He resides at [facility].      ED evaluation significant for potassium of 6.5, Hgb 7.2, platelets 82, trop 763, Covid +. CT abdomen/pelvis with patchy airspace disease. Nephrology was consulted. Pt received a dose of kayexalate and was subsequently transferred to [facility].  Pt was admitted to medicine for further care. Pt admitted to medicine floor for covid-19 pneumonia. Found to be in acute on chronic hypoxic respiratory failure. He was placed on 5L nasal cannula and maintained saturations. ID consulted. Pt started on empiric antibiotics initially for possible CAP. Additionally, found to be in acute on chronic renal failure. Nephrology was consulted and pt underwent dialysis without complication. His respiratory status improved following dialysis. Platelets (82) and hemoglobin (7.2) found to be low on admission. Repeat hemoglobin 7.0 and pt transfused one unit PRBC. Peripheral smear sent and unrevealing for any significant pathology. Review of Systems:     Constitutional:  negative for chills, fevers, sweats  Respiratory:  negative for cough, dyspnea on exertion, shortness of breath, wheezing  Cardiovascular:  negative for chest pain, chest pressure/discomfort, lower extremity edema, palpitations  Gastrointestinal:  negative for abdominal pain, constipation, diarrhea, nausea, vomiting  Neurological:  negative for dizziness, headache    Medications: Allergies: Allergies   Allergen Reactions    Cymbalta [Duloxetine Hcl]      Intolerance.     Tromethamine Other (See Comments)    Toradol [Ketorolac Tromethamine] Other (See Comments)     Headaches        Current Meds:   Scheduled Meds:    [START ON 5/20/2020] hydroxychloroquine  200 mg Oral BID    hydroxychloroquine  400 mg Oral BID    sodium chloride flush  10 mL Intravenous 2 times per day    insulin lispro  0-6 Units Subcutaneous TID WC    insulin lispro  0-3 Units Subcutaneous Nightly    methylPREDNISolone  40 mg Intravenous BID    sodium chloride  20 mL Intravenous Once    sodium chloride  20 mL Intravenous Once    fentaNYL  1 patch Transdermal Q72H    doxycycline hyclate  100 mg Oral 2 times per day    zinc sulfate  50 mg Oral Daily     Continuous Infusions:    dextrose       PRN Meds: acetaminophen **OR** Component Value Date    PHART 7.368 03/02/2020    PH 7.396 09/07/2012    OLQ1QVK 47.1 03/02/2020    PCO2 38.8 09/07/2012    PO2ART 67.5 03/02/2020    OHP6NZU 27.1 03/02/2020    NBEA NOT REPORTED 03/02/2020    PBEA 1.8 03/02/2020    D4MACIHY 90.0 03/02/2020    FIO2 NOT REPORTED 03/02/2020     Lab Results   Component Value Date/Time    SPECIAL NOT REPORTED 05/18/2020 10:44 AM     Lab Results   Component Value Date/Time    CULTURE NO GROWTH 1 DAY 05/18/2020 03:23 AM       Radiology:  Ct Abdomen Pelvis Wo Contrast Additional Contrast? None    Result Date: 5/18/2020  Interval development of patchy airspace opacities in the visualized lung parenchyma, suspicious for atypical pneumonia. Stable left renal masses as described. Ct Head Wo Contrast    Result Date: 5/18/2020  No acute intracranial abnormality. Xr Chest Portable    Result Date: 5/19/2020  Perihilar pulmonary edema improved since prior study. Xr Chest Portable    Result Date: 5/18/2020  Patchy airspace opacities may represent pulmonary edema or atypical pneumonia. Physical Examination:        General appearance:  alert, cooperative and no distress  Mental Status:  oriented to person, place and time and normal affect  Lungs:  Normal effort.  Lung auscultation compromised secondary to PPE in setting of COVID-19 pandemic  Heart:  regular rate and rhythm, no murmur  Abdomen:  soft, nontender, nondistended, normal bowel sounds, no masses, hepatomegaly, splenomegaly  Extremities:  no edema, redness, tenderness in the calves  Skin:  no gross lesions, rashes, induration    Assessment:        Hospital Problems           Last Modified POA    * (Principal) Pneumonia due to COVID-19 virus 5/18/2020 Yes    HTN (hypertension) (Chronic) 5/18/2020 Yes    Type 2 diabetes mellitus with renal complication (HCC) (Chronic) 5/18/2020 Yes    COPD (chronic obstructive pulmonary disease) (HCC) (Chronic) 5/18/2020 Yes    Anemia, chronic renal failure 5/18/2020 Yes Thrombocytopenia ([facility] [address] 5/18/2020 Yes    End stage renal disease on dialysis [facility]) (Chronic) 5/18/2020 Yes    Acute on chronic respiratory failure with hypoxia (HCC) (Chronic) 5/18/2020 Yes    Atypical pneumonia 5/18/2020 Yes          Plan:        #Acute on chronic hypoxic respiratory failure 2/2 [id number] pneumonia complicated by COPD   - ID following   - correlate with nephrology to maintain negative fluid balance   - supplemental oxygen to maintain saturation >92 percent   - remains on doxycycline   - 5 day solumedrol course   - albuterol MDI prn     #ESRD on hemodialysis   - nephrology consulted for dialysis maintenance   - monitor electrolytes     #Type 2 DM, insulin dependent, complicated by nephropathy    - resume home insulin regimen   - sliding scale, blood glucose checks AC/HS     #Hemoptysis   - no additional episodes since initial occurrence, will continue to monitor   - monitor hemoglobin     #Thrombocytopenia   - appears to be chronic. Likely secondary to marrow suppression with acute on chronic illness with underlying renal failure. Continue to trend (82 --> 67 --> 70). Peripheral smear unrevealing    #Anemia  - chronic, secondary to chronic disease and underlying renal failure. Hemolysis labs unrevealing. Peripheral smear unrevealing. Pt required transfusion of 1U PRBC yesterday. Hemoglobin remains stable. FOBT pending.     #Withhold DVT prophylaxis until hemoglobin and platelets stabilized. Can resume pt home Eliquis tomorrow if stable.     #Discussed code status with patient.  Remains DNR-CCA, with intubation     #PT/OT assessment       Nani Zamora PA-C  5/19/2020  1:33 PM

## 2020-05-19 NOTE — PROGRESS NOTES
[facility] -   Infectious diseases evaluation  admission date 5/18/2020     reason for consultation:   Suspected COVID     Impression :   Current:  · Bilateral patchy COVID viral pneumonia possible viral pneumonia     Other:  · Both lower extremity paraplegia post trauma  · DM 2  · CKD 3, hemodialysis MWF  · COPD 4 L baseline oxygen  ·    Discussion / summary of stay / plan of care   · Patchy bilateral pneumonia, viral pneumonia   · Elevated CRP is significant but elevated ferritin might be related to dialysis  · Current QTc interval is normal but patient has a history of QTC more than 500  · Thrombocytopenia, 86, while patient runs 140, can be sepsis related  Recommendations      · Post vancomycin and cefepime times one 5/18, stop 5/19  · BC x 2 pend  · Zinc 50 mg daily  · start plaquenil 5 days per protocol till 5/23/20  · Stop zithromax- start 5/18 doxy po till mycoplasma igm are out  · Not a candidate for Remdesevir due to RF        Infection Control Recommendations   · [place] Precautions  · Contact Isolation   · Airborne isolation  · Droplet Isolation     Antimicrobial Stewardship Recommendations   · Simplification of therapy  · Targeted therapy        Coordination ofOutpatient Care:   · Estimated Length of IV antimicrobials:  · Patient will need Midline / picc Catheter Insertion:   · Patient will need SNF:  · Patient will need outpatient wound care:      History of Present Illness:   Initial history:  Jose Camacho is a 78y.o.-year-old male dialysis patient, diabetic, poor historian, presented to [facility] with feeling of aches all over her body productive cough. She felt that the symptoms are worsening over the last few days. She even coughed blood clots. Had to miss dialysis and had nausea and vomiting. She has COPD on 4 L of oxygen at home.   At admission he platelets were 82 and her usual is 140  potassium was elevated, 6.5, nephrology consulted and transfer to [facility].     Interval changes  5/19/2020   Little cough and not SOB - getting HD  Severe paraparesis  venous stasis dermatitis both LE  COVID neg 4/18/20     resp PCR neg     Summary of relevant labs:  Labs:  WBC 3.5 - 1.9  Platelets 82     Liver enz normal  Micro:  BC 5/18 x 2  COVID test 5/18  resp PCR neg     CRP 69 - 71    Ferritin 1292     -[phone] - 337  Imaging:  Chest x-ray 5/18 after dialysis shows a slight improvement. On my review it seems to be the same congestion bilaterally          CT abdomen pelvis 5/18, interval development of patchy airspace opacities in the lungs, suspicious for atypical pneumonia, diverticulosis no diverticulitis. Stable left renal masses          I have personally reviewed the past medical history, past surgical history, medications, social history, and family history, and I haveupdated the database accordingly. Past Medical History:     Past Medical History:   Diagnosis Date    Acute combined systolic and diastolic congestive heart failure ([address] 11/25/2016    Anemia     BPH (benign prostatic hyperplasia)     Cancer (HCC)     left ear 8/2013    CKD (chronic kidney disease) stage 3, GFR 30-59 ml/min (HCC)     Constipation     COPD (chronic obstructive pulmonary disease) (HCC)     Depression     GERD (gastroesophageal reflux disease)     Hemodialysis patient ([address]     3 times a week    HTN (hypertension)     Hx of blood clots     \" rt.shoulder/neck\"    Hyperparathyroidism ([address]     Hypothyroidism     IDDM (insulin dependent diabetes mellitus) ([address]     Insomnia     Liver disease     patient is unsure what it is    MDRO (multiple drug resistant organisms) resistance     hx. c-dif.     Memory deficit     Neurofibromatosis ([address]     Osteoarthritis     Paraplegia ([address]     chair to bed and chair transfer only    Peptic ulcer     PVD (peripheral vascular disease) ([address]     Secondary hyperparathyroidism ([address]     Sinus disorder     Syncope     Type II or unspecified type diabetes mellitus without mention of complication, not stated as uncontrolled     Venous thrombosis        Past Surgical  History:     Past Surgical History:   Procedure Laterality Date    CHOLECYSTECTOMY      COLONOSCOPY  08/22/2011    2 POLYPS REMOBED TUBULAR ADENOMA    DIALYSIS FISTULA CREATION Left 06/13/2016    LEFT WRIST, no longer functional    DIALYSIS FISTULA CREATION Left     antecubital, was revised one time    HIP SURGERY Right     deep laceration was repaired    INTRACAPSULAR CATARACT EXTRACTION Right 5/7/2019    EYE CATARACT EMULSIFICATION IOL IMPLANT performed by Ester Medina MD at [address] [place] [address] Left 5/28/2019    EYE CATARACT EMULSIFICATION IOL IMPLANT performed by Ester Medina MD at [address] Left     ear.     SKIN GRAFT      right axilla    THYROID SURGERY      non-cancerous lump removed    TOTAL NEPHRECTOMY Right     as a donor    TUNNELED VENOUS CATHETER PLACEMENT Right 09/03/2016    later removed    TURP         Medications:      sodium chloride flush  10 mL Intravenous 2 times per day    insulin lispro  0-6 Units Subcutaneous TID WC    insulin lispro  0-3 Units Subcutaneous Nightly    methylPREDNISolone  40 mg Intravenous BID    sodium chloride  20 mL Intravenous Once    sodium chloride  20 mL Intravenous Once    fentaNYL  1 patch Transdermal Q72H    doxycycline hyclate  100 mg Oral 2 times per day    zinc sulfate  50 mg Oral Daily       Social History:     Social History     Socioeconomic History    Marital status: Single     Spouse name: Not on file    Number of children: Not on file    Years of education: Not on file    Highest education level: Not on file   Occupational History    Not on file   Social Needs    Financial resource strain: Not on file    Food insecurity     Worry: Not on file     Inability: Not on file   [employer] needs Medical: Not on file     Non-medical: Not on file   Tobacco Use    Smoking status: Former Smoker     Packs/day: 1.00     Types: Cigarettes     Last attempt to quit: 3/1/2019     Years since quittin.2    Smokeless tobacco: Never Used   Substance and Sexual Activity    Alcohol use: No    Drug use: No    Sexual activity: Not Currently   Lifestyle    Physical activity     Days per week: Not on file     Minutes per session: Not on file    Stress: Not on file   Relationships    Social connections     Talks on phone: Not on file     Gets together: Not on file     Attends Confucianist service: Not on file     Active member of club or organization: Not on file     Attends meetings of clubs or organizations: Not on file     Relationship status: Not on file    Intimate partner violence     Fear of current or ex partner: Not on file     Emotionally abused: Not on file     Physically abused: Not on file     Forced sexual activity: Not on file   Other Topics Concern    Not on file   Social History Narrative    Not on file       Family History:     Family History   Family history unknown: Yes        Allergies:   Cymbalta [duloxetine hcl];  Tromethamine; and Toradol [ketorolac tromethamine]     Review of Systems:     Review of Systems    Physical Examination :     Patient Vitals for the past 8 hrs:   BP Temp Temp src Pulse Resp SpO2   20 0809 (!) 151/68 98.9 °F (37.2 °C) Oral 78 16 96 %       Physical Exam      Medical Decision Making:   I have independently reviewed/ordered the following labs:    CBC with Differential:   Recent Labs     20  1558 20  0323   WBC 3.5 1.9*   HGB 7.0* 7.0*   HCT 24.4* 23.9*   PLT 67* 70*   LYMPHOPCT 4* 4*   MONOPCT 1 4     BMP:  Recent Labs     20  1558 20  2300 20  0323     --  139   K 6.3* 4.9 5.5*   *  --  103   CO2 14*  --  20   BUN 92*  --  59*   CREATININE 8.02*  --  5.42*   MG  --  1.6  --      Hepatic Function Panel:   Recent Labs

## 2020-05-19 NOTE — PROGRESS NOTES
daily  escitalopram (LEXAPRO) 10 MG tablet, Take 10 mg by mouth every morning   isosorbide mononitrate (IMDUR) 60 MG CR tablet, Take 60 mg by mouth daily  fluticasone (FLONASE) 50 MCG/ACT nasal spray, 1 spray by Nasal route daily   finasteride (PROSCAR) 5 MG tablet, Take 5 mg by mouth daily. omeprazole (PRILOSEC) 20 MG capsule, Take 20 mg by mouth every morning   metoprolol tartrate (LOPRESSOR) 25 MG tablet, Take 25 mg by mouth daily Indications: Hold for SBP less than 100 or HR less than 60   tamsulosin (FLOMAX) 0.4 MG capsule, Take 0.4 mg by mouth daily. INVESTIGATIONS     Last 3 CMP:    Recent Labs     05/18/20 0312 05/18/20  1558 05/18/20  1648 05/18/20  2300 05/19/20  0323    139  --   --  139   K 6.5* 6.3*  --  4.9 5.5*   * 108*  --   --  103   CO2 17* 14*  --   --  20   BUN 92* 92*  --   --  59*   CREATININE 7.47* 8.02*  --   --  5.42*   CALCIUM 8.0* 8.1*  --   --  8.2*   PROT 6.1*  --   --   --  5.8*   LABALBU 3.3* 3.2*  --   --  3.3*   BILITOT 0.33  --  0.32  --  0.33   ALKPHOS 187*  --   --   --  178*   AST 23  --   --   --  23   ALT 28  --   --   --  29       Last 3 CBC:  Recent Labs     05/18/20 0312 05/18/20  1558 05/19/20  0323   WBC 3.5 3.5 1.9*   RBC 2.47* 2.33* 2.35*   HGB 7.2* 7.0* 7.0*   HCT 24.9* 24.4* 23.9*   .8* 104.7* 101.7   MCH 29.1 30.0 29.8   MCHC 28.9* 28.7 29.3   RDW 16.7* 14.7* 14.3   PLT 82* 67* 70*   MPV 8.3 10.0 10.2       ASSESSMENT     1. End-stage renal disease on intermittent mental hemodialysis using left AV fistula Monday Wednesday Friday. .5 kg  2. Hyperkalemia  3. COVID 10 positive/Pneumonia  4. DM2  5. Anemia     PLAN     1. HD today for hyperK and volume overload  2. Fluids/Salt restriction  3.  Will need fistulogram if hyperkalemia persists    Please do not hesitate to call with questions    This note is created with the assistance of a speech-recognition program. While intending to generate a document that actually reflects the content of the visit, no guarantees can be provided that every mistake has been identified and corrected by editing    Andre Hernandez MD, MRCP Rita Magallon, FACP   5/19/2020 1:17 PM  [facility]

## 2020-05-19 NOTE — PROGRESS NOTES
IVANA Richard notified of afternoon labs.     Electronically signed by Juventino Murdock RN on 5/19/2020 at 3:23 PM

## 2020-05-19 NOTE — PLAN OF CARE
Problem: Falls - Risk of:  Goal: Will remain free from falls  Description: Will remain free from falls  Outcome: Ongoing  Goal: Absence of physical injury  Description: Absence of physical injury  Outcome: Ongoing     Problem: Pain:  Goal: Pain level will decrease  Description: Pain level will decrease  Outcome: Ongoing  Goal: Control of acute pain  Description: Control of acute pain  Outcome: Ongoing  Goal: Control of chronic pain  Description: Control of chronic pain  Outcome: Ongoing     Problem: Musculor/Skeletal Functional Status  Goal: Highest potential functional level  Outcome: Ongoing  Goal: Absence of falls  Outcome: Ongoing

## 2020-05-19 NOTE — PROGRESS NOTES
Dialysis Post Treatment Note  Vitals:    05/19/20 1600   BP: (!) 145/82   Pulse: 72   Resp: 20   Temp: 98.6 °F (37 °C)   SpO2: 95%     Pre-Weight = 126.6kg   Post-weight = Weight: 274 lb 11.1 oz (124.6 kg)  Total Liters Processed = Total Liters Processed (l/min): 54.5 l/min  Rinseback Volume (mL) = Rinseback Volume (ml): 400 ml  Net Removal (mL) =2010  Type of access used=Left upper arm AVF  Length of treatment=120 minutes    Pt tolerated extra 2hr HD tx very well without complaints.

## 2020-05-19 NOTE — PLAN OF CARE
Problem: Falls - Risk of:  Goal: Will remain free from falls  Description: Will remain free from falls  5/19/2020 0852 by Dontrell Maxwell RN  Outcome: Ongoing  5/19/2020 0156 by Mert Marquez RN  Outcome: Ongoing  Goal: Absence of physical injury  Description: Absence of physical injury  5/19/2020 0852 by Dontrell Maxwell RN  Outcome: Ongoing  5/19/2020 0156 by Mert Marquez RN  Outcome: Ongoing     Problem: Pain:  Goal: Pain level will decrease  Description: Pain level will decrease  5/19/2020 0852 by Dontrell Maxwell RN  Outcome: Ongoing  5/19/2020 0156 by Mert Marquez RN  Outcome: Ongoing  Goal: Control of acute pain  Description: Control of acute pain  5/19/2020 0852 by Dontrell Maxwell RN  Outcome: Ongoing  5/19/2020 0156 by Mert Marquez RN  Outcome: Ongoing  Goal: Control of chronic pain  Description: Control of chronic pain  5/19/2020 0852 by Dontrell Maxwell RN  Outcome: Ongoing  5/19/2020 0156 by Mert Marquez RN  Outcome: Ongoing     Problem: Musculor/Skeletal Functional Status  Goal: Highest potential functional level  5/19/2020 0852 by Dontrell Maxwell RN  Outcome: Ongoing  5/19/2020 0156 by Mert Marquez RN  Outcome: Ongoing  Goal: Absence of falls  5/19/2020 0852 by Dontrell Maxwell RN  Outcome: Ongoing  5/19/2020 0156 by Mert Marquez RN  Outcome: Ongoing

## 2020-05-20 NOTE — PROGRESS NOTES
**OR** ondansetron, sodium chloride flush, dextrose, dextrose, glucagon (rDNA), glucose, traMADol, lidocaine, midodrine, HYDROmorphone, morphine **OR** morphine    Data:     Past Medical History:   has a past medical history of Acute combined systolic and diastolic congestive heart failure ([facility] Anemia, BPH (benign prostatic hyperplasia), Cancer ([facility] CKD (chronic kidney disease) stage 3, GFR 30-59 ml/min ([facility]), Constipation, COPD (chronic obstructive pulmonary disease) ([facility] Depression, GERD (gastroesophageal reflux disease), Hemodialysis patient ([facility] HTN (hypertension), Hx of blood clots, Hyperparathyroidism ([facility] Hypothyroidism, IDDM (insulin dependent diabetes mellitus) ([facility] Insomnia, Liver disease, MDRO (multiple drug resistant organisms) resistance, Memory deficit, Neurofibromatosis ([facility] Osteoarthritis, Paraplegia ([facility] Peptic ulcer, PVD (peripheral vascular disease) ([facility] Secondary hyperparathyroidism ([facility] Sinus disorder, Syncope, Type II or unspecified type diabetes mellitus without mention of complication, not stated as uncontrolled, and Venous thrombosis. Social History:   reports that he quit smoking about 14 months ago. His smoking use included cigarettes. He smoked 1.00 pack per day. He has never used smokeless tobacco. He reports that he does not drink alcohol or use drugs. Family History:   Family History   Family history unknown: Yes       Vitals:  BP (!) 165/84   Pulse 76   Temp 99 °F (37.2 °C) (Oral)   Resp 20   Ht 6' (1.829 m)   Wt 270 lb 8.1 oz (122.7 kg)   SpO2 100%   BMI 36.69 kg/m²   Temp (24hrs), Av.8 °F (37.1 °C), Min:98.6 °F (37 °C), Max:99 °F (37.2 °C)    Recent Labs     20  1729 20  1942 20  0800 20  1212   POCGLU 213* 164* 122* 173*       I/O (24Hr):     Intake/Output Summary (Last 24 hours) at 2020 1456  Last data filed at 2020 1228  Gross per 24 hour   Intake 1200 ml   Output 5745 ml   Net -4545 ml Labs:  Hematology:  Recent Labs     05/18/20 0312 05/18/20  1558 05/19/20  0323 05/20/20  0550   WBC 3.5 3.5 1.9* 2.2*   RBC 2.47* 2.33* 2.35* 2.39*   HGB 7.2* 7.0* 7.0* 7.1*   HCT 24.9* 24.4* 23.9* 24.1*   .8* 104.7* 101.7 100.8   MCH 29.1 30.0 29.8 29.7   MCHC 28.9* 28.7 29.3 29.5   RDW 16.7* 14.7* 14.3 14.2   PLT 82* 67* 70* 69*   MPV 8.3 10.0 10.2 9.8   CRP  --  69.3* 71.6*  --    INR 1.4  --  1.0  --    DDIMER  --  0.45  --   --      Chemistry:  Recent Labs     05/18/20 0312 05/18/20  1558 05/18/20  2300 05/19/20  0323 05/19/20  1207 05/20/20  0550     --  139  --  139  --  141   K 6.5*  --  6.3* 4.9 5.5*  --  5.0   *  --  108*  --  103  --  100   CO2 17*  --  14*  --  20  --  22   GLUCOSE 125*   < > 114*  --  165*  --  195*   BUN 92*  --  92*  --  59*  --  54*   CREATININE 7.47*  --  8.02*  --  5.42*  --  4.67*   MG  --   --   --  1.6  --   --   --    ANIONGAP 15  --  17  --  16  --  19*   LABGLOM 7*  --  7*  --  11*  --  12*   GFRAA 9*  --  8*  --  13*  --  15*   CALCIUM 8.0*  --  8.1*  --  8.2*  --  7.7*   PHOS  --   --  5.1*  --   --   --   --    PROBNP 2,938*  --   --   --   --   --   --    TROPHS 150*   < >  --  159* 142* 121*  --    MYOGLOBIN  --   --   --  307* 334* 370*  --     < > = values in this interval not displayed.      Recent Labs     05/18/20  0312  05/18/20  1558 05/18/20  1648  05/19/20  0323 05/19/20  0807 05/19/20  1145 05/19/20  1729 05/19/20  1942 05/20/20  0550 05/20/20  0800 05/20/20  1212   PROT 6.1*  --   --   --   --  5.8*  --   --   --   --  5.6*  --   --    LABALBU 3.3*  --  3.2*  --   --  3.3*  --   --   --   --  3.1*  --   --    AST 23  --   --   --   --  23  --   --   --   --  24  --   --    ALT 28  --   --   --   --  29  --   --   --   --  31  --   --    LDH  --   --  173 185  --   --   --   --   --   --   --   --   --    ALKPHOS 187*  --   --   --   --  178*  --   --   --   --  158*  --   --    BILITOT 0.33  --   --  0.32  --  0.33  --   --   --

## 2020-05-20 NOTE — CARE COORDINATION
Care Transition  Received call from Tyrone Polk at [place] to check on patient. Called Kaley at [place] at [place] and patient can still return.

## 2020-05-20 NOTE — PROGRESS NOTES
and transfer to [facility]. Interval changes  5/20/2020     Due to the current efforts to prevent transmission of COVID-19 and also the need to preserve PPE for other caregivers, a face-to-face encounter with the patient was not performed. That being said, all relevant records and diagnostic tests were reviewed, including laboratory results and imaging. Patient was evaluated from the window, called when appropriate on the phone, chart reviewed, disc w  involved healthcare workers. No fever  Still has a little cough, not short of breath  Post dialysis yesterday  Severe paraparesis, but but moves his legs slowly in bed  Has severe venous stasis dermatitis both lower extremities    Tolerating Plaquenil, liver enzymes are normal  WBC 2.2    No inflammatory parameters today    COVID neg 4/18/20  resp PCR neg  Mycoplasma IgM negative    Summary of relevant labs:  Labs:  WBC 3.5 - 1.9  Platelets 82    Liver enz normal  Micro:  BC 5/18 x 2  COVID test 5/18  resp PCR neg  Mycoplasma IgM negative     CRP 69 - 71    Ferritin 1292    [phone]  Imaging:  Chest x-ray 5/18 after dialysis shows a slight improvement. On my review it seems to be the same congestion bilaterally        CT abdomen pelvis 5/18, interval development of patchy airspace opacities in the lungs, suspicious for atypical pneumonia, diverticulosis no diverticulitis. Stable left renal masses          I have personally reviewed the past medical history, past surgical history, medications, social history, and family history, and I haveupdated the database accordingly.   Past Medical History:     Past Medical History:   Diagnosis Date    Acute combined systolic and diastolic congestive heart failure ([facility] 11/25/2016    Anemia     BPH (benign prostatic hyperplasia)     Cancer (HCC)     left ear 8/2013    CKD (chronic kidney disease) stage 3, GFR 30-59 ml/min (HCC)     Constipation     [facility] obstructive [facility] disease) [facility]     Depression     GERD (gastroesophageal reflux disease)     Hemodialysis patient ([facility]     3 times a week    HTN (hypertension)     Hx of blood clots     \" rt.shoulder/neck\"    Hyperparathyroidism ([facility]     Hypothyroidism     IDDM (insulin dependent diabetes mellitus) ([facility]     Insomnia     Liver disease     patient is unsure what it is    MDRO (multiple drug resistant organisms) resistance     hx. c-dif.  Memory deficit     Neurofibromatosis ([facility]     Osteoarthritis     Paraplegia ([facility]     chair to bed and chair transfer only    Peptic ulcer     PVD (peripheral vascular disease) ([facility]     Secondary hyperparathyroidism ([facility]     Sinus disorder     Syncope     Type II or unspecified type diabetes mellitus without mention of complication, not stated as uncontrolled     Venous thrombosis        Past Surgical  History:     Past Surgical History:   Procedure Laterality Date    CHOLECYSTECTOMY      COLONOSCOPY  08/22/2011    2 POLYPS REMOBED TUBULAR ADENOMA    DIALYSIS FISTULA CREATION Left 06/13/2016    LEFT WRIST, no longer functional    DIALYSIS FISTULA CREATION Left     antecubital, was revised one time    HIP SURGERY Right     deep laceration was repaired    INTRACAPSULAR CATARACT EXTRACTION Right 5/7/2019    EYE CATARACT EMULSIFICATION IOL IMPLANT performed by Debra Rizo MD at [address] Left 5/28/2019    EYE CATARACT EMULSIFICATION IOL IMPLANT performed by Debra Rizo MD at [address] Left     ear.     SKIN GRAFT      right axilla    THYROID SURGERY      non-cancerous lump removed    TOTAL NEPHRECTOMY Right     as a donor    TUNNELED VENOUS CATHETER PLACEMENT Right 09/03/2016    later removed    TURP         Medications:      hydroxychloroquine  200 mg Oral BID    sodium chloride flush  10 mL Intravenous 2 times per day    insulin lispro  0-6 Units Subcutaneous TID WC    insulin lispro  0-3 Units Subcutaneous Nightly    methylPREDNISolone  40 mg Intravenous BID    sodium chloride  20 mL Intravenous Once    sodium chloride  20 mL Intravenous Once    fentaNYL  1 patch Transdermal Q72H    doxycycline hyclate  100 mg Oral 2 times per day    zinc sulfate  50 mg Oral Daily       Social History:     Social History     Socioeconomic History    Marital status: Single     Spouse name: Not on file    Number of children: Not on file    Years of education: Not on file    Highest education level: Not on file   Occupational History    Not on file   Social Needs    Financial resource strain: Not on file    Food insecurity     Worry: Not on file     Inability: Not on file    Transportation needs     Medical: Not on file     Non-medical: Not on file   Tobacco Use    Smoking status: Former Smoker     Packs/day: 1.00     Types: Cigarettes     Last attempt to quit: 3/1/2019     Years since quittin.2    Smokeless tobacco: Never Used   Substance and Sexual Activity    Alcohol use: No    Drug use: No    Sexual activity: Not Currently   Lifestyle    Physical activity     Days per week: Not on file     Minutes per session: Not on file    Stress: Not on file   Relationships    Social connections     Talks on phone: Not on file     Gets together: Not on file     Attends Bahai service: Not on file     Active member of club or organization: Not on file     Attends meetings of clubs or organizations: Not on file     Relationship status: Not on file    Intimate partner violence     Fear of current or ex partner: Not on file     Emotionally abused: Not on file     Physically abused: Not on file     Forced sexual activity: Not on file   Other Topics Concern    Not on file   Social History Narrative    Not on file       Family History:     Family History   Family history unknown: Yes        Allergies:   Cymbalta [duloxetine hcl];  Tromethamine; and Toradol [ketorolac tromethamine]     Review the visit, the document can still have some errors including those of syntax and sound a like substitutions which may escape proof reading. It such instances, actual meaningcan be extrapolated by contextual diversion.     Aram Giang MD  Office: [phone]  Perfect serve / office [phone]

## 2020-05-20 NOTE — PLAN OF CARE
Problem: Falls - Risk of:  Goal: Will remain free from falls  Description: Will remain free from falls  5/19/2020 2335 by Vaishali Ortiz RN  Outcome: Ongoing  5/19/2020 1827 by Beatriz Browning RN  Outcome: Met This Shift  Goal: Absence of physical injury  Description: Absence of physical injury  5/19/2020 2335 by Vaishali Ortiz RN  Outcome: Ongoing  5/19/2020 1827 by Beatriz Browning RN  Outcome: Met This Shift     Problem: Pain:  Goal: Pain level will decrease  Description: Pain level will decrease  Outcome: Ongoing  Goal: Control of acute pain  Description: Control of acute pain  Outcome: Ongoing  Goal: Control of chronic pain  Description: Control of chronic pain  Outcome: Ongoing     Problem: Musculor/Skeletal Functional Status  Goal: Highest potential functional level  Outcome: Ongoing  Goal: Absence of falls  Outcome: Ongoing

## 2020-05-21 PROBLEM — B37.49 CANDIDIASIS OF UROGENITAL SITES: Status: ACTIVE | Noted: 2020-01-01

## 2020-05-21 NOTE — PROGRESS NOTES
Patient  Dry heave and small amount of emesis immediately after eating dinner. Patient did eat his dinner fast - within 5 minutes. Zofran given.

## 2020-05-21 NOTE — PROGRESS NOTES
[facility] -   Infectious diseases evaluation  admission date 5/18/2020    reason for consultation:   Suspected COVID    Impression :   Current:  · Bilateral patchy COVID viral pneumonia possible viral pneumonia    Other:  · Both lower extremity paraplegia post trauma  · DM 2   · CKD 3, hemodialysis MWF  · COPD 4 L baseline oxygen  ·   Discussion / summary of stay / plan of care   · Patchy bilateral pneumonia, viral pneumonia   · Elevated CRP is significant but elevated ferritin might be related to dialysis  · Current QTc interval is normal but patient has a history of QTC more than 500  · Thrombocytopenia, 86, while patient runs 140, can be sepsis related  Recommendations     · Post vancomycin and cefepime times on 5/18, stop 5/19  · Post doxy till 5/20 -stopped since mycoplasma was negative  · Zinc 50 mg daily  · Plaquenil 5 days per protocol till 5/23/20  · QTC increased to 486, watch with another EKG in the morning  · Avoid combining other medication that will increase QTC  · Not a candidate for Remdesevir due to RF  · EKG and LFTs daily till 5/23  · Discussed with medicine      Infection Control Recommendations   · [place] Precautions  · Contact Isolation   · Airborne isolation  · Droplet Isolation    Antimicrobial Stewardship Recommendations   · Simplification of therapy  · Targeted therapy      Coordination ofOutpatient Care:   · Estimated Length of IV antimicrobials:  · Patient will need Midline / picc Catheter Insertion:   · Patient will need SNF:  · Patient will need outpatient wound care:     History of Present Illness:   Initial history:  Ciro Ramires is a 78y.o.-year-old male dialysis patient, diabetic, poor historian, presented to [facility] with feeling of aches all over her body productive cough. She felt that the symptoms are worsening over the last few days. She even coughed blood clots.   Had to miss dialysis and had nausea and Medications:      [START ON 2020] isosorbide mononitrate  30 mg Oral Daily    darbepoetin [place]-polysorbate  100 mcg Intravenous Weekly - Thursday    miconazole   Topical BID    apixaban  5 mg Oral BID    calcium acetate  2,001 mg Oral TID     dilTIAZem  240 mg Oral Daily    midodrine  5 mg Oral Once per day on     senna  1 tablet Oral BID    metoprolol tartrate  25 mg Oral Daily    budesonide-formoterol  2 puff Inhalation BID    escitalopram  10 mg Oral QAM    finasteride  5 mg Oral Daily    fluticasone  1 spray Nasal Daily    insulin glargine  10 Units Subcutaneous BID    hydroxychloroquine  200 mg Oral BID    sodium chloride flush  10 mL Intravenous 2 times per day    insulin lispro  0-6 Units Subcutaneous TID WC    insulin lispro  0-3 Units Subcutaneous Nightly    methylPREDNISolone  40 mg Intravenous BID    sodium chloride  20 mL Intravenous Once    sodium chloride  20 mL Intravenous Once    fentaNYL  1 patch Transdermal Q72H    zinc sulfate  50 mg Oral Daily       Social History:     Social History     Socioeconomic History    Marital status: Single     Spouse name: Not on file    Number of children: Not on file    Years of education: Not on file    Highest education level: Not on file   Occupational History    Not on file   Social Needs    Financial resource strain: Not on file    Food insecurity     Worry: Not on file     Inability: Not on file    Transportation needs     Medical: Not on file     Non-medical: Not on file   Tobacco Use    Smoking status: Former Smoker     Packs/day: 1.00     Types: Cigarettes     Last attempt to quit: 3/1/2019     Years since quittin.2    Smokeless tobacco: Never Used   Substance and Sexual Activity    Alcohol use: No    Drug use: No    Sexual activity: Not Currently   Lifestyle    Physical activity     Days per week: Not on file     Minutes per session: Not on file    Stress: Not on file   Relationships    140/43 98.3 °F (36.8 °C) Oral 86 22 97 % 271 lb 2.7 oz (123 kg)   05/21/20 0800 -- -- -- 68 -- -- --       Physical Exam      Medical Decision Making:   I have independently reviewed/ordered the following labs:    CBC with Differential:   Recent Labs     05/20/20  0550 05/21/20  0637   WBC 2.2* 4.6   HGB 7.1* 7.6*   HCT 24.1* 25.7*   PLT 69* 84*   LYMPHOPCT 6* 3*   MONOPCT 0* 3     BMP:  Recent Labs     05/18/20  2300  05/20/20  0550 05/21/20  0637   NA  --    < > 141 143   K 4.9   < > 5.0 4.4   CL  --    < > 100 101   CO2  --    < > 22 21   BUN  --    < > 54* 47*   CREATININE  --    < > 4.67* 4.34*   MG 1.6  --   --   --     < > = values in this interval not displayed. Hepatic Function Panel:   Recent Labs     05/20/20  0550 05/21/20  0637   PROT 5.6* 5.7*   LABALBU 3.1* 3.3*   BILIDIR 0.13 0.18   IBILI 0.18 0.27   BILITOT 0.31 0.45   ALKPHOS 158* 165*   ALT 31 39   AST 24 27     No results for input(s): RPR in the last 72 hours. No results for input(s): HIV in the last 72 hours. No results for input(s): BC in the last 72 hours. Lab Results   Component Value Date    CREATININE 4.34 05/21/2020    GLUCOSE 167 05/21/2020    GLUCOSE 135 06/01/2012       Detailed results: Thank you for allowing us to participate in the care of this patient. Please call with questions. This note is created with the assistance of a speech recognition program.  While intending to generate adocument that actually reflects the content of the visit, the document can still have some errors including those of syntax and sound a like substitutions which may escape proof reading. It such instances, actual meaningcan be extrapolated by contextual diversion.     Yonny Amanda MD  Office: [phone]  Perfect serve / office [phone]

## 2020-05-21 NOTE — PROGRESS NOTES
[facility]    Progress Note    5/21/2020    2:16 PM    Name:   Tay Gee  MRN:     [id number]     [place]:      [de-identified]   Room:   2027/2027-01  IP Day:  3  Admit Date:  5/18/2020  8:46 AM    PCP:   Karl Hickman DO  Code Status:  Limited    Subjective:     C/C: Shortness of breat    Interval History Status: improved. Pt seated on bedside doing word search upon evaluation. He underwent dialysis today. He is reporting improvement in shortness of breath following dialysis. He continues to report nausea and one episode of vomiting this morning. He remains afebrile and hemodynamically stable. He is saturating well at 5L. Labs reviewed. Brief History:        Tay Gee is a 78 y.o. male with a complex medical history including COPD (on 4L NC at baseline), severe LVH, CKD on HD (M, W, F), diabetes mellitus, PAD, bilateral lower extremity paraplegia secondary to previous traumatic injury. Presented complaining of shortness of breath and fatigue.     Pt is a very poor historian and the majority of my history is obtained via EMR.     Pt initially presented to [facility] ED. Pt reports that symptoms began one week ago. States that he felt as if he had a \"cold\" complaining of body aches, mildly productive cough. Symptoms continued to worsen daily. He reports an episode of coughing up a large amount of blood clots. States that this has never previously happened. Due to being ill, he missed two sessions of dialysis, with his last session on May 11. He is complaining of associated nausea and vomiting. He resides at [facility].      ED evaluation significant for potassium of 6.5, Hgb 7.2, platelets 82, trop 041, Covid +. CT abdomen/pelvis with patchy airspace disease. Nephrology was consulted. Pt received a dose of kayexalate and was subsequently transferred to [facility]. Pt was admitted to medicine for further care.       Pt disorder, Syncope, Type II or unspecified type diabetes mellitus without mention of complication, not stated as uncontrolled, and Venous thrombosis. Social History:   reports that he quit smoking about 14 months ago. His smoking use included cigarettes. He smoked 1.00 pack per day. He has never used smokeless tobacco. He reports that he does not drink alcohol or use drugs. Family History:   Family History   Family history unknown: Yes       Vitals:  /62   Pulse 74   Temp 98.3 °F (36.8 °C)   Resp 19   Ht 6' (1.829 m)   Wt 264 lb 8.8 oz (120 kg)   SpO2 100%   BMI 35.88 kg/m²   Temp (24hrs), Av.6 °F (37 °C), Min:98.3 °F (36.8 °C), Max:99.3 °F (37.4 °C)    Recent Labs     20  1212 20  1607 20  0024 20  1216   POCGLU 173* 164* 131* 190*       I/O (24Hr):     Intake/Output Summary (Last 24 hours) at 2020 1416  Last data filed at 2020 1332  Gross per 24 hour   Intake 1200 ml   Output 3530 ml   Net -2330 ml       Labs:  Hematology:  Recent Labs     20  1558 20  0323 20  0550 20  0637   WBC 3.5 1.9* 2.2* 4.6   RBC 2.33* 2.35* 2.39* 2.55*   HGB 7.0* 7.0* 7.1* 7.6*   HCT 24.4* 23.9* 24.1* 25.7*   .7* 101.7 100.8 100.8   MCH 30.0 29.8 29.7 29.8   MCHC 28.7 29.3 29.5 29.6   RDW 14.7* 14.3 14.2 14.3   PLT 67* 70* 69* 84*   MPV 10.0 10.2 9.8 9.2   CRP 69.3* 71.6*  --  25.4*   INR  --  1.0  --   --    DDIMER 0.45  --   --   --      Chemistry:  Recent Labs     20  1558 20  2300 20  0323 20  1207 20  0550 20  0637     --  139  --  141 143   K 6.3* 4.9 5.5*  --  5.0 4.4   *  --  103  --  100 101   CO2 14*  --  20  --  22 21   GLUCOSE 114*  --  165*  --  195* 167*   BUN 92*  --  59*  --  54* 47*   CREATININE 8.02*  --  5.42*  --  4.67* 4.34*   MG  --  1.6  --   --   --   --    ANIONGAP 17  --  16  --  19* 21*   LABGLOM 7*  --  11*  --  12* 14*   GFRAA 8*  --  13*  --  15* 16*   CALCIUM 8.1*  --  8.2* since initial occurrence, will continue to monitor   - monitor hemoglobin     #Thrombocytopenia   - appears to be chronic. Likely secondary to marrow suppression with acute on chronic illness with underlying renal failure. Continue to trend. Peripheral smear unrevealing    #Anemia  - chronic, secondary to chronic disease and underlying renal failure. Hemolysis labs unrevealing. Peripheral smear unrevealing. Hemoglobin remains stable.  FOBT pending.     #Remains DNR-CCA, with intubation     #PT/OT assessment    #Anticipate discharge back to SNF tomorrow       Holly Hamm PA-C  5/21/2020  2:16 PM

## 2020-05-22 NOTE — FLOWSHEET NOTE
Pt heart rat 150's Afib with RVR  Pt c/o feeling dizzy. Stewart Laguerre bp decreased to 120's from 160's. . EKG obtained . Had pt attempt vagal manuvers without success. Heart rate gradually returned to rates 100's.

## 2020-05-22 NOTE — PROGRESS NOTES
obstructive pulmonary disease) (HCC)     Depression     GERD (gastroesophageal reflux disease)     Hemodialysis patient ([facility]     3 times a week    HTN (hypertension)     Hx of blood clots     \" rt.shoulder/neck\"    Hyperparathyroidism ([facility]     Hypothyroidism     IDDM (insulin dependent diabetes mellitus) ([facility]     Insomnia     Liver disease     patient is unsure what it is    MDRO (multiple drug resistant organisms) resistance     hx. c-dif.  Memory deficit     Neurofibromatosis ([facility]     Osteoarthritis     Paraplegia ([facility]     chair to bed and chair transfer only    Peptic ulcer     PVD (peripheral vascular disease) ([facility]     Secondary hyperparathyroidism ([facility]     Sinus disorder     Syncope     Type II or unspecified type diabetes mellitus without mention of complication, not stated as uncontrolled     Venous thrombosis        Past Surgical  History:     Past Surgical History:   Procedure Laterality Date    CHOLECYSTECTOMY      COLONOSCOPY  08/22/2011    2 POLYPS REMOBED TUBULAR ADENOMA    DIALYSIS FISTULA CREATION Left 06/13/2016    LEFT WRIST, no longer functional    DIALYSIS FISTULA CREATION Left     antecubital, was revised one time    HIP SURGERY Right     deep laceration was repaired    INTRACAPSULAR CATARACT EXTRACTION Right 5/7/2019    EYE CATARACT EMULSIFICATION IOL IMPLANT performed by Kathryn Sahu MD at [address] [place] [address] Left 5/28/2019    EYE CATARACT EMULSIFICATION IOL IMPLANT performed by Kathryn Sahu MD at [address] [address] Left     ear.     SKIN GRAFT      right axilla    THYROID SURGERY      non-cancerous lump removed    TOTAL NEPHRECTOMY Right     as a donor    TUNNELED VENOUS CATHETER PLACEMENT Right 09/03/2016    later removed    TURP         Medications:      metoprolol tartrate  50 mg Oral BID    isosorbide mononitrate  30 mg Oral Daily    darbepoetin [place]-polysorbate  100 mcg Intravenous Weekly -

## 2020-05-22 NOTE — CARE COORDINATION
Spoke with Claire Garcia at [facility] she relates admissions not there today, took name and number for return call back. 1310 Message left for Heth SW to verify dialysis day and time and location for Covid Pos pt    1346 Spoke with Heth from dialysis the patient has a chair time 1-130pm at "[facility]" [place] [address] [address] with Ana Chun they will be able to see patient for dialysis at 745 am tomorrow. RN notified    [phone] with Arely Waldrop they can take patient today, Dialysis to be done tomorrow at "[facility]", chair time 745am. Arely Waldrop will see if they can get transportation set up for morning. [address] [place] [address] with Arely Waldrop they can accomodate transport to Dialysis tomorrow, Transport requested for 5pm  through [facility]. Patient aware of transport time.  Patient sitting at side of bed, remains on O2 5L nc    1500 Call from [place], [facility] must set up transport, call into [facility] transportation to set up transport to SNF,     Discharge 751 [facility] Case Management Department  Written by: Luiza Braden RN    Patient Name: Noemy Contreras  Attending Provider: Oneil Plascencia DO  Admit Date: 2020  8:46 AM  MRN: [id number]  Account: [de-identified]                     : 1950  Discharge Date: 2020      Disposition: SNF, Barbara Pretty RN

## 2020-05-22 NOTE — DISCHARGE INSTR - COC
Continuity of Care Form    Patient Name: Parag Barcenas   :  1950  MRN:  [id number]    [address] [facility] [facility] date:  2020  Discharge date 2020    Code Status Order: Limited   Advance Directives:     Admitting Physician:  Ray Keating DO  PCP: Sis Zepeda DO    Discharging Nurse: [facility] Unit/Room#:   Discharging Unit Phone Number: [phone]    Emergency Contact:   Extended Emergency Contact Information  Primary Emergency Contact: Simon Hernandez  Address: [place] [address] Phone: [phone]  Relation: Child  Hearing or visual needs: None  Other needs: None  Preferred language: Oj Duron   needed? No  Secondary Emergency Contact: Shantal Mccall  Address: [facility] [place], [facility] [address] [place] [address] Phone: [phone]  Mobile Phone: [phone]  Relation: Other  Hearing or visual needs: None  Other needs: None  Preferred language: English   needed? No    Past Surgical History:  Past Surgical History:   Procedure Laterality Date    CHOLECYSTECTOMY      COLONOSCOPY  2011    2 POLYPS REMOBED TUBULAR ADENOMA    DIALYSIS FISTULA CREATION Left 2016    LEFT WRIST, no longer functional    DIALYSIS FISTULA CREATION Left     antecubital, was revised one time    HIP SURGERY Right     deep laceration was repaired    INTRACAPSULAR CATARACT EXTRACTION Right 2019    EYE CATARACT EMULSIFICATION IOL IMPLANT performed by Maria Ines Mcgraw MD at [address] [place] [address] Left 2019    EYE CATARACT EMULSIFICATION IOL IMPLANT performed by Maria Ines Mcgraw MD at [address] [place] [address]. Left     ear.     SKIN GRAFT      right axilla    THYROID SURGERY      non-cancerous lump removed    TOTAL NEPHRECTOMY Right     as a donor    TUNNELED VENOUS CATHETER PLACEMENT Right 2016    later removed    TURP         Immunization History:   Immunization 05/18/20 05/18/20 COVID-19  07/13/20      MRSA  06/10/16 06/10/16 Ej Salmon RN        Groin - 5/2013    Resolved    COVID-19 Rule Out 05/18/20 05/18/20 05/18/20 COVID-19 (Ordered)   05/18/20 Rule-Out Test Resulted          Nurse Assessment:  Last Vital Signs: BP (!) 101/52   Pulse 81   Temp 98.6 °F (37 °C) (Oral)   Resp 21   Ht 6' (1.829 m)   Wt 272 lb 7.8 oz (123.6 kg)   SpO2 97%   BMI 36.96 kg/m²     Last documented pain score (0-10 scale): Pain Level: 8  Last Weight:   Wt Readings from Last 1 Encounters:   05/22/20 272 lb 7.8 oz (123.6 kg)     Mental Status:  oriented    IV Access:  - None    Nursing Mobility/ADLs:  Walking   Dependent  Transfer  Assisted  Bathing  Dependent  Dressing  Dependent  Toileting  Assisted  Feeding  Independent  Med Admin  Dependent  Med Delivery   none    Wound Care Documentation and Therapy:  Wound 01/03/20 Axilla Anterior;Proximal;Right;Upper redness tear ertythema (Active)   Number of days: 139       Wound 01/03/20 Axilla Anterior;Left;Proximal;Upper redness erythema (Active)   Number of days: 139       Wound 01/03/20 Scrotum redness (Active)   Number of days: 139       Wound 03/02/20 Rectum area approx 6 inch bright red (Active)   Number of days: 80       Wound 03/03/20 Thigh Proximal;Right;Dorsal Baseball sized redish- purple tee with pin size exit point (Active)   Number of days: 80       Wound 05/18/20 Scrotum Right small skin tear in right scrotum (Active)   Wound Skin Tear 5/21/2020  8:00 AM   Dressing Status Other (Comment) 5/22/2020  7:00 AM   Wound Assessment Clean;Dry; Intact 5/22/2020  4:15 AM   Drainage Amount Small 5/22/2020  4:15 AM   Drainage Description Sanguinous 5/22/2020  4:15 AM   Odor None 5/22/2020  4:15 AM   Culture Taken No 5/19/2020  8:00 AM   Number of days: 4        Elimination:  Continence:   · Bowel:  Yes  · Bladder: Yes  Urinary Catheter: None   Colostomy/Ileostomy/Ileal Conduit: No       Date of Last BM: 05/21/2020    Intake/Output Summary

## 2020-05-22 NOTE — PROGRESS NOTES
metoprolol tartrate (LOPRESSOR) tablet 50 mg, BID  isosorbide mononitrate (IMDUR) extended release tablet 30 mg, Daily  darbepoetin brooklyn-polysorbate (ARANESP) injection 100 mcg, Weekly - Thursday  miconazole (MICOTIN) 2 % powder, BID  oxyCODONE (ROXICODONE) immediate release tablet 5 mg, Q6H PRN  apixaban (ELIQUIS) tablet 5 mg, BID  calcium acetate (PHOSLO) capsule 2,001 mg, TID WC  dilTIAZem (CARDIZEM CD) extended release capsule 240 mg, Daily  midodrine (PROAMATINE) tablet 5 mg, Once per day on Mon Wed Fri  senna (SENOKOT) tablet 8.6 mg, BID  budesonide-formoterol (SYMBICORT) 80-4.5 MCG/ACT inhaler 2 puff, BID  escitalopram (LEXAPRO) tablet 10 mg, QAM  finasteride (PROSCAR) tablet 5 mg, Daily  fluticasone (FLONASE) 50 MCG/ACT nasal spray 1 spray, Daily  insulin glargine (LANTUS) injection vial 10 Units, BID  acetaminophen (TYLENOL) tablet 650 mg, Q6H PRN    Or  acetaminophen (TYLENOL) suppository 650 mg, Q6H PRN  magnesium hydroxide (MILK OF MAGNESIA) 400 MG/5ML suspension 30 mL, Daily PRN  nicotine (NICODERM CQ) 21 MG/24HR 1 patch, Daily PRN  promethazine (PHENERGAN) tablet 12.5 mg, Q6H PRN    Or  ondansetron (ZOFRAN) injection 4 mg, Q6H PRN  sodium chloride flush 0.9 % injection 10 mL, 2 times per day  sodium chloride flush 0.9 % injection 10 mL, PRN  dextrose 5 % solution, PRN  dextrose 50 % IV solution, PRN  glucagon (rDNA) injection 1 mg, PRN  glucose (GLUTOSE) 40 % oral gel 15 g, PRN  insulin lispro (HUMALOG) injection vial 0-6 Units, TID WC  insulin lispro (HUMALOG) injection vial 0-3 Units, Nightly  traMADol (ULTRAM) tablet 50 mg, Q12H PRN  lidocaine (LMX) 4 % cream, PRN  midodrine (PROAMATINE) tablet 10 mg, PRN  methylPREDNISolone sodium (SOLU-MEDROL) injection 40 mg, BID  0.9 % sodium chloride bolus, Once  0.9 % sodium chloride bolus, Once  fentaNYL (DURAGESIC) 25 MCG/HR 1 patch, Q72H  zinc sulfate (ZINCATE) capsule 50 mg, Daily      Labs:   CBC:   Recent Labs     05/20/20  0550 05/21/20  5471

## 2020-05-24 PROBLEM — I48.91 RAPID ATRIAL FIBRILLATION (HCC): Status: ACTIVE | Noted: 2020-01-01

## 2020-05-24 NOTE — ED NOTES
Pt calling out for IV pump going off. RN instructed pt he needs to keep his arm in place or else it will continue to go off. Pt did not have his mask, oxygen or pulse ox on upon entering the room. RN placed pts pulse ox, mask and oxygen back on pt.       Luis Alfredo Ness RN  05/24/20 9831

## 2020-05-24 NOTE — PROGRESS NOTES
[facility]    Progress Note    5/24/2020    10:54 AM    Name:   Ruth Sandoval  MRN:     [id number]     Ofelia Meridam:      [de-identified]   Room:   2002/2002-01  IP Day:  0  Admit Date:  5/24/2020  9:47 AM    PCP:   Mitali Shook DO  Code Status:  DNR-CCA    Subjective:     C/C: rapid afib  Interval History Status: not changed. Readmitted with rapid afib, developed sob  Went to [facility] and transferred to [facility] due to prior covid positive    Brief History:   From prior admission 5/18/20-5/22/20:    Pt admitted for covid-19 pneumonia. Found to be in acute on chronic hypoxic respiratory failure. He was placed on 5L nasal cannula and maintained saturations. ID consulted. Pt started on empiric antibiotics initially for possible CAP. Steroids added d/t COPD and wheezing. Antibiotics discontinued. Plaquenil started on 5/19.  Nephrology was consulted and pt underwent dialysis without complication. His respiratory status improved following dialysis. Repeat hemoglobin 7.0 and pt transfused one unit PRBC.     5/21 - pt condition gradually improving. He has underwent 4 episodes of dialysis since admission. His weight is down by 5 to 6 kg. Symptoms have improved and hemoglobin has remained stable. Platelets remain low but stable. \"     Treated with plaquenil by ID. On 5L nc o2 and has been stable on that amount for days, usually wears 4L o2 24/7.     On 5/22 developed tachycardia-looked to be rapid afib: responded to po lopressor, dose increased    Now readmitted with rapid afib    Review of Systems:     Constitutional:  negative for chills, fevers, sweats  Respiratory:  negative for cough,  wheezing  Cardiovascular:  negative for lower extremity edema, palpitations  Gastrointestinal:  negative for abdominal pain, constipation, diarrhea, nausea, vomiting  Neurological:  negative for dizziness, headache    Medications: Allergies:     Allergies Allergen Reactions    Cymbalta [Duloxetine Hcl]      Intolerance.  Tromethamine Other (See Comments)    Toradol [Ketorolac Tromethamine] Other (See Comments)     Headaches        Current Meds:   Scheduled Meds:    dilTIAZem  10 mg Intravenous Once    sodium chloride flush  10 mL Intravenous 2 times per day    cefepime  500 mg Intravenous Q24H    insulin lispro  0-6 Units Subcutaneous TID WC    insulin lispro  0-3 Units Subcutaneous Nightly     Continuous Infusions:    dilTIAZem (CARDIZEM) 125 mg in dextrose 5% 125 mL infusion      dextrose       PRN Meds: acetaminophen **OR** acetaminophen, nicotine, promethazine **OR** ondansetron, sodium chloride flush, albuterol sulfate HFA, dextrose, dextrose, glucagon (rDNA), glucose    Data:     Past Medical History:   has a past medical history of Acute combined systolic and diastolic congestive heart failure ([facility] Anemia, BPH (benign prostatic hyperplasia), Cancer ([facility] CKD (chronic kidney disease) stage 3, GFR 30-59 ml/min ([facility] Constipation, COPD (chronic obstructive pulmonary disease) ([facility]), Depression, GERD (gastroesophageal reflux disease), Hemodialysis patient ([facility] HTN (hypertension), Hx of blood clots, Hyperparathyroidism ([facility] Hypothyroidism, IDDM (insulin dependent diabetes mellitus) ([facility] Insomnia, Liver disease, MDRO (multiple drug resistant organisms) resistance, Memory deficit, Neurofibromatosis ([facility] Osteoarthritis, Paraplegia ([facility] Peptic ulcer, PVD (peripheral vascular disease) ([facility] Secondary hyperparathyroidism ([facility] Sinus disorder, Syncope, Type II or unspecified type diabetes mellitus without mention of complication, not stated as uncontrolled, and Venous thrombosis. Social History:   reports that he quit smoking about 14 months ago. His smoking use included cigarettes. He smoked 1.00 pack per day. He has never used smokeless tobacco. He reports that he does not drink alcohol or use drugs.      Family History:   Family History Family history unknown: Yes       Vitals:  /83   Pulse 126   Temp 98.1 °F (36.7 °C) (Oral)   Resp 24   SpO2 90%   Temp (24hrs), Av.3 °F (36.8 °C), Min:98 °F (36.7 °C), Max:98.6 °F (37 °C)    Recent Labs     20  1224   POCGLU 190* 159* 177*       I/O (24Hr):   No intake or output data in the 24 hours ending 20 1054    Labs:  Hematology:  Recent Labs     20  0000   WBC 7.7 6.4   RBC 2.86* 2.75*   HGB 8.3* 7.9*   HCT 28.3* 25.5*   MCV 99.0 92.9   MCH 29.0 28.9   MCHC 29.3 31.1   RDW 14.0 15.8*   * 155   MPV 10.7 8.8   CRP  --  116.6*     Chemistry:  Recent Labs     20  0000 20  0148     --    K 4.8  --    CL 95*  --    CO2 21  --    GLUCOSE 147*  --    *  --    CREATININE 7.44*  --    MG 1.7  --    ANIONGAP 21*  --    LABGLOM 7*  --    GFRAA 9*  --    CALCIUM 8.5*  --    PROBNP 18,879*  --    TROPHS 146* 146*     Recent Labs     20  0443 20  1224 20  0000   PROT  --   --  6.2*  --  5.9*   LABALBU  --   --  3.6  --  3.3*   AST  --   --  31  --  26   ALT  --   --  44*  --  43*   LDH  --   --   --   --  375*   ALKPHOS  --   --  181*  --  178*   BILITOT  --   --  0.52  --  0.50   BILIDIR  --   --  0.20  --   --    POCGLU 190* 159*  --  177*  --      ABG:  Lab Results   Component Value Date    PHART 7.342 2020    PH 7.396 2012    VWZ9EDI 40.5 2020    PCO2 38.8 2012    PO2ART 57.6 2020    CWC1ROV 22.0 2020    NBEA 3.8 2020    PBEA NOT REPORTED 2020    O9QRKPBO 84.0 2020    FIO2 NOT REPORTED 2020     Lab Results   Component Value Date/Time    SPECIAL NOT REPORTED 2020 10:44 AM     Lab Results   Component Value Date/Time    CULTURE NO GROWTH 6 DAYS 2020 03:23 AM       Radiology:  Ct Abdomen Pelvis Wo Contrast Additional Contrast? None    Result Date: 2020  Interval development of patchy airspace

## 2020-05-24 NOTE — PROGRESS NOTES
Results      Ht Readings from Last 1 Encounters:   05/23/20 6' (1.829 m)        Wt Readings from Last 1 Encounters:   05/23/20 270 lb (122.5 kg)         Body mass index is 36.62 kg/m². Estimated Creatinine Clearance: 12 mL/min (A) (based on SCr of 7.44 mg/dL [facility] AT [facility])). Goal Trough Level: 15-20 mcg/mL    Assessment/Plan:  Will initiate Vancomycin with a one time loading dose of 1750 mg x1, followed by dialysis dosing. Timing of trough level will be determined based on culture results, renal function, and clinical response. Thank you for the consult. Will continue to follow.     Marj Nunez RPh     -5/24/2020 at 4:04 AM

## 2020-05-24 NOTE — PROGRESS NOTES
Pharmacy Note  Vancomycin Consult    Adelaida De Jesus is a 78 y.o. male started on Vancomycin for   Acute respiratory failure with hypoxia; consult received from Dr. Lisette Chavez to manage therapy. Also receiving the following antibiotics: Cefepime. 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    Cellulitis of scrotum    Anemia, chronic renal failure    Thrombocytopenia (HCC)    Chest pain    Left flank pain    End stage renal disease on dialysis ([facility])    Venous stasis dermatitis of both lower extremities    Dizziness    Hypocalcemia    Acute combined systolic and diastolic congestive heart failure ([facility])    Respiratory distress    Acute on chronic respiratory failure with hypoxia ([facility])    Fluid overload    Rib pain on left side    Hypoglycemia    CRF (chronic renal failure), stage 5 (HCC)    Chronic renal failure, stage 3 (moderate) ([facility])    Hyperuricemia    Altered mental status    Atrial fibrillation ([facility]    ESRD on hemodialysis ([facility]    Fracture of rib of right side    Hypoxia    Phlegmon on Right Buttock    Mass of lingula of lung    Acute mastoiditis of left side    Acute metabolic encephalopathy    Atypical pneumonia    Pneumonia due to COVID-19 virus    Candidiasis of urogenital sites    Rapid atrial fibrillation ([facility])       Allergies:  Cymbalta [duloxetine hcl];  Tromethamine; and Toradol [ketorolac tromethamine]     Temp max: 98.6    Recent Labs     05/24/20  0000   *       Recent Labs     05/24/20  0000   CREATININE 7.44*       Recent Labs     05/22/20  0443 05/24/20  0000   WBC 7.7 6.4       No intake or output data in the 24 hours ending 05/24/20

## 2020-05-24 NOTE — CONSULTS
[facility] -   Infectious diseases evaluation  admission date 5/24/2020    reason for consultation:   covid +    Impression :   Current:  · bilat patchy COVID viral pneumonia - worsening  · Elevated ferritin crp LDH    Other:  · HD- COPD 4 L baseline  Discussion / summary of stay / plan of care   · COVID pneumonia bilat patchy, mycoplasma neg   · HD pt- not a candidate for Remdesevir or anti IL6  · Left home and back w SOB still -  · CT 5/24 worse bilat pulm infiltrates- treat as aspiration pneumonia    · Consider immune serum  Recommendations   · Keep Zinc 50 mg daily  · Convalescent COVID serum   · supportive care  · Try to keep on the dry side  · Start meropenem 1 g daily for possible aspiration pneumonia as a cause of the rapid CT findings deterioration. · Avoid the unasyn and zosyn due to Na over dose and ESRD. · Order Immune serum - pt agreable - ordfered  Infection Control Recommendations   · [place] Precautions  · Contact Isolation   · Airborne isolation  · Droplet Isolation    Antimicrobial Stewardship Recommendations   · Off antibiotics   Coordination ofOutpatient Care:   · Estimated Length of IV antimicrobials:  · Patient will need Midline / picc Catheter Insertion:   · Patient will need SNF:  · Patient will need outpatient wound care:     History of Present Illness:   Initial history:  Kamille Lobato is a 78y.o.-year-old male with COVID viral pneumonia w elevated infl markers and ESRD on HD. Stabilized and sent back to rehab 5/22 on his 5 L NC-  He has COPD and uses 5 L usually. Back now w vomiting and w SOB, CT chest shows worsening of the diffuse infiltrates. inflammaotry markers are elevated as well.     No fever - feels very tired from dry heaving- very  Alert approrpiate  Case disc w Dr Oquendo Sadia Blood    Interval changes  5/24/2020       Summary of relevant labs:  Labs:  WBC 3.5 - 1.9 -4.6 - 7.7  Platelets 82 - 557     CRP 69-71-25  Ferritin 1298

## 2020-05-24 NOTE — ED NOTES
pts oxygen increased from 4L/min NC to 6L/min NC     Kerry Duque [facility]  05/24/20 9435

## 2020-05-24 NOTE — ED PROVIDER NOTES
(severe pain) for up to 7 days. RAMELTEON (ROZEREM) 8 MG TABLET    Take 8 mg by mouth nightly    SENNA (SENOKOT) 8.6 MG TABLET    Take 1 tablet by mouth 2 times daily    SEVELAMER (RENVELA) 800 MG TABLET    Take 3 tablets by mouth 3 times daily (with meals)     TRAMADOL (ULTRAM) 50 MG TABLET    Take 1 tablet by mouth every 12 hours as needed (mild-mod pain) for up to 7 days. ZINC SULFATE (ZINCATE) 220 (50 ZN) MG CAPSULE    Take 1 capsule by mouth daily     ALLERGIES     is allergic to cymbalta [duloxetine hcl]; tromethamine; and toradol [ketorolac tromethamine]. FAMILY HISTORY     has no family status information on file. SOCIAL HISTORY       Social History     Tobacco Use    Smoking status: Former Smoker     Packs/day: 1.00     Types: Cigarettes     Last attempt to quit: 3/1/2019     Years since quittin.2    Smokeless tobacco: Never Used   Substance Use Topics    Alcohol use: No    Drug use: No     PHYSICAL EXAM     INITIAL VITALS: /67   Pulse 120   Temp 98.6 °F (37 °C) (Oral)   Resp 19   Ht 6' (1.829 m)   Wt 270 lb (122.5 kg)   SpO2 90%   BMI 36.62 kg/m²    Physical Exam  Vitals signs and nursing note reviewed. Constitutional:       General: He is not in acute distress. Appearance: He is well-developed. Comments: Elderly obese male sitting on the side of the bed   HENT:      Head: Normocephalic and atraumatic. Eyes:      Conjunctiva/sclera: Conjunctivae normal.   Neck:      Musculoskeletal: Neck supple. Cardiovascular:      Rate and Rhythm: Tachycardia present. Rhythm irregular. Pulses: Normal pulses. Heart sounds: No murmur. No friction rub. Comments: Left upper arm AV fistula with good palpable thrill  Pulmonary:      Comments: Increased respiratory rate but no crackles on exam no conversational dyspnea  Abdominal:      General: There is no distension. Palpations: Abdomen is soft. Tenderness: There is no abdominal tenderness.  There is no respiratory rate does decrease. He is currently 91% on his 4 L which is what I would expect from the end-stage COPD patient. 3:59 AM EDT  Patient has a PO2 of 57. This is on his 4 L of nasal cannula will increase to 6 L. His troponin is 140 6 repeat is 146 he does have a very elevated BNP from previous his CRP LDH pro calcitonin are all high. His BUN is now high again at 100. He has an anion gap of 21 most likely secondary to requiring dialysis. A CT PE is suboptimal for PE no obvious pulmonary embolism but worsening multifocal pulmonary opacities which could be worsening COVID pneumonia. Because I cannot exclude bacterial pneumonia I did give the patient vancomycin and cefepime will admit the patient. Because all of these patients need to be transferred to [facility] will attempt to transfer. 4:26 AM EDT  I spoke to Zoe Seth nurse practitioner who accepts the patient for Dr. Jhonny Malik over at [facility] the patient will be transferred. 6:56 AM EDT  Patient becoming agitated. He was hypoxic to the low 80s. He had ripped off his oxygen. His oxygen was placed back on and his oxygen came back up to 92%. We will give him a dose of Ativan. He continues to say that he wants to go to [facility] because they have a window and at [facility] we does have a door and he is claustrophobic. We are attempting to keep the patient comfortable. EKG: All EKG's are interpreted by the Emergency Department Physician who either signs or Co-signs this chart in the absence of a cardiologist.  Atrial fibrillation with RVR with heart rate of 113 bpm right bundle branch block inversion in V1 ST depression in V5 seen in previous EKG. No acute changes. CONSULTS:  PHARMACY TO DOSE VANCOMYCIN    Vitals:    Vitals:    05/24/20 0239 05/24/20 0240 05/24/20 0241 05/24/20 0242   BP:       Pulse: 115 120 116 120   Resp: 23 22 25 19   Temp:       TempSrc:       SpO2: 90% 91% 91% 90%   Weight:       Height:            The patient was given the following medications while in the emergency department:  Orders Placed This Encounter   Medications    metoprolol (LOPRESSOR) injection 5 mg    0.9 % sodium chloride bolus    sodium chloride flush 0.9 % injection 10 mL    DISCONTD: iopamidol (ISOVUE-370) 76 % injection 75 mL    metoprolol (LOPRESSOR) injection 5 mg    ioversol (OPTIRAY) 74 % injection 75 mL    cefepime (MAXIPIME) 2 g IVPB minibag    vancomycin (VANCOCIN) intermittent dosing (placeholder)    vancomycin (VANCOCIN) 1,750 mg in dextrose 5 % 500 mL IVPB         FINAL IMPRESSION      1. Acute respiratory failure with hypoxia ([address]    2. COVID-19    3. Uremia    4.  Hypervolemia, unspecified hypervolemia type         DISPOSITION/PLAN   DISPOSITION  Transfer    Basil Villasenor MD  Attending Emergency Physician    This charting supersedes any ED resident or staff charting and was written using speech recognition software       Basil Villasenor MD  05/24/20 9728       Basil Villasenor MD  05/24/20 4166

## 2020-05-24 NOTE — ED NOTES
Pt called out requesting AMA paperwork. Dr. Irish Buckner at bedside talking to pt a second time about leaving AMA.       Urszula Hook RN  05/24/20 3460

## 2020-05-24 NOTE — ED NOTES
Pt took off pulse ox, upon entering the room pt had his mask and oxygen off. This RN stated the importance of the pt wearing his mask and oxygen. Pt stated \"I want to go I need to get out of here I am claustrophobic\". RN explained why the pt can no leave his room at this time. RN placed pts pulse ox and monitor and oxygen back on.           Tricia Martin RN  05/24/20 8788

## 2020-05-24 NOTE — ED NOTES
Pt up to bedside commode for bowel movement. Pt unable to have a bowel movement at this time. Pt urinated and was cleaned up. Pt stated he is able to clean himself at the nursing home but unable to clean himself up today. Pt repeatedly taking off oxygen and mask. Pt asked to put mask on pt stated \"I feel like I cant breathe\". Pt asked repeatedly to put mask back on for safety. Pt stated he is unable to place the mask back on. This RN placed the oxygen and mask back on the pt.       Allen Boston, RN  05/24/20 1486

## 2020-05-25 NOTE — PROGRESS NOTES
(benign prostatic hyperplasia), Cancer ([facility] CKD (chronic kidney disease) stage 3, GFR 30-59 ml/min ([facility]), Constipation, COPD (chronic obstructive pulmonary disease) ([facility] Depression, GERD (gastroesophageal reflux disease), Hemodialysis patient ([facility] HTN (hypertension), Hx of blood clots, Hyperparathyroidism ([facility] Hypothyroidism, IDDM (insulin dependent diabetes mellitus) ([facility] Insomnia, Liver disease, MDRO (multiple drug resistant organisms) resistance, Memory deficit, Neurofibromatosis ([facility] Osteoarthritis, Paraplegia ([facility] Patient in clinical research study, Peptic ulcer, PVD (peripheral vascular disease) ([facility] Secondary hyperparathyroidism ([facility] Sinus disorder, Syncope, Type II or unspecified type diabetes mellitus without mention of complication, not stated as uncontrolled, and Venous thrombosis. Social History:   reports that he quit smoking about 14 months ago. His smoking use included cigarettes. He smoked 1.00 pack per day. He has never used smokeless tobacco. He reports that he does not drink alcohol or use drugs. Family History:   Family History   Family history unknown: Yes       Vitals:  /78   Pulse 123   Temp 97.1 °F (36.2 °C) (Oral)   Resp 23   SpO2 100%   Temp (24hrs), Av.6 °F (36.4 °C), Min:97.1 °F (36.2 °C), Max:98.1 °F (36.7 °C)    Recent Labs     20  1224 20  1833 20  0803   POCGLU 177* 144* 105       I/O (24Hr):     Intake/Output Summary (Last 24 hours) at 2020 0949  Last data filed at 2020 7295  Gross per 24 hour   Intake 589 ml   Output --   Net 589 ml       Labs:  Hematology:  Recent Labs     20  0000 20  2318 20  0329   WBC 6.4 6.1 10.5   RBC 2.75* 2.52* 2.50*   HGB 7.9* 7.5* 7.5*   HCT 25.5* 25.9* 24.6*   MCV 92.9 102.8 98.4   MCH 28.9 29.8 30.0   MCHC 31.1 29.0 30.5   RDW 15.8* 14.8* 14.7*    134* 177   MPV 8.8 10.7 10.5   .6*  --  122.5*     Chemistry:  Recent Labs     20  0000 20  0148

## 2020-05-25 NOTE — PLAN OF CARE
[facility]    Second Visit Note  For more detailed information please refer to the progress note of the day      5/25/2020    6:37 PM    Name:   Ethan Cameron  MRN:     [id number]     Vazquezide:      [de-identified]   Room:   2002/2002-01  IP Day:  1  Admit Date:  5/24/2020  9:47 AM    PCP:   Alphonse Clifford DO  Code Status:  DNR-CCA      Pt vitals were reviewed   New labs were reviewed   Patient was seen    Updated plan :     1. ctsp for hypotension 58/32  2. He self-decannulated from dialysis machine and refused dialysis  3.  Recheck bp 98/44      [facility], DO  5/25/2020  6:37 PM

## 2020-05-25 NOTE — PLAN OF CARE
Problem: Airway Clearance - Ineffective  Goal: Achieve or maintain patent airway  Outcome: Ongoing     Problem: Gas Exchange - Impaired  Goal: Absence of hypoxia  Outcome: Ongoing  Goal: Promote optimal lung function  Outcome: Ongoing     Problem: Breathing Pattern - Ineffective  Goal: Ability to achieve and maintain a regular respiratory rate  Outcome: Ongoing     Problem:  Body Temperature -  Risk of, Imbalanced  Goal: Ability to maintain a body temperature within defined limits  Outcome: Ongoing  Goal: Will regain or maintain usual level of consciousness  Outcome: Ongoing  Goal: Complications related to the disease process, condition or treatment will be avoided or minimized  Outcome: Ongoing     Problem: Isolation Precautions - Risk of Spread of Infection  Goal: Prevent transmission of infection  Outcome: Ongoing     Problem: Nutrition Deficits  Goal: Optimize nutrtional status  Outcome: Ongoing     Problem: Risk for Fluid Volume Deficit  Goal: Maintain normal heart rhythm  Outcome: Ongoing  Goal: Maintain absence of muscle cramping  Outcome: Ongoing  Goal: Maintain normal serum potassium, sodium, calcium, phosphorus, and pH  Outcome: Ongoing     Problem: Loneliness or Risk for Loneliness  Goal: Demonstrate positive use of time alone when socialization is not possible  Outcome: Ongoing     Problem: Fatigue  Goal: Verbalize increase energy and improved vitality  Outcome: Ongoing     Problem: Patient Education: Go to Patient Education Activity  Goal: Patient/Family Education  Outcome: Ongoing     Problem: Falls - Risk of:  Goal: Will remain free from falls  Description: Will remain free from falls  Outcome: Ongoing  Goal: Absence of physical injury  Description: Absence of physical injury  Outcome: Ongoing     Problem: Restraint Use - Nonviolent/Non-Self-Destructive Behavior:  Goal: Absence of restraint indications  Description: Absence of restraint indications  Outcome: Ongoing  Goal: Absence of

## 2020-05-25 NOTE — CONSULTS
saturation 94%. His outpatient history and dialysis orders, usual dry wt  and out pt dialysis run sheets were reviewed. Past Medical History:        Diagnosis Date    Acute combined systolic and diastolic congestive heart failure ([address] 11/25/2016    Anemia     BPH (benign prostatic hyperplasia)     Cancer (HCC)     left ear 8/2013    CKD (chronic kidney disease) stage 3, GFR 30-59 ml/min (HCC)     Constipation     COPD (chronic obstructive pulmonary disease) (HCC)     Depression     GERD (gastroesophageal reflux disease)     Hemodialysis patient ([address]     3 times a week    HTN (hypertension)     Hx of blood clots     \" rt.shoulder/neck\"    Hyperparathyroidism ([address]     Hypothyroidism     IDDM (insulin dependent diabetes mellitus) ([address]     Insomnia     Liver disease     patient is unsure what it is    MDRO (multiple drug resistant organisms) resistance     hx. c-dif.  Memory deficit     Neurofibromatosis ([address]     Osteoarthritis     Paraplegia ([address]     chair to bed and chair transfer only    Patient in clinical research study 05/24/2020    Expanded Access to Convalescent Plasma for the Treatment of COVID-19    Peptic ulcer     PVD (peripheral vascular disease) ([address]     Secondary hyperparathyroidism ([address]     Sinus disorder     Syncope     Type II or unspecified type diabetes mellitus without mention of complication, not stated as uncontrolled     Venous thrombosis      Access:  previous  Left AV fistula created in 4/2019.      Past Surgical History:        Procedure Laterality Date    CHOLECYSTECTOMY      COLONOSCOPY  08/22/2011    2 POLYPS REMOBED TUBULAR ADENOMA    DIALYSIS FISTULA CREATION Left 06/13/2016    LEFT WRIST, no longer functional    DIALYSIS FISTULA CREATION Left     antecubital, was revised one time    HIP SURGERY Right     deep laceration was repaired    INTRACAPSULAR CATARACT EXTRACTION Right 5/7/2019    EYE CATARACT EMULSIFICATION IOL IMPLANT performed by Radha Germain MD at [facility] Left 5/28/2019    EYE CATARACT EMULSIFICATION IOL IMPLANT performed by Radha Germain MD at [facility] Left     ear.  SKIN GRAFT      right axilla    THYROID SURGERY      non-cancerous lump removed    TOTAL NEPHRECTOMY Right     as a donor    TUNNELED VENOUS CATHETER PLACEMENT Right 09/03/2016    later removed    TURP       Outpatient Medications:     Medications Prior to Admission: zinc sulfate (ZINCATE) 220 (50 Zn) MG capsule, Take 1 capsule by mouth daily  fentaNYL (DURAGESIC) 25 MCG/HR, Place 1 patch onto the skin every 72 hours for 30 days. traMADol (ULTRAM) 50 MG tablet, Take 1 tablet by mouth every 12 hours as needed (mild-mod pain) for up to 7 days. oxyCODONE (ROXICODONE) 5 MG immediate release tablet, Take 1 tablet by mouth every 6 hours as needed (severe pain) for up to 7 days. metoprolol tartrate (LOPRESSOR) 50 MG tablet, Take 1 tablet by mouth 2 times daily Indications: Hold for SBP less than 100 or HR less than 60  miconazole (MICOTIN) 2 % powder, Apply topically 2 times daily.   diphenhydrAMINE (BENADRYL) 25 MG capsule, Take 25 mg by mouth every 6 hours as needed for Itching  methylPREDNISolone (MEDROL) 4 MG tablet, Take 4 mg by mouth every evening  acetaminophen (TYLENOL) 500 MG tablet, Take 1,000 mg by mouth every 8 hours as needed for Pain  calcium acetate (PHOSLO) 667 MG capsule, Take 2,001 mg by mouth 3 times daily (with meals)   apixaban (ELIQUIS) 5 MG TABS tablet, Take 1 tablet by mouth 2 times daily  dilTIAZem (CARDIZEM CD) 240 MG extended release capsule, Take 1 capsule by mouth daily  lidocaine 4 % external patch, Place 1 patch onto the skin daily  fluticasone-vilanterol (BREO ELLIPTA) 100-25 MCG/INH AEPB inhaler, Inhale 1 puff into the lungs daily  glucagon 1 MG injection, Inject 1 kit into the muscle as needed (hypoglycemia BG <60)  glucose (GLUTOSE) 40 % GEL, Take 15 g by mouth as in sodium chloride 0.9 % 100 mL IVPB, Once  dilTIAZem 125 mg in dextrose 5 % 125 mL infusion, Continuous  acetaminophen (TYLENOL) tablet 650 mg, Q6H PRN    Or  acetaminophen (TYLENOL) suppository 650 mg, Q6H PRN  nicotine (NICODERM CQ) 21 MG/24HR 1 patch, Daily PRN  promethazine (PHENERGAN) tablet 12.5 mg, Q6H PRN    Or  ondansetron (ZOFRAN) injection 4 mg, Q6H PRN  sodium chloride flush 0.9 % injection 10 mL, 2 times per day  sodium chloride flush 0.9 % injection 10 mL, PRN  albuterol sulfate  (90 Base) MCG/ACT inhaler 2 puff, Q6H PRN  dextrose 5 % solution, PRN  dextrose 50 % IV solution, PRN  glucagon (rDNA) injection 1 mg, PRN  glucose (GLUTOSE) 40 % oral gel 15 g, PRN  insulin lispro (HUMALOG) injection vial 0-6 Units, TID WC  insulin lispro (HUMALOG) injection vial 0-3 Units, Nightly  pantoprazole (PROTONIX) tablet 40 mg, QAM AC  finasteride (PROSCAR) tablet 5 mg, Daily  fluticasone (FLONASE) 50 MCG/ACT nasal spray 1 spray, Daily  isosorbide mononitrate (IMDUR) extended release tablet 60 mg, Daily  sevelamer (RENVELA) tablet 2,400 mg, TID WC  senna (SENOKOT) tablet 8.6 mg, BID  escitalopram (LEXAPRO) tablet 10 mg, QAM  aspirin chewable tablet 81 mg, Daily  midodrine (PROAMATINE) tablet 5 mg, Once per day on Mon Wed Fri  lidocaine (LMX) 4 % cream, Once per day on Mon Wed Fri  lactulose (CHRONULAC) 10 GM/15ML solution 10 g, Daily  ondansetron (ZOFRAN) tablet 4 mg, Q6H PRN  insulin glargine (LANTUS) injection vial 10 Units, BID  budesonide-formoterol (SYMBICORT) 80-4.5 MCG/ACT inhaler 2 puff, BID  glucose (GLUTOSE) 40 % oral gel 15 g, PRN  cetirizine (ZYRTEC) tablet 5 mg, Daily  nitroGLYCERIN (NITROSTAT) SL tablet 0.4 mg, Q5 Min PRN  apixaban (ELIQUIS) tablet 5 mg, BID  lidocaine 4 % external patch 1 patch, Daily  calcium acetate (PHOSLO) capsule 2,001 mg, TID WC  diphenhydrAMINE (BENADRYL) tablet 25 mg, Q6H PRN  methylPREDNISolone (MEDROL) tablet 4 mg, QPM  acetaminophen (TYLENOL) tablet 1,000 mg, Q8H PRN  zinc sulfate (ZINCATE) capsule 50 mg, Daily  fentaNYL (DURAGESIC) 25 MCG/HR 1 patch, Q72H  traMADol (ULTRAM) tablet 50 mg, Q12H PRN  oxyCODONE (ROXICODONE) immediate release tablet 5 mg, Q6H PRN  metoprolol tartrate (LOPRESSOR) tablet 50 mg, BID  miconazole (MICOTIN) 2 % powder, BID  meropenem (MERREM) 1 g in sodium chloride 0.9 % 100 mL IVPB (mini-bag), Q24H  0.9 % sodium chloride bolus, Once  promethazine (PHENERGAN) injection 12.5 mg, Once  metoprolol (LOPRESSOR) 5 MG/5ML injection,       Allergies:  Cymbalta [duloxetine hcl];  Tromethamine; and Toradol [ketorolac tromethamine]    Social History:    Social History     Socioeconomic History    Marital status: Single     Spouse name: Not on file    Number of children: Not on file    Years of education: Not on file    Highest education level: Not on file   Occupational History    Not on file   Social Needs    Financial resource strain: Not on file    Food insecurity     Worry: Not on file     Inability: Not on file   [employer] needs     Medical: Not on file     Non-medical: Not on file   Tobacco Use    Smoking status: Former Smoker     Packs/day: 1.00     Types: Cigarettes     Last attempt to quit: 3/1/2019     Years since quittin.2    Smokeless tobacco: Never Used   Substance and Sexual Activity    Alcohol use: No    Drug use: No    Sexual activity: Not Currently   Lifestyle    Physical activity     Days per week: Not on file     Minutes per session: Not on file    Stress: Not on file   Relationships    Social connections     Talks on phone: Not on file     Gets together: Not on file     Attends Mosque service: Not on file     Active member of club or organization: Not on file     Attends meetings of clubs or organizations: Not on file     Relationship status: Not on file    Intimate partner violence     Fear of current or ex partner: Not on file     Emotionally abused: Not on file     Physically abused: Not on file     Forced

## 2020-05-25 NOTE — PROGRESS NOTES
heaving- very  Alert approrpiate  Case disc w Dr Jere Mccullough Blood    Interval changes  5/25/2020   Getting dialysis today  Very short of breath overnight, desaturating into the low 80s, needed a nonrebreather 15 L now looks much better  Inflammatory markers much higher, mostly the ferritin, suggesting a cytokine surge. Getting worse resp wise very fast over night    Will qualify for ACTEMRA infusion. Summary of relevant labs:  Labs:  WBC 3.5 - 1.9 -4.6 - 7.7-10.5  Platelets 82 - 175     CRP 69-71-25  Ferritin 1298 - 1728 -      Liver enz normal  Micro:  BC 5/18 x 2  COVID test 5/18  resp PCR neg  Mycoplasma IgM negative     CRP 69 - 71-25 - 116- 122   - 375 - 575  Ferritin [id number]     -221-977 - [phone] -486    Imaging:  CT chest 5/24- personnally reviewed and looks much worse than the 5/18 CT    Suboptimal evaluation of the segmental, subsegmental and more peripheral   pulmonary arteries in the bilateral lower lobes due to motion artifact. Given this, no obvious acute pulmonary thromboembolic disease.  Prominent   main pulmonary artery suggests pulmonary hypertension.       2.  Compared to CT chest from March 9, 2020 there is progression of   multifocal bilateral heterogeneous pulmonary opacities with crazy paving   pattern, most pronounced in the upper lobes.  Commonly reported imaging   features of COVID-19 pneumonia are present.  Other processes such as   influenza pneumonia and organizing pneumonia, as can be seen with drug   toxicity and connective tissue disease, can cause a similar imaging pattern. I have personally reviewed the past medical history, past surgical history, medications, social history, and family history, and I haveupdated the database accordingly.   Past Medical History:     Past Medical History:   Diagnosis Date    Acute combined systolic and diastolic congestive heart failure ([facility] 11/25/2016    Anemia     BPH (benign prostatic hyperplasia)     Cancer (HCC) VENOUS CATHETER PLACEMENT Right 09/03/2016    later removed    TURP         Medications:      fentanNYL  100 mcg Intravenous Once    sodium chloride flush  10 mL Intravenous 2 times per day    sodium chloride flush  10 mL Intravenous 2 times per day    insulin lispro  0-6 Units Subcutaneous TID WC    insulin lispro  0-3 Units Subcutaneous Nightly    pantoprazole  40 mg Oral QAM AC    finasteride  5 mg Oral Daily    fluticasone  1 spray Nasal Daily    isosorbide mononitrate  60 mg Oral Daily    sevelamer  2,400 mg Oral TID WC    senna  1 tablet Oral BID    escitalopram  10 mg Oral QAM    aspirin  81 mg Oral Daily    midodrine  5 mg Oral Once per day on Mon Wed Fri    lidocaine   Topical Once per day on Mon Wed Fri    lactulose  10 g Oral Daily    insulin glargine  10 Units Subcutaneous BID    budesonide-formoterol  2 puff Inhalation BID    cetirizine  5 mg Oral Daily    apixaban  5 mg Oral BID    lidocaine  1 patch Transdermal Daily    calcium acetate  2,001 mg Oral TID WC    methylPREDNISolone  4 mg Oral QPM    zinc sulfate  50 mg Oral Daily    fentaNYL  1 patch Transdermal Q72H    metoprolol tartrate  50 mg Oral BID    miconazole   Topical BID    meropenem  1 g Intravenous Q24H    sodium chloride  20 mL Intravenous Once    promethazine  12.5 mg Intramuscular Once    metoprolol           Social History:     Social History     Socioeconomic History    Marital status: Single     Spouse name: Not on file    Number of children: Not on file    Years of education: Not on file    Highest education level: Not on file   Occupational History    Not on file   Social Needs    Financial resource strain: Not on file    Food insecurity     Worry: Not on file     Inability: Not on file    Transportation needs     Medical: Not on file     Non-medical: Not on file   Tobacco Use    Smoking status: Former Smoker     Packs/day: 1.00     Types: Cigarettes     Last attempt to quit: 3/1/2019 us to participate in the care of this patient. Please call with questions. This note is created with the assistance of a speech recognition program.  While intending to generate adocument that actually reflects the content of the visit, the document can still have some errors including those of syntax and sound a like substitutions which may escape proof reading. It such instances, actual meaningcan be extrapolated by contextual diversion.     Cherie Carcamo MD  Office: [phone]  Perfect serve / office [phone]

## 2020-05-25 NOTE — PROGRESS NOTES
Asked to see patient for confusion. When I review the labs he look uremic secondary to his kidney disease. Ciritical care did come to the bedside as I was unable to come to the bedside given my patient care with another patient. Discussion with Dr Lon Cushing care Resident, the decision that he would not go to ICU at this time, however we could ask nephrology to consider HD to see if this helped with his mentation. Dr Debra Jeter was paged and returned call at [phone].   Plan to have HD  first thing in the am.

## 2020-05-25 NOTE — PROGRESS NOTES
Pt. Jim Ramos to get Dialysis if he is given something to relax him.  Blood notified and Ativan x1 ordered.

## 2020-05-25 NOTE — CONSULTS
Critical Care - History and Physical Examination    Patient's name:  Salomón Mcmahan  Medical Record Number: [id number]  Patient's account/billing number: [de-identified]  Patient's YOB: 1950  Age: 78 y.o. Date of Admission: 5/24/2020  9:47 AM  Date of History and Physical Examination: 5/25/2020      Primary Care Physician: Camron Crowell DO  Attending Physician:    Code Status: DNR-CCA    Chief complaint: Altered Mentation. Afib with RVR    HISTORY OF PRESENT ILLNESS:   History was obtained from chart review. Salomón Mcmahan is a 78 y.o. male with PMH of COPD, T2DM, HTN, ESRD on HD, severe LVH recently admitted with acute on chronic respiratory failure tested positive for COVID-19 with b/l infiltrates on CXR treated with Plaquenil, developed Afib with RVR on 5/22 treated with Lopressor, received HD, improved and discharged. Re-admitted 5/24 due to worsening dyspnea. Found to be in Afib with RVR, worsening infiltrates on CXR, Ferritin [id number]. ID was consulted, not a candidate for Remdisivir. Started pt on meropenem in view of aspiration pneumonia. On 15L non rebreather since admission. CODE sattus DNR-CCA without intubation. Critical care was called because patient was in Afib with RVR with HR in 130s on Amio drip. Pt is more confused and disoriented. He received Ativan around midnight for central line placement. Primary team and cardio were not immediately available and consulted because seemed like patient was deteriorating  Pt seen and examined bedside. HR in 130s with Afib. Bp stable in 100s-110s. Saturating on 15L NRB  Confused and combative. C/o nausea. Repeat labs showed  and creat 8.86 - on discharge BUN 40s. Pt probably missed dialysis. As per RN normal dialysis days TTS, did not get dialyzed yesterday. ABG pH 7.302, pO2 78, pCO2 46.3  Discussed with Nephro Dr Katina Tracy for need of emergent dialysis.  Agreed to arrange dialysis in the early AM    Past Medical History: Diagnosis Date    Acute combined systolic and diastolic congestive heart failure ([address] 11/25/2016    Anemia     BPH (benign prostatic hyperplasia)     Cancer (HCC)     left ear 8/2013    CKD (chronic kidney disease) stage 3, GFR 30-59 ml/min (HCC)     Constipation     COPD (chronic obstructive pulmonary disease) (HCC)     Depression     GERD (gastroesophageal reflux disease)     Hemodialysis patient ([address]     3 times a week    HTN (hypertension)     Hx of blood clots     \" rt.shoulder/neck\"    Hyperparathyroidism ([address]     Hypothyroidism     IDDM (insulin dependent diabetes mellitus) ([address]     Insomnia     Liver disease     patient is unsure what it is    MDRO (multiple drug resistant organisms) resistance     hx. c-dif.     Memory deficit     Neurofibromatosis ([address]     Osteoarthritis     Paraplegia ([address]     chair to bed and chair transfer only    Patient in clinical research study 05/24/2020    Expanded Access to Convalescent Plasma for the Treatment of COVID-19    Peptic ulcer     PVD (peripheral vascular disease) ([address]     Secondary hyperparathyroidism ([address]     Sinus disorder     Syncope     Type II or unspecified type diabetes mellitus without mention of complication, not stated as uncontrolled     Venous thrombosis        Past Surgical History:        Procedure Laterality Date    CHOLECYSTECTOMY      COLONOSCOPY  08/22/2011    2 POLYPS REMOBED TUBULAR ADENOMA    DIALYSIS FISTULA CREATION Left 06/13/2016    LEFT WRIST, no longer functional    DIALYSIS FISTULA CREATION Left     antecubital, was revised one time    HIP SURGERY Right     deep laceration was repaired    INTRACAPSULAR CATARACT EXTRACTION Right 5/7/2019    EYE CATARACT EMULSIFICATION IOL IMPLANT performed by Yolanda Mcpherson MD at [address] [place] [address] Left 5/28/2019    EYE CATARACT EMULSIFICATION IOL IMPLANT performed by Yolanda Mcpherson MD at [address] MD   dilTIAZem (CARDIZEM CD) 240 MG extended release capsule Take 1 capsule by mouth daily 3/6/20   Nadira Wagner MD   lidocaine 4 % external patch Place 1 patch onto the skin daily 3/6/20   Nadira Wagner MD   fluticasone-vilanterol (BREO ELLIPTA) 100-25 MCG/INH AEPB inhaler Inhale 1 puff into the lungs daily    Historical Provider, MD   glucagon 1 MG injection Inject 1 kit into the muscle as needed (hypoglycemia BG <60)    Historical Provider, MD   glucose (GLUTOSE) 40 % GEL Take 15 g by mouth as needed (blood sugar <60)    Historical Provider, MD   loratadine (CLARITIN) 5 MG chewable tablet Take 5 mg by mouth daily as needed     Historical Provider, MD   nitroGLYCERIN (NITROSTAT) 0.4 MG SL tablet Place 0.4 mg under the tongue every 5 minutes as needed for Chest pain up to max of 3 total doses. If no relief after 1 dose, call 911.     Historical Provider, MD   insulin glargine (LANTUS) 100 UNIT/ML injection vial Inject 10 Units into the skin 2 times daily 3/4/19   Jesika Bianchi MD   lidocaine-prilocaine (EMLA) 2.5-2.5 % cream Apply topically three times a week Apply topically to arm/port three times weekly on Monday, Wednesday, and Friday for dialysis    Historical Provider, MD   lactulose (CHRONULAC) 10 GM/15ML solution Take 10 g by mouth daily    Historical Provider, MD   ondansetron (ZOFRAN) 4 MG tablet Take 4 mg by mouth every 6 hours as needed for Nausea or Vomiting    Historical Provider, MD   midodrine (PROAMATINE) 5 MG tablet Take 5 mg by mouth three times a week On Monday, Wednesday, and Friday for dialysis    Historical Provider, MD   ramelteon (ROZEREM) 8 MG tablet Take 8 mg by mouth nightly    Historical Provider, MD   aspirin 81 MG chewable tablet Take 81 mg by mouth daily    Historical Provider, MD   sevelamer (RENVELA) 800 MG tablet Take 3 tablets by mouth 3 times daily (with meals)     Historical Provider, MD   senna (SENOKOT) 8.6 MG tablet Take 1 tablet by mouth 2 times daily HGB 7.5*        BMP:    Recent Labs     05/24/20  0000 05/24/20  2318 05/25/20  0329    135 132*   K 4.8 5.7* 5.3   CL 95* 94* 90*   CO2 21 17* 18*   * 113* 117*   CREATININE 7.44* 9.06* 8.86*   GLUCOSE 147* 98 125*     S. Calcium:  Recent Labs     05/25/20  0329   CALCIUM 8.3*     S. Ionized Calcium:No results for input(s): IONCA in the last 72 hours. S. Magnesium:  Recent Labs     05/24/20  0000   MG 1.7     S. Phosphorus:No results for input(s): PHOS in the last 72 hours. S. Glucose:  Recent Labs     05/22/20  1224   POCGLU 177*     Glycosylated hemoglobin A1C: No results for input(s): LABA1C in the last 72 hours. INR: No results for input(s): INR in the last 72 hours. Hepatic functions:   Recent Labs     05/22/20  0443  05/25/20  0329   ALKPHOS 181*   < > 155*   ALT 44*   < > 37   AST 31   < > 27   PROT 6.2*   < > 5.6*   BILITOT 0.52   < > 0.45   BILIDIR 0.20  --   --    LABALBU 3.6   < > 3.1*    < > = values in this interval not displayed. Pancreatic functions:  Recent Labs     05/24/20  0000   LACTA 1.2     S. Lactic Acid:   Recent Labs     05/24/20  0000   LACTA 1.2     Cardiac enzymes:No results for input(s): CKTOTAL, CKMB, CKMBINDEX, TROPONINI in the last 72 hours. BNP:No results for input(s): BNP in the last 72 hours. Lipid profile: No results for input(s): CHOL, TRIG, HDL, LDLCALC in the last 72 hours.     Invalid input(s): LDL  Blood Gases:   Lab Results   Component Value Date    PH 7.396 09/07/2012    PCO2 38.8 09/07/2012     Thyroid functions:   Lab Results   Component Value Date    TSH 3.08 03/09/2020        Urinalysis:     Microbiology:    Cultures during this admission:     Blood cultures:                 [] None drawn      [] Negative             []  Positive (Details:  )  Urine Culture:                   [] None drawn      [] Negative             []  Positive (Details:  )  Sputum Culture:               [] None drawn       [] Negative             []  Positive (Details:  )   Endotracheal aspirate:     [] None drawn       [] Negative             []  Positive (Details:  )         -----------------------------------------------------------------  Radiological reports:    (See actual reports for details)      Recent Cardiac Catheterization summary:   (See actual reports for details)    Electrocardiogram:     Last Echocardiogram findings:   (See actual reports for details)       Assessment and Plan     Principal Problem:    Rapid atrial fibrillation ([facility]  Active Problems:    Essential hypertension    Neurofibromatosis ([facility]    Type 2 diabetes mellitus with renal complication (HCC)    COPD (chronic obstructive pulmonary disease) ([facility]    End stage renal disease on dialysis ([facility]    Acute on chronic respiratory failure with hypoxia ([facility]    Pneumonia due to COVID-19 virus  Resolved Problems:    * No resolved hospital problems. *      Plan:    1. Acute encephalopathy secondary to Uremia. ESRD. Discussed with Nephrology Dr Dada Whitney. Dialysis in the AM  2. Atrial Fibrillation with RVR. Administer another bolus of Amio followed by drip 1mg/kg. Eliquis anticoagulation. QTC prolonged. Cardio following  3. Acute Respiratory Failure secondary to COVID-19 pneumonia. Completed Plaquenil. Meropenem as per ID for concern of aspiration pneumonia. Continue Symbicort and Medrol.'  4. Avoid antipsychotics and Ativan  5. Protonix for Gi prophylaxis. Eliquis AC    Patient is DNR-CCA without intubation  Discussed with primary team  Does not warrant transfer to ICU at this time given he is hemodynamically stable and no further plan of management except for HD  Will monitor closely in step down    Discussed with critical care attending Dr Jolie Pace and he agrees with the plan      Veena Scott M.D.   Critical care resident,  Department of Internal Medicine/ Critical care  [facility]             5/25/2020, 4:42 AM     .    LOS: 1          VENTILATOR

## 2020-05-25 NOTE — CARE COORDINATION
Transitional planning-attempted to call son Renard-number disconnected. [facility] doesn't have phone in room. COVID19+, NRB 15L.

## 2020-05-26 NOTE — PROGRESS NOTES
[facility]  Occupational Therapy Not Seen Note    DATE: 2020  Name: Violet Aldana  : 1950  MRN: [id number]    Patient not available for Occupational Therapy due to:    [] Testing:    [] Hemodialysis    [] Blood Transfusion in Progress    []Refusal by Patient:    [] Surgery/Procedure:    [] Strict Bedrest    [] Sedation    [] Spine Precautions     [] Pt being transferred to palliative care at this time. Spoke with pt/family and OT services to be defered. [] Pt independent with functional mobility and functional tasks.  Pt with no OT acute care needs at this time, will defer OT eval.    [x] Other: Patient tachy, heart rate reaching 143 -138 at rest     Next Scheduled Treatment: Ck in pm as able or     Kyle Howe OTR/ORAL

## 2020-05-26 NOTE — PLAN OF CARE
RN  Outcome: Ongoing  Goal: Maintain absence of muscle cramping  5/26/2020 0041 by Fernando Velazquez RN  Outcome: Ongoing  5/25/2020 1950 by Andrew Ward RN  Outcome: Ongoing  Goal: Maintain normal serum potassium, sodium, calcium, phosphorus, and pH  5/26/2020 0041 by Fernando Velazquez RN  Outcome: Ongoing  5/25/2020 1950 by Andrew Ward RN  Outcome: Ongoing     Problem: Loneliness or Risk for Loneliness  Goal: Demonstrate positive use of time alone when socialization is not possible  5/26/2020 0041 by Fernando Velazquez RN  Outcome: Ongoing  5/25/2020 1950 by Andrew Ward RN  Outcome: Ongoing     Problem: Fatigue  Goal: Verbalize increase energy and improved vitality  5/26/2020 0041 by Fernando Velazquez RN  Outcome: Ongoing  5/25/2020 1950 by Andrew Ward RN  Outcome: Ongoing     Problem: Patient Education: Go to Patient Education Activity  Goal: Patient/Family Education  5/26/2020 0041 by Fernando Velazquez RN  Outcome: Ongoing  5/25/2020 1950 by Andrew Ward RN  Outcome: Ongoing     Problem: Falls - Risk of:  Goal: Will remain free from falls  5/26/2020 0041 by Fernando Velazquez RN  Outcome: Ongoing  5/25/2020 1950 by Andrew Ward RN  Outcome: Ongoing  Goal: Absence of physical injury  5/26/2020 0041 by Fernando Velazquez RN  Outcome: Ongoing  5/25/2020 1950 by Andrew Ward RN  Outcome: Ongoing     Problem: Restraint Use - Nonviolent/Non-Self-Destructive Behavior:  Goal: Absence of restraint indications  5/26/2020 0041 by Fernando Velazquez RN  Outcome: Ongoing  5/25/2020 1950 by Andrew Ward RN  Outcome: Ongoing  Goal: Absence of restraint-related injury  5/26/2020 0041 by Fernando Velazquez RN  Outcome: Ongoing  5/25/2020 1950 by Andrew Ward RN  Outcome: Ongoing     Problem: Pain:  Description: Pain management should include both nonpharmacologic and pharmacologic interventions.   Goal: Pain level will decrease  Outcome: Ongoing  Goal: Control of acute pain  Outcome: Ongoing  Goal: Control of chronic pain  Outcome: Ongoing

## 2020-05-26 NOTE — PLAN OF CARE
restraint-related injury  Description: Absence of restraint-related injury  Outcome: Ongoing     Problem: Pain:  Goal: Pain level will decrease  Description: Pain level will decrease  Outcome: Ongoing  Goal: Control of acute pain  Description: Control of acute pain  Outcome: Ongoing  Goal: Control of chronic pain  Description: Control of chronic pain  Outcome: Ongoing

## 2020-05-26 NOTE — PROGRESS NOTES
[facility]   Progress Note                   Date:   5/26/2020  Patient name: Danna Diaz  Date of admission:  5/24/2020  9:47 AM  MRN:   [id number]  YOB: 1950  PCP: Krystyna Argueta DO    Reason for Admission: Pneumonia due to COVID-19 virus [U07.1, J12.89]    Subjective:       Clinical Changes / Abnormalities:Spoke with RN in [facility]. Reviewed Tele. Remains AFib with RVR. BP improved this am.  Pt quit HD early yesterday.   Transfused 1 u PRBCs this am for low HGB       Medications:   Scheduled Meds:   fentanNYL  100 mcg Intravenous Once    sodium chloride flush  10 mL Intravenous 2 times per day    amiodarone bolus  150 mg Intravenous Once    sodium chloride flush  10 mL Intravenous 2 times per day    insulin lispro  0-6 Units Subcutaneous TID WC    insulin lispro  0-3 Units Subcutaneous Nightly    pantoprazole  40 mg Oral QAM AC    finasteride  5 mg Oral Daily    fluticasone  1 spray Nasal Daily    isosorbide mononitrate  60 mg Oral Daily    sevelamer  2,400 mg Oral TID WC    senna  1 tablet Oral BID    escitalopram  10 mg Oral QAM    aspirin  81 mg Oral Daily    midodrine  5 mg Oral Once per day on Mon Wed Fri    lidocaine   Topical Once per day on Mon Wed Fri    lactulose  10 g Oral Daily    insulin glargine  10 Units Subcutaneous BID    budesonide-formoterol  2 puff Inhalation BID    cetirizine  5 mg Oral Daily    apixaban  5 mg Oral BID    lidocaine  1 patch Transdermal Daily    calcium acetate  2,001 mg Oral TID WC    methylPREDNISolone  4 mg Oral QPM    zinc sulfate  50 mg Oral Daily    fentaNYL  1 patch Transdermal Q72H    metoprolol tartrate  50 mg Oral BID    miconazole   Topical BID    meropenem  1 g Intravenous Q24H    sodium chloride  20 mL Intravenous Once    promethazine  12.5 mg Intramuscular Once     Continuous Infusions:   amiodarone 1 mg/min (05/26/20 0550)    dextrose       CBC:   Recent Labs     05/24/20  7657 05/25/20  0329 05/26/20  0439   WBC 6.1 10.5 4.6   HGB 7.5* 7.5* 6.0*   * 177 134*     BMP:    Recent Labs     05/24/20  2318 05/25/20  0329 05/26/20  0439    132* 138   K 5.7* 5.3 4.5   CL 94* 90* 96*   CO2 17* 18* 22   * 117* 79*   CREATININE 9.06* 8.86* 7.32*   GLUCOSE 98 125* 95     Hepatic:   Recent Labs     05/25/20  0329 05/25/20  1322 05/26/20  0439   AST 27 25 27   ALT 37 31 27   BILITOT 0.45 0.38 0.38   ALKPHOS 155* 144* 137*     Troponin:   Recent Labs     05/24/20  0000 05/24/20  0148   TROPHS 146* 146*     BNP: No results for input(s): BNP in the last 72 hours. Lipids: No results for input(s): CHOL, HDL in the last 72 hours. Invalid input(s): LDLCALCU  INR: No results for input(s): INR in the last 72 hours. EKG:   A. fib with RVR, right bundle branch block and left axis deviation.     ECHO:   03/03/2020  Summary  Echo contrast utilized on this technically difficult study. Normal LV size and function. Estimated LV EF 55 %. Severe left ventricular hypertrophy. Left atrial dilatation. Right atrial dilatation. No significant valvular regurgitation or stenosis seen. IVC Increased diameter, but still has inspiratory variation. No significant pericardial effusion is seen. Objective:   Vitals: BP 94/68   Pulse 135   Temp 98.4 °F (36.9 °C) (Oral)   Resp 25   Wt 243 lb 9.7 oz (110.5 kg)   SpO2 92%   BMI 33.04 kg/m²     For careful stewardship of limited PPE during COVID-19 pandemic my physical exam was deferred. For physical exam, please see today's physical from primary team or ICU team.         Assessment / Acute Cardiac Problems:   1. PAF   2. ESRD   3.  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     Cellulitis of scrotum     Anemia, chronic renal failure     Thrombocytopenia (HCC)     Chest pain     Left flank pain     End stage renal disease on dialysis (HCC)     Venous stasis dermatitis of both lower extremities     Dizziness     Hypocalcemia     Acute combined systolic and diastolic congestive heart failure (HCC)     Respiratory distress     Acute on chronic respiratory failure with hypoxia (HCC)     Fluid overload     Rib pain on left side     Hypoglycemia     CRF (chronic renal failure), stage 5 (HCC)     Chronic renal failure, stage 3 (moderate) (HCC)     Hyperuricemia     Altered mental status     Atrial fibrillation (HCC)     ESRD on hemodialysis ([facility]     Fracture of rib of right side     Hypoxia     Phlegmon on Right Buttock     Mass of lingula of lung     Acute mastoiditis of left side     Acute metabolic encephalopathy     Atypical pneumonia     Pneumonia due to COVID-19 virus     Candidiasis of urogenital sites     Rapid atrial fibrillation ([facility]      Plan of Treatment:   1. PAF. Remains Afib with RVR. On amiodarone gtt. HGB low, which is likely contributing to RVR. Transfusion per Primary. Will give IV dig now. Continue BB, Continue Eliquis   2. Elevated trop, Type II MI, Secondary to ESRD. 3. Hypotension. Improved today. Will continue to hold Imdur   4.  Noncompliance     Electronically signed by KELECHI Trujillo CNP on 5/26/2020 at 9:06 AM  [address]  [phone]

## 2020-05-26 NOTE — PROGRESS NOTES
PULMONARY & CRITICAL CARE MEDICINE PROGRESS  NOTE     Patient:  Fermin Doty  MRN: [id number]  Admit date: 5/24/2020    SUBJECTIVE     I personally interviewed/examined the patient, reviewed interval history and interpreted all available radiographic, laboratory data at the time of service. Chief Compliant/Reason for Initial Consult: Acute respiratory failure/COVID-19 pneumonia    Brief Hospital Course: Patient is a 65-year-old male with multiple medical comorbidities including hypertension, diabetes, hypothyroidism, CHF, COPD and end-stage renal disease. He presented to [facility] with acute respiratory distress. He apparently had missed multiple dialysis sessions and was in fluid overload. He was noted to be in A. fib with RVR and is currently on amiodarone infusion. His testing for COVID-19 was positive, CT shows bilateral groundglass crazy paving pattern. He got Tocilizumab on 5/25 and is being planned for transfusion of conversant plasma.     Interval History:  05/26/20  Patient is currently on @ O2 Flow Rate (L/min)  Avg: 15 L/min  Min: 15 L/min  Max: 15 L/min  T-max is 98.9, WBC count is 4.6 today  Patient remains restless/agitated and reports of shortness of breath  He underwent hemodialysis last night with removal of 1.8 L of fluid    Review of Systems:  General: negative for chills, fatigue or fever  ENT: negative for headaches, nasal congestion, sore throat or visual changes  Allergy and Immunology: negative for postnasal drip or seasonal allergies  Hematological and Lymphatic: negative for bleeding problems, swollen lymph nodes  Respiratory: positive for cough and shortness of breath negative for pleuritic pain or wheezing  Cardiovascular: negative for edema or palpitations  Gastrointestinal: negative for abdominal pain, change in bowel habits or nausea/vomiting  Genito-Urinary: negative for dysuria or urinary TID WC    methylPREDNISolone  4 mg Oral QPM    zinc sulfate  50 mg Oral Daily    fentaNYL  1 patch Transdermal Q72H    metoprolol tartrate  50 mg Oral BID    miconazole   Topical BID    meropenem  1 g Intravenous Q24H    sodium chloride  20 mL Intravenous Once    promethazine  12.5 mg Intramuscular Once     Continuous Infusions:   amiodarone 1 mg/min (05/26/20 0534)    dextrose       LABS:-  ABG:   Recent Labs     05/24/20  2324   POCPH 7.302*   POCPCO2 46.3   POCPO2 78.4*   POCHCO3 22.9   IHLQ5DTM 94     WBC:    Recent Labs     05/24/20  2318 05/25/20  0329 05/26/20  0439   WBC 6.1 10.5 4.6   LYMPHOPCT 10* 5* 6*     CRP:   Recent Labs     05/25/20 0329 05/26/20  0439 05/26/20  1102   .5* 98.9* 89.5*     LDH:   Recent Labs     05/24/20 2318 05/26/20  0439 05/26/20  1102   * 475* 539*     Liver Function Test:   Recent Labs     05/25/20  0329 05/25/20  1322 05/26/20  0439   PROT 5.6* 5.1* 5.1*   LABALBU 3.1* 2.9* 3.0*   ALT 37 31 27   AST 27 25 27   ALKPHOS 155* 144* 137*   BILITOT 0.45 0.38 0.38     Coagulation Profile:   No results for input(s): INR, PROTIME, APTT in the last 72 hours. D-Dimer:  No results for input(s): DDIMER in the last 72 hours. Ferritin:    Recent Labs     05/24/20  2318 05/26/20  0439   FERRITIN 5,984* 3,723*       Lactic Acid:  Recent Labs     05/24/20  0000 05/25/20  0651   LACTA 1.2 NOT REPORTED     Cardiac Enzymes:  No results for input(s): CKTOTAL, CKMB, CKMBINDEX, TROPONINI in the last 72 hours. BNP/PROBNP:   Recent Labs     05/24/20  0000   PROBNP 18,879*        QTc:      Microbiology:    Radiology Reports:  XR CHEST PORTABLE   Final Result   Marked interval worsening of bilateral airspace disease. Stable cardiomegaly.          XR CHEST PORTABLE    (Results Pending)       Echocardiogram:   Results for orders placed during the hospital encounter of 03/02/20   ECHO Complete 2D W Doppler W Color    Narrative   Summary  Echo contrast utilized on this

## 2020-05-26 NOTE — PROGRESS NOTES
109/94 -- -- 135 -- -- --   05/25/20 1002 99/71 -- -- 125 -- -- --   05/25/20 0949 101/82 -- -- 126 -- -- --   05/25/20 0918 -- -- -- -- -- -- 251 lb 5.2 oz (114 kg)   05/25/20 0800 120/78 97.1 °F (36.2 °C) Oral 123 23 100 % --     Intake / output   05/25 0701 - 05/26 0700  In: 1054 [P.O.:50; I.V.:1089]  Out: 1910 [Urine:100]  Physical Exam:  Physical Exam  Vitals signs and nursing note reviewed. Constitutional:       General: He is in acute distress. Appearance: He is obese. He is ill-appearing. He is not diaphoretic. HENT:      Head: Normocephalic and atraumatic. Nose:      Right Sinus: No maxillary sinus tenderness or frontal sinus tenderness. Left Sinus: No maxillary sinus tenderness or frontal sinus tenderness. Mouth/Throat:      Mouth: Mucous membranes are dry. Pharynx: No oropharyngeal exudate. Eyes:      General: No scleral icterus. Conjunctiva/sclera: Conjunctivae normal.      Pupils: Pupils are equal, round, and reactive to light. Neck:      Musculoskeletal: Full passive range of motion without pain and neck supple. Thyroid: No thyromegaly. Vascular: No JVD. Cardiovascular:      Rate and Rhythm: Regular rhythm. Tachycardia present. Heart sounds: Normal heart sounds. No murmur. Comments: Absent pulses. Pulmonary:      Effort: Tachypnea, accessory muscle usage and retractions present. Breath sounds: Normal breath sounds. No wheezing or rales. Abdominal:      Palpations: Abdomen is soft. There is no mass. Tenderness: There is no abdominal tenderness. Musculoskeletal:      Comments: Skin discoloration bilateral legs. Cold to touch    Lymphadenopathy:      Head:      Right side of head: No submandibular adenopathy. Left side of head: No submandibular adenopathy. Cervical: No cervical adenopathy. Skin:     General: Skin is warm. Neurological:      Mental Status: He is alert and oriented to person, place, and time. Motor: No tremor. Psychiatric:         Behavior: Behavior is cooperative. Laboratory findings:    Recent Labs     05/24/20 2318 05/25/20  0329 05/26/20  0439   WBC 6.1 10.5 4.6   HGB 7.5* 7.5* 6.0*   HCT 25.9* 24.6* 20.1*   * 177 134*     Recent Labs     05/24/20  0000 05/24/20 2318 05/25/20 0329 05/26/20  0439    135 132* 138   K 4.8 5.7* 5.3 4.5   CL 95* 94* 90* 96*   CO2 21 17* 18* 22   GLUCOSE 147* 98 125* 95   * 113* 117* 79*   CREATININE 7.44* 9.06* 8.86* 7.32*   MG 1.7  --   --   --    CALCIUM 8.5* 8.4* 8.3* 7.7*     Recent Labs     05/24/20  0000 05/24/20 2318 05/25/20 0329 05/25/20  1322 05/26/20  0439   PROT 5.9* 5.8* 5.6* 5.1* 5.1*   LABALBU 3.3* 3.1* 3.1* 2.9* 3.0*   AST 26 36 27 25 27   ALT 43* 41 37 31 27   * 575*  --   --  475*   ALKPHOS 178* 160* 155* 144* 137*   BILITOT 0.50 0.45 0.45 0.38 0.38   BILIDIR  --   --   --  0.18  --           Specific Gravity, UA   Date Value Ref Range Status   03/02/2020 1.015 1.000 - 1.030 Final     Protein, UA   Date Value Ref Range Status   03/02/2020 2+ (A) NEGATIVE Final     RBC, UA   Date Value Ref Range Status   03/02/2020 2 TO 5 /HPF Final     Bacteria, UA   Date Value Ref Range Status   03/02/2020 MANY (A) None Final     Nitrite, Urine   Date Value Ref Range Status   03/02/2020 NEGATIVE NEGATIVE Final     WBC, UA   Date Value Ref Range Status   03/02/2020 0 TO 2 /HPF Final     Leukocyte Esterase, Urine   Date Value Ref Range Status   03/02/2020 NEGATIVE NEGATIVE Final       Imaging / Clinical Data :-   Xr Chest Portable    Result Date: 5/25/2020  Marked interval worsening of bilateral airspace disease. Stable cardiomegaly. Ct Chest Pulmonary Embolism W Contrast    Result Date: 5/24/2020  1. Suboptimal evaluation of the segmental, subsegmental and more peripheral pulmonary arteries in the bilateral lower lobes due to motion artifact. Given this, no obvious acute pulmonary thromboembolic disease.   Prominent main

## 2020-05-26 NOTE — PROGRESS NOTES
fluticasone  1 spray Nasal Daily    [Held by provider] isosorbide mononitrate  60 mg Oral Daily    sevelamer  2,400 mg Oral TID     senna  1 tablet Oral BID    escitalopram  10 mg Oral QAM    aspirin  81 mg Oral Daily    midodrine  5 mg Oral Once per day on     lidocaine   Topical Once per day on     lactulose  10 g Oral Daily    insulin glargine  10 Units Subcutaneous BID    budesonide-formoterol  2 puff Inhalation BID    cetirizine  5 mg Oral Daily    apixaban  5 mg Oral BID    lidocaine  1 patch Transdermal Daily    calcium acetate  2,001 mg Oral TID     methylPREDNISolone  4 mg Oral QPM    zinc sulfate  50 mg Oral Daily    fentaNYL  1 patch Transdermal Q72H    metoprolol tartrate  50 mg Oral BID    miconazole   Topical BID    meropenem  1 g Intravenous Q24H    sodium chloride  20 mL Intravenous Once    promethazine  12.5 mg Intramuscular Once     Continuous Infusions:    amiodarone 1 mg/min (20 0534)    dextrose       PRN Meds:  sodium chloride flush, albumin human, midodrine, acetaminophen **OR** acetaminophen, nicotine, promethazine **OR** ondansetron, sodium chloride flush, albuterol sulfate HFA, dextrose, dextrose, glucagon (rDNA), glucose, ondansetron, glucose, nitroGLYCERIN, diphenhydrAMINE, acetaminophen, traMADol, oxyCODONE    Input/Output:       I/O last 3 completed shifts: In: 5839 [P.O.:50; I.V.:1089]  Out: 1910 [Urine:100].       Patient Vitals for the past 96 hrs (Last 3 readings):   Weight   20 2245 243 lb 9.7 oz (110.5 kg)   20 1942 247 lb 9.2 oz (112.3 kg)   20 1140 251 lb 5.2 oz (114 kg)       Vital Signs:   Temperature:  Temp: 98.2 °F (36.8 °C)  TMax:   Temp (24hrs), Av.4 °F (36.9 °C), Min:98 °F (36.7 °C), Max:98.9 °F (37.2 °C)    Respirations:  Resp: 22  Pulse:   Pulse: 128  BP:    BP: 107/64  BP Range: Systolic (10ECT), OHZ:944 , Min:80 , HDK:328       Diastolic (18QOI), HAX:76, Min:47, Max:98      Physical

## 2020-05-26 NOTE — CONSULTS
[facility]    Consult / H&P               Today's Date: 5/25/2020  Patient Name: Noemy Contreras  Date of admission: 5/24/2020  9:47 AM  Patient's age: 78 y.o., 1950  Admission Dx: Pneumonia due to COVID-19 virus [U07.1, J12.89]    Reason for Consult:  Cardiac evaluation    Requesting Physician: Oneil Plascencia DO    CHIEF COMPLAINT: Altered mental status    History Obtained From:  patient, electronic medical record    HISTORY OF PRESENT ILLNESS:    74-year-old male with history of end-stage renal disease on hemodialysis, COPD, diabetes mellitus presented with acute on chronic respiratory failure and secondary to comorbid infection he was discharged and readmitted on 524 due to worsening dyspnea and was found to be in A. fib with RVR associated with worsening chest x-ray infiltrates. He continues to have worsening shortness of breath. Overnight for A. fib with RVR he was given amiodarone and started on amiodarone infusion. Past Medical History:   has a past medical history of Acute combined systolic and diastolic congestive heart failure ([address] Anemia, BPH (benign prostatic hyperplasia), Cancer ([address] CKD (chronic kidney disease) stage 3, GFR 30-59 ml/min ([facility]), Constipation, COPD (chronic obstructive pulmonary disease) ([address] Depression, GERD (gastroesophageal reflux disease), Hemodialysis patient ([address] HTN (hypertension), Hx of blood clots, Hyperparathyroidism ([address] Hypothyroidism, IDDM (insulin dependent diabetes mellitus) ([address] Insomnia, Liver disease, MDRO (multiple drug resistant organisms) resistance, Memory deficit, Neurofibromatosis ([address] Osteoarthritis, Paraplegia ([address] Patient in clinical research study, Peptic ulcer, PVD (peripheral vascular disease) ([address] Secondary hyperparathyroidism ([address] Sinus disorder, Syncope, Type II or unspecified type diabetes mellitus without mention of complication, not stated as uncontrolled, and Venous thrombosis.     Past Surgical tablet by mouth 2 times daily    Historical Provider, MD   escitalopram (LEXAPRO) 10 MG tablet Take 10 mg by mouth every morning     Historical Provider, MD   isosorbide mononitrate (IMDUR) 60 MG CR tablet Take 60 mg by mouth daily    Historical Provider, MD   fluticasone (FLONASE) 50 MCG/ACT nasal spray 1 spray by Nasal route daily     Historical Provider, MD   finasteride (PROSCAR) 5 MG tablet Take 5 mg by mouth daily.     Historical Provider, MD   omeprazole (PRILOSEC) 20 MG capsule Take 20 mg by mouth every morning     Historical Provider, MD      Current Facility-Administered Medications: fentaNYL (SUBLIMAZE) injection 100 mcg, 100 mcg, Intravenous, Once  sodium chloride flush 0.9 % injection 10 mL, 10 mL, Intravenous, 2 times per day  sodium chloride flush 0.9 % injection 10 mL, 10 mL, Intravenous, PRN  [COMPLETED] amiodarone (CORDARONE) 150 mg in dextrose 5 % 100 mL bolus, 150 mg, Intravenous, Once **FOLLOWED BY** [] amiodarone (CORDARONE) 450 mg in dextrose 5 % 250 mL infusion, 1 mg/min, Intravenous, Continuous **FOLLOWED BY** amiodarone (CORDARONE) 450 mg in dextrose 5 % 250 mL infusion, 1 mg/min, Intravenous, Continuous  albumin human 25 % IV solution 25 g, 25 g, Intravenous, PRN  midodrine (PROAMATINE) tablet 10 mg, 10 mg, Oral, PRN  amiodarone (CORDARONE) 150 mg in dextrose 5 % 100 mL bolus, 150 mg, Intravenous, Once  [COMPLETED] dilTIAZem injection 10 mg, 10 mg, Intravenous, Once **FOLLOWED BY** dilTIAZem 125 mg in dextrose 5 % 125 mL infusion, 5 mg/hr, Intravenous, Continuous  acetaminophen (TYLENOL) tablet 650 mg, 650 mg, Oral, Q6H PRN **OR** acetaminophen (TYLENOL) suppository 650 mg, 650 mg, Rectal, Q6H PRN  nicotine (NICODERM CQ) 21 MG/24HR 1 patch, 1 patch, Transdermal, Daily PRN  promethazine (PHENERGAN) tablet 12.5 mg, 12.5 mg, Oral, Q6H PRN **OR** ondansetron (ZOFRAN) injection 4 mg, 4 mg, Intravenous, Q6H PRN  sodium chloride flush 0.9 % injection 10 mL, 10 mL, Intravenous, 2 times per day  sodium chloride flush 0.9 % injection 10 mL, 10 mL, Intravenous, PRN  albuterol sulfate  (90 Base) MCG/ACT inhaler 2 puff, 2 puff, Inhalation, Q6H PRN  dextrose 5 % solution, 100 mL/hr, Intravenous, PRN  dextrose 50 % IV solution, 12.5 g, Intravenous, PRN  glucagon (rDNA) injection 1 mg, 1 mg, Intramuscular, PRN  glucose (GLUTOSE) 40 % oral gel 15 g, 15 g, Oral, PRN  insulin lispro (HUMALOG) injection vial 0-6 Units, 0-6 Units, Subcutaneous, TID WC  insulin lispro (HUMALOG) injection vial 0-3 Units, 0-3 Units, Subcutaneous, Nightly  pantoprazole (PROTONIX) tablet 40 mg, 40 mg, Oral, QAM AC  finasteride (PROSCAR) tablet 5 mg, 5 mg, Oral, Daily  fluticasone (FLONASE) 50 MCG/ACT nasal spray 1 spray, 1 spray, Nasal, Daily  isosorbide mononitrate (IMDUR) extended release tablet 60 mg, 60 mg, Oral, Daily  sevelamer (RENVELA) tablet 2,400 mg, 2,400 mg, Oral, TID WC  senna (SENOKOT) tablet 8.6 mg, 1 tablet, Oral, BID  escitalopram (LEXAPRO) tablet 10 mg, 10 mg, Oral, QAM  aspirin chewable tablet 81 mg, 81 mg, Oral, Daily  midodrine (PROAMATINE) tablet 5 mg, 5 mg, Oral, Once per day on Mon Wed Fri  lidocaine (LMX) 4 % cream, , Topical, Once per day on Mon Wed Fri  lactulose (CHRONULAC) 10 GM/15ML solution 10 g, 10 g, Oral, Daily  ondansetron (ZOFRAN) tablet 4 mg, 4 mg, Oral, Q6H PRN  insulin glargine (LANTUS) injection vial 10 Units, 10 Units, Subcutaneous, BID  budesonide-formoterol (SYMBICORT) 80-4.5 MCG/ACT inhaler 2 puff, 2 puff, Inhalation, BID  glucose (GLUTOSE) 40 % oral gel 15 g, 15 g, Oral, PRN  cetirizine (ZYRTEC) tablet 5 mg, 5 mg, Oral, Daily  nitroGLYCERIN (NITROSTAT) SL tablet 0.4 mg, 0.4 mg, Sublingual, Q5 Min PRN  apixaban (ELIQUIS) tablet 5 mg, 5 mg, Oral, BID  lidocaine 4 % external patch 1 patch, 1 patch, Transdermal, Daily  calcium acetate (PHOSLO) capsule 2,001 mg, 2,001 mg, Oral, TID WC  diphenhydrAMINE (BENADRYL) tablet 25 mg, 25 mg, Oral, Q6H PRN  methylPREDNISolone (MEDROL) tablet 4 mg, 4

## 2020-05-26 NOTE — PROGRESS NOTES
history, medications, social history, and family history, and I haveupdated the database accordingly. Past Medical History:     Past Medical History:   Diagnosis Date    Acute combined systolic and diastolic congestive heart failure ([address] 11/25/2016    Anemia     BPH (benign prostatic hyperplasia)     Cancer (HCC)     left ear 8/2013    CKD (chronic kidney disease) stage 3, GFR 30-59 ml/min (HCC)     Constipation     COPD (chronic obstructive pulmonary disease) (HCC)     Depression     GERD (gastroesophageal reflux disease)     Hemodialysis patient ([address]     3 times a week    HTN (hypertension)     Hx of blood clots     \" rt.shoulder/neck\"    Hyperparathyroidism ([address]     Hypothyroidism     IDDM (insulin dependent diabetes mellitus) ([address]     Insomnia     Liver disease     patient is unsure what it is    MDRO (multiple drug resistant organisms) resistance     hx. c-dif.     Memory deficit     Neurofibromatosis ([address]     Osteoarthritis     Paraplegia ([address]     chair to bed and chair transfer only    Patient in clinical research study 05/24/2020    Expanded Access to Convalescent Plasma for the Treatment of COVID-19    Peptic ulcer     PVD (peripheral vascular disease) ([address]     Secondary hyperparathyroidism ([address]     Sinus disorder     Syncope     Type II or unspecified type diabetes mellitus without mention of complication, not stated as uncontrolled     Venous thrombosis        Past Surgical  History:     Past Surgical History:   Procedure Laterality Date    CHOLECYSTECTOMY      COLONOSCOPY  08/22/2011    2 POLYPS REMOBED TUBULAR ADENOMA    DIALYSIS FISTULA CREATION Left 06/13/2016    LEFT WRIST, no longer functional    DIALYSIS FISTULA CREATION Left     antecubital, was revised one time    HIP SURGERY Right     deep laceration was repaired    INTRACAPSULAR CATARACT EXTRACTION Right 5/7/2019    EYE CATARACT EMULSIFICATION IOL IMPLANT performed by Miko Goodrich MD at [address] Occupational History    Not on file   Social Needs    Financial resource strain: Not on file    Food insecurity     Worry: Not on file     Inability: Not on file    Transportation needs     Medical: Not on file     Non-medical: Not on file   Tobacco Use    Smoking status: Former Smoker     Packs/day: 1.00     Types: Cigarettes     Last attempt to quit: 3/1/2019     Years since quittin.2    Smokeless tobacco: Never Used   Substance and Sexual Activity    Alcohol use: No    Drug use: No    Sexual activity: Not Currently   Lifestyle    Physical activity     Days per week: Not on file     Minutes per session: Not on file    Stress: Not on file   Relationships    Social connections     Talks on phone: Not on file     Gets together: Not on file     Attends Latter day service: Not on file     Active member of club or organization: Not on file     Attends meetings of clubs or organizations: Not on file     Relationship status: Not on file    Intimate partner violence     Fear of current or ex partner: Not on file     Emotionally abused: Not on file     Physically abused: Not on file     Forced sexual activity: Not on file   Other Topics Concern    Not on file   Social History Narrative    Not on file       Family History:     Family History   Family history unknown: Yes        Allergies:   Cymbalta [duloxetine hcl]; Tromethamine; and Toradol [ketorolac tromethamine]     Review of Systems:     Review of Systems   Constitutional: Positive for activity change and appetite change. HENT: Negative for congestion. Respiratory: Positive for cough and shortness of breath. Gastrointestinal: Positive for nausea. Genitourinary: Negative for dysuria. Musculoskeletal: Positive for myalgias. Skin: Negative for color change. Allergic/Immunologic: Negative for immunocompromised state. Psychiatric/Behavioral: The patient is nervous/anxious.         Physical Examination :     Patient Vitals for the past 8 8.86* 7.32*   MG 1.7  --   --   --     < > = values in this interval not displayed. Hepatic Function Panel:   Recent Labs     05/25/20  1322 05/26/20  0439   PROT 5.1* 5.1*   LABALBU 2.9* 3.0*   BILIDIR 0.18  --    IBILI 0.20  --    BILITOT 0.38 0.38   ALKPHOS 144* 137*   ALT 31 27   AST 25 27     No results for input(s): RPR in the last 72 hours. No results for input(s): HIV in the last 72 hours. No results for input(s): BC in the last 72 hours. Lab Results   Component Value Date    CREATININE 7.32 05/26/2020    GLUCOSE 95 05/26/2020    GLUCOSE 135 06/01/2012       Detailed results: Thank you for allowing us to participate in the care of this patient. Please call with questions. This note is created with the assistance of a speech recognition program.  While intending to generate adocument that actually reflects the content of the visit, the document can still have some errors including those of syntax and sound a like substitutions which may escape proof reading. It such instances, actual meaningcan be extrapolated by contextual diversion.     Cherie Carcamo MD  Office: [phone]  Perfect serve / office [phone]

## 2020-05-27 NOTE — PROGRESS NOTES
problems, confusion, self-injury, sleep disturbance and suicidal ideas. The patient is nervous/anxious. Objective :      Current Vitals : Temp: 98.8 °F (37.1 °C),  Pulse: 106, Resp: 23, BP: 123/76, SpO2: 96 %  Last 24 Hrs Vitals   Patient Vitals for the past 24 hrs:   BP Temp Temp src Pulse Resp SpO2   05/27/20 0600 123/76 -- -- 106 23 96 %   05/27/20 0415 (!) 103/46 -- -- 101 19 100 %   05/27/20 0100 113/61 -- -- 94 -- 98 %   05/27/20 0000 (!) 120/99 -- -- 101 -- 93 %   05/26/20 2030 134/69 98.8 °F (37.1 °C) Oral 124 18 96 %   05/26/20 1641 -- -- -- 123 -- --   05/26/20 1500 133/67 -- -- 130 27 97 %   05/26/20 1241 -- -- -- 128 -- --   05/26/20 1000 107/64 -- -- 137 22 92 %   05/26/20 0930 139/82 98.2 °F (36.8 °C) Oral 134 26 96 %   05/26/20 0912 (!) 138/98 -- -- 135 25 --   05/26/20 0800 94/68 -- -- 135 25 92 %     Intake / output   05/26 0701 - 05/27 0700  In: 345   Out: 150 [Urine:150]  Physical Exam:  Physical Exam  Vitals signs and nursing note reviewed. Constitutional:       General: He is in acute distress. Appearance: He is obese. He is ill-appearing. He is not diaphoretic. HENT:      Head: Normocephalic and atraumatic. Nose:      Right Sinus: No maxillary sinus tenderness or frontal sinus tenderness. Left Sinus: No maxillary sinus tenderness or frontal sinus tenderness. Mouth/Throat:      Mouth: Mucous membranes are dry. Pharynx: No oropharyngeal exudate. Eyes:      General: No scleral icterus. Conjunctiva/sclera: Conjunctivae normal.      Pupils: Pupils are equal, round, and reactive to light. Neck:      Musculoskeletal: Full passive range of motion without pain and neck supple. Thyroid: No thyromegaly. Vascular: No JVD. Cardiovascular:      Rate and Rhythm: Regular rhythm. Tachycardia present. Heart sounds: Normal heart sounds. No murmur. Comments: Absent pulses.    Pulmonary:      Effort: Tachypnea, accessory muscle usage and retractions present. Breath sounds: Normal breath sounds. No wheezing or rales. Abdominal:      Palpations: Abdomen is soft. There is no mass. Tenderness: There is no abdominal tenderness. Musculoskeletal:      Comments: Skin discoloration bilateral legs. Cold to touch    Lymphadenopathy:      Head:      Right side of head: No submandibular adenopathy. Left side of head: No submandibular adenopathy. Cervical: No cervical adenopathy. Skin:     General: Skin is warm. Neurological:      Mental Status: He is alert and oriented to person, place, and time. Motor: No tremor. Psychiatric:         Behavior: Behavior is cooperative. Laboratory findings:    Recent Labs     05/25/20  0329 05/26/20  0439 05/26/20  1159 05/27/20  0424   WBC 10.5 4.6  --  6.6   HGB 7.5* 6.0* 7.2* 7.0*   HCT 24.6* 20.1* 23.3* 23.4*    134*  --  153     Recent Labs     05/25/20  0329 05/26/20  0439 05/27/20  0424   * 138 137   K 5.3 4.5 5.2   CL 90* 96* 95*   CO2 18* 22 20   GLUCOSE 125* 95 92   * 79* 90*   CREATININE 8.86* 7.32* 8.84*   CALCIUM 8.3* 7.7* 7.4*     Recent Labs     05/24/20  2318  05/25/20  1322 05/26/20  0439 05/26/20  1102 05/27/20  0424   PROT 5.8*   < > 5.1* 5.1*  --  5.2*   LABALBU 3.1*   < > 2.9* 3.0*  --  3.0*   AST 36   < > 25 27  --  36   ALT 41   < > 31 27  --  29   *  --   --  475* 539*  --    ALKPHOS 160*   < > 144* 137*  --  184*   BILITOT 0.45   < > 0.38 0.38  --  0.38   BILIDIR  --   --  0.18  --   --   --     < > = values in this interval not displayed.           Specific Gravity, UA   Date Value Ref Range Status   03/02/2020 1.015 1.000 - 1.030 Final     Protein, UA   Date Value Ref Range Status   03/02/2020 2+ (A) NEGATIVE Final     RBC, UA   Date Value Ref Range Status   03/02/2020 2 TO 5 /HPF Final     Bacteria, UA   Date Value Ref Range Status   03/02/2020 MANY (A) None Final     Nitrite, Urine   Date Value Ref Range Status   03/02/2020 NEGATIVE NEGATIVE

## 2020-05-27 NOTE — CARE COORDINATION
Transitional planning-OK for [facility]-nurse asked patient. Referral called to Eliza at [facility]. [phone] call from Eliza at [facility]. Will see patient at [address] [facility] to talk with the patient. Call [address] [place] [address] when discharged [phone]. [phone] called Kaley at [facility] to see about [facility] at facility-will call back. Called Kaley at [facility] needs to be down to 10L before they can accept.

## 2020-05-27 NOTE — PROGRESS NOTES
Renal Progress Note    Patient :  Kamille Lobato; 78 y.o. MRN# [id number]  Location:  2002/2002-01  Attending:  Bib Patel MD  Admit Date:  5/24/2020   Hospital Day: 3      Subjective:     Patient is positive Covid 19 pneumonia. He normally gets dialysis as per TTS schedule but has a history of noncompliance with dialysis. Patient change his CODE STATUS to [facility] yesterday and wants to get discharged with hospice. Primary and  working towards that. He had refused hemodialysis yesterday. Outpatient Medications:     Medications Prior to Admission: traMADol (ULTRAM) 50 MG tablet, Take 1 tablet by mouth every 12 hours as needed (mild-mod pain) for up to 7 days. metoprolol tartrate (LOPRESSOR) 50 MG tablet, Take 1 tablet by mouth 2 times daily Indications: Hold for SBP less than 100 or HR less than 60  methylPREDNISolone (MEDROL) 4 MG tablet, Take 4 mg by mouth every evening  apixaban (ELIQUIS) 5 MG TABS tablet, Take 1 tablet by mouth 2 times daily  dilTIAZem (CARDIZEM CD) 240 MG extended release capsule, Take 1 capsule by mouth daily  fluticasone-vilanterol (BREO ELLIPTA) 100-25 MCG/INH AEPB inhaler, Inhale 1 puff into the lungs daily  aspirin 81 MG chewable tablet, Take 81 mg by mouth daily  escitalopram (LEXAPRO) 10 MG tablet, Take 10 mg by mouth every morning   isosorbide mononitrate (IMDUR) 60 MG CR tablet, Take 60 mg by mouth daily  zinc sulfate (ZINCATE) 220 (50 Zn) MG capsule, Take 1 capsule by mouth daily  fentaNYL (DURAGESIC) 25 MCG/HR, Place 1 patch onto the skin every 72 hours for 30 days. oxyCODONE (ROXICODONE) 5 MG immediate release tablet, Take 1 tablet by mouth every 6 hours as needed (severe pain) for up to 7 days. miconazole (MICOTIN) 2 % powder, Apply topically 2 times daily.   diphenhydrAMINE (BENADRYL) 25 MG capsule, Take 25 mg by mouth every 6 hours as needed for Itching  acetaminophen (TYLENOL) 500 MG tablet, Take 1,000 mg by mouth every 8 hours as needed for QAM AC    finasteride  5 mg Oral Daily    fluticasone  1 spray Nasal Daily    [Held by provider] isosorbide mononitrate  60 mg Oral Daily    sevelamer  2,400 mg Oral TID WC    senna  1 tablet Oral BID    escitalopram  10 mg Oral QAM    aspirin  81 mg Oral Daily    lidocaine   Topical Once per day on     lactulose  10 g Oral Daily    insulin glargine  10 Units Subcutaneous BID    budesonide-formoterol  2 puff Inhalation BID    cetirizine  5 mg Oral Daily    apixaban  5 mg Oral BID    lidocaine  1 patch Transdermal Daily    calcium acetate  2,001 mg Oral TID WC    methylPREDNISolone  4 mg Oral QPM    zinc sulfate  50 mg Oral Daily    fentaNYL  1 patch Transdermal Q72H    metoprolol tartrate  50 mg Oral BID    miconazole   Topical BID    promethazine  12.5 mg Intramuscular Once     Continuous Infusions:    dextrose       PRN Meds:  sodium chloride flush, albumin human, acetaminophen **OR** acetaminophen, nicotine, promethazine **OR** ondansetron, sodium chloride flush, albuterol sulfate HFA, dextrose, dextrose, glucagon (rDNA), glucose, ondansetron, glucose, nitroGLYCERIN, diphenhydrAMINE, acetaminophen, traMADol, oxyCODONE    Input/Output:       I/O last 3 completed shifts: In: 345 [Blood:345]  Out: 275 [Urine:275]. Patient Vitals for the past 96 hrs (Last 3 readings):   Weight   20 2245 243 lb 9.7 oz (110.5 kg)   20 1942 247 lb 9.2 oz (112.3 kg)   20 1140 251 lb 5.2 oz (114 kg)       Vital Signs:   Temperature:  Temp: 97.7 °F (36.5 °C)  TMax:   Temp (24hrs), Av.3 °F (36.8 °C), Min:97.7 °F (36.5 °C), Max:98.8 °F (37.1 °C)    Respirations:  Resp: 20  Pulse:   Pulse: 108  BP:    BP: (!) 162/127  BP Range: Systolic (74CYG), VYP:551 , Min:103 , ONH:365       Diastolic (26EWQ), GRY:48, Min:46, Max:127      Physical Examination:     Could not physically examine the patient due to Covid 19 pandemic and to conserve the PPE's.     Labs:       Recent Labs LEUKOCYTESUR NEGATIVE 03/02/2020    UROBILINOGEN Normal 03/02/2020    BILIRUBINUR  03/02/2020     Presumptive positive. Unable to confirm due to unavailability of reagent. BILIRUBINUR NEGATIVE 08/26/2011    GLUCOSEU NEGATIVE 03/02/2020    GLUCOSEU NEGATIVE 08/26/2011    KETUA NEGATIVE 03/02/2020    AMORPHOUS 2+ 03/02/2020     Urine Sodium:     Lab Results   Component Value Date    MAURY 60 02/23/2016     Urine Creatinine:     Lab Results   Component Value Date    LABCREA 34.2 02/23/2016     Radiology:     Reviewed. Assessment:     1. ESRD: His regular dialysis days are Tuesday Thursday Saturdays at [facility] however he missed 2 weeks worth of dialysis. Patient follows up with Dr. Venu Toledo. 2. Anion Gap Metabolic acidosis -secondary to renal failure  3. Hyperkalemia - secondary to ESRD and missing dialysis. 4. Acute on chronic respiratory failure secondary to COVID-19 viral pneumonia   5. COVID-19 pneumonia - ID following. 6. Inflammatory surge: ID following. Ferritin, crp, and LDH increased. IL 6 level ordered. Ectemra per ID. Plan:   1. Patient changes CODE STATUS to [facility] and wants to get discharged with hospice. 2.   working towards hospice placement. 3.  Since the patient change his CODE STATUS and also has refused his dialysis treatments, will honor his wishes and Nephrology will sign off, please call with any questions. Patient's nurse was updated. Nutrition   Please ensure that patient is on a renal diet/TF. Avoid nephrotoxic drugs/contrast exposure. We will continue to follow along with you. Yobani Kohler MD  [facility]     This note is created with the assistance of a speech-recognition program. While intending to generate a document that actually reflects the content of the visit, no guarantees can be provided that every mistake has been identified and corrected by editing.

## 2020-05-27 NOTE — DISCHARGE INSTR - COC
By Review Planned Expiration Resolved Resolved By    COVID-19 05/18/20 05/18/20 05/18/20 COVID-19  07/13/20      MRSA  06/10/16 06/10/16 Renetta Frias RN        Groin - 5/2013    Resolved    COVID-19 Rule Out 05/18/20 05/18/20 05/18/20 COVID-19 (Ordered)   05/18/20 Rule-Out Test Resulted          Nurse Assessment:  Last Vital Signs: BP (!) 162/127   Pulse 108   Temp 97.7 °F (36.5 °C) (Axillary)   Resp 20   Wt 243 lb 9.7 oz (110.5 kg)   SpO2 97%   BMI 33.04 kg/m²     Last documented pain score (0-10 scale): Pain Level: 8  Last Weight:   Wt Readings from Last 1 Encounters:   05/25/20 243 lb 9.7 oz (110.5 kg)     Mental Status:  {IP PT MENTAL STATUS:70030}    IV Access:  { SONG IV ACCESS:607580621}    Nursing Mobility/ADLs:  Walking   {P DME MMPR:762895549}  Transfer  {P DME DFDA:962188800}  Bathing  {P DME BSEP:878160359}  Dressing  {P DME BNNO:010892794}  Toileting  {P DME CANO:186353100}  Feeding  {[facility] DME BHAM:300644544}  Med Admin  {P DME VWAT:469509615}  Med Delivery   { SONG MED Delivery:785717493}    Wound Care Documentation and Therapy:  Wound 01/03/20 Axilla Anterior;Proximal;Right;Upper redness tear ertythema (Active)   Number of days: 145       Wound 01/03/20 Axilla Anterior;Left;Proximal;Upper redness erythema (Active)   Number of days: 145       Wound 01/03/20 Scrotum redness (Active)   Number of days: 145       Wound 03/02/20 Rectum area approx 6 inch bright red (Active)   Number of days: 85       Wound 03/03/20 Thigh Proximal;Right;Dorsal Baseball sized redish- purple tee with pin size exit point (Active)   Number of days: 85       Wound 05/18/20 Scrotum Right small skin tear in right scrotum (Active)   Wound Skin Tear 5/24/2020  9:30 AM   Dressing Status Other (Comment) 5/27/2020  4:00 AM   Wound Assessment Intact 5/27/2020  4:00 AM   Drainage Amount None 5/27/2020  4:00 AM   Drainage Description Sanguinous 5/22/2020  4:15 AM   Odor None 5/27/2020  4:00 AM   Culture Taken No 5/19/2020  8:00 AM   Number of days: 9        Elimination:  Continence:   · Bowel: {YES / UW:18662}  · Bladder: {YES / UT:27799}  Urinary Catheter: {Urinary Catheter:945252643}   Colostomy/Ileostomy/Ileal Conduit: {YES / WL:00821}       Date of Last BM: 5/26/2020    Intake/Output Summary (Last 24 hours) at 5/27/2020 1742  Last data filed at 5/27/2020 0500  Gross per 24 hour   Intake --   Output 275 ml   Net -275 ml     I/O last 3 completed shifts:  In: -   Out: 275 [Urine:275]    Safety Concerns:     History of Falls (last 30 days) and At Risk for Falls    Impairments/Disabilities:      None    Nutrition Therapy:  Current Nutrition Therapy:   - Oral Diet:  Renal    Routes of Feeding: Oral  Liquids: No Restrictions  Daily Fluid Restriction: no  Last Modified Barium Swallow with Video (Video Swallowing Test): not done    Treatments at the Time of Hospital Discharge:   Respiratory Treatments: inhalers  Oxygen Therapy:  On 10L non-rebreather  Ventilator:    - No ventilator support    Rehab Therapies: Physical Therapy and Occupational Therapy  Weight Bearing Status/Restrictions: No weight bearing restirctions  Other Medical Equipment (for information only, NOT a DME order):  wheelchair  Other Treatments: N/A    Patient's personal belongings (please select all that are sent with patient):  None    RN SIGNATURE:  Electronically signed by Lloyd Bolden RN on 5/27/20 at 5:52 PM EDT    CASE MANAGEMENT/SOCIAL WORK SECTION    Inpatient Status Date: ***    Readmission Risk Assessment Score:  Readmission Risk              Risk of Unplanned Readmission:        61           Discharging to Facility/ Agency   · Name:   · Address:  · Phone:  · Fax:    Dialysis Facility (if applicable)   · Name:  · Address:  · Dialysis Schedule:  · Phone:  · Fax:    / signature: {Esignature:[id number]}    PHYSICIAN SECTION    Prognosis: {Prognosis:8941422612}    Condition at Discharge: [address] Patient Condition:860611705}    Rehab

## 2020-05-27 NOTE — PROGRESS NOTES
dermatitis of both lower extremities     Dizziness     Hypocalcemia     Acute combined systolic and diastolic congestive heart failure (HCC)     Respiratory distress     Acute on chronic respiratory failure with hypoxia (HCC)     Fluid overload     Rib pain on left side     Hypoglycemia     CRF (chronic renal failure), stage 5 (HCC)     Chronic renal failure, stage 3 (moderate) (HCC)     Hyperuricemia     Altered mental status     Atrial fibrillation (HCC)     ESRD on hemodialysis ([facility] [address]     Fracture of rib of right side     Hypoxia     Phlegmon on Right Buttock     Mass of lingula of lung     Acute mastoiditis of left side     Acute metabolic encephalopathy     Atypical pneumonia     Pneumonia due to COVID-19 virus     Candidiasis of urogenital sites     Rapid atrial fibrillation ([facility] [address]      Plan of Treatment:   1. PAF. Rate improved. Jose Hurst HBG improved. IV dig given yesterday. Continue BB, Continue Eliquis. Will start on PO amiodarone   2. Elevated trop, Type II MI, Secondary to ESRD. 3. Hypotension. Improved today. 4. Noncompliance   5. Code status changed to [facility] today and meeting with Hospice scheduled today. 6. Will sign off.  Please call with any further questions or concerns    Electronically signed by KELECHI Cortez CNP on 5/27/2020 at 10:52 AM  [address]  [phone]

## 2020-05-27 NOTE — PROGRESS NOTES
11pm Writer contacted medicne NP and ID regarding the needed to administer convalescent COVID  Plasma. Per Medicine reevaluate need in am. 7am per DR Hunt d/c COVID 19 plasma and update. Writer contacted bloodbank.  Kaylee Louis RN

## 2020-05-27 NOTE — PROGRESS NOTES
PULMONARY & CRITICAL CARE MEDICINE PROGRESS  NOTE     Patient:  Lizet Sainz  MRN: [id number]  Admit date: 5/24/2020    SUBJECTIVE     I personally interviewed/examined the patient, reviewed interval history and interpreted all available radiographic, laboratory data at the time of service. Chief Compliant/Reason for Initial Consult: Acute respiratory failure/COVID-19 pneumonia    Brief Hospital Course: Patient is a 71-year-old male with multiple medical comorbidities including hypertension, diabetes, hypothyroidism, CHF, COPD and end-stage renal disease. He presented to [facility] with acute respiratory distress. He apparently had missed multiple dialysis sessions and was in fluid overload. He was noted to be in A. fib with RVR and is currently on amiodarone infusion. His testing for COVID-19 was positive, CT shows bilateral groundglass crazy paving pattern. He got Tocilizumab on 5/25 and was planned for transfusion of conversant plasma.     Interval History:  05/27/20  Patient's CODE STATUS was to DNR CC yesterday and he is awaiting evaluation by hospice  Patient is currently on nonrebreather mask  T-max is 98.8, WBC count is 6.6 today     Review of Systems:  General: negative for chills, fatigue or fever  ENT: negative for headaches, nasal congestion, sore throat or visual changes  Allergy and Immunology: negative for postnasal drip or seasonal allergies  Hematological and Lymphatic: negative for bleeding problems, swollen lymph nodes  Respiratory: positive for cough and shortness of breath negative for pleuritic pain or wheezing  Cardiovascular: negative for edema or palpitations  Gastrointestinal: negative for abdominal pain, change in bowel habits or nausea/vomiting  Genito-Urinary: negative for dysuria or urinary frequency/urgency  Musculoskeletal: negative for joint pain or joint swelling  Neurological: negative for numbness/tingling, seizures or weakness  Dermatological: negative for pruritus or rash    OBJECTIVE     VITAL SIGNS:   LAST-  BP (!) 162/127   Pulse 108   Temp 97.7 °F (36.5 °C) (Axillary)   Resp 20   Wt 243 lb 9.7 oz (110.5 kg)   SpO2 97%   BMI 33.04 kg/m²   8-24 HR RANGE-  TEMP Temp  Av.3 °F (36.8 °C)  Min: 97.7 °F (36.5 °C)  Max: 98.8 °F (67.3 °C)   BP Systolic (52MKM), QSV:380 , Min:103 , CQA:830      Diastolic (93NKP), ZLD:53, Min:46, Max:127     PULSE Pulse  Av.8  Min: 94  Max: 130   RR Resp  Av.7  Min: 19  Max: 23   O2 SAT SpO2  Av.7 %  Min: 96 %  Max: 100 %   OXYGEN DELIVERY No data recorded     Systemic Examination:   Physical exam was deferred in effort to conserve PPE and limit exposure to COVID-19    DATA REVIEW     Medications:  Scheduled Meds:   amiodarone  200 mg Oral BID    fentanNYL  100 mcg Intravenous Once    sodium chloride flush  10 mL Intravenous 2 times per day    sodium chloride flush  10 mL Intravenous 2 times per day    insulin lispro  0-6 Units Subcutaneous TID WC    insulin lispro  0-3 Units Subcutaneous Nightly    pantoprazole  40 mg Oral QAM AC    finasteride  5 mg Oral Daily    fluticasone  1 spray Nasal Daily    [Held by provider] isosorbide mononitrate  60 mg Oral Daily    sevelamer  2,400 mg Oral TID WC    senna  1 tablet Oral BID    escitalopram  10 mg Oral QAM    aspirin  81 mg Oral Daily    lidocaine   Topical Once per day on     lactulose  10 g Oral Daily    insulin glargine  10 Units Subcutaneous BID    budesonide-formoterol  2 puff Inhalation BID    cetirizine  5 mg Oral Daily    apixaban  5 mg Oral BID    lidocaine  1 patch Transdermal Daily    calcium acetate  2,001 mg Oral TID WC    methylPREDNISolone  4 mg Oral QPM    zinc sulfate  50 mg Oral Daily    fentaNYL  1 patch Transdermal Q72H    metoprolol tartrate  50 mg Oral BID    miconazole   Topical BID    promethazine  12.5 mg Intramuscular Once Continuous Infusions:   dextrose       LABS:-  ABG:   Recent Labs     05/24/20  2324   POCPH 7.302*   POCPCO2 46.3   POCPO2 78.4*   POCHCO3 22.9   IPEU0WCK 94     WBC:    Recent Labs     05/25/20  0329 05/26/20  0439 05/27/20  0424   WBC 10.5 4.6 6.6   LYMPHOPCT 5* 6* 5*     CRP:   Recent Labs     05/25/20  0329 05/26/20  0439 05/26/20  1102   .5* 98.9* 89.5*     LDH:   Recent Labs     05/24/20  2318 05/26/20  0439 05/26/20  1102   * 475* 539*     Liver Function Test:   Recent Labs     05/25/20  1322 05/26/20  0439 05/27/20  0424   PROT 5.1* 5.1* 5.2*   LABALBU 2.9* 3.0* 3.0*   ALT 31 27 29   AST 25 27 36   ALKPHOS 144* 137* 184*   BILITOT 0.38 0.38 0.38     Coagulation Profile:   No results for input(s): INR, PROTIME, APTT in the last 72 hours. D-Dimer:  No results for input(s): DDIMER in the last 72 hours. Ferritin:    Recent Labs     05/24/20 2318 05/26/20 0439 05/27/20  0424   FERRITIN 5,984* 3,723* 3,356*       Lactic Acid:  Recent Labs     05/25/20  0651   LACTA NOT REPORTED     Cardiac Enzymes:  No results for input(s): CKTOTAL, CKMB, CKMBINDEX, TROPONINI in the last 72 hours. BNP/PROBNP:   No results for input(s): BNP, PROBNP in the last 72 hours. QTc:      Microbiology:    Radiology Reports:  XR CHEST PORTABLE   Final Result   Worsening bilateral airspace disease. XR CHEST PORTABLE   Final Result   Marked interval worsening of bilateral airspace disease. Stable cardiomegaly. Echocardiogram:   Results for orders placed during the hospital encounter of 03/02/20   ECHO Complete 2D W Doppler W Color    Narrative   Summary  Echo contrast utilized on this technically difficult study. Normal LV size and function. Estimated LV EF 55 %. Severe left ventricular hypertrophy. Left atrial dilatation. Right atrial dilatation. No significant valvular regurgitation or stenosis seen. IVC Increased diameter, but still has inspiratory variation.   No significant

## 2020-05-28 NOTE — FLOWSHEET NOTE
Dr Marnell Sever notified of absence of respirations and absence of pulse  UPDATE   35   ex wife,, herve navarro notified of pt death [phone]   Juan Luis Henry notified pf pt death  Mehul notified of pt death by   Nurse manager  Jasper Rojas Packwoodnotified of patient death

## 2020-05-28 NOTE — PROGRESS NOTES
[facility]      Daily Progress Note     Admit Date: 5/24/2020  Bed/Room No.  2002/2002-01  Admitting Physician : Nir Baez MD  Code Status :Happy HOSPITAL Day:  LOS: 4 days   Chief Complaint:     Rapid Afib   Dyspnea at Rest     Principal Problem:    Rapid atrial fibrillation (HCC)  Active Problems:    Essential hypertension    Neurofibromatosis ([facility] [address]    Type 2 diabetes mellitus with renal complication (HCC)    COPD (chronic obstructive pulmonary disease) (HCC)    End stage renal disease on dialysis ([facility] [address]    Acute on chronic respiratory failure with hypoxia (HCC)    Pneumonia due to COVID-19 virus  Resolved Problems:    * No resolved hospital problems. *    Subjective : Interval History/Significant events :  05/28/20    Patient has difficulty in breathing he appears in distress. Remains on nonrebreather. Patient has been eating less. He remains in A. fib. Heart rate is uncontrolled. Patient is afebrile  Vitals - Unstable afebrile  Labs -no new labs. Nursing notes , Consults notes reviewed. Overnight events and updates discussed with Nursing staff . Background History:         Tsering Loving is 78 y.o. male  Who was admitted to the hospital on 5/24/2020 for treatment of Rapid atrial fibrillation ([facility] [address] Patient came to [facility] with acute on chronic respiratory failure. COVID-19 testing was done and was positive. Patient had missed multiple dialysis sessions and was fluid overloaded. Patient has been getting daily dialysis sessions for fluid removal.   Patient had AFib with RVR and was started on amiodarone infusion. Central line Left femoral was placed for poor IV access. Patient lives alone. Is  from wife Carlos Richards, who is listed as emergency contact. Patient has no contact with son Jessie Montoya. He has underlying COPD with chronic resp failure. He quit about a year ago. Patient has PAD .  Patient Ex wife informed that patient was in hospice a Intravenous, 2 times per day  insulin lispro, 0-6 Units, Subcutaneous, TID WC  insulin lispro, 0-3 Units, Subcutaneous, Nightly  pantoprazole, 40 mg, Oral, QAM AC  finasteride, 5 mg, Oral, Daily  fluticasone, 1 spray, Nasal, Daily  [Held by provider] isosorbide mononitrate, 60 mg, Oral, Daily  sevelamer, 2,400 mg, Oral, TID WC  senna, 1 tablet, Oral, BID  escitalopram, 10 mg, Oral, QAM  aspirin, 81 mg, Oral, Daily  lidocaine, , Topical, Once per day on Mon Wed Fri  lactulose, 10 g, Oral, Daily  insulin glargine, 10 Units, Subcutaneous, BID  budesonide-formoterol, 2 puff, Inhalation, BID  cetirizine, 5 mg, Oral, Daily  apixaban, 5 mg, Oral, BID  lidocaine, 1 patch, Transdermal, Daily  calcium acetate, 2,001 mg, Oral, TID WC  methylPREDNISolone, 4 mg, Oral, QPM  zinc sulfate, 50 mg, Oral, Daily  fentaNYL, 1 patch, Transdermal, Q72H  metoprolol tartrate, 50 mg, Oral, BID  miconazole, , Topical, BID  promethazine, 12.5 mg, Intramuscular, Once        Review of Systems   Review of Systems   Constitutional: Positive for activity change, appetite change and fatigue. Negative for fever and unexpected weight change. HENT: Negative for congestion, nosebleeds, rhinorrhea, sinus pressure, sneezing and voice change. Respiratory: Positive for shortness of breath. Negative for cough, choking, chest tightness and wheezing. Cardiovascular: Positive for chest pain. Negative for palpitations and leg swelling. Gastrointestinal: Negative for abdominal pain, constipation, diarrhea, nausea and vomiting. Genitourinary: Negative for difficulty urinating, discharge, dysuria, frequency and testicular pain. Musculoskeletal: Positive for arthralgias. Negative for back pain. Skin: Negative for rash. Neurological: Negative for dizziness, weakness, light-headedness and headaches. Hematological: Does not bruise/bleed easily.    Psychiatric/Behavioral: Negative for agitation, behavioral problems, confusion, self-injury, sleep MD  5/28/2020

## 2020-05-28 NOTE — CONSULTS
Palliative Care Inpatient Consult    NAME:  Morris Warren RECORD NUMBER:  [id number]  AGE: 78 y.o. GENDER: male  : 1950  TODAY'S DATE:  2020    Reasons for Consultation:    Symptom and/or pain management  Provision of information regarding PC and/or hospice philosophies  Complex, time-intensive communication and interdisciplinary psychosocial support  Clarification of goals of care and/or assistance with difficult decision-making  Guidance in regards to resources and transition(s)    Members of PC team contributing to this consultation are :  Dr. Kyler Mack palliative care attending  History of Present Illness     The patient is a 78 y.o. Non-/non  male who presents with No chief complaint on file. Referred to Palliative Care by   [x] Physician   [] Nursing  [] Family Request   [] Other:       He was admitted to the internal medicine service for Pneumonia due to COVID-19 virus [U07.1, J12.89]. His hospital course has been associated with Rapid atrial fibrillation ([address] The patient has a complicated medical history and has been hospitalized since 2020  9:47 AM.  The patient was admitted to due to worsening of shortness of breath having acute on chronic respiratory failure. Patient was positive for COVID-19. Chest x-ray showed bilateral infiltrates. Patient developed A. fib with RVR and was treated. Patient has history of ESRD on HD and received dialysis, improved and was discharged. Patient was readmitted  due to worsening of dyspnea. Chest x-ray showed worsening of infiltrates. ID was consulted. Patient continued to have A. fib with RVR and was started on amiodarone drip. Patient also has underlying COPD with chronic respiratory failure. As per records patient's ex-wife informed that patient was on hospice care a year ago and had stopped dialysis for many months.   Patient CODE STATUS has been changed to DNR CC  Palliative care consulted for symptom aspirin  81 mg Oral Daily    lidocaine   Topical Once per day on Mon Wed Fri    lactulose  10 g Oral Daily    insulin glargine  10 Units Subcutaneous BID    budesonide-formoterol  2 puff Inhalation BID    cetirizine  5 mg Oral Daily    apixaban  5 mg Oral BID    lidocaine  1 patch Transdermal Daily    calcium acetate  2,001 mg Oral TID WC    methylPREDNISolone  4 mg Oral QPM    zinc sulfate  50 mg Oral Daily    fentaNYL  1 patch Transdermal Q72H    metoprolol tartrate  50 mg Oral BID    miconazole   Topical BID    promethazine  12.5 mg Intramuscular Once     morphine **OR** morphine, sodium chloride flush, albumin human, acetaminophen **OR** acetaminophen, nicotine, promethazine **OR** ondansetron, sodium chloride flush, albuterol sulfate HFA, dextrose, dextrose, glucagon (rDNA), glucose, ondansetron, glucose, nitroGLYCERIN, diphenhydrAMINE, acetaminophen, traMADol, oxyCODONE  IV Drips/Infusions   dextrose       Home Medications  No current facility-administered medications on file prior to encounter. Current Outpatient Medications on File Prior to Encounter   Medication Sig Dispense Refill    traMADol (ULTRAM) 50 MG tablet Take 1 tablet by mouth every 12 hours as needed (mild-mod pain) for up to 7 days.  15 tablet 0    metoprolol tartrate (LOPRESSOR) 50 MG tablet Take 1 tablet by mouth 2 times daily Indications: Hold for SBP less than 100 or HR less than 60 60 tablet 3    apixaban (ELIQUIS) 5 MG TABS tablet Take 1 tablet by mouth 2 times daily 60 tablet 0    dilTIAZem (CARDIZEM CD) 240 MG extended release capsule Take 1 capsule by mouth daily 30 capsule 0    fluticasone-vilanterol (BREO ELLIPTA) 100-25 MCG/INH AEPB inhaler Inhale 1 puff into the lungs daily      aspirin 81 MG chewable tablet Take 81 mg by mouth daily      escitalopram (LEXAPRO) 10 MG tablet Take 10 mg by mouth every morning       isosorbide mononitrate (IMDUR) 60 MG CR tablet Take 60 mg by mouth daily      zinc sulfate 99/51   Pulse 96   Temp 99 °F (37.2 °C) (Axillary)   Resp 21   Wt 257 lb 15 oz (117 kg)   SpO2 96%   BMI 34.98 kg/m²     Wt Readings from Last 3 Encounters:   05/28/20 257 lb 15 oz (117 kg)   05/23/20 270 lb (122.5 kg)   05/22/20 272 lb 7.8 oz (123.6 kg)        Code Status: DNR-CC     ADVANCED CARE PLANNING:  Patient has capacity for medical decisions: not asked  [address] of : yes  Living Will: yes     Personal, Social, and Family History  Marital Status:   Living situation:nursing home  Importance of salomon/Voodoo/spiritual beliefs: [] Very [x] Somewhat [] Not   Psychological Distress: mild  Does patient understand diagnosis/treatment? not asked  Does caregiver understand diagnosis/treatment? not asked      Assessment        REVIEW OF SYSTEMS  Constitutional: no fever, no chills or weight loss  Eyes: no eye pain or blurred vision  ENT: no hearing loss, congestion, or difficulty swallowing   Respiratory: no wheezing, chest tightness, ++ shortness of breath   Cardiovascular: no chest pain or pressure, no palpitations, no diaphoresis   Gastrointestinal: no nausea, vomiting,abdominal pain, diarrhea or constipation, no melena   Genitourinary: no dysuria, frequency,hematuria , or nocturia   Musculoskeletal: no myalgias or arthralgias, no back pain   Skin: no rashes or sores   Neurological: no focal weakness, numbness, tingling, or headache, no seizures    PHYSICAL ASSESSMENT:  Physical exam deferred due to preservation of PPE    Palliative Performance Scale:  ___60%  Ambulation reduced; Significant disease; Can't do hobbies/housework; intake normal or reduced; occasional assist; LOC full/confusion  ___50%  Mainly sit/lie; Extensive disease; Can't do any work; Considerable assist; intake normal or reduced; LOC full/confusion  ___40%  Mainly in bed; Extensive disease; Mainly assist; intake normal or reduced; LOC full/confusion   ___30%  Bed Bound; Extensive disease;  Total care; intake

## 2020-05-28 NOTE — FLOWSHEET NOTE
Patient death. Per nurse,  home selection on file with [facility] where he lives. Next of kin is estranged son. Healthcare Power of  names Mercy Ballard (ex wife) as decision maker. Release of body form begun but not completed.  made referral to evening  to complete Death Note when more information is available. Follow as needed/requested.

## 2020-05-28 NOTE — FLOWSHEET NOTE
NRB at 10 L   Pt with distress and anxiety  Dangling at the bedside  Assisted pt to return to bed  NRB 15 L   Spoke wit dr Emilio Bellamy  Ms 4 mg iv for comfort  Refusing breakfast  Positioned for comfort  Prayed with patient for his comfort  DNRCC--------COMFORT

## 2020-05-28 NOTE — PROGRESS NOTES
CREATION Left     antecubital, was revised one time    HIP SURGERY Right     deep laceration was repaired    INTRACAPSULAR CATARACT EXTRACTION Right 5/7/2019    EYE CATARACT EMULSIFICATION IOL IMPLANT performed by Patel Romero MD at [address] [place] [address] Left 5/28/2019    EYE CATARACT EMULSIFICATION IOL IMPLANT performed by Patel Romero MD at [facility]. Left     ear.     SKIN GRAFT      right axilla    THYROID SURGERY      non-cancerous lump removed    TOTAL NEPHRECTOMY Right     as a donor    TUNNELED VENOUS CATHETER PLACEMENT Right 09/03/2016    later removed    TURP         Medications:      amiodarone  200 mg Oral BID    fentanNYL  100 mcg Intravenous Once    sodium chloride flush  10 mL Intravenous 2 times per day    sodium chloride flush  10 mL Intravenous 2 times per day    insulin lispro  0-6 Units Subcutaneous TID WC    insulin lispro  0-3 Units Subcutaneous Nightly    pantoprazole  40 mg Oral QAM AC    finasteride  5 mg Oral Daily    fluticasone  1 spray Nasal Daily    [Held by provider] isosorbide mononitrate  60 mg Oral Daily    sevelamer  2,400 mg Oral TID WC    senna  1 tablet Oral BID    escitalopram  10 mg Oral QAM    aspirin  81 mg Oral Daily    lidocaine   Topical Once per day on Mon Wed Fri    lactulose  10 g Oral Daily    insulin glargine  10 Units Subcutaneous BID    budesonide-formoterol  2 puff Inhalation BID    cetirizine  5 mg Oral Daily    apixaban  5 mg Oral BID    lidocaine  1 patch Transdermal Daily    calcium acetate  2,001 mg Oral TID WC    methylPREDNISolone  4 mg Oral QPM    zinc sulfate  50 mg Oral Daily    fentaNYL  1 patch Transdermal Q72H    metoprolol tartrate  50 mg Oral BID    miconazole   Topical BID    promethazine  12.5 mg Intramuscular Once       Social History:     Social History     Socioeconomic History    Marital status: Single     Spouse name: Not on file    Number of children: Not on file    Years of education: Not on file    Highest education level: Not on file   Occupational History    Not on file   Social Needs    Financial resource strain: Not on file    Food insecurity     Worry: Not on file     Inability: Not on file    Transportation needs     Medical: Not on file     Non-medical: Not on file   Tobacco Use    Smoking status: Former Smoker     Packs/day: 1.00     Types: Cigarettes     Last attempt to quit: 3/1/2019     Years since quittin.2    Smokeless tobacco: Never Used   Substance and Sexual Activity    Alcohol use: No    Drug use: No    Sexual activity: Not Currently   Lifestyle    Physical activity     Days per week: Not on file     Minutes per session: Not on file    Stress: Not on file   Relationships    Social connections     Talks on phone: Not on file     Gets together: Not on file     Attends Jainism service: Not on file     Active member of club or organization: Not on file     Attends meetings of clubs or organizations: Not on file     Relationship status: Not on file    Intimate partner violence     Fear of current or ex partner: Not on file     Emotionally abused: Not on file     Physically abused: Not on file     Forced sexual activity: Not on file   Other Topics Concern    Not on file   Social History Narrative    Not on file       Family History:     Family History   Family history unknown: Yes        Allergies:   Cymbalta [duloxetine hcl]; Tromethamine; and Toradol [ketorolac tromethamine]     Review of Systems:     Review of Systems   Constitutional: Positive for activity change, appetite change and fatigue. HENT: Negative for congestion. Eyes: Negative for redness. Respiratory: Positive for cough and shortness of breath. Cardiovascular: Positive for leg swelling. Gastrointestinal: Positive for nausea. Negative for abdominal distention. Endocrine: Negative for polydipsia. Genitourinary: Negative for dysuria. MONOPCT 3  --  2*     BMP:  Recent Labs     05/26/20  0439 05/27/20  0424    137   K 4.5 5.2   CL 96* 95*   CO2 22 20   BUN 79* 90*   CREATININE 7.32* 8.84*     Hepatic Function Panel:   Recent Labs     05/25/20  1322 05/26/20  0439 05/27/20  0424   PROT 5.1* 5.1* 5.2*   LABALBU 2.9* 3.0* 3.0*   BILIDIR 0.18  --   --    IBILI 0.20  --   --    BILITOT 0.38 0.38 0.38   ALKPHOS 144* 137* 184*   ALT 31 27 29   AST 25 27 36     No results for input(s): RPR in the last 72 hours. No results for input(s): HIV in the last 72 hours. No results for input(s): BC in the last 72 hours. Lab Results   Component Value Date    CREATININE 8.84 05/27/2020    GLUCOSE 92 05/27/2020    GLUCOSE 135 06/01/2012       Detailed results: Thank you for allowing us to participate in the care of this patient. Please call with questions. This note is created with the assistance of a speech recognition program.  While intending to generate adocument that actually reflects the content of the visit, the document can still have some errors including those of syntax and sound a like substitutions which may escape proof reading. It such instances, actual meaningcan be extrapolated by contextual diversion.     Emmy Roberts MD  Office: [phone]  Perfect serve / office [phone]

## 2020-05-28 NOTE — DISCHARGE SUMMARY
Lab Results   Component Value Date    ALT 29 05/27/2020    AST 36 05/27/2020    ALKPHOS 184 (H) 05/27/2020    BILITOT 0.38 05/27/2020     Lab Results   Component Value Date    TSH 3.08 03/09/2020     Lab Results   Component Value Date    HEPAIGM NONREACTIVE 09/02/2016    HEPBIGM NONREACTIVE 01/12/2019    HEPBCAB NONREACTIVE 01/02/2020    HEPCAB NONREACTIVE 05/18/2020     Lab Results   Component Value Date    COLORU DARK YELLOW 03/02/2020    NITRU NEGATIVE 03/02/2020    GLUCOSEU NEGATIVE 03/02/2020    GLUCOSEU NEGATIVE 08/26/2011    KETUA NEGATIVE 03/02/2020    UROBILINOGEN Normal 03/02/2020    BILIRUBINUR  03/02/2020     Presumptive positive. Unable to confirm due to unavailability of reagent. BILIRUBINUR NEGATIVE 08/26/2011     Lab Results   Component Value Date    LABA1C 4.7 01/12/2019     Lab Results   Component Value Date    EAG 88 01/12/2019     Lab Results   Component Value Date    INR 1.0 05/19/2020    INR 1.4 05/18/2020    INR 1.0 03/12/2020    PROTIME 10.9 05/19/2020    PROTIME 16.9 (H) 05/18/2020    PROTIME 13.2 03/12/2020       Xr Chest Portable    Result Date: 5/26/2020  Worsening bilateral airspace disease. Xr Chest Portable    Result Date: 5/25/2020  Marked interval worsening of bilateral airspace disease. Stable cardiomegaly. Ct Chest Pulmonary Embolism W Contrast    Result Date: 5/24/2020  1. Suboptimal evaluation of the segmental, subsegmental and more peripheral pulmonary arteries in the bilateral lower lobes due to motion artifact. Given this, no obvious acute pulmonary thromboembolic disease. Prominent main pulmonary artery suggests pulmonary hypertension. 2.  Compared to CT chest from March 9, 2020 there is progression of multifocal bilateral heterogeneous pulmonary opacities with crazy paving pattern, most pronounced in the upper lobes. Commonly reported imaging features of COVID-19 pneumonia are present.   Other processes such as influenza pneumonia and organizing pneumonia, as

## 2020-05-30 LAB
CULTURE: NORMAL
CULTURE: NORMAL
Lab: NORMAL
Lab: NORMAL
SPECIMEN DESCRIPTION: NORMAL
SPECIMEN DESCRIPTION: NORMAL

## 2020-05-31 LAB
CULTURE: NORMAL
CULTURE: NORMAL
Lab: NORMAL
Lab: NORMAL
SPECIMEN DESCRIPTION: NORMAL
SPECIMEN DESCRIPTION: NORMAL

## (undated) DEVICE — SOLUTION IRRIGATION BAL SALT SOLUTION 500 ML STRL BSS

## (undated) DEVICE — COVER,MAYO STAND,STERILE: Brand: MEDLINE

## (undated) DEVICE — Z DISCONTINUED BY MEDLINE USE 2711682 TRAY SKIN PREP DRY W/ PREM GLV

## (undated) DEVICE — PREP SOL PVP IODINE 4%  4 OZ/BTL

## (undated) DEVICE — GAUZE,SPONGE,4"X4",16PLY,XRAY,STRL,LF: Brand: MEDLINE

## (undated) DEVICE — TOWEL,OR,DSP,ST,WHITE,DLX,2/PK,40PK/CS: Brand: MEDLINE

## (undated) DEVICE — SURGICAL PROCEDURE PACK LT EYE CUST ST CHARLES STRL LF DISP

## (undated) DEVICE — TOWEL,OR,DSP,ST,BLUE,STD,6/PK,12PK/CS: Brand: MEDLINE

## (undated) DEVICE — SHIELD EYE PLAS CATRCT PT CARE

## (undated) DEVICE — GLOVE SURG SZ 75 STD WHT LTX SYN POLYMER BEAD REINF ANTI RL

## (undated) DEVICE — GOWN,AURORA,NONREINFORCED,LARGE: Brand: MEDLINE

## (undated) DEVICE — LABEL MED EYE STRL

## (undated) DEVICE — THE MONARCH® "D" CARTRIDGE IS A SINGLE-USE POLYPROPYLENE CARTRIDGE FOR POSTERIOR CHAMBER IOL DELIVERY: Brand: MONARCH® III